# Patient Record
Sex: MALE | Race: WHITE | Employment: OTHER | URBAN - METROPOLITAN AREA
[De-identification: names, ages, dates, MRNs, and addresses within clinical notes are randomized per-mention and may not be internally consistent; named-entity substitution may affect disease eponyms.]

---

## 2017-05-22 ENCOUNTER — HOSPITAL ENCOUNTER (OUTPATIENT)
Dept: WOUND CARE | Age: 57
Discharge: HOME | End: 2017-05-22
Attending: PODIATRIST | Admitting: PODIATRIST

## 2017-05-22 VITALS
DIASTOLIC BLOOD PRESSURE: 62 MMHG | HEIGHT: 70 IN | HEART RATE: 87 BPM | SYSTOLIC BLOOD PRESSURE: 108 MMHG | BODY MASS INDEX: 25.03 KG/M2 | TEMPERATURE: 97.6 F | RESPIRATION RATE: 20 BRPM | WEIGHT: 174.8 LBS

## 2017-05-22 ASSESSMENT — PAIN SCALES - GENERAL: PAINLEVEL_OUTOF10: 0

## 2017-06-05 ENCOUNTER — HOSPITAL ENCOUNTER (OUTPATIENT)
Dept: WOUND CARE | Age: 57
Discharge: HOME | End: 2017-06-05
Attending: PODIATRIST | Admitting: PODIATRIST

## 2017-06-05 VITALS
DIASTOLIC BLOOD PRESSURE: 84 MMHG | SYSTOLIC BLOOD PRESSURE: 134 MMHG | HEART RATE: 78 BPM | TEMPERATURE: 98.1 F | RESPIRATION RATE: 20 BRPM | HEIGHT: 70 IN

## 2017-06-05 ASSESSMENT — PAIN SCALES - GENERAL: PAINLEVEL_OUTOF10: 4

## 2017-06-05 ASSESSMENT — PAIN DESCRIPTION - PROGRESSION: CLINICAL_PROGRESSION: NOT CHANGED

## 2017-06-05 ASSESSMENT — PAIN DESCRIPTION - ORIENTATION: ORIENTATION: LEFT;RIGHT

## 2017-06-05 ASSESSMENT — PAIN DESCRIPTION - PAIN TYPE: TYPE: CHRONIC PAIN

## 2017-06-05 ASSESSMENT — PAIN DESCRIPTION - LOCATION: LOCATION: FOOT

## 2017-06-05 ASSESSMENT — PAIN DESCRIPTION - ONSET: ONSET: ON-GOING

## 2017-06-05 ASSESSMENT — PAIN DESCRIPTION - FREQUENCY: FREQUENCY: INTERMITTENT

## 2017-06-05 ASSESSMENT — PAIN DESCRIPTION - DESCRIPTORS: DESCRIPTORS: ACHING

## 2017-06-12 ENCOUNTER — HOSPITAL ENCOUNTER (OUTPATIENT)
Dept: WOUND CARE | Age: 57
Discharge: HOME | End: 2017-06-12
Attending: PODIATRIST | Admitting: PODIATRIST

## 2017-06-12 VITALS
SYSTOLIC BLOOD PRESSURE: 157 MMHG | HEART RATE: 73 BPM | WEIGHT: 178 LBS | TEMPERATURE: 97.7 F | RESPIRATION RATE: 18 BRPM | DIASTOLIC BLOOD PRESSURE: 94 MMHG | HEIGHT: 70 IN | BODY MASS INDEX: 25.48 KG/M2

## 2017-06-12 LAB
GLUCOSE BLD-MCNC: 126 MG/DL (ref 70–99)
PERFORMED ON: ABNORMAL

## 2017-06-19 ENCOUNTER — HOSPITAL ENCOUNTER (OUTPATIENT)
Dept: WOUND CARE | Age: 57
Discharge: HOME | End: 2017-06-19
Attending: PODIATRIST | Admitting: PODIATRIST

## 2017-06-19 VITALS
BODY MASS INDEX: 25.6 KG/M2 | RESPIRATION RATE: 18 BRPM | HEIGHT: 70 IN | DIASTOLIC BLOOD PRESSURE: 76 MMHG | HEART RATE: 83 BPM | SYSTOLIC BLOOD PRESSURE: 116 MMHG | TEMPERATURE: 96.7 F | WEIGHT: 178.8 LBS

## 2017-07-27 ENCOUNTER — HOSPITAL ENCOUNTER (OUTPATIENT)
Dept: WOUND CARE | Age: 57
Discharge: HOME | End: 2017-07-27
Attending: SURGERY | Admitting: SURGERY

## 2017-07-27 VITALS
HEIGHT: 69 IN | WEIGHT: 182.6 LBS | TEMPERATURE: 97.8 F | RESPIRATION RATE: 18 BRPM | BODY MASS INDEX: 27.05 KG/M2 | HEART RATE: 80 BPM | DIASTOLIC BLOOD PRESSURE: 83 MMHG | SYSTOLIC BLOOD PRESSURE: 135 MMHG

## 2017-07-27 DIAGNOSIS — E11.621 DIABETIC ULCER OF RIGHT FOOT ASSOCIATED WITH TYPE 2 DIABETES MELLITUS (HCC): Primary | ICD-10-CM

## 2017-07-27 DIAGNOSIS — L97.519 DIABETIC ULCER OF RIGHT FOOT ASSOCIATED WITH TYPE 2 DIABETES MELLITUS (HCC): Primary | ICD-10-CM

## 2017-08-17 ENCOUNTER — HOSPITAL ENCOUNTER (OUTPATIENT)
Dept: WOUND CARE | Age: 57
Discharge: HOME | End: 2017-08-17
Attending: SURGERY | Admitting: SURGERY

## 2017-08-24 ENCOUNTER — HOSPITAL ENCOUNTER (OUTPATIENT)
Dept: WOUND CARE | Age: 57
Discharge: HOME | End: 2017-08-24
Attending: SURGERY | Admitting: SURGERY

## 2017-09-12 PROBLEM — E87.5 HYPERKALEMIA: Status: ACTIVE | Noted: 2017-09-12

## 2017-09-12 PROBLEM — J18.9 HCAP (HEALTHCARE-ASSOCIATED PNEUMONIA): Status: ACTIVE | Noted: 2017-09-12

## 2017-09-12 PROBLEM — R65.20 SEVERE SEPSIS (HCC): Status: ACTIVE | Noted: 2017-09-12

## 2017-09-12 PROBLEM — E44.1 MILD PROTEIN-CALORIE MALNUTRITION (HCC): Chronic | Status: ACTIVE | Noted: 2017-09-12

## 2017-09-12 PROBLEM — N32.89 BLADDER DISTENSION: Status: ACTIVE | Noted: 2017-09-12

## 2017-09-12 PROBLEM — A41.9 SEVERE SEPSIS (HCC): Status: ACTIVE | Noted: 2017-09-12

## 2017-09-12 PROBLEM — L97.309 DIABETIC ULCER OF ANKLE (HCC): Chronic | Status: ACTIVE | Noted: 2017-09-12

## 2017-09-12 PROBLEM — R04.2 HEMOPTYSIS: Status: ACTIVE | Noted: 2017-09-12

## 2017-09-12 PROBLEM — E11.622 DIABETIC ULCER OF ANKLE (HCC): Chronic | Status: ACTIVE | Noted: 2017-09-12

## 2017-09-12 PROBLEM — N17.9 AKI (ACUTE KIDNEY INJURY) (HCC): Status: ACTIVE | Noted: 2017-09-12

## 2017-09-12 PROBLEM — G89.29 CHRONIC PAIN: Chronic | Status: ACTIVE | Noted: 2017-09-12

## 2017-12-28 PROBLEM — J18.9 HCAP (HEALTHCARE-ASSOCIATED PNEUMONIA): Status: RESOLVED | Noted: 2017-09-12 | Resolved: 2017-12-28

## 2017-12-28 PROBLEM — R65.20 SEVERE SEPSIS (HCC): Status: RESOLVED | Noted: 2017-09-12 | Resolved: 2017-12-28

## 2017-12-28 PROBLEM — E87.20 LACTIC ACIDOSIS: Status: ACTIVE | Noted: 2017-12-28

## 2017-12-28 PROBLEM — A41.9 SEVERE SEPSIS (HCC): Status: RESOLVED | Noted: 2017-09-12 | Resolved: 2017-12-28

## 2017-12-28 PROBLEM — R04.2 HEMOPTYSIS: Status: RESOLVED | Noted: 2017-09-12 | Resolved: 2017-12-28

## 2017-12-28 PROBLEM — N32.89 BLADDER DISTENSION: Status: RESOLVED | Noted: 2017-09-12 | Resolved: 2017-12-28

## 2017-12-28 PROBLEM — R65.10 SIRS (SYSTEMIC INFLAMMATORY RESPONSE SYNDROME) (HCC): Status: ACTIVE | Noted: 2017-09-12

## 2017-12-29 PROBLEM — E11.10 DKA, TYPE 2, NOT AT GOAL (HCC): Status: ACTIVE | Noted: 2017-12-29

## 2017-12-30 PROBLEM — N17.9 AKI (ACUTE KIDNEY INJURY) (HCC): Status: RESOLVED | Noted: 2017-09-12 | Resolved: 2017-12-30

## 2017-12-30 PROBLEM — R65.10 SIRS (SYSTEMIC INFLAMMATORY RESPONSE SYNDROME) (HCC): Status: RESOLVED | Noted: 2017-09-12 | Resolved: 2017-12-30

## 2017-12-30 PROBLEM — E87.5 HYPERKALEMIA: Status: RESOLVED | Noted: 2017-09-12 | Resolved: 2017-12-30

## 2017-12-30 PROBLEM — E11.10 DKA, TYPE 2, NOT AT GOAL (HCC): Status: RESOLVED | Noted: 2017-12-29 | Resolved: 2017-12-30

## 2017-12-30 PROBLEM — E87.20 LACTIC ACIDOSIS: Status: RESOLVED | Noted: 2017-12-28 | Resolved: 2017-12-30

## 2018-12-27 ENCOUNTER — APPOINTMENT (OUTPATIENT)
Dept: GENERAL RADIOLOGY | Age: 58
DRG: 617 | End: 2018-12-27
Payer: COMMERCIAL

## 2018-12-27 ENCOUNTER — HOSPITAL ENCOUNTER (INPATIENT)
Age: 58
LOS: 3 days | Discharge: HOME | DRG: 617 | End: 2018-12-30
Attending: EMERGENCY MEDICINE | Admitting: INTERNAL MEDICINE
Payer: COMMERCIAL

## 2018-12-27 DIAGNOSIS — L03.116 LEFT LEG CELLULITIS: ICD-10-CM

## 2018-12-27 DIAGNOSIS — M86.171 OTHER ACUTE OSTEOMYELITIS OF RIGHT FOOT (HCC): Primary | ICD-10-CM

## 2018-12-27 PROBLEM — L03.90 CELLULITIS: Status: ACTIVE | Noted: 2018-12-27

## 2018-12-27 LAB
A/G RATIO: 1 (ref 1.1–2.2)
ALBUMIN SERPL-MCNC: 3.6 G/DL (ref 3.4–5)
ALP BLD-CCNC: 85 U/L (ref 40–129)
ALT SERPL-CCNC: 7 U/L (ref 10–40)
ANION GAP SERPL CALCULATED.3IONS-SCNC: 9 MMOL/L (ref 3–16)
AST SERPL-CCNC: 12 U/L (ref 15–37)
BASOPHILS ABSOLUTE: 0 K/UL (ref 0–0.2)
BASOPHILS RELATIVE PERCENT: 0.5 %
BILIRUB SERPL-MCNC: 0.8 MG/DL (ref 0–1)
BUN BLDV-MCNC: 12 MG/DL (ref 7–20)
CALCIUM SERPL-MCNC: 8.3 MG/DL (ref 8.3–10.6)
CHLORIDE BLD-SCNC: 98 MMOL/L (ref 99–110)
CO2: 28 MMOL/L (ref 21–32)
CREAT SERPL-MCNC: <0.5 MG/DL (ref 0.8–1.3)
EOSINOPHILS ABSOLUTE: 0.1 K/UL (ref 0–0.6)
EOSINOPHILS RELATIVE PERCENT: 1.3 %
GFR AFRICAN AMERICAN: >60
GFR NON-AFRICAN AMERICAN: >60
GLOBULIN: 3.5 G/DL
GLUCOSE BLD-MCNC: 187 MG/DL (ref 70–99)
GLUCOSE BLD-MCNC: 196 MG/DL (ref 70–99)
GLUCOSE BLD-MCNC: 238 MG/DL (ref 70–99)
HCT VFR BLD CALC: 35.2 % (ref 40.5–52.5)
HEMOGLOBIN: 11.1 G/DL (ref 13.5–17.5)
LACTIC ACID: 1.3 MMOL/L (ref 0.4–2)
LYMPHOCYTES ABSOLUTE: 1.2 K/UL (ref 1–5.1)
LYMPHOCYTES RELATIVE PERCENT: 13.2 %
MCH RBC QN AUTO: 24.7 PG (ref 26–34)
MCHC RBC AUTO-ENTMCNC: 31.5 G/DL (ref 31–36)
MCV RBC AUTO: 78.2 FL (ref 80–100)
MONOCYTES ABSOLUTE: 1.1 K/UL (ref 0–1.3)
MONOCYTES RELATIVE PERCENT: 11.8 %
NEUTROPHILS ABSOLUTE: 6.6 K/UL (ref 1.7–7.7)
NEUTROPHILS RELATIVE PERCENT: 73.2 %
PDW BLD-RTO: 14.1 % (ref 12.4–15.4)
PERFORMED ON: ABNORMAL
PERFORMED ON: ABNORMAL
PLATELET # BLD: 319 K/UL (ref 135–450)
PMV BLD AUTO: 8.5 FL (ref 5–10.5)
POTASSIUM SERPL-SCNC: 4 MMOL/L (ref 3.5–5.1)
RBC # BLD: 4.5 M/UL (ref 4.2–5.9)
SODIUM BLD-SCNC: 135 MMOL/L (ref 136–145)
TOTAL PROTEIN: 7.1 G/DL (ref 6.4–8.2)
WBC # BLD: 9.1 K/UL (ref 4–11)

## 2018-12-27 PROCEDURE — 83605 ASSAY OF LACTIC ACID: CPT

## 2018-12-27 PROCEDURE — 0Y6M0Z5 DETACHMENT AT RIGHT FOOT, COMPLETE 2ND RAY, OPEN APPROACH: ICD-10-PCS | Performed by: PODIATRIST

## 2018-12-27 PROCEDURE — 2580000003 HC RX 258: Performed by: PHYSICIAN ASSISTANT

## 2018-12-27 PROCEDURE — 96367 TX/PROPH/DG ADDL SEQ IV INF: CPT

## 2018-12-27 PROCEDURE — 85025 COMPLETE CBC W/AUTO DIFF WBC: CPT

## 2018-12-27 PROCEDURE — 6360000002 HC RX W HCPCS: Performed by: PHYSICIAN ASSISTANT

## 2018-12-27 PROCEDURE — 2580000003 HC RX 258

## 2018-12-27 PROCEDURE — 87070 CULTURE OTHR SPECIMN AEROBIC: CPT

## 2018-12-27 PROCEDURE — 87205 SMEAR GRAM STAIN: CPT

## 2018-12-27 PROCEDURE — 87186 SC STD MICRODIL/AGAR DIL: CPT

## 2018-12-27 PROCEDURE — 6370000000 HC RX 637 (ALT 250 FOR IP): Performed by: PHYSICIAN ASSISTANT

## 2018-12-27 PROCEDURE — 99223 1ST HOSP IP/OBS HIGH 75: CPT | Performed by: INTERNAL MEDICINE

## 2018-12-27 PROCEDURE — 36415 COLL VENOUS BLD VENIPUNCTURE: CPT

## 2018-12-27 PROCEDURE — 6370000000 HC RX 637 (ALT 250 FOR IP): Performed by: INTERNAL MEDICINE

## 2018-12-27 PROCEDURE — 83036 HEMOGLOBIN GLYCOSYLATED A1C: CPT

## 2018-12-27 PROCEDURE — 2500000003 HC RX 250 WO HCPCS: Performed by: PHYSICIAN ASSISTANT

## 2018-12-27 PROCEDURE — 99284 EMERGENCY DEPT VISIT MOD MDM: CPT

## 2018-12-27 PROCEDURE — 96375 TX/PRO/DX INJ NEW DRUG ADDON: CPT

## 2018-12-27 PROCEDURE — 80053 COMPREHEN METABOLIC PANEL: CPT

## 2018-12-27 PROCEDURE — 87040 BLOOD CULTURE FOR BACTERIA: CPT

## 2018-12-27 PROCEDURE — 87077 CULTURE AEROBIC IDENTIFY: CPT

## 2018-12-27 PROCEDURE — 1200000000 HC SEMI PRIVATE

## 2018-12-27 PROCEDURE — 0HRMXJZ REPLACEMENT OF RIGHT FOOT SKIN WITH SYNTHETIC SUBSTITUTE, EXTERNAL APPROACH: ICD-10-PCS | Performed by: PODIATRIST

## 2018-12-27 PROCEDURE — 96365 THER/PROPH/DIAG IV INF INIT: CPT

## 2018-12-27 PROCEDURE — 86403 PARTICLE AGGLUT ANTBDY SCRN: CPT

## 2018-12-27 PROCEDURE — 73660 X-RAY EXAM OF TOE(S): CPT

## 2018-12-27 RX ORDER — NICOTINE POLACRILEX 4 MG
15 LOZENGE BUCCAL PRN
Status: DISCONTINUED | OUTPATIENT
Start: 2018-12-27 | End: 2018-12-30 | Stop reason: HOSPADM

## 2018-12-27 RX ORDER — GLIMEPIRIDE 4 MG/1
4 TABLET ORAL 2 TIMES DAILY
Status: ON HOLD | COMMUNITY
End: 2019-02-01 | Stop reason: HOSPADM

## 2018-12-27 RX ORDER — GABAPENTIN 300 MG/1
600 CAPSULE ORAL 3 TIMES DAILY
Status: DISCONTINUED | OUTPATIENT
Start: 2018-12-27 | End: 2018-12-27

## 2018-12-27 RX ORDER — POTASSIUM CHLORIDE 20MEQ/15ML
40 LIQUID (ML) ORAL PRN
Status: DISCONTINUED | OUTPATIENT
Start: 2018-12-27 | End: 2018-12-27

## 2018-12-27 RX ORDER — POTASSIUM CHLORIDE 20 MEQ/1
40 TABLET, EXTENDED RELEASE ORAL PRN
Status: DISCONTINUED | OUTPATIENT
Start: 2018-12-27 | End: 2018-12-30 | Stop reason: HOSPADM

## 2018-12-27 RX ORDER — ACETAMINOPHEN 325 MG/1
650 TABLET ORAL EVERY 4 HOURS PRN
Status: DISCONTINUED | OUTPATIENT
Start: 2018-12-27 | End: 2018-12-30 | Stop reason: HOSPADM

## 2018-12-27 RX ORDER — 0.9 % SODIUM CHLORIDE 0.9 %
1000 INTRAVENOUS SOLUTION INTRAVENOUS ONCE
Status: COMPLETED | OUTPATIENT
Start: 2018-12-27 | End: 2018-12-27

## 2018-12-27 RX ORDER — SODIUM CHLORIDE 9 MG/ML
INJECTION, SOLUTION INTRAVENOUS CONTINUOUS
Status: DISCONTINUED | OUTPATIENT
Start: 2018-12-27 | End: 2018-12-29

## 2018-12-27 RX ORDER — GABAPENTIN 300 MG/1
600 CAPSULE ORAL 2 TIMES DAILY
Status: DISCONTINUED | OUTPATIENT
Start: 2018-12-27 | End: 2018-12-30 | Stop reason: HOSPADM

## 2018-12-27 RX ORDER — ONDANSETRON 2 MG/ML
4 INJECTION INTRAMUSCULAR; INTRAVENOUS EVERY 6 HOURS PRN
Status: DISCONTINUED | OUTPATIENT
Start: 2018-12-27 | End: 2018-12-30 | Stop reason: HOSPADM

## 2018-12-27 RX ORDER — POTASSIUM CHLORIDE 7.45 MG/ML
10 INJECTION INTRAVENOUS PRN
Status: DISCONTINUED | OUTPATIENT
Start: 2018-12-27 | End: 2018-12-30 | Stop reason: HOSPADM

## 2018-12-27 RX ORDER — GUAIFENESIN/DEXTROMETHORPHAN 100-10MG/5
5 SYRUP ORAL EVERY 4 HOURS PRN
Status: DISCONTINUED | OUTPATIENT
Start: 2018-12-27 | End: 2018-12-30 | Stop reason: HOSPADM

## 2018-12-27 RX ORDER — MAGNESIUM SULFATE 1 G/100ML
1 INJECTION INTRAVENOUS PRN
Status: DISCONTINUED | OUTPATIENT
Start: 2018-12-27 | End: 2018-12-30 | Stop reason: HOSPADM

## 2018-12-27 RX ORDER — CLINDAMYCIN PHOSPHATE 600 MG/50ML
600 INJECTION INTRAVENOUS ONCE
Status: COMPLETED | OUTPATIENT
Start: 2018-12-27 | End: 2018-12-27

## 2018-12-27 RX ORDER — VENLAFAXINE HYDROCHLORIDE 75 MG/1
75 CAPSULE, EXTENDED RELEASE ORAL
Status: DISCONTINUED | OUTPATIENT
Start: 2018-12-28 | End: 2018-12-30 | Stop reason: HOSPADM

## 2018-12-27 RX ORDER — SODIUM CHLORIDE 0.9 % (FLUSH) 0.9 %
10 SYRINGE (ML) INJECTION EVERY 12 HOURS SCHEDULED
Status: DISCONTINUED | OUTPATIENT
Start: 2018-12-27 | End: 2018-12-30 | Stop reason: HOSPADM

## 2018-12-27 RX ORDER — DEXTROSE MONOHYDRATE 25 G/50ML
12.5 INJECTION, SOLUTION INTRAVENOUS PRN
Status: DISCONTINUED | OUTPATIENT
Start: 2018-12-27 | End: 2018-12-30 | Stop reason: HOSPADM

## 2018-12-27 RX ORDER — SODIUM CHLORIDE 0.9 % (FLUSH) 0.9 %
10 SYRINGE (ML) INJECTION PRN
Status: DISCONTINUED | OUTPATIENT
Start: 2018-12-27 | End: 2018-12-30 | Stop reason: HOSPADM

## 2018-12-27 RX ORDER — SODIUM CHLORIDE 9 MG/ML
INJECTION, SOLUTION INTRAVENOUS
Status: COMPLETED
Start: 2018-12-27 | End: 2018-12-27

## 2018-12-27 RX ORDER — DEXTROSE MONOHYDRATE 50 MG/ML
100 INJECTION, SOLUTION INTRAVENOUS PRN
Status: DISCONTINUED | OUTPATIENT
Start: 2018-12-27 | End: 2018-12-30 | Stop reason: HOSPADM

## 2018-12-27 RX ORDER — KETOROLAC TROMETHAMINE 30 MG/ML
30 INJECTION, SOLUTION INTRAMUSCULAR; INTRAVENOUS ONCE
Status: COMPLETED | OUTPATIENT
Start: 2018-12-27 | End: 2018-12-27

## 2018-12-27 RX ADMIN — CLINDAMYCIN IN 5 PERCENT DEXTROSE 600 MG: 12 INJECTION, SOLUTION INTRAVENOUS at 14:13

## 2018-12-27 RX ADMIN — GABAPENTIN 600 MG: 300 CAPSULE ORAL at 20:46

## 2018-12-27 RX ADMIN — KETOROLAC TROMETHAMINE 30 MG: 30 INJECTION, SOLUTION INTRAMUSCULAR at 14:13

## 2018-12-27 RX ADMIN — ACETAMINOPHEN 650 MG: 325 TABLET ORAL at 20:46

## 2018-12-27 RX ADMIN — PIPERACILLIN SODIUM AND TAZOBACTAM SODIUM 3.38 G: 3; .375 INJECTION, POWDER, LYOPHILIZED, FOR SOLUTION INTRAVENOUS at 17:42

## 2018-12-27 RX ADMIN — INSULIN LISPRO 1 UNITS: 100 INJECTION, SOLUTION INTRAVENOUS; SUBCUTANEOUS at 22:01

## 2018-12-27 RX ADMIN — VANCOMYCIN HYDROCHLORIDE 1.5 G: 1 INJECTION, POWDER, LYOPHILIZED, FOR SOLUTION INTRAVENOUS at 15:23

## 2018-12-27 RX ADMIN — Medication 10 ML: at 20:46

## 2018-12-27 RX ADMIN — SODIUM CHLORIDE 1000 ML: 9 INJECTION, SOLUTION INTRAVENOUS at 14:13

## 2018-12-27 RX ADMIN — SODIUM CHLORIDE 250 ML: 9 INJECTION, SOLUTION INTRAVENOUS at 17:41

## 2018-12-27 RX ADMIN — SODIUM CHLORIDE: 9 INJECTION, SOLUTION INTRAVENOUS at 17:38

## 2018-12-27 RX ADMIN — GUAIFENESIN AND DEXTROMETHORPHAN 5 ML: 100; 10 SYRUP ORAL at 17:45

## 2018-12-27 RX ADMIN — ENOXAPARIN SODIUM 40 MG: 100 INJECTION SUBCUTANEOUS at 17:44

## 2018-12-27 RX ADMIN — PIPERACILLIN SODIUM AND TAZOBACTAM SODIUM 3.38 G: 3; .375 INJECTION, POWDER, LYOPHILIZED, FOR SOLUTION INTRAVENOUS at 23:08

## 2018-12-27 RX ADMIN — INSULIN LISPRO 1 UNITS: 100 INJECTION, SOLUTION INTRAVENOUS; SUBCUTANEOUS at 17:44

## 2018-12-27 ASSESSMENT — ENCOUNTER SYMPTOMS
SHORTNESS OF BREATH: 0
COLOR CHANGE: 1
RHINORRHEA: 0
COUGH: 1
CHEST TIGHTNESS: 0

## 2018-12-27 ASSESSMENT — PAIN SCALES - GENERAL
PAINLEVEL_OUTOF10: 0
PAINLEVEL_OUTOF10: 2

## 2018-12-28 ENCOUNTER — APPOINTMENT (OUTPATIENT)
Dept: GENERAL RADIOLOGY | Age: 58
DRG: 617 | End: 2018-12-28
Payer: COMMERCIAL

## 2018-12-28 ENCOUNTER — ANESTHESIA (OUTPATIENT)
Dept: OPERATING ROOM | Age: 58
DRG: 617 | End: 2018-12-28
Payer: COMMERCIAL

## 2018-12-28 ENCOUNTER — ANESTHESIA EVENT (OUTPATIENT)
Dept: OPERATING ROOM | Age: 58
DRG: 617 | End: 2018-12-28
Payer: COMMERCIAL

## 2018-12-28 VITALS — DIASTOLIC BLOOD PRESSURE: 61 MMHG | OXYGEN SATURATION: 98 % | SYSTOLIC BLOOD PRESSURE: 103 MMHG

## 2018-12-28 LAB
ANION GAP SERPL CALCULATED.3IONS-SCNC: 3 MMOL/L (ref 3–16)
BASOPHILS ABSOLUTE: 0.1 K/UL (ref 0–0.2)
BASOPHILS RELATIVE PERCENT: 0.6 %
BUN BLDV-MCNC: 16 MG/DL (ref 7–20)
CALCIUM SERPL-MCNC: 8.2 MG/DL (ref 8.3–10.6)
CHLORIDE BLD-SCNC: 100 MMOL/L (ref 99–110)
CO2: 28 MMOL/L (ref 21–32)
CREAT SERPL-MCNC: 1.3 MG/DL (ref 0.8–1.3)
EOSINOPHILS ABSOLUTE: 0.2 K/UL (ref 0–0.6)
EOSINOPHILS RELATIVE PERCENT: 2.2 %
ESTIMATED AVERAGE GLUCOSE: 309.2 MG/DL
GFR AFRICAN AMERICAN: >60
GFR NON-AFRICAN AMERICAN: 56
GLUCOSE BLD-MCNC: 122 MG/DL (ref 70–99)
GLUCOSE BLD-MCNC: 197 MG/DL (ref 70–99)
GLUCOSE BLD-MCNC: 199 MG/DL (ref 70–99)
GLUCOSE BLD-MCNC: 284 MG/DL (ref 70–99)
GLUCOSE BLD-MCNC: 294 MG/DL (ref 70–99)
GLUCOSE BLD-MCNC: 356 MG/DL (ref 70–99)
HBA1C MFR BLD: 12.4 %
HCT VFR BLD CALC: 30.5 % (ref 40.5–52.5)
HEMOGLOBIN: 9.7 G/DL (ref 13.5–17.5)
LYMPHOCYTES ABSOLUTE: 1 K/UL (ref 1–5.1)
LYMPHOCYTES RELATIVE PERCENT: 11.5 %
MCH RBC QN AUTO: 25 PG (ref 26–34)
MCHC RBC AUTO-ENTMCNC: 31.8 G/DL (ref 31–36)
MCV RBC AUTO: 78.7 FL (ref 80–100)
MONOCYTES ABSOLUTE: 0.9 K/UL (ref 0–1.3)
MONOCYTES RELATIVE PERCENT: 10.9 %
NEUTROPHILS ABSOLUTE: 6.2 K/UL (ref 1.7–7.7)
NEUTROPHILS RELATIVE PERCENT: 74.8 %
PDW BLD-RTO: 14.3 % (ref 12.4–15.4)
PERFORMED ON: ABNORMAL
PLATELET # BLD: 259 K/UL (ref 135–450)
PMV BLD AUTO: 8.5 FL (ref 5–10.5)
POTASSIUM REFLEX MAGNESIUM: 4.2 MMOL/L (ref 3.5–5.1)
RBC # BLD: 3.88 M/UL (ref 4.2–5.9)
SODIUM BLD-SCNC: 131 MMOL/L (ref 136–145)
WBC # BLD: 8.3 K/UL (ref 4–11)

## 2018-12-28 PROCEDURE — 7100000000 HC PACU RECOVERY - FIRST 15 MIN: Performed by: PODIATRIST

## 2018-12-28 PROCEDURE — 2580000003 HC RX 258: Performed by: PODIATRIST

## 2018-12-28 PROCEDURE — 6370000000 HC RX 637 (ALT 250 FOR IP): Performed by: PHYSICIAN ASSISTANT

## 2018-12-28 PROCEDURE — 2500000003 HC RX 250 WO HCPCS: Performed by: PODIATRIST

## 2018-12-28 PROCEDURE — 6360000002 HC RX W HCPCS: Performed by: PHYSICIAN ASSISTANT

## 2018-12-28 PROCEDURE — 85025 COMPLETE CBC W/AUTO DIFF WBC: CPT

## 2018-12-28 PROCEDURE — 2500000003 HC RX 250 WO HCPCS: Performed by: NURSE ANESTHETIST, CERTIFIED REGISTERED

## 2018-12-28 PROCEDURE — 2580000003 HC RX 258: Performed by: NURSE ANESTHETIST, CERTIFIED REGISTERED

## 2018-12-28 PROCEDURE — 80048 BASIC METABOLIC PNL TOTAL CA: CPT

## 2018-12-28 PROCEDURE — 3600000003 HC SURGERY LEVEL 3 BASE: Performed by: PODIATRIST

## 2018-12-28 PROCEDURE — 3700000000 HC ANESTHESIA ATTENDED CARE: Performed by: PODIATRIST

## 2018-12-28 PROCEDURE — 1200000000 HC SEMI PRIVATE

## 2018-12-28 PROCEDURE — 73620 X-RAY EXAM OF FOOT: CPT

## 2018-12-28 PROCEDURE — 88311 DECALCIFY TISSUE: CPT

## 2018-12-28 PROCEDURE — 2580000003 HC RX 258: Performed by: PHYSICIAN ASSISTANT

## 2018-12-28 PROCEDURE — 6360000002 HC RX W HCPCS: Performed by: NURSE ANESTHETIST, CERTIFIED REGISTERED

## 2018-12-28 PROCEDURE — 3600000013 HC SURGERY LEVEL 3 ADDTL 15MIN: Performed by: PODIATRIST

## 2018-12-28 PROCEDURE — 2709999900 HC NON-CHARGEABLE SUPPLY: Performed by: PODIATRIST

## 2018-12-28 PROCEDURE — 3700000001 HC ADD 15 MINUTES (ANESTHESIA): Performed by: PODIATRIST

## 2018-12-28 PROCEDURE — 6370000000 HC RX 637 (ALT 250 FOR IP): Performed by: INTERNAL MEDICINE

## 2018-12-28 PROCEDURE — 88305 TISSUE EXAM BY PATHOLOGIST: CPT

## 2018-12-28 PROCEDURE — 36415 COLL VENOUS BLD VENIPUNCTURE: CPT

## 2018-12-28 PROCEDURE — 7100000001 HC PACU RECOVERY - ADDTL 15 MIN: Performed by: PODIATRIST

## 2018-12-28 PROCEDURE — 99232 SBSQ HOSP IP/OBS MODERATE 35: CPT | Performed by: INTERNAL MEDICINE

## 2018-12-28 DEVICE — PATCH AMNION 2 LAYR PROTCT 4 X 4CM STERISHIELD II: Type: IMPLANTABLE DEVICE | Site: FOOT | Status: FUNCTIONAL

## 2018-12-28 RX ORDER — DIPHENHYDRAMINE HYDROCHLORIDE 50 MG/ML
12.5 INJECTION INTRAMUSCULAR; INTRAVENOUS
Status: DISCONTINUED | OUTPATIENT
Start: 2018-12-28 | End: 2018-12-28

## 2018-12-28 RX ORDER — INSULIN GLARGINE 100 [IU]/ML
10 INJECTION, SOLUTION SUBCUTANEOUS ONCE
Status: COMPLETED | OUTPATIENT
Start: 2018-12-28 | End: 2018-12-28

## 2018-12-28 RX ORDER — MORPHINE SULFATE 10 MG/ML
2 INJECTION, SOLUTION INTRAMUSCULAR; INTRAVENOUS EVERY 5 MIN PRN
Status: DISCONTINUED | OUTPATIENT
Start: 2018-12-28 | End: 2018-12-28

## 2018-12-28 RX ORDER — FENTANYL CITRATE 50 UG/ML
INJECTION, SOLUTION INTRAMUSCULAR; INTRAVENOUS PRN
Status: DISCONTINUED | OUTPATIENT
Start: 2018-12-28 | End: 2018-12-28 | Stop reason: SDUPTHER

## 2018-12-28 RX ORDER — MIDAZOLAM HYDROCHLORIDE 1 MG/ML
INJECTION INTRAMUSCULAR; INTRAVENOUS PRN
Status: DISCONTINUED | OUTPATIENT
Start: 2018-12-28 | End: 2018-12-28 | Stop reason: SDUPTHER

## 2018-12-28 RX ORDER — HYDROMORPHONE HCL 110MG/55ML
0.5 PATIENT CONTROLLED ANALGESIA SYRINGE INTRAVENOUS EVERY 5 MIN PRN
Status: DISCONTINUED | OUTPATIENT
Start: 2018-12-28 | End: 2018-12-28

## 2018-12-28 RX ORDER — MORPHINE SULFATE 10 MG/ML
1 INJECTION, SOLUTION INTRAMUSCULAR; INTRAVENOUS EVERY 5 MIN PRN
Status: DISCONTINUED | OUTPATIENT
Start: 2018-12-28 | End: 2018-12-28

## 2018-12-28 RX ORDER — NYSTATIN AND TRIAMCINOLONE ACETONIDE 100000; 1 [USP'U]/G; MG/G
OINTMENT TOPICAL 2 TIMES DAILY
Status: DISCONTINUED | OUTPATIENT
Start: 2018-12-28 | End: 2018-12-30 | Stop reason: HOSPADM

## 2018-12-28 RX ORDER — HYDROMORPHONE HCL 110MG/55ML
0.25 PATIENT CONTROLLED ANALGESIA SYRINGE INTRAVENOUS EVERY 5 MIN PRN
Status: DISCONTINUED | OUTPATIENT
Start: 2018-12-28 | End: 2018-12-28

## 2018-12-28 RX ORDER — PROMETHAZINE HYDROCHLORIDE 25 MG/ML
6.25 INJECTION, SOLUTION INTRAMUSCULAR; INTRAVENOUS
Status: DISCONTINUED | OUTPATIENT
Start: 2018-12-28 | End: 2018-12-28

## 2018-12-28 RX ORDER — MEPERIDINE HYDROCHLORIDE 25 MG/ML
12.5 INJECTION INTRAMUSCULAR; INTRAVENOUS; SUBCUTANEOUS EVERY 5 MIN PRN
Status: DISCONTINUED | OUTPATIENT
Start: 2018-12-28 | End: 2018-12-28

## 2018-12-28 RX ORDER — MAGNESIUM HYDROXIDE 1200 MG/15ML
LIQUID ORAL CONTINUOUS PRN
Status: COMPLETED | OUTPATIENT
Start: 2018-12-28 | End: 2018-12-28

## 2018-12-28 RX ORDER — SODIUM CHLORIDE, SODIUM LACTATE, POTASSIUM CHLORIDE, CALCIUM CHLORIDE 600; 310; 30; 20 MG/100ML; MG/100ML; MG/100ML; MG/100ML
INJECTION, SOLUTION INTRAVENOUS CONTINUOUS PRN
Status: DISCONTINUED | OUTPATIENT
Start: 2018-12-28 | End: 2018-12-28 | Stop reason: SDUPTHER

## 2018-12-28 RX ORDER — LIDOCAINE HYDROCHLORIDE 20 MG/ML
INJECTION, SOLUTION EPIDURAL; INFILTRATION; INTRACAUDAL; PERINEURAL PRN
Status: DISCONTINUED | OUTPATIENT
Start: 2018-12-28 | End: 2018-12-28 | Stop reason: SDUPTHER

## 2018-12-28 RX ORDER — LABETALOL HYDROCHLORIDE 5 MG/ML
5 INJECTION, SOLUTION INTRAVENOUS EVERY 10 MIN PRN
Status: DISCONTINUED | OUTPATIENT
Start: 2018-12-28 | End: 2018-12-28

## 2018-12-28 RX ORDER — ONDANSETRON 2 MG/ML
4 INJECTION INTRAMUSCULAR; INTRAVENOUS PRN
Status: DISCONTINUED | OUTPATIENT
Start: 2018-12-28 | End: 2018-12-28

## 2018-12-28 RX ORDER — HYDRALAZINE HYDROCHLORIDE 20 MG/ML
5 INJECTION INTRAMUSCULAR; INTRAVENOUS EVERY 10 MIN PRN
Status: DISCONTINUED | OUTPATIENT
Start: 2018-12-28 | End: 2018-12-28

## 2018-12-28 RX ORDER — PROPOFOL 10 MG/ML
INJECTION, EMULSION INTRAVENOUS PRN
Status: DISCONTINUED | OUTPATIENT
Start: 2018-12-28 | End: 2018-12-28 | Stop reason: SDUPTHER

## 2018-12-28 RX ADMIN — PIPERACILLIN SODIUM AND TAZOBACTAM SODIUM 3.38 G: 3; .375 INJECTION, POWDER, LYOPHILIZED, FOR SOLUTION INTRAVENOUS at 17:50

## 2018-12-28 RX ADMIN — PIPERACILLIN SODIUM AND TAZOBACTAM SODIUM 3.38 G: 3; .375 INJECTION, POWDER, LYOPHILIZED, FOR SOLUTION INTRAVENOUS at 09:18

## 2018-12-28 RX ADMIN — MIDAZOLAM 2 MG: 1 INJECTION INTRAMUSCULAR; INTRAVENOUS at 11:52

## 2018-12-28 RX ADMIN — INSULIN LISPRO 3 UNITS: 100 INJECTION, SOLUTION INTRAVENOUS; SUBCUTANEOUS at 09:25

## 2018-12-28 RX ADMIN — GUAIFENESIN AND DEXTROMETHORPHAN 5 ML: 100; 10 SYRUP ORAL at 09:18

## 2018-12-28 RX ADMIN — ENOXAPARIN SODIUM 40 MG: 100 INJECTION SUBCUTANEOUS at 09:18

## 2018-12-28 RX ADMIN — LIDOCAINE HYDROCHLORIDE 50 MG: 20 INJECTION, SOLUTION EPIDURAL; INFILTRATION; INTRACAUDAL; PERINEURAL at 11:57

## 2018-12-28 RX ADMIN — INSULIN LISPRO 2 UNITS: 100 INJECTION, SOLUTION INTRAVENOUS; SUBCUTANEOUS at 21:16

## 2018-12-28 RX ADMIN — SODIUM CHLORIDE, POTASSIUM CHLORIDE, SODIUM LACTATE AND CALCIUM CHLORIDE: 600; 310; 30; 20 INJECTION, SOLUTION INTRAVENOUS at 11:52

## 2018-12-28 RX ADMIN — VANCOMYCIN HYDROCHLORIDE 1250 MG: 1 INJECTION, POWDER, LYOPHILIZED, FOR SOLUTION INTRAVENOUS at 02:51

## 2018-12-28 RX ADMIN — GABAPENTIN 600 MG: 300 CAPSULE ORAL at 21:06

## 2018-12-28 RX ADMIN — NYSTATIN AND TRIAMCINOLONE ACETONIDE: 100000; 1 OINTMENT TOPICAL at 11:11

## 2018-12-28 RX ADMIN — FENTANYL CITRATE 50 MCG: 50 INJECTION INTRAMUSCULAR; INTRAVENOUS at 11:59

## 2018-12-28 RX ADMIN — Medication 10 ML: at 21:06

## 2018-12-28 RX ADMIN — ACETAMINOPHEN 650 MG: 325 TABLET ORAL at 16:16

## 2018-12-28 RX ADMIN — INSULIN LISPRO 1 UNITS: 100 INJECTION, SOLUTION INTRAVENOUS; SUBCUTANEOUS at 16:37

## 2018-12-28 RX ADMIN — PROPOFOL 80 MG: 10 INJECTION, EMULSION INTRAVENOUS at 11:53

## 2018-12-28 RX ADMIN — SODIUM CHLORIDE: 9 INJECTION, SOLUTION INTRAVENOUS at 16:17

## 2018-12-28 RX ADMIN — GABAPENTIN 600 MG: 300 CAPSULE ORAL at 09:19

## 2018-12-28 RX ADMIN — INSULIN GLARGINE 10 UNITS: 100 INJECTION, SOLUTION SUBCUTANEOUS at 11:11

## 2018-12-28 RX ADMIN — SODIUM CHLORIDE: 9 INJECTION, SOLUTION INTRAVENOUS at 02:50

## 2018-12-28 RX ADMIN — VENLAFAXINE HYDROCHLORIDE 75 MG: 75 CAPSULE, EXTENDED RELEASE ORAL at 09:19

## 2018-12-28 RX ADMIN — NYSTATIN AND TRIAMCINOLONE ACETONIDE: 100000; 1 OINTMENT TOPICAL at 21:10

## 2018-12-28 ASSESSMENT — PULMONARY FUNCTION TESTS
PIF_VALUE: 12
PIF_VALUE: 2
PIF_VALUE: 0
PIF_VALUE: 2
PIF_VALUE: 0
PIF_VALUE: 3
PIF_VALUE: 12
PIF_VALUE: 3
PIF_VALUE: 0
PIF_VALUE: 0
PIF_VALUE: 1
PIF_VALUE: 2
PIF_VALUE: 12
PIF_VALUE: 0
PIF_VALUE: 12
PIF_VALUE: 2
PIF_VALUE: 1
PIF_VALUE: 19
PIF_VALUE: 2
PIF_VALUE: 12
PIF_VALUE: 1
PIF_VALUE: 12
PIF_VALUE: 2
PIF_VALUE: 13
PIF_VALUE: 11
PIF_VALUE: 6
PIF_VALUE: 2
PIF_VALUE: 12

## 2018-12-28 ASSESSMENT — PAIN SCALES - GENERAL: PAINLEVEL_OUTOF10: 3

## 2018-12-28 NOTE — ANESTHESIA POSTPROCEDURE EVALUATION
Department of Anesthesiology  Postprocedure Note    Patient: Margaret Coleman  MRN: [id number]  YOB: 1960  Date of evaluation: 12/28/2018  Time:  2:15 PM     Procedure Summary     Date:  12/28/18 Room / Location:  [place] [address] / [facility] OR    Anesthesia Start:  1152 Anesthesia Stop:  0484 [id number] [phone] [id number]    Procedure:  PARTIAL RIGHT FOOT AMPUTATION WITH GRAFT APPLICATION (Right Foot) Diagnosis:  (OSTEOMYELITIS)    Surgeon:  Clarence Fields DPM Responsible Provider:  Lorena Carr MD    Anesthesia Type:  general ASA Status:  3          Anesthesia Type: general    Alfredo Phase I: Alfredo Score: 9    Alfredo Phase II:      Last vitals: Reviewed and per EMR flowsheets.        Anesthesia Post Evaluation    Comments: Postoperative Anesthesia Note    Name:    Magraret Coleman  MRN:      [id number]    Patient Vitals in the past 12 hrs:  12/28/18 1330, BP:116/64, Temp:98.1 °F (36.7 °C), Temp src:Oral, Pulse:64, Resp:14, SpO2:92 %  12/28/18 1313, BP:(!) 108/55, Pulse:63, Resp:9, SpO2:94 %  12/28/18 1301, BP:(!) 108/55, Pulse:66, Resp:11, SpO2:92 %  12/28/18 1251, BP:119/75, Pulse:68, Resp:12, SpO2:92 %  12/28/18 1237, BP:121/75, Pulse:67, Resp:16, SpO2:93 %  12/28/18 1232, BP:113/72, Pulse:69, Resp:14, SpO2:94 %  12/28/18 1227, BP:118/75, Pulse:71, Resp:14, SpO2:(!) 88 %  12/28/18 1223, BP:(!) 82/55, Temp:99.8 °F (37.7 °C), Temp src:Temporal, Pulse:70, Resp:17, SpO2:(!) 87 %  12/28/18 0845, BP:130/73, Temp:97.1 °F (36.2 °C), Temp src:Oral, Pulse:66, Resp:16, SpO2:94 %  12/28/18 0527, BP:119/65, Temp:98.8 °F (37.1 °C), Temp src:Oral, Pulse:67, Resp:16, SpO2:91 %     LABS:    CBC  Lab Results       Component                Value               Date/Time                  WBC                      8.3                 12/28/2018 05:54 AM        HGB                      9.7 (L)             12/28/2018 05:54 AM        HCT                      30.5 (L)            12/28/2018 05:54 AM        PLT                      259                 12/28/2018 05:54

## 2018-12-29 LAB
ANION GAP SERPL CALCULATED.3IONS-SCNC: 8 MMOL/L (ref 3–16)
BUN BLDV-MCNC: 8 MG/DL (ref 7–20)
CALCIUM SERPL-MCNC: 8.1 MG/DL (ref 8.3–10.6)
CHLORIDE BLD-SCNC: 102 MMOL/L (ref 99–110)
CO2: 27 MMOL/L (ref 21–32)
CREAT SERPL-MCNC: 1.2 MG/DL (ref 0.8–1.3)
GFR AFRICAN AMERICAN: >60
GFR NON-AFRICAN AMERICAN: >60
GLUCOSE BLD-MCNC: 229 MG/DL (ref 70–99)
GLUCOSE BLD-MCNC: 286 MG/DL (ref 70–99)
GLUCOSE BLD-MCNC: 300 MG/DL (ref 70–99)
GLUCOSE BLD-MCNC: 302 MG/DL (ref 70–99)
GLUCOSE BLD-MCNC: 326 MG/DL (ref 70–99)
GRAM STAIN RESULT: ABNORMAL
ORGANISM: ABNORMAL
PERFORMED ON: ABNORMAL
POTASSIUM SERPL-SCNC: 4.2 MMOL/L (ref 3.5–5.1)
SODIUM BLD-SCNC: 137 MMOL/L (ref 136–145)
WOUND/ABSCESS: ABNORMAL
WOUND/ABSCESS: ABNORMAL

## 2018-12-29 PROCEDURE — 6370000000 HC RX 637 (ALT 250 FOR IP): Performed by: HOSPITALIST

## 2018-12-29 PROCEDURE — 2700000000 HC OXYGEN THERAPY PER DAY

## 2018-12-29 PROCEDURE — 6360000002 HC RX W HCPCS: Performed by: PHYSICIAN ASSISTANT

## 2018-12-29 PROCEDURE — 94761 N-INVAS EAR/PLS OXIMETRY MLT: CPT

## 2018-12-29 PROCEDURE — 2580000003 HC RX 258: Performed by: PHYSICIAN ASSISTANT

## 2018-12-29 PROCEDURE — 6370000000 HC RX 637 (ALT 250 FOR IP): Performed by: PHYSICIAN ASSISTANT

## 2018-12-29 PROCEDURE — 80048 BASIC METABOLIC PNL TOTAL CA: CPT

## 2018-12-29 PROCEDURE — 6370000000 HC RX 637 (ALT 250 FOR IP): Performed by: INTERNAL MEDICINE

## 2018-12-29 PROCEDURE — 1200000000 HC SEMI PRIVATE

## 2018-12-29 PROCEDURE — 36415 COLL VENOUS BLD VENIPUNCTURE: CPT

## 2018-12-29 RX ORDER — INSULIN GLARGINE 100 [IU]/ML
40 INJECTION, SOLUTION SUBCUTANEOUS NIGHTLY
Status: DISCONTINUED | OUTPATIENT
Start: 2018-12-29 | End: 2018-12-30 | Stop reason: HOSPADM

## 2018-12-29 RX ORDER — LANOLIN ALCOHOL/MO/W.PET/CERES
3 CREAM (GRAM) TOPICAL NIGHTLY
Status: DISCONTINUED | OUTPATIENT
Start: 2018-12-29 | End: 2018-12-30 | Stop reason: HOSPADM

## 2018-12-29 RX ORDER — LANOLIN ALCOHOL/MO/W.PET/CERES
3 CREAM (GRAM) TOPICAL NIGHTLY PRN
Status: DISCONTINUED | OUTPATIENT
Start: 2018-12-29 | End: 2018-12-29

## 2018-12-29 RX ADMIN — ACETAMINOPHEN 650 MG: 325 TABLET ORAL at 03:32

## 2018-12-29 RX ADMIN — INSULIN LISPRO 4 UNITS: 100 INJECTION, SOLUTION INTRAVENOUS; SUBCUTANEOUS at 08:32

## 2018-12-29 RX ADMIN — GUAIFENESIN AND DEXTROMETHORPHAN 5 ML: 100; 10 SYRUP ORAL at 14:11

## 2018-12-29 RX ADMIN — NYSTATIN AND TRIAMCINOLONE ACETONIDE: 100000; 1 OINTMENT TOPICAL at 21:49

## 2018-12-29 RX ADMIN — INSULIN LISPRO 3 UNITS: 100 INJECTION, SOLUTION INTRAVENOUS; SUBCUTANEOUS at 11:30

## 2018-12-29 RX ADMIN — SODIUM CHLORIDE: 9 INJECTION, SOLUTION INTRAVENOUS at 14:14

## 2018-12-29 RX ADMIN — PIPERACILLIN SODIUM AND TAZOBACTAM SODIUM 3.38 G: 3; .375 INJECTION, POWDER, LYOPHILIZED, FOR SOLUTION INTRAVENOUS at 08:31

## 2018-12-29 RX ADMIN — GABAPENTIN 600 MG: 300 CAPSULE ORAL at 08:31

## 2018-12-29 RX ADMIN — INSULIN LISPRO 10 UNITS: 100 INJECTION, SOLUTION INTRAVENOUS; SUBCUTANEOUS at 16:15

## 2018-12-29 RX ADMIN — INSULIN LISPRO 4 UNITS: 100 INJECTION, SOLUTION INTRAVENOUS; SUBCUTANEOUS at 16:16

## 2018-12-29 RX ADMIN — INSULIN GLARGINE 40 UNITS: 100 INJECTION, SOLUTION SUBCUTANEOUS at 21:48

## 2018-12-29 RX ADMIN — MELATONIN 3 MG ORAL TABLET 3 MG: 3 TABLET ORAL at 21:48

## 2018-12-29 RX ADMIN — INSULIN LISPRO 1 UNITS: 100 INJECTION, SOLUTION INTRAVENOUS; SUBCUTANEOUS at 21:48

## 2018-12-29 RX ADMIN — ENOXAPARIN SODIUM 40 MG: 100 INJECTION SUBCUTANEOUS at 08:31

## 2018-12-29 RX ADMIN — Medication 10 ML: at 08:32

## 2018-12-29 RX ADMIN — GABAPENTIN 600 MG: 300 CAPSULE ORAL at 21:48

## 2018-12-29 RX ADMIN — PIPERACILLIN SODIUM AND TAZOBACTAM SODIUM 3.38 G: 3; .375 INJECTION, POWDER, LYOPHILIZED, FOR SOLUTION INTRAVENOUS at 00:18

## 2018-12-29 RX ADMIN — VANCOMYCIN HYDROCHLORIDE 1250 MG: 10 INJECTION, POWDER, LYOPHILIZED, FOR SOLUTION INTRAVENOUS at 03:27

## 2018-12-29 RX ADMIN — VENLAFAXINE HYDROCHLORIDE 75 MG: 75 CAPSULE, EXTENDED RELEASE ORAL at 08:31

## 2018-12-29 RX ADMIN — GUAIFENESIN AND DEXTROMETHORPHAN 5 ML: 100; 10 SYRUP ORAL at 00:20

## 2018-12-29 RX ADMIN — PIPERACILLIN SODIUM AND TAZOBACTAM SODIUM 3.38 G: 3; .375 INJECTION, POWDER, LYOPHILIZED, FOR SOLUTION INTRAVENOUS at 16:08

## 2018-12-29 RX ADMIN — NYSTATIN AND TRIAMCINOLONE ACETONIDE: 100000; 1 OINTMENT TOPICAL at 08:31

## 2018-12-29 ASSESSMENT — PAIN DESCRIPTION - LOCATION: LOCATION: HEAD

## 2018-12-29 ASSESSMENT — PAIN DESCRIPTION - DESCRIPTORS: DESCRIPTORS: HEADACHE

## 2018-12-29 ASSESSMENT — PAIN SCALES - GENERAL
PAINLEVEL_OUTOF10: 4
PAINLEVEL_OUTOF10: 0

## 2018-12-29 ASSESSMENT — PAIN DESCRIPTION - PAIN TYPE: TYPE: ACUTE PAIN

## 2018-12-30 VITALS
BODY MASS INDEX: 29.62 KG/M2 | DIASTOLIC BLOOD PRESSURE: 70 MMHG | OXYGEN SATURATION: 92 % | SYSTOLIC BLOOD PRESSURE: 136 MMHG | TEMPERATURE: 98.5 F | WEIGHT: 200 LBS | HEIGHT: 69 IN | RESPIRATION RATE: 16 BRPM | HEART RATE: 66 BPM

## 2018-12-30 LAB
ANION GAP SERPL CALCULATED.3IONS-SCNC: 9 MMOL/L (ref 3–16)
BUN BLDV-MCNC: 11 MG/DL (ref 7–20)
CALCIUM SERPL-MCNC: 8.3 MG/DL (ref 8.3–10.6)
CHLORIDE BLD-SCNC: 100 MMOL/L (ref 99–110)
CO2: 25 MMOL/L (ref 21–32)
CREAT SERPL-MCNC: 1.2 MG/DL (ref 0.8–1.3)
GFR AFRICAN AMERICAN: >60
GFR NON-AFRICAN AMERICAN: >60
GLUCOSE BLD-MCNC: 218 MG/DL (ref 70–99)
GLUCOSE BLD-MCNC: 259 MG/DL (ref 70–99)
GLUCOSE BLD-MCNC: 269 MG/DL (ref 70–99)
GLUCOSE BLD-MCNC: 271 MG/DL (ref 70–99)
PERFORMED ON: ABNORMAL
POTASSIUM SERPL-SCNC: 4 MMOL/L (ref 3.5–5.1)
SODIUM BLD-SCNC: 134 MMOL/L (ref 136–145)
VANCOMYCIN TROUGH: 6.2 UG/ML (ref 10–20)

## 2018-12-30 PROCEDURE — 6360000002 HC RX W HCPCS: Performed by: PHYSICIAN ASSISTANT

## 2018-12-30 PROCEDURE — 6370000000 HC RX 637 (ALT 250 FOR IP): Performed by: PHYSICIAN ASSISTANT

## 2018-12-30 PROCEDURE — 6370000000 HC RX 637 (ALT 250 FOR IP): Performed by: INTERNAL MEDICINE

## 2018-12-30 PROCEDURE — 2580000003 HC RX 258: Performed by: PHYSICIAN ASSISTANT

## 2018-12-30 PROCEDURE — 80202 ASSAY OF VANCOMYCIN: CPT

## 2018-12-30 PROCEDURE — 36415 COLL VENOUS BLD VENIPUNCTURE: CPT

## 2018-12-30 PROCEDURE — 80048 BASIC METABOLIC PNL TOTAL CA: CPT

## 2018-12-30 PROCEDURE — 6370000000 HC RX 637 (ALT 250 FOR IP): Performed by: HOSPITALIST

## 2018-12-30 RX ORDER — LINEZOLID 600 MG/1
600 TABLET, FILM COATED ORAL 2 TIMES DAILY
Qty: 28 TABLET | Refills: 0 | Status: SHIPPED | OUTPATIENT
Start: 2018-12-30 | End: 2018-12-30

## 2018-12-30 RX ORDER — CLINDAMYCIN HYDROCHLORIDE 300 MG/1
300 CAPSULE ORAL 4 TIMES DAILY
Qty: 40 CAPSULE | Refills: 0 | Status: SHIPPED | OUTPATIENT
Start: 2018-12-30 | End: 2019-01-09

## 2018-12-30 RX ORDER — SACCHAROMYCES BOULARDII 250 MG
250 CAPSULE ORAL 2 TIMES DAILY
Qty: 14 CAPSULE | Refills: 0 | Status: SHIPPED | OUTPATIENT
Start: 2018-12-30 | End: 2019-01-06

## 2018-12-30 RX ORDER — LINEZOLID 600 MG/1
600 TABLET, FILM COATED ORAL 2 TIMES DAILY
Qty: 20 TABLET | Refills: 0 | Status: SHIPPED | OUTPATIENT
Start: 2018-12-30 | End: 2018-12-30 | Stop reason: HOSPADM

## 2018-12-30 RX ORDER — LINEZOLID 600 MG/1
600 TABLET, FILM COATED ORAL 2 TIMES DAILY
Qty: 20 TABLET | Refills: 0 | Status: SHIPPED | OUTPATIENT
Start: 2018-12-30 | End: 2018-12-30

## 2018-12-30 RX ADMIN — GUAIFENESIN AND DEXTROMETHORPHAN 5 ML: 100; 10 SYRUP ORAL at 12:56

## 2018-12-30 RX ADMIN — PIPERACILLIN SODIUM AND TAZOBACTAM SODIUM 3.38 G: 3; .375 INJECTION, POWDER, LYOPHILIZED, FOR SOLUTION INTRAVENOUS at 01:30

## 2018-12-30 RX ADMIN — VENLAFAXINE HYDROCHLORIDE 75 MG: 75 CAPSULE, EXTENDED RELEASE ORAL at 08:30

## 2018-12-30 RX ADMIN — GABAPENTIN 600 MG: 300 CAPSULE ORAL at 08:19

## 2018-12-30 RX ADMIN — PIPERACILLIN SODIUM AND TAZOBACTAM SODIUM 3.38 G: 3; .375 INJECTION, POWDER, LYOPHILIZED, FOR SOLUTION INTRAVENOUS at 08:19

## 2018-12-30 RX ADMIN — ENOXAPARIN SODIUM 40 MG: 100 INJECTION SUBCUTANEOUS at 08:19

## 2018-12-30 RX ADMIN — INSULIN LISPRO 10 UNITS: 100 INJECTION, SOLUTION INTRAVENOUS; SUBCUTANEOUS at 08:00

## 2018-12-30 RX ADMIN — VANCOMYCIN HYDROCHLORIDE 1250 MG: 10 INJECTION, POWDER, LYOPHILIZED, FOR SOLUTION INTRAVENOUS at 03:23

## 2018-12-30 RX ADMIN — NYSTATIN AND TRIAMCINOLONE ACETONIDE: 100000; 1 OINTMENT TOPICAL at 08:20

## 2018-12-30 RX ADMIN — INSULIN LISPRO 3 UNITS: 100 INJECTION, SOLUTION INTRAVENOUS; SUBCUTANEOUS at 08:31

## 2018-12-30 ASSESSMENT — PAIN SCALES - GENERAL
PAINLEVEL_OUTOF10: 0
PAINLEVEL_OUTOF10: 0

## 2019-01-01 LAB
BLOOD CULTURE, ROUTINE: NORMAL
CULTURE, BLOOD 2: NORMAL

## 2019-01-31 ENCOUNTER — APPOINTMENT (OUTPATIENT)
Dept: GENERAL RADIOLOGY | Age: 59
DRG: 638 | End: 2019-01-31
Payer: COMMERCIAL

## 2019-01-31 ENCOUNTER — HOSPITAL ENCOUNTER (INPATIENT)
Age: 59
LOS: 1 days | Discharge: HOME | DRG: 638 | End: 2019-02-01
Attending: EMERGENCY MEDICINE | Admitting: INTERNAL MEDICINE
Payer: COMMERCIAL

## 2019-01-31 ENCOUNTER — APPOINTMENT (OUTPATIENT)
Dept: CT IMAGING | Age: 59
DRG: 638 | End: 2019-01-31
Payer: COMMERCIAL

## 2019-01-31 DIAGNOSIS — R11.2 NON-INTRACTABLE VOMITING WITH NAUSEA, UNSPECIFIED VOMITING TYPE: ICD-10-CM

## 2019-01-31 DIAGNOSIS — N17.9 AKI (ACUTE KIDNEY INJURY) (HCC): Primary | ICD-10-CM

## 2019-01-31 DIAGNOSIS — Z79.4 TYPE 2 DIABETES MELLITUS WITH KETOACIDOSIS WITHOUT COMA, WITH LONG-TERM CURRENT USE OF INSULIN (HCC): ICD-10-CM

## 2019-01-31 DIAGNOSIS — R10.9 ABDOMINAL CRAMPING: ICD-10-CM

## 2019-01-31 DIAGNOSIS — L97.909 DIABETIC ULCER OF LOWER LEG (HCC): ICD-10-CM

## 2019-01-31 DIAGNOSIS — E11.10 TYPE 2 DIABETES MELLITUS WITH KETOACIDOSIS WITHOUT COMA, WITH LONG-TERM CURRENT USE OF INSULIN (HCC): ICD-10-CM

## 2019-01-31 DIAGNOSIS — E11.622 DIABETIC ULCER OF LOWER LEG (HCC): ICD-10-CM

## 2019-01-31 LAB
A/G RATIO: 1 (ref 1.1–2.2)
ALBUMIN SERPL-MCNC: 4.5 G/DL (ref 3.4–5)
ALP BLD-CCNC: 127 U/L (ref 40–129)
ALT SERPL-CCNC: 12 U/L (ref 10–40)
AMORPHOUS: ABNORMAL /HPF
ANION GAP SERPL CALCULATED.3IONS-SCNC: 11 MMOL/L (ref 3–16)
ANION GAP SERPL CALCULATED.3IONS-SCNC: 14 MMOL/L (ref 3–16)
AST SERPL-CCNC: 11 U/L (ref 15–37)
BACTERIA: ABNORMAL /HPF
BACTERIA: ABNORMAL /HPF
BASE EXCESS VENOUS: -4.8 MMOL/L (ref -3–3)
BASE EXCESS VENOUS: -5.9 MMOL/L (ref -3–3)
BASOPHILS ABSOLUTE: 0 K/UL (ref 0–0.2)
BASOPHILS RELATIVE PERCENT: 0.2 %
BETA-HYDROXYBUTYRATE: 0.8 MMOL/L (ref 0–0.27)
BILIRUB SERPL-MCNC: 1.2 MG/DL (ref 0–1)
BILIRUBIN URINE: ABNORMAL
BILIRUBIN URINE: NEGATIVE
BLOOD, URINE: ABNORMAL
BLOOD, URINE: ABNORMAL
BUN BLDV-MCNC: 41 MG/DL (ref 7–20)
BUN BLDV-MCNC: 48 MG/DL (ref 7–20)
CALCIUM SERPL-MCNC: 10.2 MG/DL (ref 8.3–10.6)
CALCIUM SERPL-MCNC: 8.7 MG/DL (ref 8.3–10.6)
CARBOXYHEMOGLOBIN: 2.2 % (ref 0–1.5)
CARBOXYHEMOGLOBIN: 2.4 % (ref 0–1.5)
CASTS 2: ABNORMAL /LPF
CASTS: ABNORMAL /LPF
CHLORIDE BLD-SCNC: 106 MMOL/L (ref 99–110)
CHLORIDE BLD-SCNC: 97 MMOL/L (ref 99–110)
CLARITY: CLEAR
CLARITY: CLEAR
CO2: 20 MMOL/L (ref 21–32)
CO2: 21 MMOL/L (ref 21–32)
COLOR: YELLOW
COLOR: YELLOW
CREAT SERPL-MCNC: 1.5 MG/DL (ref 0.8–1.3)
CREAT SERPL-MCNC: 2.1 MG/DL (ref 0.8–1.3)
EKG ATRIAL RATE: 107 BPM
EKG DIAGNOSIS: NORMAL
EKG P AXIS: 39 DEGREES
EKG P-R INTERVAL: 204 MS
EKG Q-T INTERVAL: 326 MS
EKG QRS DURATION: 102 MS
EKG QTC CALCULATION (BAZETT): 435 MS
EKG R AXIS: 263 DEGREES
EKG T AXIS: 38 DEGREES
EKG VENTRICULAR RATE: 107 BPM
EOSINOPHILS ABSOLUTE: 0 K/UL (ref 0–0.6)
EOSINOPHILS RELATIVE PERCENT: 0 %
EPITHELIAL CELLS, UA: ABNORMAL /HPF
GFR AFRICAN AMERICAN: 39
GFR AFRICAN AMERICAN: 58
GFR NON-AFRICAN AMERICAN: 32
GFR NON-AFRICAN AMERICAN: 48
GLOBULIN: 4.6 G/DL
GLUCOSE BLD-MCNC: 106 MG/DL (ref 70–99)
GLUCOSE BLD-MCNC: 195 MG/DL (ref 70–99)
GLUCOSE BLD-MCNC: 204 MG/DL (ref 70–99)
GLUCOSE BLD-MCNC: 239 MG/DL (ref 70–99)
GLUCOSE BLD-MCNC: 26 MG/DL (ref 70–99)
GLUCOSE BLD-MCNC: 29 MG/DL (ref 70–99)
GLUCOSE BLD-MCNC: 350 MG/DL (ref 70–99)
GLUCOSE BLD-MCNC: 73 MG/DL (ref 70–99)
GLUCOSE BLD-MCNC: 94 MG/DL (ref 70–99)
GLUCOSE URINE: >=1000 MG/DL
GLUCOSE URINE: NEGATIVE MG/DL
HCO3 VENOUS: 20.3 MMOL/L (ref 23–29)
HCO3 VENOUS: 21 MMOL/L (ref 23–29)
HCT VFR BLD CALC: 44.7 % (ref 40.5–52.5)
HEMOGLOBIN: 14.1 G/DL (ref 13.5–17.5)
KETONES, URINE: ABNORMAL MG/DL
KETONES, URINE: NEGATIVE MG/DL
LACTIC ACID: 1.2 MMOL/L (ref 0.4–2)
LEUKOCYTE ESTERASE, URINE: NEGATIVE
LEUKOCYTE ESTERASE, URINE: NEGATIVE
LIPASE: 11 U/L (ref 13–60)
LYMPHOCYTES ABSOLUTE: 1 K/UL (ref 1–5.1)
LYMPHOCYTES RELATIVE PERCENT: 5.8 %
MAGNESIUM: 2.6 MG/DL (ref 1.8–2.4)
MCH RBC QN AUTO: 24.3 PG (ref 26–34)
MCHC RBC AUTO-ENTMCNC: 31.6 G/DL (ref 31–36)
MCV RBC AUTO: 76.8 FL (ref 80–100)
METHEMOGLOBIN VENOUS: 0.3 %
METHEMOGLOBIN VENOUS: 0.4 %
MICROSCOPIC EXAMINATION: YES
MICROSCOPIC EXAMINATION: YES
MONOCYTES ABSOLUTE: 1.1 K/UL (ref 0–1.3)
MONOCYTES RELATIVE PERCENT: 6.2 %
MUCUS: ABNORMAL /LPF
NEUTROPHILS ABSOLUTE: 15.7 K/UL (ref 1.7–7.7)
NEUTROPHILS RELATIVE PERCENT: 87.8 %
NITRITE, URINE: NEGATIVE
NITRITE, URINE: NEGATIVE
O2 CONTENT, VEN: 12 VOL %
O2 CONTENT, VEN: 14 VOL %
O2 SAT, VEN: 66 %
O2 SAT, VEN: 71 %
O2 THERAPY: ABNORMAL
O2 THERAPY: ABNORMAL
PCO2, VEN: 38 MMHG (ref 40–50)
PCO2, VEN: 46 MMHG (ref 40–50)
PDW BLD-RTO: 15.7 % (ref 12.4–15.4)
PERFORMED ON: ABNORMAL
PERFORMED ON: NORMAL
PERFORMED ON: NORMAL
PH UA: 5.5
PH UA: 6
PH VENOUS: 7.28 (ref 7.35–7.45)
PH VENOUS: 7.35 (ref 7.35–7.45)
PHOSPHORUS: 2.8 MG/DL (ref 2.5–4.9)
PLATELET # BLD: 248 K/UL (ref 135–450)
PMV BLD AUTO: 9.4 FL (ref 5–10.5)
PO2, VEN: 38.7 MMHG (ref 25–40)
PO2, VEN: 38.7 MMHG (ref 25–40)
POTASSIUM SERPL-SCNC: 4.8 MMOL/L (ref 3.5–5.1)
POTASSIUM SERPL-SCNC: 5.4 MMOL/L (ref 3.5–5.1)
PROTEIN UA: 30 MG/DL
PROTEIN UA: NEGATIVE MG/DL
RBC # BLD: 5.82 M/UL (ref 4.2–5.9)
RBC UA: ABNORMAL /HPF (ref 0–2)
RBC UA: ABNORMAL /HPF (ref 0–2)
SODIUM BLD-SCNC: 132 MMOL/L (ref 136–145)
SODIUM BLD-SCNC: 137 MMOL/L (ref 136–145)
SPECIFIC GRAVITY UA: <=1.005
SPECIFIC GRAVITY UA: >=1.03
TCO2 CALC VENOUS: 22 MMOL/L
TCO2 CALC VENOUS: 22 MMOL/L
TOTAL PROTEIN: 9.1 G/DL (ref 6.4–8.2)
TROPONIN: <0.01 NG/ML
TROPONIN: <0.01 NG/ML
URINE REFLEX TO CULTURE: ABNORMAL
URINE TYPE: ABNORMAL
UROBILINOGEN, URINE: 0.2 E.U./DL
UROBILINOGEN, URINE: 0.2 E.U./DL
WBC # BLD: 17.9 K/UL (ref 4–11)
WBC UA: ABNORMAL /HPF (ref 0–5)
WBC UA: ABNORMAL /HPF (ref 0–5)

## 2019-01-31 PROCEDURE — 6370000000 HC RX 637 (ALT 250 FOR IP): Performed by: PHYSICIAN ASSISTANT

## 2019-01-31 PROCEDURE — 99285 EMERGENCY DEPT VISIT HI MDM: CPT

## 2019-01-31 PROCEDURE — 2580000003 HC RX 258: Performed by: EMERGENCY MEDICINE

## 2019-01-31 PROCEDURE — 85025 COMPLETE CBC W/AUTO DIFF WBC: CPT

## 2019-01-31 PROCEDURE — 99223 1ST HOSP IP/OBS HIGH 75: CPT | Performed by: INTERNAL MEDICINE

## 2019-01-31 PROCEDURE — 36415 COLL VENOUS BLD VENIPUNCTURE: CPT

## 2019-01-31 PROCEDURE — 2580000003 HC RX 258: Performed by: PHYSICIAN ASSISTANT

## 2019-01-31 PROCEDURE — 1200000000 HC SEMI PRIVATE

## 2019-01-31 PROCEDURE — 96361 HYDRATE IV INFUSION ADD-ON: CPT

## 2019-01-31 PROCEDURE — 94761 N-INVAS EAR/PLS OXIMETRY MLT: CPT

## 2019-01-31 PROCEDURE — 80053 COMPREHEN METABOLIC PANEL: CPT

## 2019-01-31 PROCEDURE — 83690 ASSAY OF LIPASE: CPT

## 2019-01-31 PROCEDURE — 83605 ASSAY OF LACTIC ACID: CPT

## 2019-01-31 PROCEDURE — 83735 ASSAY OF MAGNESIUM: CPT

## 2019-01-31 PROCEDURE — 82570 ASSAY OF URINE CREATININE: CPT

## 2019-01-31 PROCEDURE — 96375 TX/PRO/DX INJ NEW DRUG ADDON: CPT

## 2019-01-31 PROCEDURE — 82436 ASSAY OF URINE CHLORIDE: CPT

## 2019-01-31 PROCEDURE — 83935 ASSAY OF URINE OSMOLALITY: CPT

## 2019-01-31 PROCEDURE — 96372 THER/PROPH/DIAG INJ SC/IM: CPT

## 2019-01-31 PROCEDURE — G0378 HOSPITAL OBSERVATION PER HR: HCPCS

## 2019-01-31 PROCEDURE — 93005 ELECTROCARDIOGRAM TRACING: CPT | Performed by: EMERGENCY MEDICINE

## 2019-01-31 PROCEDURE — 96374 THER/PROPH/DIAG INJ IV PUSH: CPT

## 2019-01-31 PROCEDURE — 93010 ELECTROCARDIOGRAM REPORT: CPT | Performed by: INTERNAL MEDICINE

## 2019-01-31 PROCEDURE — 82010 KETONE BODYS QUAN: CPT

## 2019-01-31 PROCEDURE — 71045 X-RAY EXAM CHEST 1 VIEW: CPT

## 2019-01-31 PROCEDURE — 96365 THER/PROPH/DIAG IV INF INIT: CPT

## 2019-01-31 PROCEDURE — 6360000002 HC RX W HCPCS: Performed by: EMERGENCY MEDICINE

## 2019-01-31 PROCEDURE — 84100 ASSAY OF PHOSPHORUS: CPT

## 2019-01-31 PROCEDURE — 81001 URINALYSIS AUTO W/SCOPE: CPT

## 2019-01-31 PROCEDURE — 82803 BLOOD GASES ANY COMBINATION: CPT

## 2019-01-31 PROCEDURE — 84300 ASSAY OF URINE SODIUM: CPT

## 2019-01-31 PROCEDURE — 96366 THER/PROPH/DIAG IV INF ADDON: CPT

## 2019-01-31 PROCEDURE — 84484 ASSAY OF TROPONIN QUANT: CPT

## 2019-01-31 PROCEDURE — 84133 ASSAY OF URINE POTASSIUM: CPT

## 2019-01-31 PROCEDURE — 6360000002 HC RX W HCPCS: Performed by: PHYSICIAN ASSISTANT

## 2019-01-31 PROCEDURE — 71046 X-RAY EXAM CHEST 2 VIEWS: CPT

## 2019-01-31 RX ORDER — ACETAMINOPHEN 325 MG/1
650 TABLET ORAL EVERY 4 HOURS PRN
Status: DISCONTINUED | OUTPATIENT
Start: 2019-01-31 | End: 2019-02-01 | Stop reason: HOSPADM

## 2019-01-31 RX ORDER — SODIUM CHLORIDE 0.9 % (FLUSH) 0.9 %
10 SYRINGE (ML) INJECTION PRN
Status: DISCONTINUED | OUTPATIENT
Start: 2019-01-31 | End: 2019-02-01 | Stop reason: HOSPADM

## 2019-01-31 RX ORDER — GABAPENTIN 300 MG/1
600 CAPSULE ORAL 2 TIMES DAILY
Status: DISCONTINUED | OUTPATIENT
Start: 2019-01-31 | End: 2019-02-01 | Stop reason: HOSPADM

## 2019-01-31 RX ORDER — DEXTROSE MONOHYDRATE 50 MG/ML
100 INJECTION, SOLUTION INTRAVENOUS PRN
Status: DISCONTINUED | OUTPATIENT
Start: 2019-01-31 | End: 2019-02-01 | Stop reason: HOSPADM

## 2019-01-31 RX ORDER — SODIUM CHLORIDE 0.9 % (FLUSH) 0.9 %
10 SYRINGE (ML) INJECTION EVERY 12 HOURS SCHEDULED
Status: DISCONTINUED | OUTPATIENT
Start: 2019-01-31 | End: 2019-02-01 | Stop reason: HOSPADM

## 2019-01-31 RX ORDER — INSULIN GLARGINE 100 [IU]/ML
65 INJECTION, SOLUTION SUBCUTANEOUS NIGHTLY
Status: DISCONTINUED | OUTPATIENT
Start: 2019-02-01 | End: 2019-02-01 | Stop reason: HOSPADM

## 2019-01-31 RX ORDER — NICOTINE POLACRILEX 4 MG
15 LOZENGE BUCCAL PRN
Status: DISCONTINUED | OUTPATIENT
Start: 2019-01-31 | End: 2019-02-01 | Stop reason: HOSPADM

## 2019-01-31 RX ORDER — GLIMEPIRIDE 2 MG/1
4 TABLET ORAL 2 TIMES DAILY
Status: DISCONTINUED | OUTPATIENT
Start: 2019-01-31 | End: 2019-01-31

## 2019-01-31 RX ORDER — ONDANSETRON 2 MG/ML
4 INJECTION INTRAMUSCULAR; INTRAVENOUS EVERY 6 HOURS PRN
Status: DISCONTINUED | OUTPATIENT
Start: 2019-01-31 | End: 2019-02-01 | Stop reason: HOSPADM

## 2019-01-31 RX ORDER — SODIUM CHLORIDE, SODIUM LACTATE, POTASSIUM CHLORIDE, AND CALCIUM CHLORIDE .6; .31; .03; .02 G/100ML; G/100ML; G/100ML; G/100ML
1000 INJECTION, SOLUTION INTRAVENOUS ONCE
Status: COMPLETED | OUTPATIENT
Start: 2019-01-31 | End: 2019-01-31

## 2019-01-31 RX ORDER — INSULIN GLARGINE 100 [IU]/ML
65 INJECTION, SOLUTION SUBCUTANEOUS NIGHTLY
Status: DISCONTINUED | OUTPATIENT
Start: 2019-01-31 | End: 2019-01-31

## 2019-01-31 RX ORDER — DEXTROSE MONOHYDRATE 25 G/50ML
12.5 INJECTION, SOLUTION INTRAVENOUS PRN
Status: DISCONTINUED | OUTPATIENT
Start: 2019-01-31 | End: 2019-02-01 | Stop reason: HOSPADM

## 2019-01-31 RX ORDER — INSULIN GLARGINE 100 [IU]/ML
65 INJECTION, SOLUTION SUBCUTANEOUS
Status: DISCONTINUED | OUTPATIENT
Start: 2019-02-01 | End: 2019-01-31

## 2019-01-31 RX ORDER — VENLAFAXINE HYDROCHLORIDE 75 MG/1
75 CAPSULE, EXTENDED RELEASE ORAL DAILY
Status: DISCONTINUED | OUTPATIENT
Start: 2019-01-31 | End: 2019-02-01 | Stop reason: HOSPADM

## 2019-01-31 RX ORDER — 0.9 % SODIUM CHLORIDE 0.9 %
1000 INTRAVENOUS SOLUTION INTRAVENOUS ONCE
Status: COMPLETED | OUTPATIENT
Start: 2019-01-31 | End: 2019-01-31

## 2019-01-31 RX ORDER — DOCUSATE SODIUM 100 MG/1
100 CAPSULE, LIQUID FILLED ORAL 2 TIMES DAILY
Status: DISCONTINUED | OUTPATIENT
Start: 2019-01-31 | End: 2019-02-01 | Stop reason: HOSPADM

## 2019-01-31 RX ORDER — ONDANSETRON 2 MG/ML
4 INJECTION INTRAMUSCULAR; INTRAVENOUS ONCE
Status: COMPLETED | OUTPATIENT
Start: 2019-01-31 | End: 2019-01-31

## 2019-01-31 RX ORDER — SODIUM CHLORIDE, SODIUM LACTATE, POTASSIUM CHLORIDE, CALCIUM CHLORIDE 600; 310; 30; 20 MG/100ML; MG/100ML; MG/100ML; MG/100ML
1000 INJECTION, SOLUTION INTRAVENOUS CONTINUOUS
Status: DISCONTINUED | OUTPATIENT
Start: 2019-01-31 | End: 2019-01-31

## 2019-01-31 RX ORDER — SODIUM CHLORIDE, SODIUM LACTATE, POTASSIUM CHLORIDE, CALCIUM CHLORIDE 600; 310; 30; 20 MG/100ML; MG/100ML; MG/100ML; MG/100ML
1000 INJECTION, SOLUTION INTRAVENOUS CONTINUOUS
Status: DISCONTINUED | OUTPATIENT
Start: 2019-01-31 | End: 2019-02-01 | Stop reason: HOSPADM

## 2019-01-31 RX ADMIN — SODIUM CHLORIDE 1000 ML: 9 INJECTION, SOLUTION INTRAVENOUS at 07:30

## 2019-01-31 RX ADMIN — VENLAFAXINE HYDROCHLORIDE 75 MG: 75 CAPSULE, EXTENDED RELEASE ORAL at 10:54

## 2019-01-31 RX ADMIN — INSULIN LISPRO 10 UNITS: 100 INJECTION, SOLUTION INTRAVENOUS; SUBCUTANEOUS at 12:19

## 2019-01-31 RX ADMIN — SODIUM CHLORIDE, POTASSIUM CHLORIDE, SODIUM LACTATE AND CALCIUM CHLORIDE 1000 ML: 600; 310; 30; 20 INJECTION, SOLUTION INTRAVENOUS at 09:09

## 2019-01-31 RX ADMIN — Medication 10 ML: at 10:56

## 2019-01-31 RX ADMIN — GABAPENTIN 600 MG: 300 CAPSULE ORAL at 10:54

## 2019-01-31 RX ADMIN — Medication 10 ML: at 20:56

## 2019-01-31 RX ADMIN — SODIUM CHLORIDE 1000 ML: 9 INJECTION, SOLUTION INTRAVENOUS at 06:41

## 2019-01-31 RX ADMIN — DOCUSATE SODIUM 100 MG: 100 CAPSULE, LIQUID FILLED ORAL at 10:54

## 2019-01-31 RX ADMIN — SODIUM CHLORIDE, POTASSIUM CHLORIDE, SODIUM LACTATE AND CALCIUM CHLORIDE 1000 ML: 600; 310; 30; 20 INJECTION, SOLUTION INTRAVENOUS at 17:34

## 2019-01-31 RX ADMIN — DOCUSATE SODIUM 100 MG: 100 CAPSULE, LIQUID FILLED ORAL at 20:55

## 2019-01-31 RX ADMIN — INSULIN LISPRO 2 UNITS: 100 INJECTION, SOLUTION INTRAVENOUS; SUBCUTANEOUS at 12:19

## 2019-01-31 RX ADMIN — ONDANSETRON 4 MG: 2 INJECTION INTRAMUSCULAR; INTRAVENOUS at 07:27

## 2019-01-31 RX ADMIN — ENOXAPARIN SODIUM 40 MG: 40 INJECTION SUBCUTANEOUS at 12:18

## 2019-01-31 RX ADMIN — GABAPENTIN 600 MG: 300 CAPSULE ORAL at 20:55

## 2019-01-31 RX ADMIN — SODIUM CHLORIDE, POTASSIUM CHLORIDE, SODIUM LACTATE AND CALCIUM CHLORIDE 1000 ML: 600; 310; 30; 20 INJECTION, SOLUTION INTRAVENOUS at 10:56

## 2019-01-31 ASSESSMENT — PAIN SCALES - GENERAL
PAINLEVEL_OUTOF10: 0
PAINLEVEL_OUTOF10: 4

## 2019-01-31 ASSESSMENT — PAIN DESCRIPTION - LOCATION: LOCATION: ABDOMEN

## 2019-01-31 ASSESSMENT — PAIN DESCRIPTION - PAIN TYPE: TYPE: ACUTE PAIN

## 2019-02-01 VITALS
OXYGEN SATURATION: 94 % | BODY MASS INDEX: 26.36 KG/M2 | DIASTOLIC BLOOD PRESSURE: 88 MMHG | SYSTOLIC BLOOD PRESSURE: 146 MMHG | HEIGHT: 70 IN | WEIGHT: 184.1 LBS | RESPIRATION RATE: 14 BRPM | HEART RATE: 85 BPM | TEMPERATURE: 97.4 F

## 2019-02-01 LAB
A/G RATIO: 1.1 (ref 1.1–2.2)
ALBUMIN SERPL-MCNC: 3.3 G/DL (ref 3.4–5)
ALP BLD-CCNC: 81 U/L (ref 40–129)
ALT SERPL-CCNC: 9 U/L (ref 10–40)
ANION GAP SERPL CALCULATED.3IONS-SCNC: 8 MMOL/L (ref 3–16)
AST SERPL-CCNC: 9 U/L (ref 15–37)
BASOPHILS ABSOLUTE: 0 K/UL (ref 0–0.2)
BASOPHILS RELATIVE PERCENT: 0.3 %
BILIRUB SERPL-MCNC: 0.7 MG/DL (ref 0–1)
BUN BLDV-MCNC: 24 MG/DL (ref 7–20)
CALCIUM SERPL-MCNC: 8.6 MG/DL (ref 8.3–10.6)
CHLORIDE BLD-SCNC: 103 MMOL/L (ref 99–110)
CHLORIDE URINE RANDOM: 25 MMOL/L
CO2: 25 MMOL/L (ref 21–32)
CREAT SERPL-MCNC: 1.2 MG/DL (ref 0.8–1.3)
CREATININE URINE: 46.3 MG/DL (ref 39–259)
EOSINOPHILS ABSOLUTE: 0 K/UL (ref 0–0.6)
EOSINOPHILS RELATIVE PERCENT: 0.2 %
GFR AFRICAN AMERICAN: >60
GFR NON-AFRICAN AMERICAN: >60
GLOBULIN: 3.1 G/DL
GLUCOSE BLD-MCNC: 178 MG/DL (ref 70–99)
GLUCOSE BLD-MCNC: 194 MG/DL (ref 70–99)
GLUCOSE BLD-MCNC: 264 MG/DL (ref 70–99)
GLUCOSE BLD-MCNC: 68 MG/DL (ref 70–99)
HCT VFR BLD CALC: 34.1 % (ref 40.5–52.5)
HEMOGLOBIN: 11 G/DL (ref 13.5–17.5)
LYMPHOCYTES ABSOLUTE: 1.4 K/UL (ref 1–5.1)
LYMPHOCYTES RELATIVE PERCENT: 15.6 %
MCH RBC QN AUTO: 24.7 PG (ref 26–34)
MCHC RBC AUTO-ENTMCNC: 32.1 G/DL (ref 31–36)
MCV RBC AUTO: 77 FL (ref 80–100)
MONOCYTES ABSOLUTE: 0.7 K/UL (ref 0–1.3)
MONOCYTES RELATIVE PERCENT: 7.6 %
NEUTROPHILS ABSOLUTE: 7 K/UL (ref 1.7–7.7)
NEUTROPHILS RELATIVE PERCENT: 76.3 %
OSMOLALITY URINE: 242 MOSM/KG (ref 390–1070)
PDW BLD-RTO: 16 % (ref 12.4–15.4)
PERFORMED ON: ABNORMAL
PLATELET # BLD: 178 K/UL (ref 135–450)
PMV BLD AUTO: 8.5 FL (ref 5–10.5)
POTASSIUM REFLEX MAGNESIUM: 5 MMOL/L (ref 3.5–5.1)
POTASSIUM, UR: 10.5 MMOL/L
RBC # BLD: 4.43 M/UL (ref 4.2–5.9)
SODIUM BLD-SCNC: 136 MMOL/L (ref 136–145)
SODIUM URINE: <20 MMOL/L
TOTAL PROTEIN: 6.4 G/DL (ref 6.4–8.2)
WBC # BLD: 9.2 K/UL (ref 4–11)

## 2019-02-01 PROCEDURE — 2580000003 HC RX 258: Performed by: PHYSICIAN ASSISTANT

## 2019-02-01 PROCEDURE — 6370000000 HC RX 637 (ALT 250 FOR IP): Performed by: PHYSICIAN ASSISTANT

## 2019-02-01 PROCEDURE — 85025 COMPLETE CBC W/AUTO DIFF WBC: CPT

## 2019-02-01 PROCEDURE — 96372 THER/PROPH/DIAG INJ SC/IM: CPT

## 2019-02-01 PROCEDURE — 80053 COMPREHEN METABOLIC PANEL: CPT

## 2019-02-01 PROCEDURE — 99238 HOSP IP/OBS DSCHRG MGMT 30/<: CPT | Performed by: INTERNAL MEDICINE

## 2019-02-01 PROCEDURE — G0378 HOSPITAL OBSERVATION PER HR: HCPCS

## 2019-02-01 PROCEDURE — 36415 COLL VENOUS BLD VENIPUNCTURE: CPT

## 2019-02-01 PROCEDURE — 6360000002 HC RX W HCPCS: Performed by: PHYSICIAN ASSISTANT

## 2019-02-01 RX ADMIN — SODIUM CHLORIDE, POTASSIUM CHLORIDE, SODIUM LACTATE AND CALCIUM CHLORIDE 1000 ML: 600; 310; 30; 20 INJECTION, SOLUTION INTRAVENOUS at 00:04

## 2019-02-01 RX ADMIN — INSULIN LISPRO 5 UNITS: 100 INJECTION, SOLUTION INTRAVENOUS; SUBCUTANEOUS at 07:57

## 2019-02-01 RX ADMIN — INSULIN LISPRO 1 UNITS: 100 INJECTION, SOLUTION INTRAVENOUS; SUBCUTANEOUS at 07:56

## 2019-02-01 RX ADMIN — ENOXAPARIN SODIUM 40 MG: 40 INJECTION SUBCUTANEOUS at 07:54

## 2019-02-01 RX ADMIN — DOCUSATE SODIUM 100 MG: 100 CAPSULE, LIQUID FILLED ORAL at 07:54

## 2019-02-01 RX ADMIN — VENLAFAXINE HYDROCHLORIDE 75 MG: 75 CAPSULE, EXTENDED RELEASE ORAL at 07:54

## 2019-02-01 RX ADMIN — GABAPENTIN 600 MG: 300 CAPSULE ORAL at 07:54

## 2019-02-01 RX ADMIN — Medication 10 ML: at 07:54

## 2019-02-01 ASSESSMENT — PAIN SCALES - GENERAL: PAINLEVEL_OUTOF10: 0

## 2019-09-25 ENCOUNTER — HOSPITAL ENCOUNTER (INPATIENT)
Age: 59
LOS: 2 days | Discharge: HOME | DRG: 638 | End: 2019-09-27
Attending: EMERGENCY MEDICINE | Admitting: HOSPITALIST
Payer: COMMERCIAL

## 2019-09-25 DIAGNOSIS — E11.10 DIABETIC KETOACIDOSIS WITHOUT COMA ASSOCIATED WITH TYPE 2 DIABETES MELLITUS (HCC): Primary | ICD-10-CM

## 2019-09-25 DIAGNOSIS — N17.9 AKI (ACUTE KIDNEY INJURY) (HCC): ICD-10-CM

## 2019-09-25 PROBLEM — E10.10 DKA, TYPE 1, NOT AT GOAL (HCC): Status: ACTIVE | Noted: 2019-09-25

## 2019-09-25 LAB
A/G RATIO: 1.2 (ref 1.1–2.2)
ALBUMIN SERPL-MCNC: 4.1 G/DL (ref 3.4–5)
ALP BLD-CCNC: 127 U/L (ref 40–129)
ALT SERPL-CCNC: 26 U/L (ref 10–40)
ANION GAP SERPL CALCULATED.3IONS-SCNC: 23 MMOL/L (ref 3–16)
AST SERPL-CCNC: 14 U/L (ref 15–37)
BASOPHILS ABSOLUTE: 0 K/UL (ref 0–0.2)
BASOPHILS RELATIVE PERCENT: 0 %
BILIRUB SERPL-MCNC: 0.5 MG/DL (ref 0–1)
BILIRUBIN URINE: NEGATIVE
BLOOD, URINE: ABNORMAL
BUN BLDV-MCNC: 40 MG/DL (ref 7–20)
CALCIUM SERPL-MCNC: 8.7 MG/DL (ref 8.3–10.6)
CHLORIDE BLD-SCNC: 96 MMOL/L (ref 99–110)
CLARITY: CLEAR
CO2: 15 MMOL/L (ref 21–32)
COLOR: YELLOW
CREAT SERPL-MCNC: 2 MG/DL (ref 0.8–1.3)
EOSINOPHILS ABSOLUTE: 0 K/UL (ref 0–0.6)
EOSINOPHILS RELATIVE PERCENT: 0 %
GFR AFRICAN AMERICAN: 41
GFR NON-AFRICAN AMERICAN: 34
GLOBULIN: 3.4 G/DL
GLUCOSE BLD-MCNC: 423 MG/DL (ref 70–99)
GLUCOSE BLD-MCNC: 634 MG/DL (ref 70–99)
GLUCOSE BLD-MCNC: >600 MG/DL (ref 70–99)
GLUCOSE URINE: >=1000 MG/DL
HCT VFR BLD CALC: 45.7 % (ref 40.5–52.5)
HEMOGLOBIN: 14.3 G/DL (ref 13.5–17.5)
KETONES, URINE: 40 MG/DL
LEUKOCYTE ESTERASE, URINE: NEGATIVE
LIPASE: 11 U/L (ref 13–60)
LYMPHOCYTES ABSOLUTE: 1.1 K/UL (ref 1–5.1)
LYMPHOCYTES RELATIVE PERCENT: 5 %
MCH RBC QN AUTO: 25.3 PG (ref 26–34)
MCHC RBC AUTO-ENTMCNC: 31.3 G/DL (ref 31–36)
MCV RBC AUTO: 80.8 FL (ref 80–100)
MICROSCOPIC EXAMINATION: YES
MONOCYTES ABSOLUTE: 1.1 K/UL (ref 0–1.3)
MONOCYTES RELATIVE PERCENT: 5 %
MUCUS: ABNORMAL /LPF
NEUTROPHILS ABSOLUTE: 19.4 K/UL (ref 1.7–7.7)
NEUTROPHILS RELATIVE PERCENT: 90 %
NITRITE, URINE: NEGATIVE
PDW BLD-RTO: 14.6 % (ref 12.4–15.4)
PERFORMED ON: ABNORMAL
PERFORMED ON: ABNORMAL
PH UA: 5.5 (ref 5–8)
PLATELET # BLD: 250 K/UL (ref 135–450)
PMV BLD AUTO: 11.1 FL (ref 5–10.5)
POIKILOCYTES: ABNORMAL
POTASSIUM REFLEX MAGNESIUM: 5.2 MMOL/L (ref 3.5–5.1)
PROTEIN UA: ABNORMAL MG/DL
RBC # BLD: 5.66 M/UL (ref 4.2–5.9)
RBC UA: ABNORMAL /HPF (ref 0–2)
SLIDE REVIEW: ABNORMAL
SODIUM BLD-SCNC: 134 MMOL/L (ref 136–145)
SPECIFIC GRAVITY UA: 1.02 (ref 1–1.03)
TOTAL PROTEIN: 7.5 G/DL (ref 6.4–8.2)
TROPONIN: <0.01 NG/ML
URINE REFLEX TO CULTURE: ABNORMAL
URINE TYPE: ABNORMAL
UROBILINOGEN, URINE: 0.2 E.U./DL
WBC # BLD: 21.6 K/UL (ref 4–11)
WBC UA: ABNORMAL /HPF (ref 0–5)

## 2019-09-25 PROCEDURE — G0378 HOSPITAL OBSERVATION PER HR: HCPCS

## 2019-09-25 PROCEDURE — 85025 COMPLETE CBC W/AUTO DIFF WBC: CPT

## 2019-09-25 PROCEDURE — 81001 URINALYSIS AUTO W/SCOPE: CPT

## 2019-09-25 PROCEDURE — 96375 TX/PRO/DX INJ NEW DRUG ADDON: CPT

## 2019-09-25 PROCEDURE — 6370000000 HC RX 637 (ALT 250 FOR IP): Performed by: HOSPITALIST

## 2019-09-25 PROCEDURE — 99285 EMERGENCY DEPT VISIT HI MDM: CPT

## 2019-09-25 PROCEDURE — 2580000003 HC RX 258: Performed by: HOSPITALIST

## 2019-09-25 PROCEDURE — 84484 ASSAY OF TROPONIN QUANT: CPT

## 2019-09-25 PROCEDURE — 2580000003 HC RX 258: Performed by: EMERGENCY MEDICINE

## 2019-09-25 PROCEDURE — 2000000000 HC ICU R&B

## 2019-09-25 PROCEDURE — 6360000002 HC RX W HCPCS: Performed by: EMERGENCY MEDICINE

## 2019-09-25 PROCEDURE — 80053 COMPREHEN METABOLIC PANEL: CPT

## 2019-09-25 PROCEDURE — 83690 ASSAY OF LIPASE: CPT

## 2019-09-25 PROCEDURE — 6370000000 HC RX 637 (ALT 250 FOR IP): Performed by: EMERGENCY MEDICINE

## 2019-09-25 PROCEDURE — 93005 ELECTROCARDIOGRAM TRACING: CPT | Performed by: EMERGENCY MEDICINE

## 2019-09-25 PROCEDURE — 96361 HYDRATE IV INFUSION ADD-ON: CPT

## 2019-09-25 PROCEDURE — 96374 THER/PROPH/DIAG INJ IV PUSH: CPT

## 2019-09-25 RX ORDER — VENLAFAXINE HYDROCHLORIDE 37.5 MG/1
75 CAPSULE, EXTENDED RELEASE ORAL DAILY
Status: DISCONTINUED | OUTPATIENT
Start: 2019-09-26 | End: 2019-09-27 | Stop reason: HOSPADM

## 2019-09-25 RX ORDER — 0.9 % SODIUM CHLORIDE 0.9 %
1000 INTRAVENOUS SOLUTION INTRAVENOUS ONCE
Status: COMPLETED | OUTPATIENT
Start: 2019-09-25 | End: 2019-09-25

## 2019-09-25 RX ORDER — MAGNESIUM SULFATE 1 G/100ML
1 INJECTION INTRAVENOUS PRN
Status: DISCONTINUED | OUTPATIENT
Start: 2019-09-25 | End: 2019-09-27 | Stop reason: HOSPADM

## 2019-09-25 RX ORDER — ONDANSETRON 2 MG/ML
4 INJECTION INTRAMUSCULAR; INTRAVENOUS ONCE
Status: COMPLETED | OUTPATIENT
Start: 2019-09-25 | End: 2019-09-25

## 2019-09-25 RX ORDER — NICOTINE POLACRILEX 4 MG
15 LOZENGE BUCCAL PRN
Status: DISCONTINUED | OUTPATIENT
Start: 2019-09-25 | End: 2019-09-27 | Stop reason: HOSPADM

## 2019-09-25 RX ORDER — SODIUM CHLORIDE 450 MG/100ML
INJECTION, SOLUTION INTRAVENOUS CONTINUOUS
Status: DISCONTINUED | OUTPATIENT
Start: 2019-09-25 | End: 2019-09-26

## 2019-09-25 RX ORDER — DEXTROSE AND SODIUM CHLORIDE 5; .45 G/100ML; G/100ML
INJECTION, SOLUTION INTRAVENOUS CONTINUOUS PRN
Status: DISCONTINUED | OUTPATIENT
Start: 2019-09-25 | End: 2019-09-27 | Stop reason: HOSPADM

## 2019-09-25 RX ORDER — DEXTROSE MONOHYDRATE 25 G/50ML
12.5 INJECTION, SOLUTION INTRAVENOUS PRN
Status: DISCONTINUED | OUTPATIENT
Start: 2019-09-25 | End: 2019-09-25

## 2019-09-25 RX ORDER — GABAPENTIN 300 MG/1
600 CAPSULE ORAL 3 TIMES DAILY
Status: DISCONTINUED | OUTPATIENT
Start: 2019-09-25 | End: 2019-09-27 | Stop reason: HOSPADM

## 2019-09-25 RX ORDER — 0.9 % SODIUM CHLORIDE 0.9 %
1000 INTRAVENOUS SOLUTION INTRAVENOUS ONCE
Status: COMPLETED | OUTPATIENT
Start: 2019-09-25 | End: 2019-09-26

## 2019-09-25 RX ORDER — SODIUM CHLORIDE 0.9 % (FLUSH) 0.9 %
10 SYRINGE (ML) INJECTION PRN
Status: DISCONTINUED | OUTPATIENT
Start: 2019-09-25 | End: 2019-09-27 | Stop reason: HOSPADM

## 2019-09-25 RX ORDER — DEXTROSE MONOHYDRATE 25 G/50ML
12.5 INJECTION, SOLUTION INTRAVENOUS PRN
Status: DISCONTINUED | OUTPATIENT
Start: 2019-09-25 | End: 2019-09-27 | Stop reason: HOSPADM

## 2019-09-25 RX ORDER — SODIUM CHLORIDE 0.9 % (FLUSH) 0.9 %
10 SYRINGE (ML) INJECTION EVERY 12 HOURS SCHEDULED
Status: DISCONTINUED | OUTPATIENT
Start: 2019-09-25 | End: 2019-09-27 | Stop reason: HOSPADM

## 2019-09-25 RX ORDER — ACETAMINOPHEN 325 MG/1
650 TABLET ORAL ONCE
Status: COMPLETED | OUTPATIENT
Start: 2019-09-25 | End: 2019-09-25

## 2019-09-25 RX ORDER — POTASSIUM CHLORIDE 7.45 MG/ML
10 INJECTION INTRAVENOUS PRN
Status: DISCONTINUED | OUTPATIENT
Start: 2019-09-25 | End: 2019-09-27 | Stop reason: HOSPADM

## 2019-09-25 RX ORDER — DEXTROSE MONOHYDRATE 50 MG/ML
100 INJECTION, SOLUTION INTRAVENOUS PRN
Status: DISCONTINUED | OUTPATIENT
Start: 2019-09-25 | End: 2019-09-25

## 2019-09-25 RX ORDER — ACETAMINOPHEN 325 MG/1
650 TABLET ORAL EVERY 4 HOURS PRN
Status: DISCONTINUED | OUTPATIENT
Start: 2019-09-25 | End: 2019-09-27 | Stop reason: HOSPADM

## 2019-09-25 RX ORDER — ONDANSETRON 2 MG/ML
4 INJECTION INTRAMUSCULAR; INTRAVENOUS EVERY 6 HOURS PRN
Status: DISCONTINUED | OUTPATIENT
Start: 2019-09-25 | End: 2019-09-27 | Stop reason: HOSPADM

## 2019-09-25 RX ADMIN — ONDANSETRON 4 MG: 2 INJECTION INTRAMUSCULAR; INTRAVENOUS at 20:40

## 2019-09-25 RX ADMIN — SODIUM CHLORIDE 1000 ML: 9 INJECTION, SOLUTION INTRAVENOUS at 21:22

## 2019-09-25 RX ADMIN — ACETAMINOPHEN 650 MG: 325 TABLET ORAL at 21:19

## 2019-09-25 RX ADMIN — SODIUM CHLORIDE 8 UNITS/HR: 9 INJECTION, SOLUTION INTRAVENOUS at 22:47

## 2019-09-25 RX ADMIN — SODIUM CHLORIDE 1000 ML: 9 INJECTION, SOLUTION INTRAVENOUS at 22:51

## 2019-09-25 RX ADMIN — SODIUM CHLORIDE 1000 ML: 9 INJECTION, SOLUTION INTRAVENOUS at 20:40

## 2019-09-25 RX ADMIN — SODIUM CHLORIDE 4.5 UNITS/HR: 9 INJECTION, SOLUTION INTRAVENOUS at 23:56

## 2019-09-25 ASSESSMENT — PAIN SCALES - GENERAL
PAINLEVEL_OUTOF10: 8
PAINLEVEL_OUTOF10: 8

## 2019-09-25 ASSESSMENT — PAIN DESCRIPTION - LOCATION: LOCATION: ABDOMEN

## 2019-09-25 ASSESSMENT — PAIN DESCRIPTION - PAIN TYPE: TYPE: ACUTE PAIN

## 2019-09-26 LAB
ANION GAP SERPL CALCULATED.3IONS-SCNC: 12 MMOL/L (ref 3–16)
ANION GAP SERPL CALCULATED.3IONS-SCNC: 14 MMOL/L (ref 3–16)
ANION GAP SERPL CALCULATED.3IONS-SCNC: 14 MMOL/L (ref 3–16)
ANION GAP SERPL CALCULATED.3IONS-SCNC: 8 MMOL/L (ref 3–16)
ANION GAP SERPL CALCULATED.3IONS-SCNC: 9 MMOL/L (ref 3–16)
BASOPHILS ABSOLUTE: 0 K/UL (ref 0–0.2)
BASOPHILS RELATIVE PERCENT: 0.2 %
BUN BLDV-MCNC: 21 MG/DL (ref 7–20)
BUN BLDV-MCNC: 24 MG/DL (ref 7–20)
BUN BLDV-MCNC: 28 MG/DL (ref 7–20)
BUN BLDV-MCNC: 32 MG/DL (ref 7–20)
BUN BLDV-MCNC: 35 MG/DL (ref 7–20)
CALCIUM SERPL-MCNC: 7.7 MG/DL (ref 8.3–10.6)
CALCIUM SERPL-MCNC: 8 MG/DL (ref 8.3–10.6)
CALCIUM SERPL-MCNC: 8.4 MG/DL (ref 8.3–10.6)
CALCIUM SERPL-MCNC: 8.4 MG/DL (ref 8.3–10.6)
CALCIUM SERPL-MCNC: 8.5 MG/DL (ref 8.3–10.6)
CHLORIDE BLD-SCNC: 100 MMOL/L (ref 99–110)
CHLORIDE BLD-SCNC: 102 MMOL/L (ref 99–110)
CHLORIDE BLD-SCNC: 102 MMOL/L (ref 99–110)
CHLORIDE BLD-SCNC: 105 MMOL/L (ref 99–110)
CHLORIDE BLD-SCNC: 99 MMOL/L (ref 99–110)
CO2: 19 MMOL/L (ref 21–32)
CO2: 21 MMOL/L (ref 21–32)
CO2: 21 MMOL/L (ref 21–32)
CO2: 23 MMOL/L (ref 21–32)
CO2: 25 MMOL/L (ref 21–32)
CREAT SERPL-MCNC: 1.3 MG/DL (ref 0.8–1.3)
CREAT SERPL-MCNC: 1.3 MG/DL (ref 0.8–1.3)
CREAT SERPL-MCNC: 1.5 MG/DL (ref 0.8–1.3)
CREAT SERPL-MCNC: 1.8 MG/DL (ref 0.8–1.3)
CREAT SERPL-MCNC: 1.9 MG/DL (ref 0.8–1.3)
EKG ATRIAL RATE: 103 BPM
EKG DIAGNOSIS: NORMAL
EKG P AXIS: 28 DEGREES
EKG P-R INTERVAL: 200 MS
EKG Q-T INTERVAL: 346 MS
EKG QRS DURATION: 98 MS
EKG QTC CALCULATION (BAZETT): 453 MS
EKG R AXIS: -74 DEGREES
EKG T AXIS: 34 DEGREES
EKG VENTRICULAR RATE: 103 BPM
EOSINOPHILS ABSOLUTE: 0 K/UL (ref 0–0.6)
EOSINOPHILS RELATIVE PERCENT: 0 %
GFR AFRICAN AMERICAN: 44
GFR AFRICAN AMERICAN: 47
GFR AFRICAN AMERICAN: 58
GFR AFRICAN AMERICAN: >60
GFR AFRICAN AMERICAN: >60
GFR NON-AFRICAN AMERICAN: 36
GFR NON-AFRICAN AMERICAN: 39
GFR NON-AFRICAN AMERICAN: 48
GFR NON-AFRICAN AMERICAN: 56
GFR NON-AFRICAN AMERICAN: 56
GLUCOSE BLD-MCNC: 127 MG/DL (ref 70–99)
GLUCOSE BLD-MCNC: 148 MG/DL (ref 70–99)
GLUCOSE BLD-MCNC: 164 MG/DL (ref 70–99)
GLUCOSE BLD-MCNC: 172 MG/DL (ref 70–99)
GLUCOSE BLD-MCNC: 189 MG/DL (ref 70–99)
GLUCOSE BLD-MCNC: 191 MG/DL (ref 70–99)
GLUCOSE BLD-MCNC: 198 MG/DL (ref 70–99)
GLUCOSE BLD-MCNC: 199 MG/DL (ref 70–99)
GLUCOSE BLD-MCNC: 206 MG/DL (ref 70–99)
GLUCOSE BLD-MCNC: 208 MG/DL (ref 70–99)
GLUCOSE BLD-MCNC: 209 MG/DL (ref 70–99)
GLUCOSE BLD-MCNC: 214 MG/DL (ref 70–99)
GLUCOSE BLD-MCNC: 219 MG/DL (ref 70–99)
GLUCOSE BLD-MCNC: 234 MG/DL (ref 70–99)
GLUCOSE BLD-MCNC: 244 MG/DL (ref 70–99)
GLUCOSE BLD-MCNC: 276 MG/DL (ref 70–99)
GLUCOSE BLD-MCNC: 299 MG/DL (ref 70–99)
GLUCOSE BLD-MCNC: 302 MG/DL (ref 70–99)
GLUCOSE BLD-MCNC: 328 MG/DL (ref 70–99)
GLUCOSE BLD-MCNC: 342 MG/DL (ref 70–99)
GLUCOSE BLD-MCNC: 365 MG/DL (ref 70–99)
GLUCOSE BLD-MCNC: 426 MG/DL (ref 70–99)
HCT VFR BLD CALC: 37.8 % (ref 40.5–52.5)
HEMOGLOBIN: 12.1 G/DL (ref 13.5–17.5)
LYMPHOCYTES ABSOLUTE: 0.9 K/UL (ref 1–5.1)
LYMPHOCYTES RELATIVE PERCENT: 5.6 %
MAGNESIUM: 2.1 MG/DL (ref 1.8–2.4)
MAGNESIUM: 2.2 MG/DL (ref 1.8–2.4)
MAGNESIUM: 2.3 MG/DL (ref 1.8–2.4)
MAGNESIUM: 2.4 MG/DL (ref 1.8–2.4)
MCH RBC QN AUTO: 25.5 PG (ref 26–34)
MCHC RBC AUTO-ENTMCNC: 32.1 G/DL (ref 31–36)
MCV RBC AUTO: 79.4 FL (ref 80–100)
MONOCYTES ABSOLUTE: 1.1 K/UL (ref 0–1.3)
MONOCYTES RELATIVE PERCENT: 6.9 %
NEUTROPHILS ABSOLUTE: 14.3 K/UL (ref 1.7–7.7)
NEUTROPHILS RELATIVE PERCENT: 87.3 %
PDW BLD-RTO: 14.1 % (ref 12.4–15.4)
PERFORMED ON: ABNORMAL
PHOSPHORUS: 2.2 MG/DL (ref 2.5–4.9)
PHOSPHORUS: 2.2 MG/DL (ref 2.5–4.9)
PHOSPHORUS: 2.3 MG/DL (ref 2.5–4.9)
PHOSPHORUS: 2.3 MG/DL (ref 2.5–4.9)
PHOSPHORUS: 3 MG/DL (ref 2.5–4.9)
PLATELET # BLD: 250 K/UL (ref 135–450)
PMV BLD AUTO: 9.3 FL (ref 5–10.5)
POTASSIUM REFLEX MAGNESIUM: 4.7 MMOL/L (ref 3.5–5.1)
POTASSIUM SERPL-SCNC: 3.9 MMOL/L (ref 3.5–5.1)
POTASSIUM SERPL-SCNC: 4 MMOL/L (ref 3.5–5.1)
POTASSIUM SERPL-SCNC: 4.3 MMOL/L (ref 3.5–5.1)
POTASSIUM SERPL-SCNC: 4.7 MMOL/L (ref 3.5–5.1)
RBC # BLD: 4.77 M/UL (ref 4.2–5.9)
SODIUM BLD-SCNC: 133 MMOL/L (ref 136–145)
SODIUM BLD-SCNC: 134 MMOL/L (ref 136–145)
SODIUM BLD-SCNC: 135 MMOL/L (ref 136–145)
SODIUM BLD-SCNC: 136 MMOL/L (ref 136–145)
SODIUM BLD-SCNC: 136 MMOL/L (ref 136–145)
WBC # BLD: 16.4 K/UL (ref 4–11)

## 2019-09-26 PROCEDURE — 96365 THER/PROPH/DIAG IV INF INIT: CPT

## 2019-09-26 PROCEDURE — 83735 ASSAY OF MAGNESIUM: CPT

## 2019-09-26 PROCEDURE — 84100 ASSAY OF PHOSPHORUS: CPT

## 2019-09-26 PROCEDURE — 36415 COLL VENOUS BLD VENIPUNCTURE: CPT

## 2019-09-26 PROCEDURE — 99291 CRITICAL CARE FIRST HOUR: CPT | Performed by: INTERNAL MEDICINE

## 2019-09-26 PROCEDURE — 85025 COMPLETE CBC W/AUTO DIFF WBC: CPT

## 2019-09-26 PROCEDURE — G0378 HOSPITAL OBSERVATION PER HR: HCPCS

## 2019-09-26 PROCEDURE — 6360000002 HC RX W HCPCS: Performed by: HOSPITALIST

## 2019-09-26 PROCEDURE — 93010 ELECTROCARDIOGRAM REPORT: CPT | Performed by: INTERNAL MEDICINE

## 2019-09-26 PROCEDURE — 2580000003 HC RX 258: Performed by: HOSPITALIST

## 2019-09-26 PROCEDURE — 83036 HEMOGLOBIN GLYCOSYLATED A1C: CPT

## 2019-09-26 PROCEDURE — 96375 TX/PRO/DX INJ NEW DRUG ADDON: CPT

## 2019-09-26 PROCEDURE — 96367 TX/PROPH/DG ADDL SEQ IV INF: CPT

## 2019-09-26 PROCEDURE — 96376 TX/PRO/DX INJ SAME DRUG ADON: CPT

## 2019-09-26 PROCEDURE — 2580000003 HC RX 258: Performed by: INTERNAL MEDICINE

## 2019-09-26 PROCEDURE — 6370000000 HC RX 637 (ALT 250 FOR IP): Performed by: HOSPITALIST

## 2019-09-26 PROCEDURE — 1200000000 HC SEMI PRIVATE

## 2019-09-26 PROCEDURE — 96372 THER/PROPH/DIAG INJ SC/IM: CPT

## 2019-09-26 PROCEDURE — 2500000003 HC RX 250 WO HCPCS: Performed by: HOSPITALIST

## 2019-09-26 PROCEDURE — 80048 BASIC METABOLIC PNL TOTAL CA: CPT

## 2019-09-26 PROCEDURE — 6370000000 HC RX 637 (ALT 250 FOR IP): Performed by: INTERNAL MEDICINE

## 2019-09-26 PROCEDURE — 96368 THER/DIAG CONCURRENT INF: CPT

## 2019-09-26 PROCEDURE — 2500000003 HC RX 250 WO HCPCS: Performed by: INTERNAL MEDICINE

## 2019-09-26 PROCEDURE — 96366 THER/PROPH/DIAG IV INF ADDON: CPT

## 2019-09-26 PROCEDURE — 99233 SBSQ HOSP IP/OBS HIGH 50: CPT | Performed by: INTERNAL MEDICINE

## 2019-09-26 RX ORDER — INSULIN GLARGINE 100 [IU]/ML
40 INJECTION, SOLUTION SUBCUTANEOUS 2 TIMES DAILY
Status: DISCONTINUED | OUTPATIENT
Start: 2019-09-26 | End: 2019-09-27 | Stop reason: HOSPADM

## 2019-09-26 RX ORDER — DEXTROSE MONOHYDRATE 25 G/50ML
12.5 INJECTION, SOLUTION INTRAVENOUS PRN
Status: DISCONTINUED | OUTPATIENT
Start: 2019-09-26 | End: 2019-09-27 | Stop reason: HOSPADM

## 2019-09-26 RX ORDER — GLIMEPIRIDE 4 MG/1
TABLET ORAL
Status: ON HOLD | COMMUNITY
Start: 2019-07-26 | End: 2019-09-27 | Stop reason: HOSPADM

## 2019-09-26 RX ORDER — GABAPENTIN 600 MG/1
600 TABLET ORAL
COMMUNITY
Start: 2019-07-26 | End: 2020-07-20

## 2019-09-26 RX ORDER — DEXTROSE MONOHYDRATE 50 MG/ML
100 INJECTION, SOLUTION INTRAVENOUS PRN
Status: DISCONTINUED | OUTPATIENT
Start: 2019-09-26 | End: 2019-09-27 | Stop reason: HOSPADM

## 2019-09-26 RX ORDER — NICOTINE POLACRILEX 4 MG
15 LOZENGE BUCCAL PRN
Status: DISCONTINUED | OUTPATIENT
Start: 2019-09-26 | End: 2019-09-27 | Stop reason: HOSPADM

## 2019-09-26 RX ADMIN — INSULIN LISPRO 2 UNITS: 100 INJECTION, SOLUTION INTRAVENOUS; SUBCUTANEOUS at 21:12

## 2019-09-26 RX ADMIN — BENZOCAINE AND MENTHOL 1 LOZENGE: 15; 2.6 LOZENGE ORAL at 17:20

## 2019-09-26 RX ADMIN — Medication 10 ML: at 01:29

## 2019-09-26 RX ADMIN — POTASSIUM CHLORIDE 10 MEQ: 7.46 INJECTION, SOLUTION INTRAVENOUS at 05:33

## 2019-09-26 RX ADMIN — SODIUM CHLORIDE: 4.5 INJECTION, SOLUTION INTRAVENOUS at 01:20

## 2019-09-26 RX ADMIN — SODIUM PHOSPHATE, MONOBASIC, MONOHYDRATE 15 MMOL: 276; 142 INJECTION, SOLUTION INTRAVENOUS at 10:18

## 2019-09-26 RX ADMIN — INSULIN GLARGINE 40 UNITS: 100 INJECTION, SOLUTION SUBCUTANEOUS at 13:51

## 2019-09-26 RX ADMIN — DEXTROSE AND SODIUM CHLORIDE: 5; 450 INJECTION, SOLUTION INTRAVENOUS at 12:26

## 2019-09-26 RX ADMIN — SODIUM PHOSPHATE, MONOBASIC, MONOHYDRATE 10 MMOL: 276; 142 INJECTION, SOLUTION INTRAVENOUS at 05:44

## 2019-09-26 RX ADMIN — GABAPENTIN 600 MG: 300 CAPSULE ORAL at 20:42

## 2019-09-26 RX ADMIN — GABAPENTIN 600 MG: 300 CAPSULE ORAL at 13:23

## 2019-09-26 RX ADMIN — POTASSIUM CHLORIDE 10 MEQ: 7.46 INJECTION, SOLUTION INTRAVENOUS at 05:35

## 2019-09-26 RX ADMIN — Medication 10 ML: at 07:40

## 2019-09-26 RX ADMIN — SODIUM CHLORIDE 6.95 UNITS/HR: 9 INJECTION, SOLUTION INTRAVENOUS at 10:21

## 2019-09-26 RX ADMIN — ENOXAPARIN SODIUM 40 MG: 40 INJECTION SUBCUTANEOUS at 07:40

## 2019-09-26 RX ADMIN — GABAPENTIN 600 MG: 300 CAPSULE ORAL at 01:28

## 2019-09-26 RX ADMIN — INSULIN LISPRO 2 UNITS: 100 INJECTION, SOLUTION INTRAVENOUS; SUBCUTANEOUS at 16:16

## 2019-09-26 RX ADMIN — POTASSIUM CHLORIDE 10 MEQ: 7.46 INJECTION, SOLUTION INTRAVENOUS at 09:21

## 2019-09-26 RX ADMIN — DEXTROSE AND SODIUM CHLORIDE: 5; 450 INJECTION, SOLUTION INTRAVENOUS at 05:34

## 2019-09-26 RX ADMIN — INSULIN GLARGINE 40 UNITS: 100 INJECTION, SOLUTION SUBCUTANEOUS at 21:12

## 2019-09-26 RX ADMIN — SODIUM PHOSPHATE, MONOBASIC, MONOHYDRATE 15 MMOL: 276; 142 INJECTION, SOLUTION INTRAVENOUS at 17:20

## 2019-09-26 RX ADMIN — POTASSIUM CHLORIDE 10 MEQ: 7.46 INJECTION, SOLUTION INTRAVENOUS at 10:18

## 2019-09-26 RX ADMIN — VENLAFAXINE HYDROCHLORIDE 75 MG: 37.5 CAPSULE, EXTENDED RELEASE ORAL at 07:40

## 2019-09-26 RX ADMIN — GABAPENTIN 600 MG: 300 CAPSULE ORAL at 07:39

## 2019-09-26 ASSESSMENT — PAIN SCALES - GENERAL: PAINLEVEL_OUTOF10: 0

## 2019-09-26 NOTE — ED PROVIDER NOTES
26.0 - 34.0 pg    MCHC 31.3 31.0 - 36.0 g/dL    RDW 14.6 12.4 - 15.4 %    Platelets 650 927 - 298 K/uL    MPV 11.1 (H) 5.0 - 10.5 fL    SLIDE REVIEW see below     Neutrophils % 90.0 %    Lymphocytes % 5.0 %    Monocytes % 5.0 %    Eosinophils % 0.0 %    Basophils % 0.0 %    Neutrophils Absolute 19.4 (H) 1.7 - 7.7 K/uL    Lymphocytes Absolute 1.1 1.0 - 5.1 K/uL    Monocytes Absolute 1.1 0.0 - 1.3 K/uL    Eosinophils Absolute 0.0 0.0 - 0.6 K/uL    Basophils Absolute 0.0 0.0 - 0.2 K/uL    Poikilocytes Occasional (A)    Urinalysis Reflex to Culture   Result Value Ref Range    Color, UA Yellow Straw/Yellow    Clarity, UA Clear Clear    Glucose, Ur >=1000 (A) Negative mg/dL    Bilirubin Urine Negative Negative    Ketones, Urine 40 (A) Negative mg/dL    Specific Gravity, UA 1.020 1.005 - 1.030    Blood, Urine TRACE-INTACT (A) Negative    pH, UA 5.5 5.0 - 8.0    Protein, UA TRACE (A) Negative mg/dL    Urobilinogen, Urine 0.2 <2.0 E.U./dL    Nitrite, Urine Negative Negative    Leukocyte Esterase, Urine Negative Negative    Microscopic Examination YES     Urine Reflex to Culture Not Indicated     Urine Type Not Specified    Comprehensive Metabolic Panel w/ Reflex to MG   Result Value Ref Range    Sodium 134 (L) 136 - 145 mmol/L    Potassium reflex Magnesium 5.2 (H) 3.5 - 5.1 mmol/L    Chloride 96 (L) 99 - 110 mmol/L    CO2 15 (L) 21 - 32 mmol/L    Anion Gap 23 (H) 3 - 16    Glucose 634 (HH) 70 - 99 mg/dL    BUN 40 (H) 7 - 20 mg/dL    CREATININE 2.0 (H) 0.8 - 1.3 mg/dL    GFR Non- 34 (A) >60    GFR  41 (A) >60    Calcium 8.7 8.3 - 10.6 mg/dL    Total Protein 7.5 6.4 - 8.2 g/dL    Alb 4.1 3.4 - 5.0 g/dL    Albumin/Globulin Ratio 1.2 1.1 - 2.2    Total Bilirubin 0.5 0.0 - 1.0 mg/dL    Alkaline Phosphatase 127 40 - 129 U/L    ALT 26 10 - 40 U/L    AST 14 (L) 15 - 37 U/L    Globulin 3.4 g/dL   Lipase   Result Value Ref Range    Lipase 11.0 (L) 13.0 - 60.0 U/L   Troponin   Result Value Ref Range Troponin <0.01 <0.01 ng/mL   Microscopic Urinalysis   Result Value Ref Range    Mucus, UA Rare (A) /LPF    WBC, UA 3-5 0 - 5 /HPF    RBC, UA 3-5 (A) 0 - 2 /HPF   POCT Glucose   Result Value Ref Range    POC Glucose >600 (AA) 70 - 99 mg/dl    Performed on ACCU-CHEK    EKG 12 Lead   Result Value Ref Range    Ventricular Rate 103 BPM    Atrial Rate 103 BPM    P-R Interval 200 ms    QRS Duration 98 ms    Q-T Interval 346 ms    QTc Calculation (Bazett) 453 ms    P Axis 28 degrees    R Axis -74 degrees    T Axis 34 degrees    Diagnosis       Sinus tachycardiaPulmonary disease patternIncomplete right bundle branch blockLeft anterior fascicular blockAbnormal ECGWhen compared with ECG of 31-JAN-2019 06:24,Incomplete right bundle branch block is now Present       ECG  The Ekg interpreted by me shows  sinus tachycardia, eohb=526 with a rate of 103  Axis is   Left axis deviation  QTc is  within an acceptable range  Intervals and Durations are unremarkable. ST Segments: no acute change  No significant change from prior EKG dated 1/31/19    ED COURSE/MDM  Patient seen and evaluated. Old records reviewed. Labs and imaging reviewed and results discussed with patient. Patient presents with hyperglycemia. He is tachycardic to 110. He is afebrile. He is ill-appearing on exam.  He is alert and oriented. His blood sugar was high in triage. Labs are obtained here and I initially gave him 2 L of normal saline. He is also given Zofran. His abdomen is soft and nontender on exam and I do not feel imaging is indicated at this time. The patient is in DKA with a bicarbonate of 15 and anion gap of 23. I did initiate an insulin drip. Potassium is mildly elevated at 5.2 and he does not require supplementation at this time. UA is negative for infection. He does have a leukocytosis of 21.6 and I am unclear on the etiology of this and I will hold off on antibiotics at this time.   His EKG was unremarkable and troponin is

## 2019-09-27 VITALS
OXYGEN SATURATION: 93 % | SYSTOLIC BLOOD PRESSURE: 150 MMHG | DIASTOLIC BLOOD PRESSURE: 89 MMHG | BODY MASS INDEX: 26.77 KG/M2 | WEIGHT: 187 LBS | TEMPERATURE: 97.9 F | HEIGHT: 70 IN | HEART RATE: 69 BPM | RESPIRATION RATE: 15 BRPM

## 2019-09-27 PROBLEM — E10.10 DKA, TYPE 1, NOT AT GOAL (HCC): Status: RESOLVED | Noted: 2019-09-25 | Resolved: 2019-09-27

## 2019-09-27 LAB
ANION GAP SERPL CALCULATED.3IONS-SCNC: 9 MMOL/L (ref 3–16)
BASOPHILS ABSOLUTE: 0 K/UL (ref 0–0.2)
BASOPHILS RELATIVE PERCENT: 0.5 %
BUN BLDV-MCNC: 13 MG/DL (ref 7–20)
CALCIUM SERPL-MCNC: 8.4 MG/DL (ref 8.3–10.6)
CHLORIDE BLD-SCNC: 102 MMOL/L (ref 99–110)
CO2: 20 MMOL/L (ref 21–32)
CREAT SERPL-MCNC: 1.1 MG/DL (ref 0.8–1.3)
EOSINOPHILS ABSOLUTE: 0 K/UL (ref 0–0.6)
EOSINOPHILS RELATIVE PERCENT: 0.4 %
ESTIMATED AVERAGE GLUCOSE: 343.6 MG/DL
GFR AFRICAN AMERICAN: >60
GFR NON-AFRICAN AMERICAN: >60
GLUCOSE BLD-MCNC: 191 MG/DL (ref 70–99)
GLUCOSE BLD-MCNC: 212 MG/DL (ref 70–99)
HBA1C MFR BLD: 13.6 %
HCT VFR BLD CALC: 34.8 % (ref 40.5–52.5)
HEMOGLOBIN: 11.2 G/DL (ref 13.5–17.5)
LYMPHOCYTES ABSOLUTE: 1.1 K/UL (ref 1–5.1)
LYMPHOCYTES RELATIVE PERCENT: 12.9 %
MCH RBC QN AUTO: 25.8 PG (ref 26–34)
MCHC RBC AUTO-ENTMCNC: 32.2 G/DL (ref 31–36)
MCV RBC AUTO: 80.3 FL (ref 80–100)
MONOCYTES ABSOLUTE: 0.8 K/UL (ref 0–1.3)
MONOCYTES RELATIVE PERCENT: 8.8 %
NEUTROPHILS ABSOLUTE: 6.9 K/UL (ref 1.7–7.7)
NEUTROPHILS RELATIVE PERCENT: 77.4 %
PDW BLD-RTO: 14.3 % (ref 12.4–15.4)
PERFORMED ON: ABNORMAL
PHOSPHORUS: 2.9 MG/DL (ref 2.5–4.9)
PLATELET # BLD: 192 K/UL (ref 135–450)
PMV BLD AUTO: 9.2 FL (ref 5–10.5)
POTASSIUM REFLEX MAGNESIUM: 4.1 MMOL/L (ref 3.5–5.1)
RBC # BLD: 4.34 M/UL (ref 4.2–5.9)
SODIUM BLD-SCNC: 131 MMOL/L (ref 136–145)
WBC # BLD: 8.9 K/UL (ref 4–11)

## 2019-09-27 PROCEDURE — 80048 BASIC METABOLIC PNL TOTAL CA: CPT

## 2019-09-27 PROCEDURE — G0378 HOSPITAL OBSERVATION PER HR: HCPCS

## 2019-09-27 PROCEDURE — 84100 ASSAY OF PHOSPHORUS: CPT

## 2019-09-27 PROCEDURE — 6360000002 HC RX W HCPCS: Performed by: HOSPITALIST

## 2019-09-27 PROCEDURE — 6370000000 HC RX 637 (ALT 250 FOR IP): Performed by: HOSPITALIST

## 2019-09-27 PROCEDURE — 2580000003 HC RX 258: Performed by: HOSPITALIST

## 2019-09-27 PROCEDURE — 6370000000 HC RX 637 (ALT 250 FOR IP): Performed by: INTERNAL MEDICINE

## 2019-09-27 PROCEDURE — 85025 COMPLETE CBC W/AUTO DIFF WBC: CPT

## 2019-09-27 PROCEDURE — 99238 HOSP IP/OBS DSCHRG MGMT 30/<: CPT | Performed by: INTERNAL MEDICINE

## 2019-09-27 PROCEDURE — 96372 THER/PROPH/DIAG INJ SC/IM: CPT

## 2019-09-27 RX ADMIN — INSULIN LISPRO 1 UNITS: 100 INJECTION, SOLUTION INTRAVENOUS; SUBCUTANEOUS at 09:17

## 2019-09-27 RX ADMIN — Medication 10 ML: at 01:10

## 2019-09-27 RX ADMIN — INSULIN GLARGINE 40 UNITS: 100 INJECTION, SOLUTION SUBCUTANEOUS at 09:17

## 2019-09-27 RX ADMIN — ENOXAPARIN SODIUM 40 MG: 40 INJECTION SUBCUTANEOUS at 09:15

## 2019-09-27 RX ADMIN — Medication 10 ML: at 09:16

## 2019-09-27 RX ADMIN — VENLAFAXINE HYDROCHLORIDE 75 MG: 37.5 CAPSULE, EXTENDED RELEASE ORAL at 09:15

## 2019-09-27 RX ADMIN — GABAPENTIN 600 MG: 300 CAPSULE ORAL at 09:15

## 2019-09-27 NOTE — CARE COORDINATION
DISCHARGE ORDER  Date/Time 2019 10:46 AM  Completed by: Raza Emery, Case Management    Patient Name: Addi Hurtado    : 1960  Admitting Diagnosis: DKA, type 1, not at goal [facility]) [E10.10]  Admit Date/Time: 2019  7:45 PM    Noted discharge order. Confirmed discharge plan with patient / family (pt): Yes   Discharge Plan: Reviewed chart. Met with the pt. Pt IPTA and denies hhc. No other d/c needs identified per Kamilah Robins, bedside nurse.

## 2019-09-27 NOTE — DISCHARGE SUMMARY
+  Extremities:  No edema. Chronic left leg ulcer  Intact peripheral pulses. Brisk cap refill, < 2 secs  Neuro: non focal    CBC:   Recent Labs     09/25/19  2030 09/26/19  0416 09/27/19  0700   WBC 21.6* 16.4* 8.9   HGB 14.3 12.1* 11.2*   HCT 45.7 37.8* 34.8*   MCV 80.8 79.4* 80.3    250 192     BMP:   Recent Labs     09/26/19  1216 09/26/19  1607 09/27/19  0700    133* 131*   K 4.0 3.9 4.1    100 102   CO2 23 21 20*   PHOS 2.2* 2.3* 2.9   BUN 24* 21* 13   CREATININE 1.3 1.3 1.1     LIVER PROFILE:   Recent Labs     09/25/19  2146   AST 14*   ALT 26   LIPASE 11.0*   BILITOT 0.5   ALKPHOS 127     UA:  Recent Labs     09/25/19 2124   COLORU Yellow   PHUR 5.5   WBCUA 3-5   RBCUA 3-5*   MUCUS Rare*   CLARITYU Clear   SPECGRAV 1.020   LEUKOCYTESUR Negative   UROBILINOGEN 0.2   BILIRUBINUR Negative   BLOODU TRACE-INTACT*   GLUCOSEU >=1000*     CULTURES  N/A    RADIOLOGY  N/A    Discharge Medications     Medication List      CHANGE how you take these medications    gabapentin 600 MG tablet  Commonly known as:  NEURONTIN  What changed:  Another medication with the same name was removed. Continue taking this medication, and follow the directions you see here. insulin glargine 100 UNIT/ML injection pen  Commonly known as:  LANTUS  Inject 80 Units into the skin nightly  What changed:    · how much to take  · Another medication with the same name was removed. Continue taking this medication, and follow the directions you see here.      insulin lispro 100 UNIT/ML pen  Commonly known as:  HUMALOG  Inject 20 Units into the skin 3 times daily (before meals)  What changed:  how much to take        CONTINUE taking these medications    blood glucose test strips strip  Commonly known as:  ASCENSIA AUTODISC VI;ONE TOUCH ULTRA TEST VI     Insulin Pen Needle 32G X 4 MM Misc     venlafaxine 75 MG extended release capsule  Commonly known as:  EFFEXOR XR        STOP taking these medications    glimepiride 4 MG tablet  Commonly known as:  AMARYL           Where to Get Your Medications      These medications were sent to [facility] [facility]  [address], [address] [place] [address]    Phone:  [phone]   · insulin glargine 100 UNIT/ML injection pen  · insulin lispro 100 UNIT/ML pen           Discharged in stable condition to home. Follow Up: Follow up with PCP in 1 week.        Leonardo Marvin 9/30/2019 5:33 PM

## 2019-09-27 NOTE — FLOWSHEET NOTE
09/27/19 0835   Vital Signs   Temp 97.9 °F (36.6 °C)   Temp Source Oral   Pulse 69   Resp 15   BP (!) 150/89   BP Location Right upper arm   BP Upper/Lower Upper   Patient Position High fowlers   Oxygen Therapy   SpO2 93 %   O2 Device None (Room air)   Am assessment complete. Pt already with dc order. VSS. Refuses bed alarm . No needs at this time. Will continue to monitor. Pt has been educated to hospital falls prevention policy. They are aware they will be assessed every shift, and with any condition changes, by the nursing staff on their ability to perform their ADL's without need for assistance. Pt understands that based on the number of their score they are given a base score that will assign a level of low, medium, or high risk for falls. This patient has rated a high which requires a bed / chair alarm for their safety to prevent a fall. The patient is alert and oriented, and acknowledges and understands the need for intervention but refuses the application and use of the bed / chair alarm that is required per policy. Pt agrees to use call light and wait for help to arrive to assist them to get up when they need to. Call light is within reach and all other safety measures in place.

## 2019-09-27 NOTE — PROGRESS NOTES
4 Eyes Skin Assessment     The patient is being assess for   Admission    I agree that 2 RN's have performed a thorough Head to Toe Skin Assessment on the patient. ALL assessment sites listed below have been assessed. Areas assessed by both nurses:   [x]   Head, Face, and Ears   [x]   Shoulders, Back, and Chest, Abdomen  [x]   Arms, Elbows, and Hands   [x]   Coccyx, Sacrum, and Ischium  [x]   Legs, Feet, and Heels        Pt's skin has 2 wounds. One on ball of L foot and the other is L inner leg above ankle. **SHARE this note so that the co-signing nurse is able to place an eSignature**    Co-signer eSignature: Electronically signed by Monica Boston RN on 9/26/19 at 7:54 AM    Does the Patient have Skin Breakdown?   Yes LDA WOUND CARE was Initiated documentation include the Laura-wound, Wound Assessment, Measurements, Dressing Treatment, Drainage, and Color\",          Slade Prevention initiated:  Yes   Wound Care Orders initiated:  No      WOC nurse consulted for Pressure Injury (Stage 3,4, Unstageable, DTI, NWPT, Complex wounds)and New or Established Ostomies:  No      Primary Nurse eSignature: Electronically signed by Bryan Oswald RN on 9/26/19 at 7:00 AM
4pm BMP Labs reported to Dr Leonora Ballesteros. Per MD, patient may transfer to [place] [address]. MD also ordered 15 mmol of na phos and PRN throat lozenges.
Dr Lozano File paged to notify patient's gap has closed x 2 values.
Patient refused bed alarm notified sigrid rodriguez rn
Pt transferred by w/c from ICU admitted to 219  P. M.assisted x 1 to bed without incident @ 7;25 P.M.. Pt adjusting well to surroundings. Skilled assessment completed this shift see flow sheets. Pt in bed eys closed respirations even and easy call light and bedside table within reach. Cont all care per POC. Will cont to monitor.
No results for input(s): BNP in the last 72 hours. PT/INR: No results for input(s): PROTIME, INR in the last 72 hours. APTT: No results for input(s): APTT in the last 72 hours. CARDIAC ENZYMES:   Recent Labs     09/25/19 2146   TROPONINI <0.01     FASTING LIPID PANEL:  Lab Results   Component Value Date    CHOL 93 12/22/2013    HDL 43 12/22/2013    TRIG 78 12/22/2013     LIVER PROFILE:   Recent Labs     09/25/19 2146   AST 14*   ALT 26   BILITOT 0.5   ALKPHOS 127          No orders to display         Assessment:  Active Problems:    DKA, type 1, not at goal [facility])  Resolved Problems:    * No resolved hospital problems. *      Plan:  DKA  Insulin-dependent diabetic mellitus  - He ran out of his Lantus insulin 2 weeks ago and has been taking Humalog at home.   -Continue insulin drip and IV fluids per DKA protocol. Monitor BMP, magnesium and phos every 4 hours. Anion gap is corrected now. Will likely switch to Lantus tonight. Blood sugars were over 600 on presentation, currently in the 200s    Chronic left leg ulcer   history of MRSA   -on contact precautions,  wound care consulted    Leukocytosis, reactive   -white count improved from 21K to 16 K. TORI  BUN and creatinine improving continue IV hydration      Diabetic neuropathy  - cont his neurontin      Depression  - cont effexor     DVT Prophylaxis: lovenox  Diet: Diet NPO Effective Now  Code Status: Full Code    Jeremias Perez MD 9/26/2019 10:05 AM     Addendum    5 PM   Anion gap corrected. Off insulin drip, Lantus and sliding scale started. Tolerating diabetic carb controlled diet.      Transfer out of ICU     Jeremias Perez MD

## 2020-06-18 NOTE — H&P
Hospital Medicine History & Physical      PCP: Javon Priest MD    Date of Admission: 9/25/2019    Date of Service: Pt seen/examined on 9/25/2019 and Admitted to Inpatient with expected LOS greater than two midnights due to medical therapy. Chief Complaint:  n/v      History Of Present Illness:       59 y.o. male presents following onset of n/v around noon today. Patient denies abdominal pain, fever, chills, sob, cough, dysuria or diarrhea. Recalls change in his insulin regimen last week. He has been compliant with these changes. Currently seen and examined in the ICU on room air, no distress, awake, alert, and oriented. Past Medical History:          Diagnosis Date    TORI (acute kidney injury) ([facility] [address] 9/12/2017    Bacteremia 05/05/2017    staph aureus    Diabetes mellitus ([facility] [address]     Diabetic neuropathy ([facility] [address]     Gout     MRSA (methicillin resistant staph aureus) culture positive 12/27/18, 9/12/17, 5/5/17,9/5/13, 1/15/14    ulcers bilateral legs    Pneumonia     Pyogenic inflammation of bone ([facility] [address]        Past Surgical History:          Procedure Laterality Date    EYE SURGERY      lens implants after removal of cataracts    OTHER SURGICAL HISTORY  12/28/2018    partial right foot amputation with graft application    TOE AMPUTATION Right 12/28/2018    PARTIAL RIGHT FOOT AMPUTATION WITH GRAFT APPLICATION performed by Otto Maldonado DPM at [facility]       Medications Prior to Admission:      Prior to Admission medications    Medication Sig Start Date End Date Taking?  Authorizing Provider   gabapentin (NEURONTIN) 600 MG tablet Take 600 mg by mouth. 7/26/19 7/20/20 Yes Historical Provider, MD   Insulin Pen Needle 32G X 4 MM MISC Use 4 daily 6/28/17  Yes Historical Provider, MD   blood glucose test strips (ASCENSIA AUTODISC VI;ONE TOUCH ULTRA TEST VI) strip Test TID and as directed 3/7/19  Yes Historical Provider, MD   glimepiride (AMARYL) 4 MG tablet  7/26/19  Yes Historical Provider, MD   insulin 0 = independent

## 2021-01-18 ENCOUNTER — APPOINTMENT (OUTPATIENT)
Dept: CT IMAGING | Age: 61
End: 2021-01-18
Payer: COMMERCIAL

## 2021-01-18 ENCOUNTER — APPOINTMENT (OUTPATIENT)
Dept: GENERAL RADIOLOGY | Age: 61
End: 2021-01-18
Payer: COMMERCIAL

## 2021-01-18 ENCOUNTER — HOSPITAL ENCOUNTER (EMERGENCY)
Age: 61
Discharge: ANOTHER HOSPITAL | End: 2021-01-19
Attending: STUDENT IN AN ORGANIZED HEALTH CARE EDUCATION/TRAINING PROGRAM
Payer: COMMERCIAL

## 2021-01-18 VITALS
WEIGHT: 160 LBS | HEART RATE: 106 BPM | HEIGHT: 70 IN | TEMPERATURE: 101.7 F | BODY MASS INDEX: 22.9 KG/M2 | OXYGEN SATURATION: 94 % | RESPIRATION RATE: 23 BRPM | DIASTOLIC BLOOD PRESSURE: 72 MMHG | SYSTOLIC BLOOD PRESSURE: 143 MMHG

## 2021-01-18 DIAGNOSIS — R09.02 HYPOXIA: ICD-10-CM

## 2021-01-18 DIAGNOSIS — K92.2 GASTROINTESTINAL HEMORRHAGE, UNSPECIFIED GASTROINTESTINAL HEMORRHAGE TYPE: ICD-10-CM

## 2021-01-18 DIAGNOSIS — E11.622 DIABETIC ULCER OF LOWER EXTREMITY (HCC): ICD-10-CM

## 2021-01-18 DIAGNOSIS — E11.10 LACTIC ACIDOSIS DUE TO DIABETES MELLITUS (HCC): ICD-10-CM

## 2021-01-18 DIAGNOSIS — E11.10 DIABETIC KETOACIDOSIS WITHOUT COMA ASSOCIATED WITH TYPE 2 DIABETES MELLITUS (HCC): Primary | ICD-10-CM

## 2021-01-18 DIAGNOSIS — D62 ANEMIA DUE TO ACUTE BLOOD LOSS: ICD-10-CM

## 2021-01-18 DIAGNOSIS — N17.9 AKI (ACUTE KIDNEY INJURY) (HCC): ICD-10-CM

## 2021-01-18 DIAGNOSIS — L97.909 DIABETIC ULCER OF LOWER EXTREMITY (HCC): ICD-10-CM

## 2021-01-18 LAB
A/G RATIO: 1.1 (ref 1.1–2.2)
ABO/RH: NORMAL
ACETAMINOPHEN LEVEL: <5 UG/ML (ref 10–30)
ALBUMIN SERPL-MCNC: 4.3 G/DL (ref 3.4–5)
ALP BLD-CCNC: 195 U/L (ref 40–129)
ALT SERPL-CCNC: 25 U/L (ref 10–40)
AMMONIA: 73 UMOL/L (ref 16–60)
ANION GAP SERPL CALCULATED.3IONS-SCNC: 25 MMOL/L (ref 3–16)
ANION GAP SERPL CALCULATED.3IONS-SCNC: 28 MMOL/L (ref 3–16)
ANION GAP SERPL CALCULATED.3IONS-SCNC: 36 MMOL/L (ref 3–16)
ANTIBODY SCREEN: NORMAL
APTT: 27.2 SEC (ref 24.2–36.2)
AST SERPL-CCNC: 21 U/L (ref 15–37)
BACTERIA: ABNORMAL /HPF
BASE EXCESS VENOUS: -22.5 MMOL/L (ref -3–3)
BASE EXCESS VENOUS: -23 MMOL/L (ref -3–3)
BASOPHILS ABSOLUTE: 0.1 K/UL (ref 0–0.2)
BASOPHILS RELATIVE PERCENT: 0.6 %
BETA-HYDROXYBUTYRATE: >8 MMOL/L (ref 0–0.27)
BILIRUB SERPL-MCNC: 0.6 MG/DL (ref 0–1)
BILIRUBIN URINE: NEGATIVE
BLOOD BANK DISPENSE STATUS: NORMAL
BLOOD BANK DISPENSE STATUS: NORMAL
BLOOD BANK PRODUCT CODE: NORMAL
BLOOD BANK PRODUCT CODE: NORMAL
BLOOD, URINE: ABNORMAL
BPU ID: NORMAL
BPU ID: NORMAL
BUN BLDV-MCNC: 31 MG/DL (ref 7–20)
BUN BLDV-MCNC: 49 MG/DL (ref 7–20)
BUN BLDV-MCNC: 54 MG/DL (ref 7–20)
CALCIUM SERPL-MCNC: 7.1 MG/DL (ref 8.3–10.6)
CALCIUM SERPL-MCNC: 8.4 MG/DL (ref 8.3–10.6)
CALCIUM SERPL-MCNC: 9.1 MG/DL (ref 8.3–10.6)
CARBOXYHEMOGLOBIN: 1.5 % (ref 0–1.5)
CARBOXYHEMOGLOBIN: 1.8 % (ref 0–1.5)
CHLORIDE BLD-SCNC: 82 MMOL/L (ref 99–110)
CHLORIDE BLD-SCNC: 90 MMOL/L (ref 99–110)
CHLORIDE BLD-SCNC: 93 MMOL/L (ref 99–110)
CLARITY: CLEAR
CO2: 10 MMOL/L (ref 21–32)
CO2: 15 MMOL/L (ref 21–32)
CO2: 7 MMOL/L (ref 21–32)
COLOR: ABNORMAL
CREAT SERPL-MCNC: 1.3 MG/DL (ref 0.8–1.3)
CREAT SERPL-MCNC: 2.6 MG/DL (ref 0.8–1.3)
CREAT SERPL-MCNC: 2.7 MG/DL (ref 0.8–1.3)
DESCRIPTION BLOOD BANK: NORMAL
DESCRIPTION BLOOD BANK: NORMAL
EKG ATRIAL RATE: 104 BPM
EKG DIAGNOSIS: NORMAL
EKG P AXIS: 30 DEGREES
EKG P-R INTERVAL: 202 MS
EKG Q-T INTERVAL: 346 MS
EKG QRS DURATION: 100 MS
EKG QTC CALCULATION (BAZETT): 454 MS
EKG R AXIS: -76 DEGREES
EKG T AXIS: 16 DEGREES
EKG VENTRICULAR RATE: 104 BPM
EOSINOPHILS ABSOLUTE: 0 K/UL (ref 0–0.6)
EOSINOPHILS RELATIVE PERCENT: 0.1 %
EPITHELIAL CELLS, UA: ABNORMAL /HPF (ref 0–5)
ETHANOL: NORMAL MG/DL (ref 0–0.08)
GFR AFRICAN AMERICAN: 29
GFR AFRICAN AMERICAN: 30
GFR AFRICAN AMERICAN: >60
GFR NON-AFRICAN AMERICAN: 24
GFR NON-AFRICAN AMERICAN: 25
GFR NON-AFRICAN AMERICAN: 56
GLOBULIN: 4 G/DL
GLUCOSE BLD-MCNC: 593 MG/DL (ref 70–99)
GLUCOSE BLD-MCNC: 831 MG/DL (ref 70–99)
GLUCOSE BLD-MCNC: 986 MG/DL (ref 70–99)
GLUCOSE BLD-MCNC: >600 MG/DL (ref 70–99)
GLUCOSE URINE: >=1000 MG/DL
HCO3 VENOUS: 6 MMOL/L (ref 23–29)
HCO3 VENOUS: 8.7 MMOL/L (ref 23–29)
HCT VFR BLD CALC: 31.3 % (ref 40.5–52.5)
HCT VFR BLD CALC: 41.8 % (ref 40.5–52.5)
HEMOGLOBIN: 12.1 G/DL (ref 13.5–17.5)
HEMOGLOBIN: 8.7 G/DL (ref 13.5–17.5)
INR BLD: 1.05 (ref 0.86–1.14)
KETONES, URINE: >=80 MG/DL
LACTIC ACID, SEPSIS: 8.9 MMOL/L (ref 0.4–1.9)
LACTIC ACID, SEPSIS: 9.3 MMOL/L (ref 0.4–1.9)
LEUKOCYTE ESTERASE, URINE: NEGATIVE
LYMPHOCYTES ABSOLUTE: 0.8 K/UL (ref 1–5.1)
LYMPHOCYTES RELATIVE PERCENT: 3.4 %
MCH RBC QN AUTO: 24.6 PG (ref 26–34)
MCHC RBC AUTO-ENTMCNC: 28.9 G/DL (ref 31–36)
MCV RBC AUTO: 84.9 FL (ref 80–100)
METHEMOGLOBIN VENOUS: 0.3 %
METHEMOGLOBIN VENOUS: 0.3 %
MICROSCOPIC EXAMINATION: YES
MONOCYTES ABSOLUTE: 1.4 K/UL (ref 0–1.3)
MONOCYTES RELATIVE PERCENT: 6.6 %
MUCUS: ABNORMAL /LPF
NEUTROPHILS ABSOLUTE: 19.5 K/UL (ref 1.7–7.7)
NEUTROPHILS RELATIVE PERCENT: 89.3 %
NITRITE, URINE: NEGATIVE
O2 CONTENT, VEN: 11 VOL %
O2 CONTENT, VEN: 12 VOL %
O2 SAT, VEN: 52 %
O2 SAT, VEN: 99 %
O2 THERAPY: ABNORMAL
O2 THERAPY: ABNORMAL
OCCULT BLOOD DIAGNOSTIC: ABNORMAL
PCO2, VEN: 23.8 MMHG (ref 40–50)
PCO2, VEN: 39 MMHG (ref 40–50)
PDW BLD-RTO: 15.3 % (ref 12.4–15.4)
PERFORMED ON: ABNORMAL
PH UA: 5.5 (ref 5–8)
PH VENOUS: 6.97 (ref 7.35–7.45)
PH VENOUS: 7.02 (ref 7.35–7.45)
PLATELET # BLD: 419 K/UL (ref 135–450)
PMV BLD AUTO: 10 FL (ref 5–10.5)
PO2, VEN: 215.2 MMHG (ref 25–40)
PO2, VEN: 41.5 MMHG (ref 25–40)
POTASSIUM REFLEX MAGNESIUM: 5.1 MMOL/L (ref 3.5–5.1)
POTASSIUM REFLEX MAGNESIUM: 5.4 MMOL/L (ref 3.5–5.1)
POTASSIUM REFLEX MAGNESIUM: 5.7 MMOL/L (ref 3.5–5.1)
PROTEIN UA: ABNORMAL MG/DL
PROTHROMBIN TIME: 12.2 SEC (ref 10–13.2)
RBC # BLD: 4.92 M/UL (ref 4.2–5.9)
RBC UA: ABNORMAL /HPF (ref 0–4)
RENAL EPITHELIAL, UA: ABNORMAL /HPF (ref 0–1)
SARS-COV-2, NAAT: NOT DETECTED
SODIUM BLD-SCNC: 128 MMOL/L (ref 136–145)
SODIUM BLD-SCNC: 128 MMOL/L (ref 136–145)
SODIUM BLD-SCNC: 130 MMOL/L (ref 136–145)
SPECIFIC GRAVITY UA: 1.02 (ref 1–1.03)
TCO2 CALC VENOUS: 10 MMOL/L
TCO2 CALC VENOUS: 7 MMOL/L
TOTAL PROTEIN: 8.3 G/DL (ref 6.4–8.2)
TROPONIN: 0.02 NG/ML
URINE REFLEX TO CULTURE: ABNORMAL
URINE TYPE: ABNORMAL
UROBILINOGEN, URINE: 0.2 E.U./DL
WBC # BLD: 21.8 K/UL (ref 4–11)
WBC UA: ABNORMAL /HPF (ref 0–5)

## 2021-01-18 PROCEDURE — 83605 ASSAY OF LACTIC ACID: CPT

## 2021-01-18 PROCEDURE — 99291 CRITICAL CARE FIRST HOUR: CPT

## 2021-01-18 PROCEDURE — G0328 FECAL BLOOD SCRN IMMUNOASSAY: HCPCS

## 2021-01-18 PROCEDURE — 85610 PROTHROMBIN TIME: CPT

## 2021-01-18 PROCEDURE — 86900 BLOOD TYPING SEROLOGIC ABO: CPT

## 2021-01-18 PROCEDURE — 36415 COLL VENOUS BLD VENIPUNCTURE: CPT

## 2021-01-18 PROCEDURE — 86923 COMPATIBILITY TEST ELECTRIC: CPT

## 2021-01-18 PROCEDURE — 93005 ELECTROCARDIOGRAM TRACING: CPT | Performed by: PHYSICIAN ASSISTANT

## 2021-01-18 PROCEDURE — 36430 TRANSFUSION BLD/BLD COMPNT: CPT

## 2021-01-18 PROCEDURE — 99285 EMERGENCY DEPT VISIT HI MDM: CPT

## 2021-01-18 PROCEDURE — 86850 RBC ANTIBODY SCREEN: CPT

## 2021-01-18 PROCEDURE — 85018 HEMOGLOBIN: CPT

## 2021-01-18 PROCEDURE — 82803 BLOOD GASES ANY COMBINATION: CPT

## 2021-01-18 PROCEDURE — 74176 CT ABD & PELVIS W/O CONTRAST: CPT

## 2021-01-18 PROCEDURE — 93010 ELECTROCARDIOGRAM REPORT: CPT | Performed by: INTERNAL MEDICINE

## 2021-01-18 PROCEDURE — U0002 COVID-19 LAB TEST NON-CDC: HCPCS

## 2021-01-18 PROCEDURE — 85014 HEMATOCRIT: CPT

## 2021-01-18 PROCEDURE — 96367 TX/PROPH/DG ADDL SEQ IV INF: CPT

## 2021-01-18 PROCEDURE — 85025 COMPLETE CBC W/AUTO DIFF WBC: CPT

## 2021-01-18 PROCEDURE — 72125 CT NECK SPINE W/O DYE: CPT

## 2021-01-18 PROCEDURE — 82010 KETONE BODYS QUAN: CPT

## 2021-01-18 PROCEDURE — 80053 COMPREHEN METABOLIC PANEL: CPT

## 2021-01-18 PROCEDURE — G0480 DRUG TEST DEF 1-7 CLASSES: HCPCS

## 2021-01-18 PROCEDURE — 70450 CT HEAD/BRAIN W/O DYE: CPT

## 2021-01-18 PROCEDURE — 96368 THER/DIAG CONCURRENT INF: CPT

## 2021-01-18 PROCEDURE — P9016 RBC LEUKOCYTES REDUCED: HCPCS

## 2021-01-18 PROCEDURE — 84484 ASSAY OF TROPONIN QUANT: CPT

## 2021-01-18 PROCEDURE — 87040 BLOOD CULTURE FOR BACTERIA: CPT

## 2021-01-18 PROCEDURE — 2500000003 HC RX 250 WO HCPCS: Performed by: PHYSICIAN ASSISTANT

## 2021-01-18 PROCEDURE — 96365 THER/PROPH/DIAG IV INF INIT: CPT

## 2021-01-18 PROCEDURE — 86901 BLOOD TYPING SEROLOGIC RH(D): CPT

## 2021-01-18 PROCEDURE — 2580000003 HC RX 258: Performed by: PHYSICIAN ASSISTANT

## 2021-01-18 PROCEDURE — 6360000002 HC RX W HCPCS: Performed by: PHYSICIAN ASSISTANT

## 2021-01-18 PROCEDURE — 82140 ASSAY OF AMMONIA: CPT

## 2021-01-18 PROCEDURE — 71045 X-RAY EXAM CHEST 1 VIEW: CPT

## 2021-01-18 PROCEDURE — 99292 CRITICAL CARE ADDL 30 MIN: CPT

## 2021-01-18 PROCEDURE — 85730 THROMBOPLASTIN TIME PARTIAL: CPT

## 2021-01-18 PROCEDURE — C9113 INJ PANTOPRAZOLE SODIUM, VIA: HCPCS | Performed by: PHYSICIAN ASSISTANT

## 2021-01-18 PROCEDURE — 6370000000 HC RX 637 (ALT 250 FOR IP): Performed by: PHYSICIAN ASSISTANT

## 2021-01-18 PROCEDURE — 96375 TX/PRO/DX INJ NEW DRUG ADDON: CPT

## 2021-01-18 PROCEDURE — 81001 URINALYSIS AUTO W/SCOPE: CPT

## 2021-01-18 PROCEDURE — 96366 THER/PROPH/DIAG IV INF ADDON: CPT

## 2021-01-18 RX ORDER — PANTOPRAZOLE SODIUM 40 MG/10ML
80 INJECTION, POWDER, LYOPHILIZED, FOR SOLUTION INTRAVENOUS ONCE
Status: COMPLETED | OUTPATIENT
Start: 2021-01-18 | End: 2021-01-18

## 2021-01-18 RX ORDER — SODIUM CHLORIDE 9 MG/ML
INJECTION, SOLUTION INTRAVENOUS PRN
Status: DISCONTINUED | OUTPATIENT
Start: 2021-01-18 | End: 2021-01-19 | Stop reason: HOSPADM

## 2021-01-18 RX ORDER — SODIUM CHLORIDE, SODIUM LACTATE, POTASSIUM CHLORIDE, AND CALCIUM CHLORIDE .6; .31; .03; .02 G/100ML; G/100ML; G/100ML; G/100ML
30 INJECTION, SOLUTION INTRAVENOUS ONCE
Status: COMPLETED | OUTPATIENT
Start: 2021-01-18 | End: 2021-01-18

## 2021-01-18 RX ORDER — BENOXINATE HCL/FLUORESCEIN SOD 0.4%-0.25%
1 DROPS OPHTHALMIC (EYE) ONCE
Status: DISCONTINUED | OUTPATIENT
Start: 2021-01-18 | End: 2021-01-18

## 2021-01-18 RX ORDER — ACETAMINOPHEN 650 MG/1
650 SUPPOSITORY RECTAL ONCE
Status: COMPLETED | OUTPATIENT
Start: 2021-01-18 | End: 2021-01-18

## 2021-01-18 RX ORDER — SODIUM CHLORIDE, SODIUM LACTATE, POTASSIUM CHLORIDE, CALCIUM CHLORIDE 600; 310; 30; 20 MG/100ML; MG/100ML; MG/100ML; MG/100ML
1000 INJECTION, SOLUTION INTRAVENOUS ONCE
Status: DISCONTINUED | OUTPATIENT
Start: 2021-01-18 | End: 2021-01-18

## 2021-01-18 RX ORDER — SODIUM CHLORIDE, SODIUM LACTATE, POTASSIUM CHLORIDE, CALCIUM CHLORIDE 600; 310; 30; 20 MG/100ML; MG/100ML; MG/100ML; MG/100ML
INJECTION, SOLUTION INTRAVENOUS CONTINUOUS
Status: DISCONTINUED | OUTPATIENT
Start: 2021-01-18 | End: 2021-01-19 | Stop reason: HOSPADM

## 2021-01-18 RX ORDER — OCTREOTIDE ACETATE 50 UG/ML
50 INJECTION, SOLUTION INTRAVENOUS; SUBCUTANEOUS ONCE
Status: DISCONTINUED | OUTPATIENT
Start: 2021-01-18 | End: 2021-01-18

## 2021-01-18 RX ADMIN — SODIUM BICARBONATE: 84 INJECTION, SOLUTION INTRAVENOUS at 18:55

## 2021-01-18 RX ADMIN — SODIUM CHLORIDE, POTASSIUM CHLORIDE, SODIUM LACTATE AND CALCIUM CHLORIDE 2178 ML: 600; 310; 30; 20 INJECTION, SOLUTION INTRAVENOUS at 18:31

## 2021-01-18 RX ADMIN — PANTOPRAZOLE SODIUM 80 MG: 40 INJECTION, POWDER, FOR SOLUTION INTRAVENOUS at 18:33

## 2021-01-18 RX ADMIN — ACETAMINOPHEN 650 MG: 650 SUPPOSITORY RECTAL at 21:48

## 2021-01-18 RX ADMIN — VANCOMYCIN HYDROCHLORIDE 1750 MG: 10 INJECTION, POWDER, LYOPHILIZED, FOR SOLUTION INTRAVENOUS at 18:55

## 2021-01-18 RX ADMIN — CEFTRIAXONE SODIUM 1 G: 1 INJECTION, POWDER, FOR SOLUTION INTRAMUSCULAR; INTRAVENOUS at 18:33

## 2021-01-18 RX ADMIN — SODIUM CHLORIDE, POTASSIUM CHLORIDE, SODIUM LACTATE AND CALCIUM CHLORIDE: 600; 310; 30; 20 INJECTION, SOLUTION INTRAVENOUS at 20:45

## 2021-01-18 RX ADMIN — SODIUM CHLORIDE 0.15 UNITS/KG/HR: 9 INJECTION, SOLUTION INTRAVENOUS at 23:36

## 2021-01-18 RX ADMIN — SODIUM CHLORIDE 0.1 UNITS/KG/HR: 9 INJECTION, SOLUTION INTRAVENOUS at 19:06

## 2021-01-18 NOTE — ED NOTES
Lab called with critical, venous pH 6.968. Dr. Leach Hands aware.       Josue Negron RN  01/18/21 [id number]

## 2021-01-18 NOTE — ED TRIAGE NOTES
Chief Complaint   Patient presents with    Altered Mental Status     EMS states pt found responsive to pain this evening, last seen normal this am when wife left for work, FSBS >500

## 2021-01-18 NOTE — ED PROVIDER NOTES
I independently examined and evaluated Selena Macias. All diagnostic, treatment, and disposition decisions were made by myself in conjunction with the advanced practice provider. For all further details of the patient's emergency department visit, please see the advanced practice provider's documentation. Primary Care Physician: Christian Byrnes MD    History: This is a 56 y.o. male who presents to the Emergency Department with complaint of altered mental status. Patient reportedly was feeling tired when his wife went to work, when she got home from work he was found to be altered. Brought in by EMS, blood sugar in the 500s. Does have history of diabetes insulin-dependent, has been previously admitted for DKA before. No obvious signs of trauma. Patient is covered with stool and vomit, dark in color, no bright red blood. Prior EtOH use. Patient speaking some words clearly however does not make sense, is not answering questions or following specific commands. Does appear to be moving all extremities equal.  Seen for possible GI bleed versus DKA. Patient was found to be Hemoccult positive, due to document history of EtOH abuse, negative ethanol here, patient was started on dose of Rocephin, patient's hemoglobin on arrival 12.1 increased from 2019. Transfer for potential upper GI bleed. Patient was also started on octreotide due to EtOH history, Protonix bolus. Will repeat H&H. At this time patient's airway is intact. Patient was found to be in DKA with hyperglycemia, elevated ketone level, acidosis with a pH of 6.9. Was started on bicarb infusion, insulin infusion. We will plan to repeat blood gas, lactic acid. Patient's creatinine level 2.7 up from baseline around 1.1, receiving IV fluid bolus. Potassium was elevated at 5.7 with slight peaked T waves, receiving IV insulin infusion now, will plan to recheck BMP. CT head CT abdomen unremarkable.     Patient will be admitted to ICU    Physical:     height is 5' 10\" (1.778 m) and weight is 160 lb (72.6 kg). His pulse is 110. His respiration is 26 and oxygen saturation is 97%.    56 y.o. male   Awake but not oriented  Slightly tachycardic but regular  Lung sounds are clear anteriorly  Abdomen soft there is no rigidity or guarding  Neuro speaking words, not in context, not verbally responding appears to be moving all extremities spontaneously pupils are equal and reactive    Impression: DKA, upper GI bleed    Plan: Admission    EKG Interpretation    The Ekg interpreted by me shows  sinus tachycardia, ekkq=488 with a rate of  Axis is   Left axis deviation  QTc is  within an acceptable range  Intervals and Durations are unremarkable. ST Segments: no acute change    No significant change from prior EKG dated 9/25/2019      CRITICAL CARE: There was a high probability of clinically significant/life threatening deterioration in this patient's condition which required my urgent intervention. Total critical care time was 0 minutes. This excludes any time for separately reportable procedures. Smitha Reeves DO  Emergency Physician        Comment: Please note this report has been produced using speech recognition software and may contain errors related to that system including errors in grammar, punctuation, and spelling, as well as words and phrases that may be inappropriate. If there are any questions or concerns please feel free to contact the dictating provider for clarification.           Smitha Reevse DO  01/18/21 2030

## 2021-01-19 ENCOUNTER — APPOINTMENT (OUTPATIENT)
Dept: INTERVENTIONAL RADIOLOGY/VASCULAR | Age: 61
DRG: 871 | End: 2021-01-19
Attending: INTERNAL MEDICINE
Payer: COMMERCIAL

## 2021-01-19 ENCOUNTER — APPOINTMENT (OUTPATIENT)
Dept: GENERAL RADIOLOGY | Age: 61
DRG: 871 | End: 2021-01-19
Attending: INTERNAL MEDICINE
Payer: COMMERCIAL

## 2021-01-19 ENCOUNTER — HOSPITAL ENCOUNTER (INPATIENT)
Age: 61
LOS: 6 days | Discharge: HOME | DRG: 871 | End: 2021-01-25
Attending: INTERNAL MEDICINE | Admitting: INTERNAL MEDICINE
Payer: COMMERCIAL

## 2021-01-19 DIAGNOSIS — E11.10 DIABETIC KETOACIDOSIS WITHOUT COMA ASSOCIATED WITH TYPE 2 DIABETES MELLITUS (HCC): Primary | ICD-10-CM

## 2021-01-19 LAB
ANION GAP SERPL CALCULATED.3IONS-SCNC: 10 MMOL/L (ref 3–16)
ANION GAP SERPL CALCULATED.3IONS-SCNC: 15 MMOL/L (ref 3–16)
APPEARANCE CSF: CLEAR
APPEARANCE CSF: CLEAR
ATYPICAL LYMPHS CSF: 1 %
BASE EXCESS VENOUS: -12.9 MMOL/L (ref -3–3)
BUN BLDV-MCNC: 26 MG/DL (ref 7–20)
BUN BLDV-MCNC: 28 MG/DL (ref 7–20)
BUN BLDV-MCNC: 35 MG/DL (ref 7–20)
BUN BLDV-MCNC: 42 MG/DL (ref 7–20)
CALCIUM SERPL-MCNC: 7.6 MG/DL (ref 8.3–10.6)
CALCIUM SERPL-MCNC: 8 MG/DL (ref 8.3–10.6)
CALCIUM SERPL-MCNC: 8.1 MG/DL (ref 8.3–10.6)
CALCIUM SERPL-MCNC: 8.2 MG/DL (ref 8.3–10.6)
CARBOXYHEMOGLOBIN: 0.8 % (ref 0–1.5)
CHLORIDE BLD-SCNC: 101 MMOL/L (ref 99–110)
CHLORIDE BLD-SCNC: 107 MMOL/L (ref 99–110)
CHLORIDE BLD-SCNC: 108 MMOL/L (ref 99–110)
CHLORIDE BLD-SCNC: 108 MMOL/L (ref 99–110)
CLOT EVALUATION CSF: ABNORMAL
CLOT EVALUATION CSF: ABNORMAL
CO2: 20 MMOL/L (ref 21–32)
CO2: 23 MMOL/L (ref 21–32)
CO2: 25 MMOL/L (ref 21–32)
CO2: 25 MMOL/L (ref 21–32)
COLOR CSF: COLORLESS
COLOR CSF: COLORLESS
CREAT SERPL-MCNC: 1.7 MG/DL (ref 0.8–1.3)
CREAT SERPL-MCNC: 1.9 MG/DL (ref 0.8–1.3)
CREAT SERPL-MCNC: 2 MG/DL (ref 0.8–1.3)
CREAT SERPL-MCNC: 2.4 MG/DL (ref 0.8–1.3)
GFR AFRICAN AMERICAN: 33
GFR AFRICAN AMERICAN: 41
GFR AFRICAN AMERICAN: 44
GFR AFRICAN AMERICAN: 50
GFR NON-AFRICAN AMERICAN: 28
GFR NON-AFRICAN AMERICAN: 34
GFR NON-AFRICAN AMERICAN: 36
GFR NON-AFRICAN AMERICAN: 41
GLUCOSE BLD-MCNC: 120 MG/DL (ref 70–99)
GLUCOSE BLD-MCNC: 133 MG/DL (ref 70–99)
GLUCOSE BLD-MCNC: 147 MG/DL (ref 70–99)
GLUCOSE BLD-MCNC: 164 MG/DL (ref 70–99)
GLUCOSE BLD-MCNC: 167 MG/DL (ref 70–99)
GLUCOSE BLD-MCNC: 187 MG/DL (ref 70–99)
GLUCOSE BLD-MCNC: 190 MG/DL (ref 70–99)
GLUCOSE BLD-MCNC: 195 MG/DL (ref 70–99)
GLUCOSE BLD-MCNC: 198 MG/DL (ref 70–99)
GLUCOSE BLD-MCNC: 238 MG/DL (ref 70–99)
GLUCOSE BLD-MCNC: 252 MG/DL (ref 70–99)
GLUCOSE BLD-MCNC: 277 MG/DL (ref 70–99)
GLUCOSE BLD-MCNC: 282 MG/DL (ref 70–99)
GLUCOSE BLD-MCNC: 286 MG/DL (ref 70–99)
GLUCOSE BLD-MCNC: 305 MG/DL (ref 70–99)
GLUCOSE BLD-MCNC: 311 MG/DL (ref 70–99)
GLUCOSE BLD-MCNC: 324 MG/DL (ref 70–99)
GLUCOSE BLD-MCNC: 355 MG/DL (ref 70–99)
GLUCOSE BLD-MCNC: 386 MG/DL (ref 70–99)
GLUCOSE BLD-MCNC: 393 MG/DL (ref 70–99)
GLUCOSE BLD-MCNC: 436 MG/DL (ref 70–99)
GLUCOSE BLD-MCNC: 439 MG/DL (ref 70–99)
GLUCOSE BLD-MCNC: 584 MG/DL (ref 70–99)
GLUCOSE BLD-MCNC: 79 MG/DL (ref 70–99)
GLUCOSE BLD-MCNC: 94 MG/DL (ref 70–99)
GLUCOSE, CSF: 243 MG/DL (ref 40–80)
HCO3 VENOUS: 14 MMOL/L (ref 23–29)
HCT VFR BLD CALC: 34 % (ref 40.5–52.5)
HCT VFR BLD CALC: 35.2 % (ref 40.5–52.5)
HCT VFR BLD CALC: 36.5 % (ref 40.5–52.5)
HEMOGLOBIN: 11.1 G/DL (ref 13.5–17.5)
HEMOGLOBIN: 11.6 G/DL (ref 13.5–17.5)
HEMOGLOBIN: 11.9 G/DL (ref 13.5–17.5)
LACTIC ACID, SEPSIS: 1.5 MMOL/L (ref 0.4–1.9)
LYMPHS CSF: 25 % (ref 40–80)
LYMPHS CSF: 9 % (ref 40–80)
MAGNESIUM: 2 MG/DL (ref 1.8–2.4)
MAGNESIUM: 2 MG/DL (ref 1.8–2.4)
MAGNESIUM: 2.1 MG/DL (ref 1.8–2.4)
MAGNESIUM: 2.1 MG/DL (ref 1.8–2.4)
MCH RBC QN AUTO: 25.8 PG (ref 26–34)
MCHC RBC AUTO-ENTMCNC: 32.7 G/DL (ref 31–36)
MCV RBC AUTO: 78.8 FL (ref 80–100)
MENINGITIS ENCEPHALITIS PANEL: NORMAL
METHEMOGLOBIN VENOUS: 0.2 %
MONOCYTE, CSF: 6 % (ref 15–45)
MONOCYTE, CSF: 7 % (ref 15–45)
NEUTROPHILS, CSF: 68 % (ref 0–6)
NEUTROPHILS, CSF: 84 % (ref 0–6)
NUMBER OF CELLS CSF: 100
NUMBER OF CELLS CSF: 100
O2 CONTENT, VEN: 17 VOL %
O2 SAT, VEN: 98 %
O2 THERAPY: ABNORMAL
PCO2, VEN: 35.4 MMHG (ref 40–50)
PDW BLD-RTO: 14.8 % (ref 12.4–15.4)
PERFORMED ON: ABNORMAL
PERFORMED ON: NORMAL
PERFORMED ON: NORMAL
PH VENOUS: 7.21 (ref 7.35–7.45)
PHOSPHORUS: 2 MG/DL (ref 2.5–4.9)
PHOSPHORUS: 2.1 MG/DL (ref 2.5–4.9)
PHOSPHORUS: 2.8 MG/DL (ref 2.5–4.9)
PHOSPHORUS: 2.8 MG/DL (ref 2.5–4.9)
PLATELET # BLD: 283 K/UL (ref 135–450)
PMV BLD AUTO: 9 FL (ref 5–10.5)
PO2, VEN: 126.4 MMHG (ref 25–40)
POTASSIUM SERPL-SCNC: 3.7 MMOL/L (ref 3.5–5.1)
POTASSIUM SERPL-SCNC: 4 MMOL/L (ref 3.5–5.1)
POTASSIUM SERPL-SCNC: 4.2 MMOL/L (ref 3.5–5.1)
POTASSIUM SERPL-SCNC: 4.6 MMOL/L (ref 3.5–5.1)
PROTEIN CSF: 31 MG/DL (ref 15–45)
RBC # BLD: 4.62 M/UL (ref 4.2–5.9)
RBC CSF: 17 /CUMM
RBC CSF: 20 /CUMM
REPORT: NORMAL
SODIUM BLD-SCNC: 136 MMOL/L (ref 136–145)
SODIUM BLD-SCNC: 141 MMOL/L (ref 136–145)
SODIUM BLD-SCNC: 142 MMOL/L (ref 136–145)
SODIUM BLD-SCNC: 143 MMOL/L (ref 136–145)
TCO2 CALC VENOUS: 15 MMOL/L
TUBE NUMBER CSF: ABNORMAL
TUBE NUMBER CSF: ABNORMAL
VANCOMYCIN RANDOM: 9.4 UG/ML
VOLUME CSF: 11.5 ML
VOLUME CSF: 11.5 ML
WBC # BLD: 16 K/UL (ref 4–11)
WBC CSF: 11 /CUMM (ref 0–5)
WBC CSF: 14 /CUMM (ref 0–5)

## 2021-01-19 PROCEDURE — 71045 X-RAY EXAM CHEST 1 VIEW: CPT

## 2021-01-19 PROCEDURE — 2500000003 HC RX 250 WO HCPCS: Performed by: INTERNAL MEDICINE

## 2021-01-19 PROCEDURE — 82945 GLUCOSE OTHER FLUID: CPT

## 2021-01-19 PROCEDURE — 2000000000 HC ICU R&B

## 2021-01-19 PROCEDURE — 85018 HEMOGLOBIN: CPT

## 2021-01-19 PROCEDURE — 83605 ASSAY OF LACTIC ACID: CPT

## 2021-01-19 PROCEDURE — 62328 DX LMBR SPI PNXR W/FLUOR/CT: CPT

## 2021-01-19 PROCEDURE — 6370000000 HC RX 637 (ALT 250 FOR IP): Performed by: INTERNAL MEDICINE

## 2021-01-19 PROCEDURE — 6360000002 HC RX W HCPCS: Performed by: INTERNAL MEDICINE

## 2021-01-19 PROCEDURE — 87205 SMEAR GRAM STAIN: CPT

## 2021-01-19 PROCEDURE — 80048 BASIC METABOLIC PNL TOTAL CA: CPT

## 2021-01-19 PROCEDURE — 87483 CNS DNA AMP PROBE TYPE 12-25: CPT

## 2021-01-19 PROCEDURE — 87449 NOS EACH ORGANISM AG IA: CPT

## 2021-01-19 PROCEDURE — 85014 HEMATOCRIT: CPT

## 2021-01-19 PROCEDURE — U0003 INFECTIOUS AGENT DETECTION BY NUCLEIC ACID (DNA OR RNA); SEVERE ACUTE RESPIRATORY SYNDROME CORONAVIRUS 2 (SARS-COV-2) (CORONAVIRUS DISEASE [COVID-19]), AMPLIFIED PROBE TECHNIQUE, MAKING USE OF HIGH THROUGHPUT TECHNOLOGIES AS DESCRIBED BY CMS-2020-01-R: HCPCS

## 2021-01-19 PROCEDURE — 99291 CRITICAL CARE FIRST HOUR: CPT | Performed by: INTERNAL MEDICINE

## 2021-01-19 PROCEDURE — 87070 CULTURE OTHR SPECIMN AEROBIC: CPT

## 2021-01-19 PROCEDURE — 80202 ASSAY OF VANCOMYCIN: CPT

## 2021-01-19 PROCEDURE — 83735 ASSAY OF MAGNESIUM: CPT

## 2021-01-19 PROCEDURE — 84157 ASSAY OF PROTEIN OTHER: CPT

## 2021-01-19 PROCEDURE — 85027 COMPLETE CBC AUTOMATED: CPT

## 2021-01-19 PROCEDURE — C9113 INJ PANTOPRAZOLE SODIUM, VIA: HCPCS | Performed by: INTERNAL MEDICINE

## 2021-01-19 PROCEDURE — 84100 ASSAY OF PHOSPHORUS: CPT

## 2021-01-19 PROCEDURE — 2580000003 HC RX 258: Performed by: INTERNAL MEDICINE

## 2021-01-19 PROCEDURE — U0002 COVID-19 LAB TEST NON-CDC: HCPCS

## 2021-01-19 PROCEDURE — 36415 COLL VENOUS BLD VENIPUNCTURE: CPT

## 2021-01-19 PROCEDURE — 89051 BODY FLUID CELL COUNT: CPT

## 2021-01-19 PROCEDURE — 2709999900 IR LUMBAR PUNCTURE FOR DIAGNOSIS

## 2021-01-19 RX ORDER — ACETAMINOPHEN 160 MG/5ML
650 SOLUTION ORAL EVERY 4 HOURS PRN
Status: DISCONTINUED | OUTPATIENT
Start: 2021-01-19 | End: 2021-01-25

## 2021-01-19 RX ORDER — SODIUM CHLORIDE 9 MG/ML
10 INJECTION INTRAVENOUS 2 TIMES DAILY
Status: DISCONTINUED | OUTPATIENT
Start: 2021-01-19 | End: 2021-01-24

## 2021-01-19 RX ORDER — ACETAMINOPHEN 650 MG/1
650 SUPPOSITORY RECTAL EVERY 4 HOURS PRN
Status: DISCONTINUED | OUTPATIENT
Start: 2021-01-19 | End: 2021-01-25 | Stop reason: HOSPADM

## 2021-01-19 RX ORDER — DEXAMETHASONE SODIUM PHOSPHATE 10 MG/ML
6 INJECTION, SOLUTION INTRAMUSCULAR; INTRAVENOUS EVERY 12 HOURS
Status: DISCONTINUED | OUTPATIENT
Start: 2021-01-19 | End: 2021-01-19

## 2021-01-19 RX ORDER — MAGNESIUM SULFATE 1 G/100ML
1 INJECTION INTRAVENOUS PRN
Status: DISCONTINUED | OUTPATIENT
Start: 2021-01-19 | End: 2021-01-25 | Stop reason: HOSPADM

## 2021-01-19 RX ORDER — PANTOPRAZOLE SODIUM 40 MG/10ML
40 INJECTION, POWDER, LYOPHILIZED, FOR SOLUTION INTRAVENOUS 2 TIMES DAILY
Status: DISCONTINUED | OUTPATIENT
Start: 2021-01-19 | End: 2021-01-24

## 2021-01-19 RX ORDER — POTASSIUM CHLORIDE 7.45 MG/ML
10 INJECTION INTRAVENOUS PRN
Status: DISCONTINUED | OUTPATIENT
Start: 2021-01-19 | End: 2021-01-20

## 2021-01-19 RX ORDER — DEXTROSE AND SODIUM CHLORIDE 5; .45 G/100ML; G/100ML
INJECTION, SOLUTION INTRAVENOUS CONTINUOUS PRN
Status: DISCONTINUED | OUTPATIENT
Start: 2021-01-19 | End: 2021-01-20

## 2021-01-19 RX ORDER — SODIUM CHLORIDE 9 MG/ML
INJECTION, SOLUTION INTRAVENOUS CONTINUOUS
Status: DISCONTINUED | OUTPATIENT
Start: 2021-01-19 | End: 2021-01-20

## 2021-01-19 RX ORDER — 0.9 % SODIUM CHLORIDE 0.9 %
15 INTRAVENOUS SOLUTION INTRAVENOUS ONCE
Status: COMPLETED | OUTPATIENT
Start: 2021-01-19 | End: 2021-01-19

## 2021-01-19 RX ORDER — DEXTROSE MONOHYDRATE 25 G/50ML
12.5 INJECTION, SOLUTION INTRAVENOUS PRN
Status: DISCONTINUED | OUTPATIENT
Start: 2021-01-19 | End: 2021-01-25 | Stop reason: HOSPADM

## 2021-01-19 RX ADMIN — Medication 10 ML: at 19:56

## 2021-01-19 RX ADMIN — VANCOMYCIN HYDROCHLORIDE 1250 MG: 10 INJECTION, POWDER, LYOPHILIZED, FOR SOLUTION INTRAVENOUS at 18:09

## 2021-01-19 RX ADMIN — SODIUM CHLORIDE 1221 ML: 9 INJECTION, SOLUTION INTRAVENOUS at 01:39

## 2021-01-19 RX ADMIN — SODIUM PHOSPHATE, MONOBASIC, MONOHYDRATE 15 MMOL: 276; 142 INJECTION, SOLUTION INTRAVENOUS at 13:47

## 2021-01-19 RX ADMIN — PANTOPRAZOLE SODIUM 40 MG: 40 INJECTION, POWDER, FOR SOLUTION INTRAVENOUS at 19:56

## 2021-01-19 RX ADMIN — SODIUM PHOSPHATE, MONOBASIC, MONOHYDRATE 15 MMOL: 276; 142 INJECTION, SOLUTION INTRAVENOUS at 06:56

## 2021-01-19 RX ADMIN — AMPICILLIN SODIUM 2 G: 2 INJECTION, POWDER, FOR SOLUTION INTRAMUSCULAR; INTRAVENOUS at 05:08

## 2021-01-19 RX ADMIN — SODIUM CHLORIDE: 9 INJECTION, SOLUTION INTRAVENOUS at 05:01

## 2021-01-19 RX ADMIN — CEFTRIAXONE 2 G: 2 INJECTION, POWDER, FOR SOLUTION INTRAMUSCULAR; INTRAVENOUS at 09:49

## 2021-01-19 RX ADMIN — Medication 10 ML: at 09:49

## 2021-01-19 RX ADMIN — POTASSIUM CHLORIDE 10 MEQ: 7.46 INJECTION, SOLUTION INTRAVENOUS at 20:07

## 2021-01-19 RX ADMIN — POTASSIUM CHLORIDE 10 MEQ: 7.46 INJECTION, SOLUTION INTRAVENOUS at 21:19

## 2021-01-19 RX ADMIN — DEXAMETHASONE SODIUM PHOSPHATE 6 MG: 10 INJECTION, SOLUTION INTRAMUSCULAR; INTRAVENOUS at 14:06

## 2021-01-19 RX ADMIN — POTASSIUM CHLORIDE 10 MEQ: 7.46 INJECTION, SOLUTION INTRAVENOUS at 08:19

## 2021-01-19 RX ADMIN — DEXAMETHASONE SODIUM PHOSPHATE 6 MG: 10 INJECTION, SOLUTION INTRAMUSCULAR; INTRAVENOUS at 05:11

## 2021-01-19 RX ADMIN — POTASSIUM CHLORIDE 10 MEQ: 7.46 INJECTION, SOLUTION INTRAVENOUS at 22:53

## 2021-01-19 RX ADMIN — PANTOPRAZOLE SODIUM 40 MG: 40 INJECTION, POWDER, FOR SOLUTION INTRAVENOUS at 09:49

## 2021-01-19 RX ADMIN — AMPICILLIN SODIUM 2 G: 2 INJECTION, POWDER, FOR SOLUTION INTRAMUSCULAR; INTRAVENOUS at 21:16

## 2021-01-19 RX ADMIN — POTASSIUM CHLORIDE 10 MEQ: 7.46 INJECTION, SOLUTION INTRAVENOUS at 13:47

## 2021-01-19 RX ADMIN — AMPICILLIN SODIUM 2 G: 2 INJECTION, POWDER, FOR SOLUTION INTRAMUSCULAR; INTRAVENOUS at 12:25

## 2021-01-19 RX ADMIN — DEXTROSE AND SODIUM CHLORIDE: 5; 450 INJECTION, SOLUTION INTRAVENOUS at 09:41

## 2021-01-19 RX ADMIN — POTASSIUM CHLORIDE 10 MEQ: 7.46 INJECTION, SOLUTION INTRAVENOUS at 12:36

## 2021-01-19 RX ADMIN — INSULIN HUMAN 5.45 UNITS/HR: 1 INJECTION, SOLUTION INTRAVENOUS at 01:27

## 2021-01-19 RX ADMIN — DEXTROSE AND SODIUM CHLORIDE: 5; 450 INJECTION, SOLUTION INTRAVENOUS at 20:00

## 2021-01-19 RX ADMIN — POTASSIUM CHLORIDE 10 MEQ: 7.46 INJECTION, SOLUTION INTRAVENOUS at 07:28

## 2021-01-19 RX ADMIN — AMPICILLIN SODIUM 2 G: 2 INJECTION, POWDER, FOR SOLUTION INTRAMUSCULAR; INTRAVENOUS at 01:42

## 2021-01-19 RX ADMIN — AMPICILLIN SODIUM 2 G: 2 INJECTION, POWDER, FOR SOLUTION INTRAMUSCULAR; INTRAVENOUS at 09:49

## 2021-01-19 RX ADMIN — CEFTRIAXONE 2 G: 2 INJECTION, POWDER, FOR SOLUTION INTRAMUSCULAR; INTRAVENOUS at 19:56

## 2021-01-19 RX ADMIN — ACETAMINOPHEN 650 MG: 650 SUPPOSITORY RECTAL at 03:25

## 2021-01-19 RX ADMIN — SODIUM CHLORIDE: 9 INJECTION, SOLUTION INTRAVENOUS at 01:27

## 2021-01-19 RX ADMIN — AMPICILLIN SODIUM 2 G: 2 INJECTION, POWDER, FOR SOLUTION INTRAMUSCULAR; INTRAVENOUS at 16:31

## 2021-01-19 ASSESSMENT — PAIN SCALES - GENERAL: PAINLEVEL_OUTOF10: 0

## 2021-01-19 NOTE — PROGRESS NOTES
Clinical Pharmacy Note  Vancomycin Consult    April Zamora is a 56 y.o. male ordered Vancomycin for Acute encephalopathy secondary to suspected acute pyogenic meningitis. ; consult received from Dr. Jae Moffett to manage therapy. Also receiving Ampicillin,Rocephin. Patient Active Problem List   Diagnosis    Diabetic ketoacidosis without coma associated with type 2 diabetes mellitus (HCC)    Nausea & vomiting    Diabetic neuropathy (HCC)    DM2/IDDM    Gout    Hx MRSA    Hyperkalemia    Chronic pain on chronic opioids    Chronic LE diabetic ulcers    Mild protein-calorie malnutrition (HCC)    Cellulitis    TORI (acute kidney injury) ([facility] [address]    Neuropathy    Diabetic acidosis without coma (HCC)       Allergies:  Hydrocodone-acetaminophen, Percocet [oxycodone-acetaminophen], Pregabalin, and Vicodin [hydrocodone-acetaminophen]     Temp max:  Temp (24hrs), Av.3 °F (38.5 °C), Min:98.8 °F (37.1 °C), Max:102.5 °F (39.2 °C)      Recent Labs     21  1755 21  0335   WBC 21.8* 16.0*       Recent Labs     21  0938 21  1556 21  1752   BUN 35* 28* 26*   CREATININE 1.9* 2.0* 1.7*         Intake/Output Summary (Last 24 hours) at 2021 1833  Last data filed at 2021 1808  Gross per 24 hour   Intake 3004.35 ml   Output 1950 ml   Net 1054.35 ml       Culture Results:  pending    Ht Readings from Last 1 Encounters:   21 5' 10\" (1.778 m)        Wt Readings from Last 1 Encounters:   21 179 lb 7.3 oz (81.4 kg)         Estimated Creatinine Clearance: 47 mL/min (A) (based on SCr of 1.7 mg/dL (H)). Assessment/Plan:  Trough at 24hr 9.4 ug/mL  Scr 1.9 mg/dL  Vancomycin 1250 mg IV x 1 dose. Regimen projects a trough level of 15-20 mg/L. Level ordered for 21  Dr Zacarias Lisa has been consulted. Await his input     Thank you for the consult.    Tin Soto, [address] [facility]

## 2021-01-19 NOTE — ED NOTES
Pt was soiled and cleaned from head to toe. Multiple sores noted. MD aware of all very critical labs.       Kala Mcclelland  01/18/21 1942

## 2021-01-19 NOTE — CARE COORDINATION
Focus:  Diabetes education     Chart reviewed. Not medically stable for diabetes education. Education delayed at this time.       Electronically signed by Ruthann Bolivar RN on 1/19/2021 at 12:00 PM

## 2021-01-19 NOTE — PROGRESS NOTES
Patient was admitted from [facility] via squad and transferred to ICU 2116 with out difficulty. Patient is not responding to any painful stimuli. He is on an insulin gtt at 10.9u/hr and he just completed blood infusion. He had LR infusing on admission order stopped by Dr. Mikala Downing and new orders placed. He has 3 IV sites that are all working good. Assessment completed and documented. Patient had no belongings with him on admission. 0155: patient set up and started gaging. he appeared to be about to vomit. NG placed and verified and placement  by CXR. Connected to [facility].    4 Eyes Skin Assessment     NAME:  Raj Del Angel OF BIRTH:  1960  MEDICAL RECORD NUMBER:  [id number]    The patient is being assess for  Admission    I agree that 2 RN's have performed a thorough Head to Toe Skin Assessment on the patient. ALL assessment sites listed below have been assessed. Areas assessed by both nurses:    Head, Face, Ears, Shoulders, Back, Chest, Arms, Elbows, Hands, Sacrum. Buttock, Coccyx, Ischium, Legs. Feet and Heels and Other wounds documented        Does the Patient have a Wound? Yes wound(s) were present on assessment.  LDA wound assessment was Initiated and completed        Slade Prevention initiated:  Yes   Wound Care Orders initiated:  Yes    Pressure Injury (Stage 3,4, Unstageable, DTI, NWPT, and Complex wounds) if present place consult order under [de-identified] No    New and Established Ostomies if present place consult order under : No      Nurse 1 eSignature: Electronically signed by Stacy Fried RN on 1/19/21 at 6:00 AM EST    **SHARE this note so that the co-signing nurse is able to place an eSignature**    Nurse 2 eSignature: Electronically signed by Woody Hoffmann RN on 1/22/21 at 7:55 AM EST

## 2021-01-19 NOTE — CONSULTS
GASTROENTEROLOGY INPATIENT CONSULTATION      IDENTIFYING DATA/REASON FOR CONSULTATION   PATIENT:  Yolanda Edwards  MRN:  [id number]  ADMIT DATE: 1/19/2021  TIME OF EVALUATION: 1/19/2021 11:17 AM  HOSPITAL STAY:   LOS: 0 days     REASON FOR CONSULTATION:  UGIB    HISTORY OF PRESENT ILLNESS   Yolanda Edwards is a 56 y.o. male with a PMH of IDDM, diabetic neuropathy, toe amputation, gout who presented on 1/19/2021 with altered mental status. We have been consulted regarding concern for upper GI bleed. Pt unable to provide a history. According to chart notes patient had progressive unresponsiveness at home along with nausea and vomiting. His wife called EMS and he was found to be covered in vomit and stool when they arrived. He was taken to [facility] ED and found to be in severe DKA. He also had initial hemoglobin of 12.1 which dropped to 8.7 on repeat draw. His stool was heme positive. He was transfused 2u PRBCs. He was transferred to [facility] ICU. According to RN pt has not had any overt bleeding since admitted. He had a brown BMs this morning. NGT outpt dark brown. Blood pressure stable. Repeat Hgb post transfusion 11.9.   BUN 35, creatinine 1.9.  CT A/P wo contrast with no acute findings    Prior Endoscopic Evaluations: unknown    PAST MEDICAL, SURGICAL, FAMILY, and SOCIAL HISTORY     Past Medical History:   Diagnosis Date    TORI (acute kidney injury) ([facility] [address] 9/12/2017    Bacteremia 05/05/2017    staph aureus    Diabetes mellitus ([facility] [address]     Diabetic neuropathy (HCC)     Gout     MRSA (methicillin resistant staph aureus) culture positive 12/27/18, 9/12/17, 5/5/17,9/5/13, 1/15/14    ulcers bilateral legs    Pneumonia     Pyogenic inflammation of bone ([address]      Past Surgical History:   Procedure Laterality Date    EYE SURGERY      lens implants after removal of cataracts    OTHER SURGICAL HISTORY  12/28/2018    partial right foot amputation with graft application    TOE AMPUTATION Right 12/28/2018    PARTIAL RIGHT FOOT AMPUTATION WITH GRAFT APPLICATION performed by Brynn Livingston DPM at [facility] OR     Family History   Problem Relation Age of Onset    Diabetes Maternal Grandfather     Heart Disease Paternal Uncle     High Blood Pressure Mother      Social History     Socioeconomic History    Marital status:      Spouse name: Napolean Dose Number of children: 1    Years of education: Not on file    Highest education level: Not on file   Occupational History    Not on file   Social Needs    Financial resource strain: Not on file    Food insecurity     Worry: Not on file     Inability: Not on file   [employer] needs     Medical: Not on file     Non-medical: Not on file   Tobacco Use    Smoking status: Never Smoker    Smokeless tobacco: Never Used   Substance and Sexual Activity    Alcohol use: No     Comment: FORMER DRINKER approx.  quit 2005    Drug use: No    Sexual activity: Yes     Partners: Female   Lifestyle    Physical activity     Days per week: Not on file     Minutes per session: Not on file    Stress: Not on file   Relationships    Social connections     Talks on phone: Not on file     Gets together: Not on file     Attends Latter day service: Not on file     Active member of club or organization: Not on file     Attends meetings of clubs or organizations: Not on file     Relationship status: Not on file    Intimate partner violence     Fear of current or ex partner: Not on file     Emotionally abused: Not on file     Physically abused: Not on file     Forced sexual activity: Not on file   Other Topics Concern    Not on file   Social History Narrative    Not on file       MEDICATIONS   SCHEDULED:      pantoprazole, 40 mg, BID    And      sodium chloride (PF), 10 mL, BID      cefTRIAXone (ROCEPHIN) IV, 2 g, Q12H      dexamethasone, 6 mg, Q12H      ampicillin IV, 2 g, 6 times per day      vancomycin (VANCOCIN) intermittent dosing (placeholder), , RX Placeholder      FLUIDS/DRIPS:     sodium chloride Stopped (01/19/21 0937)    dextrose 5 % and 0.45 % NaCl 150 mL/hr at 01/19/21 0941    insulin 1.74 Units/hr (01/19/21 1033)     PRNs:     dextrose, 12.5 g, PRN      potassium chloride, 10 mEq, PRN      magnesium sulfate, 1 g, PRN      sodium phosphate IVPB, 10 mmol, PRN    Or      sodium phosphate IVPB, 15 mmol, PRN    Or      sodium phosphate IVPB, 20 mmol, PRN      dextrose 5 % and 0.45 % NaCl, , Continuous PRN      acetaminophen, 650 mg, Q4H PRN      ALLERGIES:  He   Allergies   Allergen Reactions    Hydrocodone-Acetaminophen Itching    Percocet [Oxycodone-Acetaminophen]      States \"hallucinations,disoriented, & freaked out\" per wife    Pregabalin Itching    Vicodin [Hydrocodone-Acetaminophen]      Spaces out. Pt. States 1/15/14 has been taking some vicodin without problems. REVIEW OF SYSTEMS   Pertinent ROS noted in HPI    PHYSICAL EXAM     Vitals:    01/19/21 0430 01/19/21 0500 01/19/21 0600 01/19/21 0700   BP: (!) 150/71 (!) 149/67 135/72 (!) 142/69   Pulse: 104 101 99 97   Resp: 19 20 19 20   Temp:       TempSrc:       SpO2: 98% 98%     Weight:           I/O last 3 completed shifts:   In: 2404.4 [I.V.:1189.4; NG/GT:15; IV Piggyback:1200]  Out: 1350 [Urine:1350]      Physical Exam:  General appearance: responsive to pain stimuli only  Eyes: Anicteric  Head: Normocephalic, without obvious abnormality  Lungs: CTA  Heart: RRR  Abdomen: soft, non-distended   Extremities: atraumatic, no cyanosis or edema  Skin: warm and dry, no jaundice  Neuro: Grossly intact, A&OX3      LABS AND IMAGING   Laboratory   Recent Labs     01/18/21  1755 01/18/21  2050 01/19/21  0335   WBC 21.8*  --  16.0*   HGB 12.1* 8.7* 11.9*   HCT 41.8 31.3* 36.5*   MCV 84.9  --  78.8*     --  283     Recent Labs     01/18/21  2252 01/19/21  0335 01/19/21  0938   * 136 143   K 5.1 4.6 4.2   CL 90* 101 108   CO2 15* 20* 25   PHOS  --  2.0* 2.1*   BUN 54* 42* 35* CREATININE 2.6* 2.4* 1.9*     Recent Labs     01/18/21  1755   AST 21   ALT 25   BILITOT 0.6   ALKPHOS 195*     No results for input(s): LIPASE, AMYLASE in the last 72 hours. Recent Labs     01/18/21  1755   PROTIME 12.2   INR 1.05       Imaging  XR CHEST PORTABLE   Final Result   Appropriate radiographic positioning of the nasogastric tube. Recommend clinical correlation prior to use. IR LUMBAR PUNCTURE FOR DIAGNOSIS    (Results Pending)         ASSESSMENT AND RECOMMENDATIONS   56 y.o. male with a PMH of IDDM, diabetic neuropathy, toe amputation, gout who presented on 1/19/2021 with altered mental status, nausea, vomiting. Admitted with DKA. We have been consulted regarding concern for upper GI bleed given drop in hgb and +heme stools      IMPRESSION:  1. Anemia with heme positive stools  -hgb dropped from 12.1 to 8.7. Received 2u PRBCs, hgb responded well, 11.9.    -no signs of active hemorrhaging. No melena or hematochezia. Vitals stable. -CT with no acute findings  -prior EGD/colonoscopy unknown  -on PPI BID  2. Nausea, vomiting  -suspect related to hyperglycemia/DKA  -CT with no acute findings. 3. DKA  -on insulin gtt   4. TORI  5. Encephalopathy  -Concern related to acute pyogenic meningitis per Hospitalist.  S/p LP. ID consulted  -CT head with no acute intracranial abnormalites. -NH3 mildly elevated. RECOMMENDATIONS:    Continue PPI therapy  Monitor h/h and for signs of active GI hemorrhaging  Will discuss case with Dr. Mechelle Crockett. Please await his input and recommendations. If you have any questions or need any further information, please feel free to contact our consult team.  Thank you for allowing us to participate in the care of Levonne Kussmaul. The note was completed using Dragon voice recognition transcription. Every effort was made to ensure accuracy; however, inadvertent transcription errors may be present despite my best efforts to edit errors.       Kyrie Gibbs Josie Khoury    Attending physician addendum:      I have personally seen and examined the patient, reviewed the patient's medical record and pertinent labs and clinical imaging. I have personally staffed the case with POLO Prieto. I agree with her consultation note, exam findings, assessment and plans  as written above. I have made appropriate modifications and edited her assessment and plan where needed to reflect my impression and plans for this patient. The patient was admitted with suspected DKA. He has a PMH of poorly controlled insulin-dependent diabetes, diabetic neuropathy, prior toe amputation, gout, who was found down by his wife in coffee-ground emesis and dark stool. She reported that he was having a fever and increasing confusion. He was brought to the ER over concern of possible GI bleeding. His hemoglobin a drop down to 8.7. He received 2 units packed red blood cells. His posttransfusion hemoglobin increased up to 11. His baseline is around 12. The patient does have previous history of DKA. The patient's wife does not endorse any recent or remote EGD. He did not use any NSAIDs reportedly. The patient has had persistent confusion. He is status post an LP. He is still somewhat somnolent on an insulin drip. He is on ampicillin. No other complaints voiced. /70   Pulse 77   Temp 99.4 °F (37.4 °C) (Axillary)   Resp 10   Ht 5' 10\" (1.778 m)   Wt 179 lb 7.3 oz (81.4 kg)   SpO2 95%   BMI 25.75 kg/m²      Gen- somnolent and minimally arousable  CV- RRR  Abd- soft, ND/NT  Ext- without C/C/E/E    Labs    Impression:    DKA- recurrent. Poorly controlled DM at home. Wife reports compliance with insulin. ? Inciting event. Ruling out infectious etiology. Acute blood loss anemia-stabilized. suspect karon torres tear vs esophagitis vs gastritis/PUD. Stable after 2 units PRBC  Acute encephalopathy- suspect metabolic encephalopathy.   LP done-  Acute meningoencephalitis PCR panel negative. Blood, urine, CSF culture pending. CT negative. Plan:  Continue PPI IV bid  Trend H and H  Continue Insulin gtt  NPO for now  Continued workup of mental status changes. Thank you for allowing me to participate in this patient's care. If there are any questions or concerns regarding this patient, or the plan we have set in place, please feel free to contact me at [phone].      Nany Farias, DO

## 2021-01-19 NOTE — PROGRESS NOTES
Tony DIETZ    I was called with a request for consultation  The patient has not been seen previously by Paty DIETZ  Please call the GI physician on-call (Dr. Parker Oswald) with the consultation request  Thank you

## 2021-01-19 NOTE — ED PROVIDER NOTES
[address] ED  EMERGENCY DEPARTMENT ENCOUNTER        Pt Name: Milo Olivier  MRN: [id number]  [place] 1960  Date of evaluation: 1/18/2021  Provider: POLO Reno  PCP: Wilmer Bright MD     I have seen and evaluated this patient with my supervising physician Kentrell Urban, [facility]       Chief Complaint   Patient presents with    Altered Mental Status     EMS states pt found responsive to pain this evening, last seen normal this am when wife left for work, FSBS >500       HISTORY OF PRESENT ILLNESS   (Location, Timing/Onset, Context/Setting, Quality, Duration, Modifying Factors, Severity, Associated Signs and Symptoms)  Note limiting factors. Milo Olivier is a 56 y.o. male presents to the emergency department via EMS for altered mental status. Per EMS, they were called for an unresponsive patient found him laying at the side of his bed covered in vomit and stool. He was responsive to painful stimuli. Fingerstick glucose 512. Wife later came to the emergency room and stated that he has been having high sugars over the last few days, she been at work since approximately 8 AM and came home to find about the side of the bed covered in vomit and stool. Notes that he had previous alcohol use but has not drank for many years. Was never diagnosed with cirrhosis. He has an ulcer on his left leg and foot that are chronic, she is worried he may have contributed to his presentation. Patient unable to provide history due to mental status. Nursing Notes were all reviewed and agreed with or any disagreements were addressed in the HPI. REVIEW OF SYSTEMS    (2-9 systems for level 4, 10 or more for level 5)     Review of Systems   Unable to perform ROS: Mental status change       Positives and Pertinent negatives as per HPI. Except as noted above in the ROS, all other systems were reviewed and negative.        PAST MEDICAL HISTORY     Past Medical History:   Diagnosis Date    TORI (acute kidney injury) ([facility] [address] 9/12/2017    Bacteremia 05/05/2017    staph aureus    Diabetes mellitus ([email or web address]     Diabetic neuropathy ([email or web address]     Gout     MRSA (methicillin resistant staph aureus) culture positive 12/27/18, 9/12/17, 5/5/17,9/5/13, 1/15/14    ulcers bilateral legs    Pneumonia     Pyogenic inflammation of bone ([email or web address]          SURGICAL HISTORY     Past Surgical History:   Procedure Laterality Date    EYE SURGERY      lens implants after removal of cataracts    OTHER SURGICAL HISTORY  12/28/2018    partial right foot amputation with graft application    TOE AMPUTATION Right 12/28/2018    PARTIAL RIGHT FOOT AMPUTATION WITH GRAFT APPLICATION performed by Erick Pringle DPM at [address] [place] [address]       Previous Medications    BLOOD GLUCOSE TEST STRIPS (ASCENSIA AUTODISC VI;ONE TOUCH ULTRA TEST VI) STRIP    Test TID and as directed    INSULIN GLARGINE (LANTUS SOLOSTAR) 100 UNIT/ML INJECTION PEN    Inject 80 Units into the skin nightly    INSULIN LISPRO (HUMALOG KWIKPEN) 100 UNIT/ML PEN    Inject 20 Units into the skin 3 times daily (before meals)    INSULIN PEN NEEDLE 32G X 4 MM MISC    Use 4 daily    VENLAFAXINE (EFFEXOR XR) 75 MG EXTENDED RELEASE CAPSULE    Take 75 mg by mouth daily         ALLERGIES     Hydrocodone-acetaminophen, Percocet [oxycodone-acetaminophen], Pregabalin, and Vicodin [hydrocodone-acetaminophen]    FAMILYHISTORY       Family History   Problem Relation Age of Onset    Diabetes Maternal Grandfather     Heart Disease Paternal Uncle     High Blood Pressure Mother           SOCIAL HISTORY       Social History     Tobacco Use    Smoking status: Never Smoker    Smokeless tobacco: Never Used   Substance Use Topics    Alcohol use: No     Comment: FORMER DRINKER approx. quit 2005    Drug use: No       SCREENINGS    Golden Coma Scale  Eye Opening: To pain  Best Verbal Response:  Incomprehensible speech  Best Motor Response: Extension to pain  Charlotte Coma Scale Score: 6        PHYSICAL EXAM    (up to 7 for level 4, 8 or more for level 5)     ED Triage Vitals   BP Temp Temp Source Pulse Resp SpO2 Height Weight   01/18/21 1844 01/18/21 1844 01/18/21 1844 01/18/21 1751 01/18/21 1751 01/18/21 1751 01/18/21 1752 01/18/21 1758   (!) 160/118 98.8 °F (37.1 °C) CORE 110 26 97 % 5' 10\" (1.778 m) 160 lb (72.6 kg)       Physical Exam  Vitals signs reviewed. Constitutional:       Appearance: He is ill-appearing and toxic-appearing. He is not diaphoretic. Comments: Elderly male patient lying in hospital bed. Tachypnea. Covered with dark vomit and stool without true melena. Does open his eyes to his name, does not respond coherently. Protecting airway. HENT:      Head: Normocephalic and atraumatic. Mouth/Throat:      Mouth: Mucous membranes are moist.      Pharynx: Oropharynx is clear. No oropharyngeal exudate or posterior oropharyngeal erythema. Eyes:      Pupils: Pupils are equal, round, and reactive to light. Neck:      Musculoskeletal: No neck rigidity or muscular tenderness. Cardiovascular:      Rate and Rhythm: Regular rhythm. Tachycardia present. Pulses: Normal pulses. Heart sounds: Normal heart sounds. No murmur. No friction rub. No gallop. Pulmonary:      Effort: Pulmonary effort is normal. No respiratory distress. Breath sounds: Normal breath sounds. No stridor. No wheezing, rhonchi or rales. Abdominal:      General: There is no distension. Palpations: Abdomen is soft. Tenderness: There is no abdominal tenderness. There is no guarding or rebound. Musculoskeletal:         General: No swelling. Comments: Feet and lower legs are very cold to touch. Ulcer on left shin and left plantar foot. Status post toe amputations. Skin:     General: Skin is dry. Findings: No bruising. Neurological:      Mental Status: He is disoriented. Comments: Moves all extremities freely.   Responds to name.         DIAGNOSTIC RESULTS   LABS:    Labs Reviewed   CBC WITH AUTO DIFFERENTIAL - Abnormal; Notable for the following components:       Result Value    WBC 21.8 (*)     Hemoglobin 12.1 (*)     MCH 24.6 (*)     MCHC 28.9 (*)     Neutrophils Absolute 19.5 (*)     Lymphocytes Absolute 0.8 (*)     Monocytes Absolute 1.4 (*)     All other components within normal limits    Narrative:     Performed at:  [place] [address],  [place] [facility]   Phone [phone]   COMPREHENSIVE METABOLIC PANEL W/ REFLEX TO MG FOR LOW K - Abnormal; Notable for the following components:    Sodium 128 (*)     Potassium reflex Magnesium 5.7 (*)     Chloride 82 (*)     CO2 10 (*)     Anion Gap 36 (*)     Glucose 986 (*)     BUN 49 (*)     CREATININE 2.7 (*)     GFR Non- 24 (*)     GFR  29 (*)     Total Protein 8.3 (*)     Alkaline Phosphatase 195 (*)     All other components within normal limits    Narrative:     CALL  Dodd  SCED tel. [phone],  Chemistry results called to and read back by Virgene Fleischer RN, 01/18/2021  18:52, by [facility]  Chemistry results called to and read back by Virgene Fleischer RN, 01/18/2021  18:51, by [facility]  Performed at:  [place] [address],  [place] [facility], [facility]   Phone [phone]   TROPONIN - Abnormal; Notable for the following components:    Troponin 0.02 (*)     All other components within normal limits    Narrative:     Performed at:  [place] [address],  [place] [facility]   Phone [phone]   URINE RT REFLEX TO CULTURE - Abnormal; Notable for the following components:    Glucose, Ur >=1000 (*)     Ketones, Urine >=80 (*)     Blood, Urine SMALL (*)     Protein, UA TRACE (*)     All other components within normal limits    Narrative:     Performed at:  [facility]) - [place] [address],  [place] [facility] Phone [phone]   BETA-HYDROXYBUTYRATE - Abnormal; Notable for the following components:    Beta-Hydroxybutyrate >8.00 (*)     All other components within normal limits    Narrative:     Ky SHABAZZ tel. [phone],  Chemistry results called to and read back by Liana Wylie RN, 01/18/2021  18:51, by [place]  Performed at:  [facility],  [place], [place]   Phone [phone]   BLOOD GAS, VENOUS - Abnormal; Notable for the following components:    pH, David 6.968 (*)     pCO2, David 39.0 (*)     pO2, David 41.5 (*)     HCO3, Venous 8.7 (*)     Base Excess, David -22.5 (*)     All other components within normal limits    Narrative:     [address] [id number],  Chemistry results called to and read back by Jae Klein RN, 01/18/2021  18:18, by [place]  Performed at:  [facility],  [place], [place] [employer]   Phone [phone]   LACTATE, SEPSIS - Abnormal; Notable for the following components:    Lactic Acid, Sepsis 8.9 (*)     All other components within normal limits    Narrative:     [address] [phone],  Chemistry results called to and read back by Liana Wylie RN, 01/18/2021  18:52, by [place]  Performed at:  [facility],  [place], [place]   Phone [phone]   LACTATE, SEPSIS - Abnormal; Notable for the following components:    Lactic Acid, Sepsis 9.3 (*)     All other components within normal limits    Narrative:     Elinda Lennox tel. [phone],  Chemistry results called to and read back by Lius Subramanian RN, 01/18/2021  21:24, by [place]  Performed at:  [facility],  [place], [place] [employer]   Phone [phone]   AMMONIA - Abnormal; Notable for the following components:    Ammonia 73 (*)     All other components within normal limits    Narrative:     Performed at:  [facility],  [place], [email or web address]   Phone [phone]   BLOOD OCCULT STOOL DIAGNOSTIC - Abnormal; Notable for the following components:    Occult Blood Diagnostic   (*)     Value: RResult: POSITIVE  Normal range: Negative      All other components within normal limits    Narrative:     ORDER#: [id number]                          ORDERED BY: Magdalena Pack  SOURCE: Stool                              COLLECTED:  21 18:48  ANTIBIOTICS AT JOHNNIE.:                      RECEIVED :  21 19:52  Performed at:  [place] [address],  [employer]   Phone [phone]   ACETAMINOPHEN LEVEL - Abnormal; Notable for the following components:    Acetaminophen Level <5 (*)     All other components within normal limits    Narrative:     Performed at:  [place] [address],  [employer]   Phone [phone]   MICROSCOPIC URINALYSIS - Abnormal; Notable for the following components:    Mucus, UA Rare (*)     Bacteria, UA Rare (*)     All other components within normal limits    Narrative:     Performed at:  [place] [address],  [employer]   Phone [phone]   BLOOD GAS, VENOUS - Abnormal; Notable for the following components:    pH, David 7.023 (*)     pCO2, David 23.8 (*)     pO2, David 215.2 (*)     HCO3, Venous 6.0 (*)     Base Excess, David -23.0 (*)     Carboxyhemoglobin 1.8 (*)     All other components within normal limits    Narrative:     Guzman Candice tel. [phone],  Chemistry results called to and read back by GARLAND Lou, 2021  21:20, by [facility] [place]  Chemistry results called to and read back by Alona Aguilar RN, 2021  21:11, by [facility]  Performed at:  [facility] 75,  [place]   Phone [phone]   HEMOGLOBIN AND HEMATOCRIT, BLOOD - Abnormal; Notable for the following components:    Hemoglobin 8.7 (*)     Hematocrit 31.3 (*)     All other components within normal limits    Narrative:     Performed at:  [facility] 75,  [employer]   Phone [phone]   BASIC METABOLIC PANEL W/ REFLEX TO MG FOR LOW K - Abnormal; Notable for the following components:    Sodium 128 (*)     Potassium reflex Magnesium 5.4 (*)     Chloride 93 (*)     CO2 7 (*)     Anion Gap 28 (*)     Glucose 593 (*)     BUN 31 (*)     GFR Non-African American 56 (*)     Calcium 7.1 (*)     All other components within normal limits    Narrative:     CALL  Dodd  SCED tel. [phone],  Previous panic on this admission - call not needed per SOP, 01/18/2021 21:23,  by Sana0 S Jeb  Performed at:  [facility] 75,  [employer]   Phone [phone]   BLOOD GAS, VENOUS - Abnormal; Notable for the following components:    pH, David 7.214 (*)     pCO2, David 35.4 (*)     pO2, David 126.4 (*)     HCO3, Venous 14.0 (*)     Base Excess, David -12.9 (*)     All other components within normal limits    Narrative:     Performed at:  [place] [address],  [employer]   Phone [phone]   [address] MG FOR LOW K - Abnormal; Notable for the following components:    Sodium 130 (*)     Chloride 90 (*)     CO2 15 (*)     Anion Gap 25 (*)     Glucose 831 (*)     BUN 54 (*)     CREATININE 2.6 (*)     GFR Non- 25 (*)     GFR  30 (*)     All other components within normal limits    Narrative:     CALL  Dodd  SCED tel. [phone],  Chemistry results called to and read back by Sarah De [place], 01/18/2021  23:32, by [place]  Performed at:  [facility] 75,  [employer]   Phone [phone]   POCT GLUCOSE - Abnormal; Notable for the following components:    POC Glucose >600 (*)     All other components within normal limits    Narrative:     Performed at:  [facility] 75,  [place], [facility]   Phone [phone]   CULTURE, BLOOD 1   CULTURE, BLOOD 2   PROTIME-INR    Narrative:     Performed at:  [facility] 75,  [place], [facility]   Phone [phone]   APTT    Narrative:     Performed at:  [place] [address],  [place], [facility]   Phone [phone]   ETHANOL    Narrative:     Performed at:  [place] [address],  [place], [facility]   Phone [phone]    Narrative:     Performed at:  [place] [address],  [place], [facility]   Phone [phone]   POCT GLUCOSE   TYPE AND SCREEN    Narrative:     Performed at:  [facility] 75,  [place], [facility]   Phone [phone]   PREPARE RBC (CROSSMATCH)       All other labs were within normal range or not returned as of this dictation. EKG: All EKG's are interpreted by the Emergency Department Physician in the absence of a cardiologist.  Please see their note for interpretation of EKG. RADIOLOGY:   Non-plain film images such as CT, Ultrasound and MRI are read by the radiologist. Plain radiographic images are visualized and preliminarily interpreted by the ED Provider with the below findings:        Interpretation per the Radiologist below, if available at the time of this note:    XR CHEST PORTABLE   Final Result   Mild bilateral interstitial opacities which may relate to interstitial edema   or possibly underlying atypical infection. CT ABDOMEN PELVIS WO CONTRAST Additional Contrast? None   Preliminary Result   1. No acute findings within the abdomen or pelvis. No evidence of   obstructive uropathy or appendicitis.    2. Moderate retained stool in the rectosigmoid colon. CT Head WO Contrast   Final Result   No acute intracranial abnormality. CT CERVICAL SPINE WO CONTRAST   Final Result   No acute abnormality of the cervical spine. Ct Abdomen Pelvis Wo Contrast Additional Contrast? None    Result Date: 1/18/2021  EXAMINATION: CT OF THE ABDOMEN AND PELVIS WITHOUT CONTRAST, 1/18/2021 7:31 pm TECHNIQUE: CT of the abdomen and pelvis was performed without the administration of intravenous contrast. Multiplanar reformatted images are provided for review. Dose modulation, iterative reconstruction, and/or weight based adjustment of the mA/kV was utilized to reduce the radiation dose to as low as reasonably achievable. COMPARISON: 09/11/2017. HISTORY: ORDERING SYSTEM PROVIDED HISTORY:  DKA. Vomiting. TECHNOLOGIST PROVIDED HISTORY: Reason for exam:   DKA. Vomiting. Additional Contrast?   None Reason for Exam: Altered FINDINGS: Lower Chest: No acute infiltrate at the lung bases. The heart is borderline in size. Organs: Mild hepatic steatosis with no focal abnormality. No acute biliary findings. The spleen, pancreas and adrenal glands are unremarkable. There is no evidence of obstructive uropathy. 5.8 cm left renal cyst without significant interval change. GI/Bowel: Moderate retained stool in the rectosigmoid colon. Scattered gas and stool throughout the remainder of the colon. No pericolonic inflammatory changes. The appendix is unremarkable. No small bowel distension. The stomach and duodenal sweep are intact. Pelvis: No pelvic mass or free pelvic fluid. The prostate is not enlarged. Minimal distention of the urinary bladder with David catheter in place. Peritoneum/Retroperitoneum: The abdominal aorta is normal in caliber. No retroperitoneal adenopathy or upper abdominal ascites. Bones/Soft Tissues: No acute osseous or soft tissue abnormality. 1. No acute findings within the abdomen or pelvis.   No evidence of obstructive uropathy or appendicitis. 2. Moderate retained stool in the rectosigmoid colon. Ct Head Wo Contrast    Result Date: 1/18/2021  EXAMINATION: CT OF THE HEAD WITHOUT CONTRAST  1/18/2021 7:30 pm TECHNIQUE: CT of the head was performed without the administration of intravenous contrast. Dose modulation, iterative reconstruction, and/or weight based adjustment of the mA/kV was utilized to reduce the radiation dose to as low as reasonably achievable. COMPARISON: August 20, 2012. HISTORY: ORDERING SYSTEM PROVIDED HISTORY: Altered TECHNOLOGIST PROVIDED HISTORY: Reason for exam:->Altered Has a \"code stroke\" or \"stroke alert\" been called? ->No Reason for Exam: altered FINDINGS: BRAIN/VENTRICLES: There is no acute intracranial hemorrhage, mass effect or midline shift. No abnormal extra-axial fluid collection. The gray-white differentiation is maintained without evidence of an acute infarct. There is no evidence of hydrocephalus. Generalized cerebral and cerebellar volume loss. ORBITS: The visualized portion of the orbits demonstrate no acute abnormality. SINUSES: The visualized paranasal sinuses and mastoid air cells demonstrate no acute abnormality. SOFT TISSUES/SKULL:  No acute abnormality of the visualized skull or soft tissues. No acute intracranial abnormality. Ct Cervical Spine Wo Contrast    Result Date: 1/18/2021  EXAMINATION: CT OF THE CERVICAL SPINE WITHOUT CONTRAST 1/18/2021 7:30 pm TECHNIQUE: CT of the cervical spine was performed without the administration of intravenous contrast. Multiplanar reformatted images are provided for review. Dose modulation, iterative reconstruction, and/or weight based adjustment of the mA/kV was utilized to reduce the radiation dose to as low as reasonably achievable. COMPARISON: None.  HISTORY: ORDERING SYSTEM PROVIDED HISTORY: Altered TECHNOLOGIST PROVIDED HISTORY: Reason for exam:->Altered Reason for Exam: altered FINDINGS: BONES/ALIGNMENT: There is no acute fracture or traumatic malalignment. DEGENERATIVE CHANGES: Chronic degenerative spondylosis in the cervical spine. Mild spinal canal stenosis at C4-5. Neuroforaminal stenosis is demonstrated bilaterally at C3-4, on the left at C4-5 and on the left at C6-7. SOFT TISSUES: There is no prevertebral soft tissue swelling. No acute abnormality of the cervical spine. Xr Chest Portable    Result Date: 1/18/2021  EXAMINATION: ONE XRAY VIEW OF THE CHEST 1/18/2021 4:31 pm COMPARISON: 01/31/2019, 12/28/2017 HISTORY: ORDERING SYSTEM PROVIDED HISTORY: Altered TECHNOLOGIST PROVIDED HISTORY: Reason for exam:->Altered Reason for Exam: AMS Acuity: Unknown Type of Exam: Unknown FINDINGS: There are some mild bilateral interstitial opacities. No pneumothorax or pleural effusion. Cardiomediastinal contours and bony thorax are unchanged relative to prior. Mild bilateral interstitial opacities which may relate to interstitial edema or possibly underlying atypical infection. PROCEDURES   Unless otherwise noted below, none     Procedures    CRITICAL CARE TIME   Due to the immediate potential for life-threatening deterioration due to acidosis, hypoxia, I spent 120 minutes providing critical care. Critical intervention included initiating submental oxygen with hypoxic, IV fluid administration, repeat evaluation of electrolytes and adjustments of medications to this. Interventions also included obtaining history from EMS and from patient's wife, multiple repeat examinations of patient, close monitoring of vital signs, initiation of IV antibiotics, ordering and interpretation of labs and imaging studies, consultation with hospitalist, via report to critical care EMS team.  This time is excluding time spent performing procedures.     CONSULTS:  IP CONSULT TO HOSPITALIST      EMERGENCY DEPARTMENT COURSE and DIFFERENTIAL DIAGNOSIS/MDM:   Vitals:    Vitals:    01/18/21 2211 01/18/21 2236 01/18/21 2305 01/18/21 2327   BP: (!) 158/75 (!) 165/72 139/74 (!) 143/72   Pulse: 115 111 109 106   Resp: 25 27 25 23   Temp:    101.7 °F (38.7 °C)   TempSrc:       SpO2: 94% 94% 95% 94%   Weight:       Height:           Patient was given the following medications:  Medications   insulin regular (HUMULIN R;NOVOLIN R) 100 Units in sodium chloride 0.9 % 100 mL infusion (0.15 Units/kg/hr × 72.6 kg Intravenous New Bag 1/18/21 2336)   lactated ringers infusion ( Intravenous New Bag 1/18/21 2045)   0.9 % sodium chloride infusion (has no administration in time range)   lactated ringers bolus (0 mLs Intravenous Stopped 1/18/21 2037)   cefTRIAXone (ROCEPHIN) 1 g IVPB in 50 mL D5W minibag (0 g Intravenous Stopped 1/18/21 1933)   pantoprazole (PROTONIX) injection 80 mg (80 mg Intravenous Given 1/18/21 1833)   vancomycin (VANCOCIN) 1,750 mg in dextrose 5 % 500 mL IVPB (0 mg/kg × 72.6 kg Intravenous Stopped 1/18/21 2141)   acetaminophen (TYLENOL) suppository 650 mg (650 mg Rectal Given 1/18/21 2148)           64-year-old male presents emergency room via EMS for altered mental status. Labs show the patient is in severe DKA. All patient was altered, he was protecting his airway but I was concerned about the possibility of having to intubate and therefore started a IV bicarb drip while awaiting electrolyte results in order to initiate insulin infusion. Lactated Ringer's bolus 30 mils per KG ordered. Broad-spectrum antibiotics with Rocephin and Keflex initiated. Due to his dark vomitus and stool, Hemoccult was sent which resulted as positive. Initial H&H with hemoglobin 12. Initial pH on VBG 6.9, was noted to be improving and bicarb drip was discontinued. The Protonix bolus given for potential GI bleed. IV octreotide was also ordered prior to obtaining history from wife that he did not have cirrhosis, this was discontinued when she noted that he has no history of cirrhosis.   CT of head and cervical spine ordered due to potential for trauma as he was found on the side of the bed, CT abdomen pelvis was also ordered due to possible GI bleed though IV contrast was not administered due to TORI. Imaging studies without emergent etiology. Covid negative. No ICU beds available at this hospital, [facility] Tyrone Lawrence [facility] who graciously agreed to admit the patient for transfer to their ICU. Patient was noted to have oxygen saturations between 85 and 90%. Was noted of a respiratory rate of above 30 during this time, liters nasal cannula provided with improvement of oxygen saturation to above 95%. Patient repeat H&H noted that his hemoglobin decreased to 8.7. With a positive Hemoccult concerning for hemorrhagic GI bleed and 2 units of packed red blood cells ordered and infusion began emergent. Noted to have fever from core temperature, [place] Tylenol provided. Repeat evaluation patient satting at 100%, nasal cannula was titrated off and patient remained satting above 94% on room air. Repeat labs continue to improve with pH 7.2, closing of anion gap. On reassessment following third VBG results, patient was noted to have improved respiratory rate the 20s and no longer has snoring respirations. Feet now warm to touch indicating improved perfusion. Good urine output throughout his time in the emergency department. MICU transport arrived, report given by myself. FINAL IMPRESSION      1. Diabetic ketoacidosis without coma associated with type 2 diabetes mellitus ([address]    2. TORI (acute kidney injury) ([address]    3. Lactic acidosis due to diabetes mellitus ([address]    4. Gastrointestinal hemorrhage, unspecified gastrointestinal hemorrhage type    5. Diabetic ulcer of lower extremity ([address]    6. Anemia due to acute blood loss    7. Hypoxia          DISPOSITION/PLAN   DISPOSITION Decision To Transfer 01/18/2021 09:16:55 PM      PATIENT REFERREDTO:  No follow-up provider specified.     DISCHARGE MEDICATIONS:  New Prescriptions    No medications on file DISCONTINUED MEDICATIONS:  Discontinued Medications    No medications on file              (Please note that portions of this note were completed with a voice recognition program.  Efforts were made to edit the dictations but occasionally words are mis-transcribed.)    POLO Easton (electronically signed)         POLO Easton  01/19/21 6955

## 2021-01-19 NOTE — H&P
[address] [address]                              HISTORY AND PHYSICAL    PATIENT NAME: Deedee Rooney                     :        1960  MED REC NO:   [id number]                          ROOM:       2116  ACCOUNT NO:   [de-identified]                           ADMIT DATE: 2021  PROVIDER:     Megan Vo MD    I obtained the history and performed the physical exam on the patient in  the Intensive Care Unit on 2021. CHIEF COMPLAINT:  Confusion. HISTORY OF PRESENT ILLNESS:  The patient is a 26-year-old  male  with a known history of insulin-dependent diabetes with episode of  diabetic ketoacidosis in the past who presents to the hospital as a  transfer from [facility] with chief complaint of one-day history of  subacute onset of gradually progressive increasing unresponsiveness at  home associated with nausea and vomiting and the fact that the patient  was covered in vomit and stool. The patient is unable to provide any  history. According to the wife, the patient was having increasingly  high sugars over the past couple of days. No other constitutional  symptoms. PAST MEDICAL/PAST SURGICAL HISTORY:  1. Insulin-dependent diabetes. 2.  Acute kidney injury. 3.  History of Staph aureus bacteremia in the past.    PAST SURGICAL HISTORY:  Eye surgery, right foot partial toe amputation,  graft application to the right foot. MEDICATIONS:  Lantus, Lispro, Effexor. ALLERGIES:  PERCOCET, HYDROCODONE. FAMILY HISTORY:  Reviewed by me and is currently noncontributory. SOCIAL HISTORY:  Lifetime nonsmoker. No illicit substance use. The  patient has a history of previous alcohol use and he stopped in . REVIEW OF SYSTEMS:  Unobtainable due to the patient's significant  confusion and obtundation per history of present illness.     PHYSICAL EXAMINATION: VITAL SIGNS:  Temperature, T-max 102.4; respiratory rate 23; pulse 104;  blood pressure 161/61; saturating 98%. CNS:  The patient is obtunded and significantly encephalopathic and  confused. The patient has got significant neck muscle rigidity to  examination which is passive rigidity as well as active rigidity. PSYCH:  Not agitated. HEENT:  Eyes:  Pupils are bilaterally equal.  ENT:  No oral mucosal  lesions. RESPIRATORY:  No obvious rales or rhonchi. CARDIOVASCULAR:  S1, S2 are heard. Tachycardic rhythm. ABDOMEN:  Nondistended. MUSCULOSKELETAL:  No acute obvious deformities. SKIN:  The patient has got a flushed skin. No acute rashes or lesions. DIAGNOSTIC DATA:  Sodium 120, potassium 5.1, BUN 54, creatinine 2.6. Anion gap is 25, it was as high as in the 30s. Blood glucose initially  was 831. White count 21.8, hemoglobin 8.7, platelets of 737. Hemoccult  positive stool. UA negative for obvious infection. EKG independently reviewed by me shows sinus tachycardia at the rate of  104 beats per minute. CONSULTATIONS:  Critical Care Medicine and Infectious Disease. ASSESSMENT:  1. Acute encephalopathy secondary to suspected acute pyogenic  meningitis. 2.  Diabetic ketoacidosis. 3.  Acute kidney injury. PLAN OF CARE:  The patient is currently admitted to the Intensive Care  Unit. DKA protocol initiated. Antimeningitis  treatment will be continued with Rocephin, vancomycin, ampicillin, and  Decadron. TOTAL CRITICAL CARE TIME:  35 minutes. RISK:  High due to the patient's presentation with probable meningitis. EXPECTED LENGTH OF STAY:  More than two midnights based on the plan of  care above. DISPOSITION:  Admitted to the Intensive Care Unit.         Manav Henry MD    D: 01/19/2021 4:56:42       T: 01/19/2021 6:33:44     [email or web address]  Job#: [id number]     Doc#: [id number]    CC:

## 2021-01-19 NOTE — ED NOTES
2009 Placed call to transfer center for consult with [facility] per GRIS  2030 Dr. Francie Vizcaino returned all     [employer]  01/18/21 2011       Francine Leos  01/18/21 2032

## 2021-01-19 NOTE — ED NOTES
Pt transferred to [facility] via [facility] MICU. Report called to Mya. Wife Patrick Diss notified of transfer.       Maribel Kruger RN  01/19/21 0000

## 2021-01-19 NOTE — CONSULTS
WITH GRAFT APPLICATION performed by Bernice Argueta DPM at [address] OR       Current Medications:    No outpatient medications have been marked as taking for the 1/19/21 encounter [facility] Encounter). Allergies:  Hydrocodone-acetaminophen, Percocet [oxycodone-acetaminophen], Pregabalin, and Vicodin [hydrocodone-acetaminophen]    Immunizations :   Immunization History   Administered Date(s) Administered    Influenza Virus Vaccine 02/05/2001    Tdap (Boostrix, Adacel) 09/05/2008         Social History:   Social History     Tobacco Use    Smoking status: Never Smoker    Smokeless tobacco: Never Used   Substance Use Topics    Alcohol use: No     Comment: FORMER DRINKER approx. quit 2005    Drug use: No     Social History     Tobacco Use   Smoking Status Never Smoker   Smokeless Tobacco Never Used      Family History   Problem Relation Age of Onset    Diabetes Maternal Grandfather     Heart Disease Paternal Uncle     High Blood Pressure Mother          REVIEW OF SYSTEMS:     Not possible due to change in mentation .      PHYSICAL EXAM:      Vitals:  t MAX  102.4   BP (!) 142/69   Pulse 97   Temp 102.4 °F (39.1 °C) (Rectal)   Resp 20   Wt 179 lb 7.3 oz (81.4 kg)   SpO2 98%   BMI 25.75 kg/m²     General Appearance: lethargic opens eyes during exam and fall backs to sleep and Ng tube IN PLACE + in Some acute distress, +  pallor, no icterus chronic ill appearing man+  Skin: warm and dry, no rash or erythema  Head: normocephalic and atraumatic  Eyes: pupils equal, round, and reactive to light, conjunctivae normal  ENT: tympanic membrane, external ear and ear canal normal bilaterally, nose without deformity, nasal mucosa and turbinates normal without polyps  Neck: supple and non-tender without mass, no thyromegaly  no cervical lymphadenopathy  Pulmonary/Chest: clear to auscultation bilaterally- no wheezes, rales or rhonchi, normal air movement, no respiratory distress  Cardiovascular: normal rate, regular rhythm, normal S1 and S2, no murmurs, rubs, clicks, or gallops, no carotid bruits  Abdomen: soft, non-tender, non-distended, normal bowel sounds, no masses or organomegaly  Extremities: no cyanosis, clubbing or edema  Musculoskeletal: normal range of motion, no joint swelling, deformity or tenderness  Integumentary: No rashes, no abnormal skin lesions, no petechiae  Neurologic: reflexes normal and symmetric, no cranial nerve deficit  Lines: IV  David+  Left foot ulcer+  Diabetic foot changes+    DATA:    CBC:   Lab Results   Component Value Date    WBC 16.0 (H) 01/19/2021    HGB 11.9 (L) 01/19/2021    HCT 36.5 (L) 01/19/2021    MCV 78.8 (L) 01/19/2021     01/19/2021     RENAL:   Lab Results   Component Value Date    CREATININE 1.9 (H) 01/19/2021    BUN 35 (H) 01/19/2021     01/19/2021    K 4.2 01/19/2021     01/19/2021    CO2 25 01/19/2021     SED RATE:   Lab Results   Component Value Date    SEDRATE 60 04/15/2016     CK: No results found for: CKTOTAL  CRP: No results found for: CRP  Hepatic Function Panel:   Lab Results   Component Value Date    ALKPHOS 195 01/18/2021    ALT 25 01/18/2021    AST 21 01/18/2021    PROT 8.3 01/18/2021    PROT 7.3 08/20/2012    BILITOT 0.6 01/18/2021    LABALBU 4.3 01/18/2021     UA:  Lab Results   Component Value Date    COLORU Straw 01/18/2021    CLARITYU Clear 01/18/2021    GLUCOSEU >=1000 01/18/2021    BILIRUBINUR Negative 01/18/2021    KETUA >=80 01/18/2021    SPECGRAV 1.020 01/18/2021    BLOODU SMALL 01/18/2021    PHUR 5.5 01/18/2021    PROTEINU TRACE 01/18/2021    UROBILINOGEN 0.2 01/18/2021    NITRU Negative 01/18/2021    LEUKOCYTESUR Negative 01/18/2021    LABMICR YES 01/18/2021    URINETYPE NotGiven 01/18/2021      Urine Microscopic:   Lab Results   Component Value Date    LABCAST 3-5 Hyaline 01/31/2019    BACTERIA Rare 01/18/2021    WBCUA 3-5 01/18/2021    RBCUA 0-2 01/18/2021    EPIU 2-5 01/18/2021     Urine Reflex to Culture:   Lab Results   Component Value Date URRFLXCULT Not Indicated 01/18/2021     Status:  Final result   Ref Range & Units 01/18/21 1755   Beta-Hydroxybutyrate 0.00 - 0.27 mmol/L >8. 00High     Resulting Agency  [address] [place] [address] Lab   Narrative         Creat  2.6  Down to  1.9     Lactic acid  9.3  Down  1.5     WBC  21.8     1/18/2021  7:22 PM - Swoh Incoming Lab Results From Soft (Epic Adt)         Component Value Ref Range & Units Status Collected Lab   SARS-CoV-2, NAAT Not Detected  Not Detected Final 01/18/2021  6:48 PM [address] [place] [address] Lab   Rapid NAAT:   Negative results should be treated as presumptive and,   if inconsistent with clinical signs and symptoms or necessary for   patient management, should be tested with an alternative molecular   assay. Negative results do not preclude SARS-CoV-2 infection and   should not be used as the sole basis for patient management decisions       MICRO: cultures reviewed and updated by me     /Time       Culture, CSF [[id number]] Collected: 01/19/21 1108   Order Status: Sent Specimen: CSF Updated: 01/19/21 1150   Culture, Blood 1 [[id number]]    Order Status: Sent Specimen: Blood    Culture, Blood 2 [[id number]]    Order Status: Sent Specimen: Blood    Culture, Blood 2 [[id number]]    Order Status: Canceled Specimen: Blood    Culture, Blood 1 [[id number]]    Order Status: Canceled Specimen: Blood        Blood Culture:   Lab Results   Component Value Date    BC No growth after 5 days of incubation. 12/27/2018    BLOODCULT2 No growth after 5 days of incubation. 12/27/2018       Viral Culture:    Lab Results   Component Value Date    COVID19 Not Detected 01/18/2021     Urine Culture: No results for input(s): Etha Kendell in the last 72 hours.     Scheduled Meds:   pantoprazole  40 mg Intravenous BID    And    sodium chloride (PF)  10 mL Intravenous BID    cefTRIAXone (ROCEPHIN) IV  2 g Intravenous Q12H    dexamethasone  6 mg Intravenous Q12H    ampicillin IV  2 g Intravenous 6 times per day    vancomycin (VANCOCIN) intermittent dosing (placeholder)   Other RX Placeholder       Continuous Infusions:   sodium chloride Stopped (01/19/21 0937)    dextrose 5 % and 0.45 % NaCl 150 mL/hr at 01/19/21 0941    insulin 5.34 Units/hr (01/19/21 1135)       PRN Meds:  dextrose, potassium chloride, magnesium sulfate, sodium phosphate IVPB **OR** sodium phosphate IVPB **OR** sodium phosphate IVPB, dextrose 5 % and 0.45 % NaCl, acetaminophen    Imaging:   IR LUMBAR PUNCTURE FOR DIAGNOSIS   Final Result   Successful fluoroscopic-guided lumbar puncture. XR CHEST PORTABLE   Final Result   Appropriate radiographic positioning of the nasogastric tube. Recommend clinical correlation prior to use. All pertinent images and reports for the current Hospitalization were reviewed by me.     IMPRESSION:    Patient Active Problem List   Diagnosis    Diabetic ketoacidosis without coma associated with type 2 diabetes mellitus (HCC)    Nausea & vomiting    Diabetic neuropathy (HCC)    DM2/IDDM    Gout    Hx MRSA    Hyperkalemia    Chronic pain on chronic opioids    Chronic LE diabetic ulcers    Mild protein-calorie malnutrition (HCC)    Cellulitis    TORI (acute kidney injury) ([address]    Neuropathy    Diabetic acidosis without coma ([address]         DKA with coma+  Fevers  Sepsis  Lactic acidosis  TORI  Wbc ELEVATION   Change in mentation - Encephalopathy   CSF with WBC elevation but could likely be from severe DKA vs early CNS infection   Given the presentation it would be prudent to treat with IV abx to cover CNS pending clinical improvement   Left diabetic foot infection   COVID 19 -ve NAAT   CT brain -ve  CXR -VE  DM with poor control HbA1c > 13 for long time     He remains very ill from severe DKA and coma and sepsis now some improvement in mentation since acidosis is being corrected but concern is for infection precipitating DKA and given CSF abnormality with WBC higher than normal would cont IV abx for now pending clinical improvement      Labs, Microbiology, Radiology and pertinent results from current hospitalization and care every where were reviewed by me as a part of the consultation. PLAN :  1. Cont IV Vancomycin by levels  2. Cont IV Ceftriaxone x 2 gm Q 12 HRS  3. Cont IV Ampicillin x 2 gm x Q 6 HRS  4. CSF cx in process  5. I spoke to lab to check CSF cell counts on tube 3 as well  6. Check Meningoencephalitis panel on CSF spoke ot Micro  7. Blood cx in process  8. Check PROCAL  9. HbA1c  10. D/c Dexamethasone only useful in Pneumococcal Meningitis  11. I would check COVID 19 PCR as NAAT has been less sensitive   12. Urine for legionella and Pneumococcal antigen     Discussed with patient/Family and Nursing d/w wife at bed side    · Patient is critically ill and has a potentially life threatening infection that poses threat to life/bodily function. · There is potential for worsening infection/ sudden clinically significant or life-threatening deterioration in the patient's condition without appropriate antimicrobial therapy. · Complex medical decision making process was involved to select appropriate antimicrobial agents to reverse the cause of patient's severe infection/ illness. · Antimicrobial therapy requires intensive monitoring for toxicity and frequent dose adjustments to prevent toxicity and permanent end-organ dysfunction    CC time : 32 min     Thanks for allowing me to participate in your patient's care please call me with any questions or concerns.     Dr. Navarro Hanson MD  [facility]  Phone: [phone]   Fax : [phone]

## 2021-01-20 LAB
AMMONIA: 47 UMOL/L (ref 16–60)
ANION GAP SERPL CALCULATED.3IONS-SCNC: 10 MMOL/L (ref 3–16)
BUN BLDV-MCNC: 23 MG/DL (ref 7–20)
CALCIUM SERPL-MCNC: 7.9 MG/DL (ref 8.3–10.6)
CHLORIDE BLD-SCNC: 106 MMOL/L (ref 99–110)
CO2: 24 MMOL/L (ref 21–32)
CREAT SERPL-MCNC: 1.7 MG/DL (ref 0.8–1.3)
ESTIMATED AVERAGE GLUCOSE: 260.4 MG/DL
GFR AFRICAN AMERICAN: 50
GFR NON-AFRICAN AMERICAN: 41
GLUCOSE BLD-MCNC: 129 MG/DL (ref 70–99)
GLUCOSE BLD-MCNC: 161 MG/DL (ref 70–99)
GLUCOSE BLD-MCNC: 163 MG/DL (ref 70–99)
GLUCOSE BLD-MCNC: 163 MG/DL (ref 70–99)
GLUCOSE BLD-MCNC: 164 MG/DL (ref 70–99)
GLUCOSE BLD-MCNC: 178 MG/DL (ref 70–99)
GLUCOSE BLD-MCNC: 195 MG/DL (ref 70–99)
GLUCOSE BLD-MCNC: 259 MG/DL (ref 70–99)
GLUCOSE BLD-MCNC: 267 MG/DL (ref 70–99)
HBA1C MFR BLD: 10.7 %
HCT VFR BLD CALC: 32.6 % (ref 40.5–52.5)
HEMOGLOBIN: 10.8 G/DL (ref 13.5–17.5)
L. PNEUMOPHILA SEROGP 1 UR AG: NORMAL
MAGNESIUM: 1.8 MG/DL (ref 1.8–2.4)
PERFORMED ON: ABNORMAL
PHOSPHORUS: 2.2 MG/DL (ref 2.5–4.9)
PHOSPHORUS: 2.3 MG/DL (ref 2.5–4.9)
POTASSIUM SERPL-SCNC: 3.9 MMOL/L (ref 3.5–5.1)
POTASSIUM SERPL-SCNC: 4 MMOL/L (ref 3.5–5.1)
PROCALCITONIN: 0.46 NG/ML (ref 0–0.15)
SARS-COV-2: NOT DETECTED
SODIUM BLD-SCNC: 140 MMOL/L (ref 136–145)
STREP PNEUMONIAE ANTIGEN, URINE: NORMAL
VANCOMYCIN RANDOM: 7.5 UG/ML

## 2021-01-20 PROCEDURE — 94761 N-INVAS EAR/PLS OXIMETRY MLT: CPT

## 2021-01-20 PROCEDURE — 6360000002 HC RX W HCPCS: Performed by: INTERNAL MEDICINE

## 2021-01-20 PROCEDURE — 6370000000 HC RX 637 (ALT 250 FOR IP): Performed by: FAMILY MEDICINE

## 2021-01-20 PROCEDURE — 2580000003 HC RX 258: Performed by: INTERNAL MEDICINE

## 2021-01-20 PROCEDURE — 83036 HEMOGLOBIN GLYCOSYLATED A1C: CPT

## 2021-01-20 PROCEDURE — 85018 HEMOGLOBIN: CPT

## 2021-01-20 PROCEDURE — 82140 ASSAY OF AMMONIA: CPT

## 2021-01-20 PROCEDURE — 84132 ASSAY OF SERUM POTASSIUM: CPT

## 2021-01-20 PROCEDURE — C9113 INJ PANTOPRAZOLE SODIUM, VIA: HCPCS | Performed by: INTERNAL MEDICINE

## 2021-01-20 PROCEDURE — 2500000003 HC RX 250 WO HCPCS: Performed by: INTERNAL MEDICINE

## 2021-01-20 PROCEDURE — 85014 HEMATOCRIT: CPT

## 2021-01-20 PROCEDURE — 80202 ASSAY OF VANCOMYCIN: CPT

## 2021-01-20 PROCEDURE — 84100 ASSAY OF PHOSPHORUS: CPT

## 2021-01-20 PROCEDURE — 36415 COLL VENOUS BLD VENIPUNCTURE: CPT

## 2021-01-20 PROCEDURE — 1200000000 HC SEMI PRIVATE

## 2021-01-20 PROCEDURE — 99233 SBSQ HOSP IP/OBS HIGH 50: CPT | Performed by: INTERNAL MEDICINE

## 2021-01-20 PROCEDURE — 83735 ASSAY OF MAGNESIUM: CPT

## 2021-01-20 PROCEDURE — 6370000000 HC RX 637 (ALT 250 FOR IP): Performed by: INTERNAL MEDICINE

## 2021-01-20 PROCEDURE — 80048 BASIC METABOLIC PNL TOTAL CA: CPT

## 2021-01-20 PROCEDURE — 84145 PROCALCITONIN (PCT): CPT

## 2021-01-20 RX ORDER — DEXTROSE MONOHYDRATE 50 MG/ML
100 INJECTION, SOLUTION INTRAVENOUS PRN
Status: DISCONTINUED | OUTPATIENT
Start: 2021-01-20 | End: 2021-01-25 | Stop reason: HOSPADM

## 2021-01-20 RX ORDER — DEXTROSE MONOHYDRATE 25 G/50ML
12.5 INJECTION, SOLUTION INTRAVENOUS PRN
Status: DISCONTINUED | OUTPATIENT
Start: 2021-01-20 | End: 2021-01-25 | Stop reason: HOSPADM

## 2021-01-20 RX ORDER — NICOTINE POLACRILEX 4 MG
15 LOZENGE BUCCAL PRN
Status: DISCONTINUED | OUTPATIENT
Start: 2021-01-20 | End: 2021-01-25 | Stop reason: HOSPADM

## 2021-01-20 RX ORDER — INSULIN GLARGINE 100 [IU]/ML
60 INJECTION, SOLUTION SUBCUTANEOUS NIGHTLY
Status: DISCONTINUED | OUTPATIENT
Start: 2021-01-20 | End: 2021-01-21

## 2021-01-20 RX ORDER — INSULIN GLARGINE 100 [IU]/ML
20 INJECTION, SOLUTION SUBCUTANEOUS ONCE
Status: COMPLETED | OUTPATIENT
Start: 2021-01-20 | End: 2021-01-20

## 2021-01-20 RX ORDER — POTASSIUM CHLORIDE 20 MEQ/1
40 TABLET, EXTENDED RELEASE ORAL PRN
Status: DISCONTINUED | OUTPATIENT
Start: 2021-01-20 | End: 2021-01-25 | Stop reason: HOSPADM

## 2021-01-20 RX ORDER — POTASSIUM CHLORIDE 7.45 MG/ML
10 INJECTION INTRAVENOUS PRN
Status: DISCONTINUED | OUTPATIENT
Start: 2021-01-20 | End: 2021-01-25 | Stop reason: HOSPADM

## 2021-01-20 RX ADMIN — Medication 10 ML: at 21:48

## 2021-01-20 RX ADMIN — INSULIN GLARGINE 60 UNITS: 100 INJECTION, SOLUTION SUBCUTANEOUS at 22:47

## 2021-01-20 RX ADMIN — PANTOPRAZOLE SODIUM 40 MG: 40 INJECTION, POWDER, FOR SOLUTION INTRAVENOUS at 08:22

## 2021-01-20 RX ADMIN — POTASSIUM CHLORIDE 10 MEQ: 7.46 INJECTION, SOLUTION INTRAVENOUS at 08:18

## 2021-01-20 RX ADMIN — AMPICILLIN SODIUM 2 G: 2 INJECTION, POWDER, FOR SOLUTION INTRAMUSCULAR; INTRAVENOUS at 05:57

## 2021-01-20 RX ADMIN — DEXTROSE AND SODIUM CHLORIDE: 5; 450 INJECTION, SOLUTION INTRAVENOUS at 09:49

## 2021-01-20 RX ADMIN — ACETAMINOPHEN ORAL SOLUTION 650 MG: 650 SOLUTION ORAL at 18:40

## 2021-01-20 RX ADMIN — AMPICILLIN SODIUM 2 G: 2 INJECTION, POWDER, FOR SOLUTION INTRAMUSCULAR; INTRAVENOUS at 18:40

## 2021-01-20 RX ADMIN — DEXTROSE AND SODIUM CHLORIDE: 5; 450 INJECTION, SOLUTION INTRAVENOUS at 03:00

## 2021-01-20 RX ADMIN — SODIUM PHOSPHATE, MONOBASIC, MONOHYDRATE 15 MMOL: 276; 142 INJECTION, SOLUTION INTRAVENOUS at 08:23

## 2021-01-20 RX ADMIN — INSULIN GLARGINE 20 UNITS: 100 INJECTION, SOLUTION SUBCUTANEOUS at 12:50

## 2021-01-20 RX ADMIN — CEFTRIAXONE 2 G: 2 INJECTION, POWDER, FOR SOLUTION INTRAMUSCULAR; INTRAVENOUS at 21:47

## 2021-01-20 RX ADMIN — Medication 10 ML: at 08:22

## 2021-01-20 RX ADMIN — INSULIN LISPRO 3 UNITS: 100 INJECTION, SOLUTION INTRAVENOUS; SUBCUTANEOUS at 22:47

## 2021-01-20 RX ADMIN — POTASSIUM CHLORIDE 10 MEQ: 7.46 INJECTION, SOLUTION INTRAVENOUS at 06:00

## 2021-01-20 RX ADMIN — VANCOMYCIN HYDROCHLORIDE 1250 MG: 10 INJECTION, POWDER, LYOPHILIZED, FOR SOLUTION INTRAVENOUS at 23:30

## 2021-01-20 RX ADMIN — POTASSIUM CHLORIDE 10 MEQ: 7.46 INJECTION, SOLUTION INTRAVENOUS at 07:06

## 2021-01-20 RX ADMIN — PANTOPRAZOLE SODIUM 40 MG: 40 INJECTION, POWDER, FOR SOLUTION INTRAVENOUS at 21:48

## 2021-01-20 RX ADMIN — CEFTRIAXONE 2 G: 2 INJECTION, POWDER, FOR SOLUTION INTRAMUSCULAR; INTRAVENOUS at 08:18

## 2021-01-20 RX ADMIN — AMPICILLIN SODIUM 2 G: 2 INJECTION, POWDER, FOR SOLUTION INTRAMUSCULAR; INTRAVENOUS at 14:33

## 2021-01-20 NOTE — PROGRESS NOTES
Infectious Disease Follow up Notes  Admit Date: 1/19/2021  Hospital Day: 2    Antibiotics :   IV Vancomycin  IV Ceftriaxone  IV Ampicillin stopped     CHIEF COMPLAINT:       DKA with coma  Sepsis  Change in Mentation   WBC elevation     Subjective interval History :  56 y.o. Man with DM and previous Neuropathy, foot infections, and h/o DKA in the past and admitted from home with change in mentation and was found by his wife unresponsive and in emesis and incontinence, EMS was called and BG reading high and on admit BG > 600 AND Ph at  6.96 with severe acidosis and Beta hydroxy butyric acid > 8 and WBC elevation in TORI on admit with on going change in mentation but no seizures per his wife. He was tested -ve for COVID 19 and Blood, urine cx in process he had marked lactic acidosis at 9.3 on admission. Creatinine elevated 2.6 CSF analysis completed given the presentation and its abnormal with WBC elevation and Neutrophils   84% and WBC at 14, Protein 31, Gluc 243. He is currently on nasal cannula and we are consulted for IV antibiotic recommendations. CT brain admission negative, CT abdomen pelvis negative.     Interval History : Mental status slowly improving blood glucose slowly improving. Ongoing fevers. Seen in ICU. CSF culture in process. Meningoencephalitis panel reported negative. Tolerating antibiotic therapy okay.     Past Medical History:    Past Medical History:   Diagnosis Date    TORI (acute kidney injury) ([facility] 9/12/2017    Bacteremia 05/05/2017    staph aureus    Diabetes mellitus ([facility] [address]     Diabetic neuropathy ([facility] [address]     Gout     MRSA (methicillin resistant staph aureus) culture positive 12/27/18, 9/12/17, 5/5/17,9/5/13, 1/15/14    ulcers bilateral legs    Pneumonia     Pyogenic inflammation of bone ([address]        Past Surgical History:    Past Surgical History:   Procedure Laterality Date    EYE SURGERY      lens polyphagia  Musculoskeletal:  negative for joint  pain, swelling, decreased range of motion  Integumentary: No rashes, skin lesions  Neurological:  negative for headaches, slurred speech, unilateral weakness  Psychiatric: negative for hallucinations,confusion+ ,agitation.                 PHYSICAL EXAM:      Vitals:  t MAX  102.4     BP (!) 160/73   Pulse 69   Temp 98.6 °F (37 °C) (Oral)   Resp 15   Ht 5' 10\" (1.778 m)   Wt 177 lb 0.5 oz (80.3 kg)   SpO2 92%   BMI 25.40 kg/m²     General Appearance:awake + some intermittent confusion  and + in Some acute distress, +  pallor, no icterus chronic ill appearing man+  Skin: warm and dry, no rash or erythema  Head: normocephalic and atraumatic  Eyes: pupils equal, round, and reactive to light, conjunctivae normal  ENT: tympanic membrane, external ear and ear canal normal bilaterally, nose without deformity, nasal mucosa and turbinates normal without polyps  Neck: supple and non-tender without mass, no thyromegaly  no cervical lymphadenopathy  Pulmonary/Chest: clear to auscultation bilaterally- no wheezes, rales or rhonchi, normal air movement, no respiratory distress  Cardiovascular: normal rate, regular rhythm, normal S1 and S2, no murmurs, rubs, clicks, or gallops, no carotid bruits  Abdomen: soft, non-tender, non-distended, normal bowel sounds, no masses or organomegaly  Extremities: no cyanosis, clubbing or edema  Musculoskeletal: normal range of motion, no joint swelling, deformity or tenderness  Integumentary: No rashes, no abnormal skin lesions, no petechiae  Neurologic: reflexes normal and symmetric, no cranial nerve deficit  Lines: IV  David+  Left foot ulcer+  Diabetic foot changes+     Data Review:    CBC:   Lab Results   Component Value Date    WBC 16.0 (H) 01/19/2021    HGB 10.8 (L) 01/20/2021    HCT 32.6 (L) 01/20/2021    MCV 78.8 (L) 01/19/2021     01/19/2021     RENAL:   Lab Results   Component Value Date    CREATININE 1.7 (H) 01/20/2021    BUN 23 (H) 01/20/2021     01/20/2021    K 3.9 01/20/2021     01/20/2021    CO2 24 01/20/2021     SED RATE:   Lab Results   Component Value Date    SEDRATE 60 04/15/2016     CK: No results found for: CKTOTAL  CRP: No results found for: CRP  Hepatic Function Panel:   Lab Results   Component Value Date    ALKPHOS 195 01/18/2021    ALT 25 01/18/2021    AST 21 01/18/2021    PROT 8.3 01/18/2021    PROT 7.3 08/20/2012    BILITOT 0.6 01/18/2021    LABALBU 4.3 01/18/2021     UA:  Lab Results   Component Value Date    COLORU Straw 01/18/2021    CLARITYU Clear 01/18/2021    GLUCOSEU >=1000 01/18/2021    BILIRUBINUR Negative 01/18/2021    KETUA >=80 01/18/2021    SPECGRAV 1.020 01/18/2021    BLOODU SMALL 01/18/2021    PHUR 5.5 01/18/2021    PROTEINU TRACE 01/18/2021    UROBILINOGEN 0.2 01/18/2021    NITRU Negative 01/18/2021    LEUKOCYTESUR Negative 01/18/2021    LABMICR YES 01/18/2021    URINETYPE NotGiven 01/18/2021      Urine Microscopic:   Lab Results   Component Value Date    LABCAST 3-5 Hyaline 01/31/2019    BACTERIA Rare 01/18/2021    WBCUA 3-5 01/18/2021    RBCUA 0-2 01/18/2021    EPIU 2-5 01/18/2021     Urine Reflex to Culture:   Lab Results   Component Value Date    URRFLXCULT Not Indicated 01/18/2021       Creat  2.6  Down 1.7     lACTIC ACID   9.3  DOWN  1.5       PROCAL 0.46 /19/2021  5:30 PM - Mayo Donahue Incoming Lab Results From Soft (Epic Adt)    Component Value Ref Range & Units Status Collected Lab   Color, CSF Colorless  Colorless Final 01/19/2021 11:08 AM  - [facility]   Appearance, CSF Clear  Clear Final 01/19/2021 11:08 AM [address] Lab   Tube Number + CELL CT + DIFF-CSF Tube 3   Final 01/19/2021 11:08 AM [facility]   Clot Evaluation CSF see below   Final 01/19/2021 11:08 AM [facility]   No Clots Seen   WBC, CSF 14High   0 - 5 /cumm Final 01/19/2021 11:08 AM [facility]   RBC, CSF 17Abnormal   0 /cumm Final 01/19/2021 11:08 AM [address] Lab Neutrophils, CSF 84High   0 - 6 % Final 01/19/2021 11:08 AM [facility]   Lymphs, CSF 9Low   40 - 80 % Final 01/19/2021 11:08 AM [facility]   Monocytes, CSF 6Low   15 - 45 % Final 01/19/2021 11:08 AM [facility]   Atypical Lymphs CSF 1  % Final 01/19/2021 11:08 AM [facility]      MICRO: cultures reviewed and updated by me   Blood Culture:          Legionella Antigen, Urine [[id number]] Collected: 01/19/21 2209   Order Status: Completed Specimen: Urine, clean catch Updated: 01/20/21 1219    L. pneumophila Serogp 1 Ur Ag --    Presumptive Negative   No Legionella pneumophila serogroup 1 antigens detected. A negative result does not exclude infection with   Legionella pneumophila serogroup 1 nor does it rule out   other microbial-caused respiratory infections or   disease caused by other serogroups of   Legionella pneumophila. Normal Range: Presumptive Negative    Narrative:     ORDER#: [id number]                          ORDERED BY: Rox Gowers   SOURCE: Urine Clean Catch                  COLLECTED:  01/19/21 22:09   ANTIBIOTICS AT JOHNNIE. :                      RECEIVED :  01/19/21 22:22   Performed at:   [place] [address]   Phone [phone]   Strep Pneumoniae Antigen [[id number]] Collected: 01/19/21 2209   Order Status: Completed Specimen: Urine, clean catch Updated: 01/20/21 1217    STREP PNEUMONIAE ANTIGEN, URINE --    Presumptive Negative   Presumptive negative suggests no current or recent   pneumococcal infection. Infection due to Strep pneumoniae   cannot be ruled out since the antigen present in the sample   may be below the detection limit of the test.   Normal Range:Presumptive Negative    Narrative:     ORDER#: [id number]                          ORDERED BY: Rox Gowers   SOURCE: Urine Clean Catch                  COLLECTED:  01/19/21 22:09   ANTIBIOTICS AT JOHNNIE. :                      RECEIVED :  01/19/21 22:22   Performed at:   [facility]   [address] [facility] [address] [place]   Phone [phone]   Culture, CSF [[id number]] Collected: 01/19/21 1108   Order Status: Sent Specimen: CSF Updated: 01/19/21 1359    Gram Stain Result WBC's (Polymorphonuclear)   No organisms seen   Cytospin performed,no quantitation    Narrative:     ORDER#: [id number]                          ORDERED BY: Chapo Cotto   SOURCE: CSF (Spinal Fluid)                 COLLECTED:  01/19/21 11:08   ANTIBIOTICS AT JOHNNIE. :                      RECEIVED :  01/19/21 11:47   Performed at:   [facility]   [address] [place] [address] [address]   Phone [phone]   Meningitis Encephalitis Panel Report [[id number]] Collected: 01/19/21 1108   Order Status: Completed Updated: 01/19/21 1352    Report SEE IMAGE   Narrative:     Referred out by:   [facility]   [address]   Phone [phone]   Meningitis/Encephalitis Panel, CSF [[id number]] Collected: 01/19/21 1108   Order Status: Completed Specimen: CSF Updated: 01/19/21 1349    Meningitis Encephalitis Panel --    Meningitis Encephalitis Panel PCR Result: Not Detected   See additional report for complete Meningitis Encephalitis Panel. Narrative:     ORDER#: [id number]                          ORDERED BY: Peterson Zhang   SOURCE: CSF (Spinal Fluid)                 COLLECTED:  01/19/21 11:08   ANTIBIOTICS AT JOHNNIE. :                      RECEIVED :  01/19/21 12:20   Performed at:   [facility]   [address] [place] [address] [address]   Phone [phone]   Culture, Blood 1 [[id number]]    Order Status: Sent Specimen: Blood    Culture, Blood 2 [[id number]]    Order Status: Sent Specimen: Blood    Culture, Blood 2 [[id number]]    Order Status: Canceled Specimen: Blood    Culture, Blood 1 [[id number]]    Order Status: Canceled Specimen: Blood        Lab Results   Component Value Date    [facility]  01/18/2021     No Growth to date. Any change in status will be called. BLOODCULT2  01/18/2021     No Growth to date. Any change in status will be called. Respiratory Culture:  Lab Results   Component Value Date    CULTRESP Light growth normal respiratory idris with 09/12/2017    CULTRESP  09/12/2017     Rare growth  Susceptibility testing of penicillin and other beta-lactams is  not necessary for beta hemolytic Streptococci since resistant  strains have not been identified. (CLSI B638-W96, 2017)      CULTRESP  09/12/2017     Rare growth  Susceptibility testing of penicillin and other beta-lactams is  not necessary for beta hemolytic Streptococci since resistant  strains have not been identified. (CLSI D762-Q46, 2017)      LABGRAM  01/19/2021     WBC's (Polymorphonuclear)  No organisms seen  Cytospin performed,no quantitation       AFB:No results found for: AFBSMEAR  Viral Culture:  Lab Results   Component Value Date    COVID19 Not Detected 01/18/2021     Urine Culture: No results for input(s): Durward Iba in the last 72 hours. IMAGING:    IR LUMBAR PUNCTURE FOR DIAGNOSIS   Final Result   Successful fluoroscopic-guided lumbar puncture. XR CHEST PORTABLE   Final Result   Appropriate radiographic positioning of the nasogastric tube. Recommend clinical correlation prior to use.                All the pertinent images and reports for the current Hospitalization were reviewed by me     Scheduled Meds:   pantoprazole  40 mg Intravenous BID    And    sodium chloride (PF)  10 mL Intravenous BID    cefTRIAXone (ROCEPHIN) IV  2 g Intravenous Q12H    vancomycin (VANCOCIN) intermittent dosing (placeholder)   Other RX Placeholder    ampicillin IV  2 g Intravenous 4 times per day       Continuous Infusions:   sodium chloride Stopped (01/19/21 0937)    dextrose 5 % and 0.45 % NaCl 150 mL/hr at 01/20/21 0949    insulin 2.36 Units/hr (01/20/21 1104)       PRN Meds:  dextrose, potassium chloride, magnesium sulfate, sodium phosphate IVPB **OR** sodium phosphate IVPB **OR** sodium phosphate IVPB, dextrose 5 % and 0.45 % NaCl, acetaminophen, acetaminophen      Assessment:     Patient Active Problem List   Diagnosis    Diabetic ketoacidosis without coma associated with type 2 diabetes mellitus (HCC)    Nausea & vomiting    Diabetic neuropathy (HCC)    DM2/IDDM    Gout    Hx MRSA    Hyperkalemia    Chronic pain on chronic opioids    Chronic LE diabetic ulcers    Mild protein-calorie malnutrition (HCC)    Cellulitis    TORI (acute kidney injury) ([facility] [address]    Neuropathy    Diabetic acidosis without coma ([address]     DKA with coma+ improving   Fevers  Sepsis  Lactic acidosis resolved  TORI on going   Wbc ELEVATION   Change in mentation - Encephalopathy   CSF with WBC elevation but could likely be from severe DKA vs early CNS infection   Given the presentation it would be prudent to treat with IV abx to cover CNS pending clinical improvement   Left diabetic foot infection   COVID 19 -ve NAAT   CT brain -ve  CXR -VE  DM with poor control HbA1c > 13 for long time      He remains very ill from severe DKA and coma and sepsis now some improvement in mentation since acidosis is being corrected but concern is for infection precipitating DKA and given CSF abnormality with WBC higher than normal would cont IV abx for now pending clinical improvement     Mentation seems to be improving with improvement in  glucose control. CSF culture thus far negative. Blood cultures are in process. Labs, Microbiology, Radiology and all the pertinent results from current hospitalization and  care every where were reviewed  by me as a part of the evaluation   Plan:   1. Cont IV Vancomycin by levels  2. Cont IV Ceftriaxone x 2 gm Q 12 HRS  3. D/C  IV Ampicillin   4. CSF cx in process  5.   CSF cell counts on tube 3  Wbc HIGHER than normal   6. Meningoencephalitis panel on CSF -ve  7. Blood cx in process  8. Check PROCAL  9. HbA1c elevated  10. Control DM   11. COVID 19 PCR pending  12. Urine for legionella -ve and Pneumococcal antigen  -ve     Discussed with patient/Family and Nursing staff   Risk of Complications/Morbidity: High      · Illness(es)/ Infection present that pose threat to bodily function. · There is potential for severe exacerbation of infection/side effects of treatment. · Therapy requires intensive monitoring for antimicrobial agent toxicity. Thanks for allowing me to participate in your patient's care and please call me with any questions or concerns.     Giovani Angeles MD  Infectious Disease  [facility] ([facility]) Physician  Phone: [phone]   Fax : [phone]

## 2021-01-20 NOTE — CARE COORDINATION
INITIAL CASE MANAGEMENT ASSESSMENT    Reviewed chart, met with patient to assess possible discharge needs. Explained Case Management role/services. Living Situation: Per Wife, patient lives with his Wife in a house with 2 steps to enter. ADLs: Wife states that he is independent     DME: Glucometer, Insulin pen    PT/OT Recs: No evals on chart     Active Services: None     Transportation: Active /Wife will transport     Medications: [employer] and [employer]/No barriers    PCP: Chris Palmer MD      HD/PD: N/A    PLAN/COMMENTS: Patient is from [facility]. Follow for needs. Needs ACP when more alert. SW/CM provided contact information for patient or family to call with any questions. SW/CM will follow and assist as needed. Electronically signed by Cabrera Lr RN on 1/20/2021 at 11:03 AM

## 2021-01-20 NOTE — CARE COORDINATION
Focus:  Diabetes education    Droplet isolation. Nursing reports some mental clearing but not ready for a telephonic education session.       Electronically signed by Barron Landry RN on 1/20/2021 at 3:44 PM

## 2021-01-20 NOTE — CARE COORDINATION
1/20 Chart Review: Patient is a transfer from [facility] ED 2/2 no ICU beds. It appears that he is from home with his Wife. Needs assessment and ACP. Electronically signed by Supriya Raza RN on 1/20/2021 at 10:56 AM

## 2021-01-20 NOTE — PROGRESS NOTES
Gastroenterology Progress Note    Manuel Crawford is a 56 y.o. male patient. Hospitalization Day:1    SUBJECTIVE: Patient was much more alert this morning. No further nausea vomiting has been reported. The patient responded well to transfusion and has had no further signs of GI bleeding. ROS:  Gastrointestinal ROS: no abdominal pain, change in bowel habits, or black or bloody stools    Physical    VITALS:  BP (!) 181/87   Pulse 72   Temp 98.5 °F (36.9 °C) (Oral)   Resp 16   Ht 5' 10\" (1.778 m)   Wt 177 lb 0.5 oz (80.3 kg)   SpO2 90%   BMI 25.40 kg/m²   TEMPERATURE:  Current - Temp: 98.5 °F (36.9 °C); Max - Temp  Av.8 °F (37.1 °C)  Min: 97.5 °F (36.4 °C)  Max: 100 °F (37.8 °C)    General:  Alert and oriented,  Skin- without jaundice  Eyes: anicteric sclera  Cardiac: RRR, Nl s1s2, without murmurs  Lungs CTA Bilaterally, normal effort  Abdomen soft, ND, NT, no HSM, Bowel sounds normal  Ext: without edema  Neuro: no asterixis     Data    Data Review:    Recent Labs     21  1755 21  1755 21  0335 21  1556 21  2221 21  0514   WBC 21.8*  --  16.0*  --   --   --    HGB 12.1*   < > 11.9* 11.1* 11.6* 10.8*   HCT 41.8   < > 36.5* 34.0* 35.2* 32.6*   MCV 84.9  --  78.8*  --   --   --      --  283  --   --   --     < > = values in this interval not displayed. Recent Labs     21  1556 21  1752 21  0300 21  0514    142  --  140   K 4.0 3.7 4.0 3.9    107  --  106   CO2 23 25  --  24   PHOS 2.8 2.8 2.3* 2.2*   BUN 28* 26*  --  23*   CREATININE 2.0* 1.7*  --  1.7*     Recent Labs     21  1755   AST 21   ALT 25   BILITOT 0.6   ALKPHOS 195*     No results for input(s): LIPASE, AMYLASE in the last 72 hours. Recent Labs     21  1755   PROTIME 12.2   INR 1.05       Radiology Review:    IR LUMBAR PUNCTURE FOR DIAGNOSIS   Final Result   Successful fluoroscopic-guided lumbar puncture.          XR CHEST PORTABLE   Final Result Appropriate radiographic positioning of the nasogastric tube. Recommend clinical correlation prior to use. ASSESSMENT:  1) DKA-anion gap has closed and patient is off insulin drip. Patient's mental status has improved  2) Suspected community acquired pneumonia-patient had an elevated procalcitonin and elevated white count with tachypnea. Improved. 3) Suspected acute blood loss anemia-currently stable. The patient was found with melena and coffee-ground emesis. Suspect possible Rosi-Vang tear versus esophagitis versus peptic ulcer disease. The patient may benefit from an endoscopy tomorrow to evaluate    PLAN :  1) we will consider an endoscopy tomorrow assuming the patient remains clinically stable  2) continue PPI twice daily  3) n.p.o. after midnight    Thank you for allowing me to participate in the care of your patient. Please feel free to contact me with any concerns.   [address] [place] [address] Edgardo Morales, DO

## 2021-01-20 NOTE — PROGRESS NOTES
Hospitalist Progress Note    CC: <principal problem not specified>      Admit date: 1/19/2021  Days in hospital:  1    Subjective/interval history: Pt S/E. Pt more awake today, asking for his ngt to be taken out. Sugars improved and gap closed. ROS:   Pertinent items are noted in HPI. Objective:    BP (!) 149/72   Pulse 80   Temp 98 °F (36.7 °C) (Axillary)   Resp 13   Ht 5' 10\" (1.778 m)   Wt 177 lb 0.5 oz (80.3 kg)   SpO2 94%   BMI 25.40 kg/m²     Gen: alert, NAD  HEENT: NC/AT, moist mucous membranes, no oropharyngeal erythema or exudate, ngt in place  Neck: supple, trachea midline, no anterior cervical or SC LAD  Heart: Normal s1/s2, RRR, no murmurs, gallops, or rubs. Lungs: clear bilaterally, no wheezing, no rales, no rhonchi, no use of accessory muscles  Abd: bowel sounds present, soft, nontender, nondistended, no masses  Extrem: No clubbing, cyanosis, no edema  Skin: no rashes or lesions  Psych: A & O to name, place only affect agitated  Neuro: grossly intact, moves all four extremities spontaneously.   Cap refill: +2 sec    Medications:  Scheduled Meds:   pantoprazole  40 mg Intravenous BID    And    sodium chloride (PF)  10 mL Intravenous BID    cefTRIAXone (ROCEPHIN) IV  2 g Intravenous Q12H    vancomycin (VANCOCIN) intermittent dosing (placeholder)   Other RX Placeholder    ampicillin IV  2 g Intravenous 4 times per day       PRN Meds:  dextrose, potassium chloride, magnesium sulfate, sodium phosphate IVPB **OR** sodium phosphate IVPB **OR** sodium phosphate IVPB, dextrose 5 % and 0.45 % NaCl, acetaminophen, acetaminophen    IV:   sodium chloride Stopped (01/19/21 0937)    dextrose 5 % and 0.45 % NaCl 150 mL/hr at 01/20/21 0300    insulin 1.38 Units/hr (01/20/21 0824)         Intake/Output Summary (Last 24 hours) at 1/20/2021 0925  Last data filed at 1/20/2021 0623  Gross per 24 hour   Intake 4787.5 ml   Output 1610 ml   Net 3177.5 ml       Results:  CBC:   Recent Labs     01/18/21  1755 01/18/21  1755 01/19/21  0335 01/19/21  1556 01/19/21  2221 01/20/21  0514   WBC 21.8*  --  16.0*  --   --   --    HGB 12.1*   < > 11.9* 11.1* 11.6* 10.8*   HCT 41.8   < > 36.5* 34.0* 35.2* 32.6*   MCV 84.9  --  78.8*  --   --   --      --  283  --   --   --     < > = values in this interval not displayed. BMP:   Recent Labs     01/19/21  1556 01/19/21  1752 01/20/21  0300 01/20/21  0514    142  --  140   K 4.0 3.7 4.0 3.9    107  --  106   CO2 23 25  --  24   PHOS 2.8 2.8 2.3* 2.2*   BUN 28* 26*  --  23*   CREATININE 2.0* 1.7*  --  1.7*     Mag: No results for input(s): MAG in the last 72 hours. Phos:   Lab Results   Component Value Date    PHOS 2.2 (L) 01/20/2021     No components found for: GLU    LIVER PROFILE:   Recent Labs     01/18/21 1755   AST 21   ALT 25   BILITOT 0.6   ALKPHOS 195*     PT/INR:   Recent Labs     01/18/21  1755   PROTIME 12.2   INR 1.05     APTT:   Recent Labs     01/18/21  1755   APTT 27.2     UA:  Recent Labs     01/18/21  1845   COLORU Straw   PHUR 5.5   WBCUA 3-5   RBCUA 0-2   MUCUS Rare*   BACTERIA Rare*   CLARITYU Clear   SPECGRAV 1.020   LEUKOCYTESUR Negative   UROBILINOGEN 0.2   BILIRUBINUR Negative   BLOODU SMALL*   GLUCOSEU >=1000*       Invalid input(s): ABG  Lab Results   Component Value Date    CALCIUM 7.9 (L) 01/20/2021    PHOS 2.2 (L) 01/20/2021       Assessment:    Active Problems:    Diabetic acidosis without coma (HCC)  Resolved Problems:    * No resolved hospital problems. [facility] course: a 55 yo male with iddm admitted with dka and sepsis. He was found by his wife as unresponsive, covered in emesis and stool.      Plan:    possible acute pyogenic meningitis  - on vanc, Rocephin, ampicillin  - lp done, pcr panel is negative  - csf cultures pending    Sepsis, POA  - with criteria: leukocytosis, lactic acidosis, fevers, tachycardia, tachypnea; source is PNA  - blood and urine cultures pending  - ct abdomen/pelvis nonacute   - recheck lactate until normalizes  - procalcitonin .46,. will trend  - covid pcr pending  - ivf given   - abx as above  - ID consulted    DKA -resolved  - insulin gtt can be changed to sq insulin today  - IVF given  - hba1c    TORI - improving  - crt baseline 1; was 2.7 on admission -> 1.7  - IVF  - Avoid nephrotoxins    GIB  Acute blood loss anemia - 2 units given  - hgb 12 -> 11  - emesis and stool occult +  - follow cbc  - avoid nsaids  - ppi   - scds for dvt prophylaxis   - transfuse for hgb < 7.0  - check Fe studies, retic count  - consult GI    Acute metabolic encephalopathy - improving  - due to dka, infection  - head CT negative  - treat associated conditions  - avoid sedating meds as able  - supportive care    Code status:  full  DVT prophylaxis: [] Lovenox  [] SQ Heparin  [] SCDs because of  [] warfarin/oral direct thrombin inhibitor [] Encourage ambulation      Disposition:  [] Home [] Rehab [] Psych [] SNF  [] LTAC  [] Transfer to ICU  [] Transfer to PCU [] Other: can transfer to pcu       Electronically signed by Shahnaz Quevedo DO on 1/20/2021 at 9:25 AM

## 2021-01-21 ENCOUNTER — ANESTHESIA EVENT (OUTPATIENT)
Dept: ENDOSCOPY | Age: 61
DRG: 871 | End: 2021-01-21
Payer: COMMERCIAL

## 2021-01-21 ENCOUNTER — ANESTHESIA (OUTPATIENT)
Dept: ENDOSCOPY | Age: 61
DRG: 871 | End: 2021-01-21
Payer: COMMERCIAL

## 2021-01-21 VITALS
RESPIRATION RATE: 13 BRPM | SYSTOLIC BLOOD PRESSURE: 142 MMHG | OXYGEN SATURATION: 97 % | DIASTOLIC BLOOD PRESSURE: 86 MMHG

## 2021-01-21 LAB
ANION GAP SERPL CALCULATED.3IONS-SCNC: 8 MMOL/L (ref 3–16)
BUN BLDV-MCNC: 17 MG/DL (ref 7–20)
CALCIUM SERPL-MCNC: 8.4 MG/DL (ref 8.3–10.6)
CHLORIDE BLD-SCNC: 105 MMOL/L (ref 99–110)
CO2: 29 MMOL/L (ref 21–32)
CREAT SERPL-MCNC: 1.4 MG/DL (ref 0.8–1.3)
GFR AFRICAN AMERICAN: >60
GFR NON-AFRICAN AMERICAN: 51
GLUCOSE BLD-MCNC: 147 MG/DL (ref 70–99)
GLUCOSE BLD-MCNC: 179 MG/DL (ref 70–99)
GLUCOSE BLD-MCNC: 47 MG/DL (ref 70–99)
GLUCOSE BLD-MCNC: 66 MG/DL (ref 70–99)
GLUCOSE BLD-MCNC: 82 MG/DL (ref 70–99)
GLUCOSE BLD-MCNC: 86 MG/DL (ref 70–99)
GLUCOSE BLD-MCNC: 87 MG/DL (ref 70–99)
GLUCOSE BLD-MCNC: 97 MG/DL (ref 70–99)
HCT VFR BLD CALC: 35.5 % (ref 40.5–52.5)
HEMOGLOBIN: 11.6 G/DL (ref 13.5–17.5)
MAGNESIUM: 1.9 MG/DL (ref 1.8–2.4)
MCH RBC QN AUTO: 25.5 PG (ref 26–34)
MCHC RBC AUTO-ENTMCNC: 32.7 G/DL (ref 31–36)
MCV RBC AUTO: 77.9 FL (ref 80–100)
PDW BLD-RTO: 15.8 % (ref 12.4–15.4)
PERFORMED ON: ABNORMAL
PERFORMED ON: NORMAL
PLATELET # BLD: 218 K/UL (ref 135–450)
PMV BLD AUTO: 8.5 FL (ref 5–10.5)
POTASSIUM SERPL-SCNC: 3.9 MMOL/L (ref 3.5–5.1)
PROCALCITONIN: 0.18 NG/ML (ref 0–0.15)
RBC # BLD: 4.55 M/UL (ref 4.2–5.9)
SODIUM BLD-SCNC: 142 MMOL/L (ref 136–145)
VANCOMYCIN RANDOM: 8 UG/ML
WBC # BLD: 12.2 K/UL (ref 4–11)

## 2021-01-21 PROCEDURE — 3700000000 HC ANESTHESIA ATTENDED CARE: Performed by: INTERNAL MEDICINE

## 2021-01-21 PROCEDURE — 1200000000 HC SEMI PRIVATE

## 2021-01-21 PROCEDURE — 7100000001 HC PACU RECOVERY - ADDTL 15 MIN: Performed by: INTERNAL MEDICINE

## 2021-01-21 PROCEDURE — 87205 SMEAR GRAM STAIN: CPT

## 2021-01-21 PROCEDURE — 88305 TISSUE EXAM BY PATHOLOGIST: CPT

## 2021-01-21 PROCEDURE — 94761 N-INVAS EAR/PLS OXIMETRY MLT: CPT

## 2021-01-21 PROCEDURE — 80048 BASIC METABOLIC PNL TOTAL CA: CPT

## 2021-01-21 PROCEDURE — 2580000003 HC RX 258: Performed by: INTERNAL MEDICINE

## 2021-01-21 PROCEDURE — 92610 EVALUATE SWALLOWING FUNCTION: CPT

## 2021-01-21 PROCEDURE — 80202 ASSAY OF VANCOMYCIN: CPT

## 2021-01-21 PROCEDURE — 6360000002 HC RX W HCPCS: Performed by: INTERNAL MEDICINE

## 2021-01-21 PROCEDURE — 6360000002 HC RX W HCPCS: Performed by: NURSE ANESTHETIST, CERTIFIED REGISTERED

## 2021-01-21 PROCEDURE — C9113 INJ PANTOPRAZOLE SODIUM, VIA: HCPCS | Performed by: INTERNAL MEDICINE

## 2021-01-21 PROCEDURE — 2500000003 HC RX 250 WO HCPCS: Performed by: NURSE ANESTHETIST, CERTIFIED REGISTERED

## 2021-01-21 PROCEDURE — 2709999900 HC NON-CHARGEABLE SUPPLY: Performed by: INTERNAL MEDICINE

## 2021-01-21 PROCEDURE — 36415 COLL VENOUS BLD VENIPUNCTURE: CPT

## 2021-01-21 PROCEDURE — 2580000003 HC RX 258: Performed by: NURSE ANESTHETIST, CERTIFIED REGISTERED

## 2021-01-21 PROCEDURE — 3609012400 HC EGD TRANSORAL BIOPSY SINGLE/MULTIPLE: Performed by: INTERNAL MEDICINE

## 2021-01-21 PROCEDURE — 85027 COMPLETE CBC AUTOMATED: CPT

## 2021-01-21 PROCEDURE — 99233 SBSQ HOSP IP/OBS HIGH 50: CPT | Performed by: INTERNAL MEDICINE

## 2021-01-21 PROCEDURE — 3700000001 HC ADD 15 MINUTES (ANESTHESIA): Performed by: INTERNAL MEDICINE

## 2021-01-21 PROCEDURE — 86403 PARTICLE AGGLUT ANTBDY SCRN: CPT

## 2021-01-21 PROCEDURE — 2580000003 HC RX 258: Performed by: FAMILY MEDICINE

## 2021-01-21 PROCEDURE — 87070 CULTURE OTHR SPECIMN AEROBIC: CPT

## 2021-01-21 PROCEDURE — 7100000000 HC PACU RECOVERY - FIRST 15 MIN: Performed by: INTERNAL MEDICINE

## 2021-01-21 PROCEDURE — 87186 SC STD MICRODIL/AGAR DIL: CPT

## 2021-01-21 PROCEDURE — 87077 CULTURE AEROBIC IDENTIFY: CPT

## 2021-01-21 PROCEDURE — 83735 ASSAY OF MAGNESIUM: CPT

## 2021-01-21 PROCEDURE — 6370000000 HC RX 637 (ALT 250 FOR IP): Performed by: FAMILY MEDICINE

## 2021-01-21 PROCEDURE — 84145 PROCALCITONIN (PCT): CPT

## 2021-01-21 PROCEDURE — 0DB68ZX EXCISION OF STOMACH, VIA NATURAL OR ARTIFICIAL OPENING ENDOSCOPIC, DIAGNOSTIC: ICD-10-PCS | Performed by: INTERNAL MEDICINE

## 2021-01-21 RX ORDER — SODIUM CHLORIDE 9 MG/ML
INJECTION, SOLUTION INTRAVENOUS CONTINUOUS
Status: DISCONTINUED | OUTPATIENT
Start: 2021-01-21 | End: 2021-01-22

## 2021-01-21 RX ORDER — LIDOCAINE HYDROCHLORIDE 20 MG/ML
INJECTION, SOLUTION EPIDURAL; INFILTRATION; INTRACAUDAL; PERINEURAL PRN
Status: DISCONTINUED | OUTPATIENT
Start: 2021-01-21 | End: 2021-01-21 | Stop reason: SDUPTHER

## 2021-01-21 RX ORDER — PROPOFOL 10 MG/ML
INJECTION, EMULSION INTRAVENOUS CONTINUOUS PRN
Status: DISCONTINUED | OUTPATIENT
Start: 2021-01-21 | End: 2021-01-21 | Stop reason: SDUPTHER

## 2021-01-21 RX ORDER — INSULIN GLARGINE 100 [IU]/ML
10 INJECTION, SOLUTION SUBCUTANEOUS NIGHTLY
Status: DISCONTINUED | OUTPATIENT
Start: 2021-01-21 | End: 2021-01-23

## 2021-01-21 RX ORDER — PROMETHAZINE HYDROCHLORIDE 25 MG/ML
6.25 INJECTION, SOLUTION INTRAMUSCULAR; INTRAVENOUS
Status: DISCONTINUED | OUTPATIENT
Start: 2021-01-21 | End: 2021-01-21

## 2021-01-21 RX ORDER — PROPOFOL 10 MG/ML
INJECTION, EMULSION INTRAVENOUS PRN
Status: DISCONTINUED | OUTPATIENT
Start: 2021-01-21 | End: 2021-01-21 | Stop reason: SDUPTHER

## 2021-01-21 RX ORDER — CYCLOBENZAPRINE HCL 10 MG
10 TABLET ORAL 3 TIMES DAILY PRN
Status: ON HOLD | COMMUNITY
End: 2021-01-25 | Stop reason: HOSPADM

## 2021-01-21 RX ORDER — GABAPENTIN 600 MG/1
600 TABLET ORAL 2 TIMES DAILY
COMMUNITY

## 2021-01-21 RX ORDER — INSULIN GLARGINE 100 [IU]/ML
45 INJECTION, SOLUTION SUBCUTANEOUS NIGHTLY
Status: DISCONTINUED | OUTPATIENT
Start: 2021-01-21 | End: 2021-01-21

## 2021-01-21 RX ORDER — ONDANSETRON 2 MG/ML
4 INJECTION INTRAMUSCULAR; INTRAVENOUS
Status: DISCONTINUED | OUTPATIENT
Start: 2021-01-21 | End: 2021-01-21

## 2021-01-21 RX ORDER — SODIUM CHLORIDE 9 MG/ML
INJECTION, SOLUTION INTRAVENOUS CONTINUOUS PRN
Status: DISCONTINUED | OUTPATIENT
Start: 2021-01-21 | End: 2021-01-21 | Stop reason: SDUPTHER

## 2021-01-21 RX ORDER — LABETALOL HYDROCHLORIDE 5 MG/ML
5 INJECTION, SOLUTION INTRAVENOUS EVERY 10 MIN PRN
Status: DISCONTINUED | OUTPATIENT
Start: 2021-01-21 | End: 2021-01-21

## 2021-01-21 RX ADMIN — DEXTROSE MONOHYDRATE 12.5 G: 25 INJECTION, SOLUTION INTRAVENOUS at 07:48

## 2021-01-21 RX ADMIN — LIDOCAINE HYDROCHLORIDE 60 MG: 20 INJECTION, SOLUTION EPIDURAL; INFILTRATION; INTRACAUDAL; PERINEURAL at 12:59

## 2021-01-21 RX ADMIN — Medication 10 ML: at 08:16

## 2021-01-21 RX ADMIN — AMPICILLIN SODIUM 2 G: 2 INJECTION, POWDER, FOR SOLUTION INTRAMUSCULAR; INTRAVENOUS at 01:33

## 2021-01-21 RX ADMIN — PROPOFOL 1250 MG: 10 INJECTION, EMULSION INTRAVENOUS at 12:59

## 2021-01-21 RX ADMIN — DEXTROSE MONOHYDRATE 12.5 G: 25 INJECTION, SOLUTION INTRAVENOUS at 11:55

## 2021-01-21 RX ADMIN — PANTOPRAZOLE SODIUM 40 MG: 40 INJECTION, POWDER, FOR SOLUTION INTRAVENOUS at 21:41

## 2021-01-21 RX ADMIN — INSULIN GLARGINE 10 UNITS: 100 INJECTION, SOLUTION SUBCUTANEOUS at 21:43

## 2021-01-21 RX ADMIN — CEFTRIAXONE 2 G: 2 INJECTION, POWDER, FOR SOLUTION INTRAMUSCULAR; INTRAVENOUS at 08:15

## 2021-01-21 RX ADMIN — PANTOPRAZOLE SODIUM 40 MG: 40 INJECTION, POWDER, FOR SOLUTION INTRAVENOUS at 08:15

## 2021-01-21 RX ADMIN — SODIUM CHLORIDE: 9 INJECTION, SOLUTION INTRAVENOUS at 17:41

## 2021-01-21 RX ADMIN — CEFTRIAXONE 2 G: 2 INJECTION, POWDER, FOR SOLUTION INTRAMUSCULAR; INTRAVENOUS at 21:41

## 2021-01-21 RX ADMIN — PROPOFOL 150 MCG/KG/MIN: 10 INJECTION, EMULSION INTRAVENOUS at 12:59

## 2021-01-21 RX ADMIN — DEXTROSE MONOHYDRATE 100 ML/HR: 50 INJECTION, SOLUTION INTRAVENOUS at 11:55

## 2021-01-21 RX ADMIN — INSULIN LISPRO 1 UNITS: 100 INJECTION, SOLUTION INTRAVENOUS; SUBCUTANEOUS at 21:43

## 2021-01-21 RX ADMIN — Medication 10 ML: at 21:42

## 2021-01-21 RX ADMIN — SODIUM CHLORIDE: 9 INJECTION, SOLUTION INTRAVENOUS at 12:52

## 2021-01-21 ASSESSMENT — PULMONARY FUNCTION TESTS
PIF_VALUE: 0

## 2021-01-21 ASSESSMENT — PAIN SCALES - GENERAL
PAINLEVEL_OUTOF10: 0
PAINLEVEL_OUTOF10: 0

## 2021-01-21 ASSESSMENT — PAIN - FUNCTIONAL ASSESSMENT: PAIN_FUNCTIONAL_ASSESSMENT: 0-10

## 2021-01-21 NOTE — PROGRESS NOTES
Pt arrived to pacu from endo asleep with bite block in mouth. VSS on monitor. Ab soft. Pt asleep on left side.

## 2021-01-21 NOTE — PROGRESS NOTES
Arrived to patients room for bed alarm notification. Upon entering the patient is attempting to get out of bed with x2 peripheral IV's removed and pulling at jennings catheter. Patient settled back into bed, remains oriented to self and place only. Pt can be redirected briefly, unable to follow commands or tasks for extended periods. This RN placed telecam in room for patient's safety at this time. Pt's wife Jaylen Reyes updated. 0500: patient removed 3rd peripheral IV this morning. Is becoming aggravated, still alert to self and place only. States \"there's gallons of water pouring down from the ceiling so im leaving in 1 hour\". Pt still knows he is at [facility] on 4th floor, unable to redirect at this time. Tele monitor leads removed multiple times tonight, pt refusing to keep it on.         Electronically signed by Colleen Berrios RN on 1/20/2021 at 8:38 PM

## 2021-01-21 NOTE — PROGRESS NOTES
4 Eyes Skin Assessment     NAME:  Thiago Baker OF BIRTH:  1960  MEDICAL RECORD NUMBER:  [id number]    The patient is being assess for  Transfer to New Unit    I agree that 2 RN's have performed a thorough Head to Toe Skin Assessment on the patient. ALL assessment sites listed below have been assessed. Areas assessed by both nurses:    Head, Face, Ears, Shoulders, Back, Chest, Arms, Elbows, Hands, Sacrum. Buttock, Coccyx, Ischium and Legs. Feet and Heels        Does the Patient have a Wound? Yes wound(s) were present on assessment. LDA wound assessment was Initiated and completed    Diabetic ulcer located on left calf. Diabetic ulcer located on left foot.           Slade Prevention initiated:  Yes   Wound Care Orders initiated:  Yes    Pressure Injury (Stage 3,4, Unstageable, DTI, NWPT, and Complex wounds) if present place consult order under [de-identified] Yes    New and Established Ostomies if present place consult order under : No      Nurse 1 eSignature: Electronically signed by Emilee Malone RN on 1/20/21 at 7:10 PM EST    **SHARE this note so that the co-signing nurse is able to place an eSignature**    Nurse 2 eSignature: Electronically signed by Timo Maguire RN on 1/20/21 at 8:35 PM EST

## 2021-01-21 NOTE — H&P
Patient seen and examined by me prior to the procedure. The patient is stable for sedation. There have been no significant changes since my initial consultation note. Please reference this note for full details    The patient was counseled at length about the risks of aguilar Covid-19 during their perioperative period and any recovery window from their procedure. The patient was made aware that aguilar Covid-19  may worsen their prognosis for recovering from their procedure  and lend to a higher morbidity and/or mortality risk. All material risks, benefits, and reasonable alternatives including postponing the procedure were discussed. The patient does wish to proceed with the procedure at this time.     ASA class-3  Malampati- 2  Justin Monsalve,

## 2021-01-21 NOTE — PROGRESS NOTES
Blood glucose 47, half amp of d50 given IVP , dextrose 5% at 100ml/hr, notified Preop nurse, she will recheck blood glucose. Pt to preop endo.

## 2021-01-21 NOTE — PROGRESS NOTES
Blood sugar 66, gave 12.5g of dextrose via PIV. Pt NPO for EGD. Will recheck blood glucose in 15 minutes.

## 2021-01-21 NOTE — PROGRESS NOTES
Speech Language Pathology  Facility/Department: [address] MED SURG   CLINICAL BEDSIDE SWALLOW EVALUATION    NAME: Addi Hinojosa  : 1960  MRN: [id number]    ADMISSION DATE: 2021  ADMITTING DIAGNOSIS: Diabetic ketoacidosis without coma associated with other specified diabetes mellitus ([address] [E13.10]     Addi Hinojosa has Diabetic ketoacidosis without coma associated with type 2 diabetes mellitus ([address] Nausea & vomiting; Diabetic neuropathy ([address] DM2/IDDM; Gout; Hx MRSA; Hyperkalemia; Chronic pain on chronic opioids; Chronic LE diabetic ulcers; Mild protein-calorie malnutrition ([address] Cellulitis; TORI (acute kidney injury) ([address] Neuropathy; and Diabetic acidosis without coma ([address] on their problem list.     ONSET DATE: 2021    CHART REVIEW:  2021 admitted with DKA:  The patient is a 78-year-old  male with a known history of insulin-dependent diabetes with episode of diabetic ketoacidosis in the past who presents to the hospital as a transfer from [facility] with chief complaint of one-day history of subacute onset of gradually progressive increasing unresponsiveness at home associated with nausea and vomiting and the fact that the patient was covered in vomit and stool. The patient is unable to provide any history. According to the wife, the patient was having increasingly high sugars over the past couple of days. No other constitutional symptoms. 2021 CT HEAD: No acute intracranial abnormality. 2021 GI consult for UGI bleed    2021 CXR:   The lungs are well aerated.  The pleural surfaces are normal and no evidence   of a pleural effusion is seen. 2021 EGD:  Postoperative Diagnosis:   1) There was LA class C erosive esophagitis noted in the distal 1/3 of the esophagus. The gastroesophageal junction was at 40 cm from the incisors. 2) There was a 2-3 cm sliding hiatal hernia noted in the stomach.    There was some bile stained liquid noted in the fundus but no retained food was noted in the stomach. 3)  There was some mucosal ulceration and edema noted in the lesser curvature of the body of the stomach. This was likely related to patient's vomiting during the DKA. 4) Mild erythema of the antrum. Biopsies were obtained from the antrum and body of the stomach to rule out active gastritis and H.pylori gastritis. 5) There was a normal appearing duodenal bulb and second portion of the duodenum. 6) The ampulla was normal appearing.    1/21/2021 RN notes:  Pt coughing on thin liquids and solid food notified Dr. Chantel Watkins, order to keep NPO and order speech eval     1/21/2021 Diabetic Educator notes: Catalino Queveod experienced choking during education session while eating. Holding a large volume of food in his mouth and continued to add more. He choked with sips of water. Food and water removed from reach. Date of Eval: 1/21/2021  Evaluating Therapist: Mauricio Duarte    Current Diet level:  Current Diet : NPO  Current Liquid Diet : NPO      Primary Complaint  Patient Complaint: coughing with PO s/p diet advancement after EGD; wife reports coughing with PO prior to admission for 2-3 months (reports solids and liquid)    Pain:  Pain Level: 0    Reason for Referral  [place] was referred for a bedside swallow evaluation to assess the efficiency of his swallow function, identify signs and symptoms of aspiration and make recommendations regarding safe dietary consistencies, effective compensatory strategies, and safe eating environment. Impression  Dysphagia Diagnosis: Mild pharyngeal stage dysphagia; Suspected needs further assessment  · Accepted and tolerated evaluation at bedside. · Patient alert, cooperative, pleasant, but confused, distractible, and hallucinating; oriented to self only; follows simple dx; verbally responsive but responses are confused.    · Patient currently followed by GI with EGD completed this afternoon; diet NPO since admission until diet advanced post-EGD at which time significant coughing was noted with all PO; impulsivity reported to compound/exacerbate deficits. · Suspected mild pharyngeal dysphagia characterized by potential delayed swallow and concern for decreased laryngeal elevation when assessed via digital palpation. Delayed cough with all PO trials. · Concern for delayed cough r/t currently identified esophageal involvements vs delayed sensation for potential pharyngeal involvement - recommend MBS for continued assessment. Please write/obtain MD order for MBS. Recommend NPO d/t severity of reported impairments prior to this assessment, but may have ice chips/water. Dysphagia Outcome Severity Scale: Level 1: Severe dysphagia- NPO. Unable to tolerate any PO safely     Treatment Plan  Requires SLP Intervention: Yes  Duration/Frequency of Treatment: PENDING MBS  D/C Recommendations: To be determined       Recommended Diet and Intervention  Diet Solids Recommendation: NPO(PENDING MBS)  Liquid Consistency Recommendation: NPO(PENDING MBS)  Recommended Form of Meds: Crushed in puree as able     Therapeutic Interventions: (PENDING MBS)    Compensatory Swallowing Strategies  Compensatory Swallowing Strategies: Upright as possible for all oral intake;Remain upright for 30-45 minutes after meals;Eat/Feed slowly; Small bites/sips    Treatment/Goals  Dysphagia Goals: The patient will tolerate instrumental swallowing procedure    General  Chart Reviewed: Yes  Behavior/Cognition: Alert; Cooperative;Pleasant mood;Confused; Requires cueing;Distractible(Follows directions; Hallucinating)  Communication Observation: Functional(confused repsonses at times)  Follows Directions: Simple  Dentition: Adequate  Patient Positioning: Upright in bed  Baseline Vocal Quality: Normal  Volitional Cough: Strong  Consistencies Administered: Nectar - teaspoon;Nectar - cup; Thin - teaspoon; Thin - cup;Dysphagia Pureed (Dysphagia I); Thin - straw;Dysphagia Soft and Bite-Sized (Dysphagia III); Reg solid    Vision/Hearing  Vision: Within Functional Limits  Hearing: Within functional limits    Oral Motor Deficits  Oral Motor: Within functional limits    Oral Phase Dysfunction  Oral Phase: WFL     Indicators of Pharyngeal Phase Dysfunction  Delayed Swallow: All(potential/suspected)  Decreased Laryngeal Elevation: All  Cough - Delayed: All(concern for delayed cough r/t currently identified esophageal involvements vs delayed sensation for potential pharyngeal involvement - recommend MBS for continued assessment)    Prognosis  Prognosis for safe diet advancement: guarded  Consulted and agree with results and recommendations: Patient; Family member;RN  Family member consulted: wife    Education  Patient Education: Completed on results/recs/plan  Patient Education Response: Needs reinforcement         Therapy Time  SLP Individual Minutes  Time In: 1700  Time Out: 0165  Minutes: [facility].  [address], [address] [facility] [place] [place], [id number]  Speech-Language Pathologist  1/21/2021 5:45 PM

## 2021-01-21 NOTE — PROGRESS NOTES
Infectious Disease Follow up Notes  Admit Date: 1/19/2021  Hospital Day: 3    Antibiotics :   IV Vancomycin  IV Ceftriaxone      CHIEF COMPLAINT:       DKA with coma  Sepsis  Change in Mentation   WBC elevation     Subjective interval History :  56 y.o. Man with DM and previous Neuropathy, foot infections, and h/o DKA in the past and admitted from home with change in mentation and was found by his wife unresponsive and in emesis and incontinence, EMS was called and BG reading high and on admit BG > 600 AND Ph at  6.96 with severe acidosis and Beta hydroxy butyric acid > 8 and WBC elevation in TORI on admit with on going change in mentation but no seizures per his wife. He was tested -ve for COVID 19 and Blood, urine cx in process he had marked lactic acidosis at 9.3 on admission. Creatinine elevated 2.6 CSF analysis completed given the presentation and its abnormal with WBC elevation and Neutrophils   84% and WBC at 14, Protein 31, Gluc 243. He is currently on nasal cannula and we are consulted for IV antibiotic recommendations. CT brain admission negative, CT abdomen pelvis negative.     Interval History : Mental status slowly improving, per wife at bedside he having some episodes of hallucination in between. He is able to interact breath sometimes not able to follow full commands. Speech pathology working with him to assess his swallowing. He underwent esophagogastroduodenoscopy today.   Ongoing low-grade temperatures    Past Medical History:    Past Medical History:   Diagnosis Date    TORI [facility]acute kidney [facility]) ([facility] 9/12/2017    Bacteremia 05/05/2017    staph aureus    Diabetes mellitus ([facility]     Diabetic neuropathy ([facility]     Gout     MRSA (methicillin resistant staph aureus) culture positive 12/27/18, 9/12/17, 5/5/17,9/5/13, 1/15/14    ulcers bilateral legs    Pneumonia     Pyogenic inflammation of bone (HCC)        Past Surgical History:    Past Surgical History:   Procedure Laterality Date    EYE SURGERY      lens implants after removal of cataracts    OTHER SURGICAL HISTORY  12/28/2018    partial right foot amputation with graft application    TOE AMPUTATION Right 12/28/2018    PARTIAL RIGHT FOOT AMPUTATION WITH GRAFT APPLICATION performed by Margareth Knight DPM at [address] OR       Current Medications:    Outpatient Medications Marked as Taking for the 1/19/21 encounter [facility] HOSPITAL Encounter)   Medication Sig Dispense Refill    insulin lispro (HUMALOG) 100 UNIT/ML injection vial Inject 65 Units into the skin Daily with supper      gabapentin (NEURONTIN) 600 MG tablet Take 600 mg by mouth 2 times daily.  cyclobenzaprine (FLEXERIL) 10 MG tablet Take 10 mg by mouth 3 times daily as needed for Muscle spasms      venlafaxine (EFFEXOR XR) 75 MG extended release capsule Take 75 mg by mouth daily         Allergies:  Hydrocodone-acetaminophen, Percocet [oxycodone-acetaminophen], Pregabalin, and Vicodin [hydrocodone-acetaminophen]    Immunizations :   Immunization History   Administered Date(s) Administered    Influenza Virus Vaccine 02/05/2001    Tdap (Boostrix, Adacel) 09/05/2008       Social History:    Social History     Tobacco Use    Smoking status: Never Smoker    Smokeless tobacco: Never Used   Substance Use Topics    Alcohol use: No     Comment: FORMER DRINKER approx.  quit 2005    Drug use: No     Social History     Tobacco Use   Smoking Status Never Smoker   Smokeless Tobacco Never Used      Family History   Problem Relation Age of Onset    Diabetes Maternal Grandfather     Heart Disease Paternal Uncle     High Blood Pressure Mother          REVIEW OF SYSTEMS:     Constitutional:   rfevers+, chills+ , night sweats  Eyes:  negative for blurred vision, eye discharge, visual disturbance   HEENT:  negative for hearing loss, ear drainage,nasal congestion  Respiratory:  negative for cough, shortness of breath or hemoptysis Cardiovascular:  negative for chest pain, palpitations, syncope  Gastrointestinal:  negative for nausea, vomiting, diarrhea, constipation, abdominal pain  Genitourinary:  negative for frequency, dysuria, urinary incontinence, hematuria  Hematologic/Lymphatic:  negative for easy bruising, bleeding and lymphadenopathy  Allergic/Immunologic:  negative for recurrent infections, angioedema, anaphylaxis   Endocrine:  negative for weight changes, polyuria, polydipsia and polyphagia  Musculoskeletal:  negative for joint  pain, swelling, decreased range of motion  Integumentary: No rashes, skin lesions  Neurological:  negative for headaches, slurred speech, unilateral weakness  Psychiatric: hallucinations+,confusion ,agitation.                 PHYSICAL EXAM:      Vitals:  t MAX  102.4 on admit    BP (!) 143/71   Pulse 63   Temp 98.6 °F (37 °C) (Oral)   Resp 18   Ht 5' 10\" (1.778 m)   Wt 190 lb 11.2 oz (86.5 kg)   SpO2 93%   BMI 27.36 kg/m²     General Appearance:awake + some intermittent confusion  and + in Some acute distress, +  pallor, no icterus chronic ill appearing man+  Skin: warm and dry, no rash or erythema  Head: normocephalic and atraumatic  Eyes: pupils equal, round, and reactive to light, conjunctivae normal  ENT: tympanic membrane, external ear and ear canal normal bilaterally, nose without deformity, nasal mucosa and turbinates normal without polyps  Neck: supple and non-tender without mass, no thyromegaly  no cervical lymphadenopathy  Pulmonary/Chest: clear to auscultation bilaterally- no wheezes, rales or rhonchi, normal air movement, no respiratory distress  Cardiovascular: normal rate, regular rhythm, normal S1 and S2, no murmurs, rubs, clicks, or gallops, no carotid bruits  Abdomen: soft, non-tender, non-distended, normal bowel sounds, no masses or organomegaly  Extremities: no cyanosis, clubbing or edema  Musculoskeletal: normal range of motion, no joint swelling, deformity or tenderness  Integumentary: No rashes, no abnormal skin lesions, no petechiae  Neurologic: reflexes normal and symmetric, no cranial nerve deficit  Lines: IV  David+  Left foot ulcer+  Diabetic foot changes+     Data Review:    CBC:   Lab Results   Component Value Date    WBC 12.2 (H) 01/21/2021    HGB 11.6 (L) 01/21/2021    HCT 35.5 (L) 01/21/2021    MCV 77.9 (L) 01/21/2021     01/21/2021     RENAL:   Lab Results   Component Value Date    CREATININE 1.4 (H) 01/21/2021    BUN 17 01/21/2021     01/21/2021    K 3.9 01/21/2021     01/21/2021    CO2 29 01/21/2021     SED RATE:   Lab Results   Component Value Date    SEDRATE 60 04/15/2016     CK: No results found for: CKTOTAL  CRP: No results found for: CRP  Hepatic Function Panel:   Lab Results   Component Value Date    ALKPHOS 195 01/18/2021    ALT 25 01/18/2021    AST 21 01/18/2021    PROT 8.3 01/18/2021    PROT 7.3 08/20/2012    BILITOT 0.6 01/18/2021    LABALBU 4.3 01/18/2021     UA:  Lab Results   Component Value Date    COLORU Straw 01/18/2021    CLARITYU Clear 01/18/2021    GLUCOSEU >=1000 01/18/2021    BILIRUBINUR Negative 01/18/2021    KETUA >=80 01/18/2021    SPECGRAV 1.020 01/18/2021    BLOODU SMALL 01/18/2021    PHUR 5.5 01/18/2021    PROTEINU TRACE 01/18/2021    UROBILINOGEN 0.2 01/18/2021    NITRU Negative 01/18/2021    LEUKOCYTESUR Negative 01/18/2021    LABMICR YES 01/18/2021    URINETYPE NotGiven 01/18/2021      Urine Microscopic:   Lab Results   Component Value Date    LABCAST 3-5 Hyaline 01/31/2019    BACTERIA Rare 01/18/2021    WBCUA 3-5 01/18/2021    RBCUA 0-2 01/18/2021    EPIU 2-5 01/18/2021     Urine Reflex to Culture:   Lab Results   Component Value Date    URRFLXCULT Not Indicated 01/18/2021       Creat  2.6  Down 1.7     lACTIC ACID   9.3  DOWN  1.5       PROCAL 0.46 /19/2021  5:30 PM - Eugenio Kendrick Incoming Lab Results From Soft (Epic Adt)    Component Value Ref Range & Units Status Collected Lab   Color, CSF Colorless  Colorless Final 01/19/2021 11:08 AM [address] [facility]   Appearance, CSF Clear  Clear Final 01/19/2021 11:08 AM [address] [facility]   Tube Number + CELL CT + DIFF-CSF Tube 3   Final 01/19/2021 11:08 AM [facility] Lab   Clot Evaluation CSF see below   Final 01/19/2021 11:08 AM [address] [facility]   No Clots Seen   WBC, CSF 14High   0 - 5 /cumm Final 01/19/2021 11:08 AM [facility] Lab   RBC, CSF 17Abnormal   0 /cumm Final 01/19/2021 11:08 AM [facility] Lab   Neutrophils, CSF 84High   0 - 6 % Final 01/19/2021 11:08 AM [facility] Lab   Lymphs, CSF 9Low   40 - 80 % Final 01/19/2021 11:08 AM [facility] Lab   Monocytes, CSF 6Low   15 - 45 % Final 01/19/2021 11:08 AM [facility] Lab   Atypical Lymphs CSF 1  % Final 01/19/2021 11:08 AM [facility]      MICRO: cultures reviewed and updated by me   Blood Culture:          Legionella Antigen, Urine [[id number]] Collected: 01/19/21 2209   Order Status: Completed Specimen: Urine, clean catch Updated: 01/20/21 1219    L. pneumophila Serogp 1 Ur Ag --    Presumptive Negative   No Legionella pneumophila serogroup 1 antigens detected. A negative result does not exclude infection with   Legionella pneumophila serogroup 1 nor does it rule out   other microbial-caused respiratory infections or   disease caused by other serogroups of   Legionella pneumophila. Normal Range: Presumptive Negative    Narrative:     ORDER#: [id number]                          ORDERED BY: Conrad Mariee   SOURCE: Urine Clean Catch                  COLLECTED:  01/19/21 22:09   ANTIBIOTICS AT JOHNNIE. :                      RECEIVED :  01/19/21 22:22   Performed at:   [place] [address] [facility] [place] [facility] [address]   Phone [phone]   Strep Pneumoniae Antigen [[id number]] Collected: 01/19/21 2209   Order Status: Completed Specimen: Urine, clean catch Updated: 01/20/21 1217    STREP PNEUMONIAE ANTIGEN, URINE -- Presumptive Negative   Presumptive negative suggests no current or recent   pneumococcal infection. Infection due to Strep pneumoniae   cannot be ruled out since the antigen present in the sample   may be below the detection limit of the test.   Normal Range:Presumptive Negative    Narrative:     ORDER#: [id number]                          ORDERED BY: Tevin Knutson   SOURCE: Urine Clean Catch                  COLLECTED:  01/19/21 22:09   ANTIBIOTICS AT JOHNNIE. :                      RECEIVED :  01/19/21 22:22   Performed at:   [facility]   [address] [employer]   Phone [phone]   Culture, CSF [[id number]] Collected: 01/19/21 1108   Order Status: Sent Specimen: CSF Updated: 01/19/21 1359    Gram Stain Result WBC's (Polymorphonuclear)   No organisms seen   Cytospin performed,no quantitation    Narrative:     ORDER#: [id number]                          ORDERED BY: Franklyn Hidalgo   SOURCE: CSF (Spinal Fluid)                 COLLECTED:  01/19/21 11:08   ANTIBIOTICS AT JOHNNIE. :                      RECEIVED :  01/19/21 11:47   Performed at:   [facility]   [address] [employer]   Phone [phone]   Meningitis Encephalitis Panel Report [[id number]] Collected: 01/19/21 1108   Order Status: Completed Updated: 01/19/21 1352    Report SEE IMAGE   Narrative:     Referred out by:   [facility]   [address] [employer]   Phone [phone]   Meningitis/Encephalitis Panel, CSF [[id number]] Collected: 01/19/21 1108   Order Status: Completed Specimen: CSF Updated: 01/19/21 1349    Meningitis Encephalitis Panel --    Meningitis Encephalitis Panel PCR Result: Not Detected   See additional report for complete Meningitis Encephalitis Panel.     Narrative:     ORDER#: [id number]                          ORDERED BY: Tevin Knutson   SOURCE: CSF (Spinal Fluid)                 COLLECTED:  01/19/21 11:08   ANTIBIOTICS AT JOHNNIE. :                      RECEIVED :  01/19/21 12:20   Performed at:   [facility]   [address] [facility] [place] [address]   Phone [phone]   Culture, Blood 1 [[id number]]    Order Status: Sent Specimen: Blood    Culture, Blood 2 [[id number]]    Order Status: Sent Specimen: Blood    Culture, Blood 2 [[id number]]    Order Status: Canceled Specimen: Blood    Culture, Blood 1 [[id number]]    Order Status: Canceled Specimen: Blood        Lab Results   Component Value Date    [facility]  01/18/2021     No Growth to date. Any change in status will be called. BLOODCULT2  01/18/2021     No Growth to date. Any change in status will be called. Respiratory Culture:  Lab Results   Component Value Date    CULTRESP Light growth normal respiratory idris with 09/12/2017    CULTRESP  09/12/2017     Rare growth  Susceptibility testing of penicillin and other beta-lactams is  not necessary for beta hemolytic Streptococci since resistant  strains have not been identified. (CLSI A208-V98, 2017)      CULTRESP  09/12/2017     Rare growth  Susceptibility testing of penicillin and other beta-lactams is  not necessary for beta hemolytic Streptococci since resistant  strains have not been identified. (CLSI O896-O99, 2017)      LABGRAM  01/19/2021     WBC's (Polymorphonuclear)  No organisms seen  Cytospin performed,no quantitation       AFB:No results found for: AFBSMEAR  Viral Culture:  Lab Results   Component Value Date    COVID19 Not Detected 01/19/2021     Urine Culture: No results for input(s): Jere Murcia in the last 72 hours. IMAGING:    IR LUMBAR PUNCTURE FOR DIAGNOSIS   Final Result   Successful fluoroscopic-guided lumbar puncture. XR CHEST PORTABLE   Final Result   Appropriate radiographic positioning of the nasogastric tube. Recommend clinical correlation prior to use.                All the pertinent images and reports for the current Hospitalization were reviewed by me     Scheduled Meds:   insulin lispro  10 Units Subcutaneous TID     insulin glargine  45 Units Subcutaneous Nightly    insulin lispro  0-12 Units Subcutaneous TID WC    insulin lispro  0-6 Units Subcutaneous Nightly    pantoprazole  40 mg Intravenous BID    And    sodium chloride (PF)  10 mL Intravenous BID    cefTRIAXone (ROCEPHIN) IV  2 g Intravenous Q12H    vancomycin (VANCOCIN) intermittent dosing (placeholder)   Other RX Placeholder       Continuous Infusions:   dextrose         PRN Meds:  glucose, dextrose, glucagon (rDNA), dextrose, potassium chloride **OR** potassium alternative oral replacement **OR** potassium chloride, sodium phosphate IVPB **OR** sodium phosphate IVPB, dextrose, magnesium sulfate, acetaminophen, acetaminophen      Assessment:     Patient Active Problem List   Diagnosis    Diabetic ketoacidosis without coma associated with type 2 diabetes mellitus (HCC)    Nausea & vomiting    Diabetic neuropathy (HCC)    DM2/IDDM    Gout    Hx MRSA    Hyperkalemia    Chronic pain on chronic opioids    Chronic LE diabetic ulcers    Mild protein-calorie malnutrition (HCC)    Cellulitis    [facility]) [facility]    Neuropathy    Diabetic acidosis without coma (HCC)     DKA with coma+ improving   Fevers  Sepsis improving  Lactic acidosis resolved  TORI -creatinine now slowly downtrending   Wbc ELEVATION   Change in mentation - Encephalopathy resolving  CSF with WBC elevation but could likely be from severe DKA vs early CNS infection   Given the presentation it would be prudent to treat with IV abx to cover CNS pending clinical improvement   Left diabetic foot infection   COVID 19 -ve NAAT   CT brain -ve  CXR -VE  DM with poor control HbA1c > 13 for long time      He remains very ill from severe DKA and coma and sepsis now some improvement in mentation since acidosis is being corrected but concern is for infection precipitating DKA and given CSF abnormality with WBC higher than normal would cont IV abx for now pending clinical improvement     Mentation seems to be improving with improvement in  glucose control. CSF culture thus far negative. Blood cultures are in process no growth to date    Status post esophagogastroduodenoscopy to evaluate anemia and there was esophagitis . Labs, Microbiology, Radiology and all the pertinent results from current hospitalization and  care every where were reviewed  by me as a part of the evaluation   Plan:   1. Cont IV Vancomycin by levels  2. Cont IV Ceftriaxone x 2 gm Q 12 HRS  3. EGD results note   4. CSF cx in process  5. CSF cell counts on tube 3  Wbc HIGHER than normal   6. Meningoencephalitis panel on CSF -ve  7. Blood cx in process  8. Trend Procal   9. HbA1c elevated  10. Control DM   11. COVID 19 PCR -VE  12. Urine for legionella -ve and Pneumococcal antigen  -ve     Discussed with patient/Family and Nursing staff   Risk of Complications/Morbidity: High      · Illness(es)/ Infection present that pose threat to bodily function. · There is potential for severe exacerbation of infection/side effects of treatment. · Therapy requires intensive monitoring for antimicrobial agent toxicity. Thanks for allowing me to participate in your patient's care and please call me with any questions or concerns.     Michele Oconnor MD  Infectious Disease  [employer] [facility]  Phone: [phone]   Fax : [phone]

## 2021-01-21 NOTE — CARE COORDINATION
[facility]  Diabetes Education   Progress Note       NAME:  Rina Sahh  MEDICAL RECORD NUMBER:  [id number]  AGE: 56 y.o. GENDER: male  : 1960  TODAY'S DATE:  2021    Subjective   Reason for Diabetic Education Evaluation and Assessment: general diabetes support    Roberto Bradley is oriented to self. He recognizes his wife. He is disoriented to date and location. They both describe wide BG swings at home with ranges 25 - 300. They describe in frequent BG monitoring with estimates of 3 - 5 times per week. Medication taking for the Lantus is described as consistent. Humalog medication taking is variable. Roberto Bradley experienced choking during education session while eating. Holding a large volume of food in his mouth and continued to add more. He choked with sips of water. Food and water removed from reach.        Visit Type: evaluation      Rina Shah is a 56 y.o. male referred by:     [x] Physician  [] Nursing   [] Chart Review   [] Other:     PAST MEDICAL HISTORY        Diagnosis Date    [facility]) [facility] 2017    Bacteremia 2017    staph aureus    Diabetes mellitus ([facility]     Diabetic neuropathy ([facility]     Gout     MRSA (methicillin resistant staph aureus) culture positive 18, 17, 17,13, 1/15/14    ulcers bilateral legs    Pneumonia     Pyogenic inflammation of bone ([facility] [address]        PAST SURGICAL HISTORY    Past Surgical History:   Procedure Laterality Date    EYE SURGERY      lens implants after removal of cataracts    OTHER SURGICAL HISTORY  2018    partial right foot amputation with graft application    TOE AMPUTATION Right 2018    PARTIAL RIGHT FOOT AMPUTATION WITH GRAFT APPLICATION performed by Yousif Juárez DPM at [address]    Family History   Problem Relation Age of Onset    Diabetes Maternal Grandfather     Heart Disease Paternal Uncle     High Blood Pressure Mother        SOCIAL HISTORY    Social History     Tobacco Use    Smoking status: Never Smoker    Smokeless tobacco: Never Used   Substance Use Topics    Alcohol use: No     Comment: FORMER DRINKER approx. quit 2005    Drug use: No       ALLERGIES    Allergies   Allergen Reactions    Hydrocodone-Acetaminophen Itching    Percocet [Oxycodone-Acetaminophen]      States \"hallucinations,disoriented, & freaked out\" per wife    Pregabalin Itching    Vicodin [Hydrocodone-Acetaminophen]      Spaces out. Pt. States 1/15/14 has been taking some vicodin without problems.        MEDICATIONS     insulin lispro  0-6 Units Subcutaneous TID WC    insulin lispro  0-3 Units Subcutaneous Nightly    insulin glargine  10 Units Subcutaneous Nightly    pantoprazole  40 mg Intravenous BID    And    sodium chloride (PF)  10 mL Intravenous BID    cefTRIAXone (ROCEPHIN) IV  2 g Intravenous Q12H    vancomycin (VANCOCIN) intermittent dosing (placeholder)   Other RX Placeholder       Objective        Patient Active Problem List   Diagnosis Code    Diabetic ketoacidosis without coma associated with type 2 diabetes mellitus (HCC) E11.10    Nausea & vomiting R11.2    Diabetic neuropathy (HCC) E11.40    DM2/IDDM E11.9    Gout M10.9    Hx MRSA Z22.322    Hyperkalemia E87.5    Chronic pain on chronic opioids G89.29    Chronic LE diabetic ulcers E11.622, L97.309    Mild protein-calorie malnutrition (HCC) E44.1    Cellulitis L03.90    TORI (acute kidney injury) ([facility] [address] N17.9    Neuropathy G62.9    Diabetic acidosis without coma (HCC) E11.10        BP (!) 166/87   Pulse 65   Temp 99.9 °F (37.7 °C) (Oral)   Resp 16   Ht 5' 10\" (1.778 m)   Wt 190 lb (86.2 kg)   SpO2 93%   BMI 27.26 kg/m²     HgBA1c:    Lab Results   Component Value Date    LABA1C 10.7 01/20/2021       Recent Labs     01/21/21  0810 01/21/21  1146 01/21/21  1220 01/21/21  1312   POCGLU 97 47* 86 82       BUN/Creatinine:    Lab Results   Component Value Date    BUN 17 01/21/2021 CREATININE 1.4 01/21/2021       Assessment        Diabetes Management and Education    Does the patient have a Primary Care Physician? Yes, Fina Archer MD       Does the patient require new medication instruction? Yes. Wife reports that they have insulin and supplies at home. Discussed plan to re-evaluate insulin needs. Level of patient/caregiver understanding able to:       [] Verbalized Understanding   [] Demonstrated Understanding       [] Teach Back       [x] Needs Reinforcement     []  Other:        Does the patient/caregiver monitor Blood Glucoses? No: Meter and supplies at home. Recommend testing before meals and bedtime. Reviewed glycemic control targets, testing frequency and when to call PCP. Level of patient/caregiver understanding able to:        [x] Verbalized Understanding   [] Demonstrated Understanding       [] Teach Back       [] Needs Reinforcement     []  Other:        Does the patient/caregiver follow a Meal Plan? No:    Reviewed importance of eating three meals per day and plate method for consistent carb intake. Level of patient/caregiver understanding able to:       [] Verbalized Understanding   [] Demonstrated Understanding       [] Teach Back       [x] Needs Reinforcement     []  Other:      Does the patient/caregiver understand S/S of Hypoglycemia? No:   Reviewed symptoms, prevention and treatment. Level of patient/caregiver understanding able to:       [] Verbalized Understanding   [] Demonstrated Understanding       [] Teach Back       [x] Needs Reinforcement     []  Other:                    Does the patient/caregiver understand S/S of Hyperglycemia? No:     Reviewed symptoms, prevention and treatment.     Level of patient/caregiver understanding able to:        [] Verbalized Understanding   [] Demonstrated Understanding       [] Teach Back       [x] Needs Reinforcement     []  Other:           Plan        Ongoing diabetes education and blood glucose

## 2021-01-21 NOTE — PROGRESS NOTES
Intake/Output Summary (Last 24 hours) at 1/21/2021 0834  Last data filed at 1/21/2021 8825  Gross per 24 hour   Intake 1500 ml   Output 3200 ml   Net -1700 ml       Results:  CBC:   Recent Labs     01/18/21  1755 01/18/21  1755 01/19/21  0335 01/19/21  0335 01/19/21  2221 01/20/21  0514 01/21/21  0655   WBC 21.8*  --  16.0*  --   --   --  12.2*   HGB 12.1*   < > 11.9*   < > 11.6* 10.8* 11.6*   HCT 41.8   < > 36.5*   < > 35.2* 32.6* 35.5*   MCV 84.9  --  78.8*  --   --   --  77.9*     --  283  --   --   --  218    < > = values in this interval not displayed. BMP:   Recent Labs     01/19/21 1752 01/20/21  0300 01/20/21  0514 01/21/21  0654     --  140 142   K 3.7 4.0 3.9 3.9     --  106 105   CO2 25  --  24 29   PHOS 2.8 2.3* 2.2*  --    BUN 26*  --  23* 17   CREATININE 1.7*  --  1.7* 1.4*     Mag: No results for input(s): MAG in the last 72 hours. Phos:   Lab Results   Component Value Date    PHOS 2.2 (L) 01/20/2021     No components found for: GLU    LIVER PROFILE:   Recent Labs     01/18/21 1755   AST 21   ALT 25   BILITOT 0.6   ALKPHOS 195*     PT/INR:   Recent Labs     01/18/21 1755   PROTIME 12.2   INR 1.05     APTT:   Recent Labs     01/18/21 1755   APTT 27.2     UA:  Recent Labs     01/18/21  1845   COLORU Straw   PHUR 5.5   WBCUA 3-5   RBCUA 0-2   MUCUS Rare*   BACTERIA Rare*   CLARITYU Clear   SPECGRAV 1.020   LEUKOCYTESUR Negative   UROBILINOGEN 0.2   BILIRUBINUR Negative   BLOODU SMALL*   GLUCOSEU >=1000*       Invalid input(s): ABG  Lab Results   Component Value Date    CALCIUM 8.4 01/21/2021    PHOS 2.2 (L) 01/20/2021       Assessment:    Active Problems:    Diabetic acidosis without coma (HCC)  Resolved Problems:    * No resolved hospital problems. [facility] course: a 55 yo male with iddm admitted with dka and sepsis. He was found by his wife as unresponsive, covered in emesis and stool.      Plan:  possible acute pyogenic meningitis  - on vanc, Rocephin, ampicillin  - lp done, pcr panel is negative  - csf cultures pending    Acute metabolic encephalopathy - improving  - due to dka, infection  - head CT negative  - will get an mri brain when he is able to tolerate it  - treat associated conditions  - avoid sedating meds as able  - supportive care    Sepsis, POA -improving  - with criteria: leukocytosis, lactic acidosis, fevers, tachycardia, tachypnea; source is   - recheck lactate until normalized, wbc coming down  - blood and urine cultures ngtd  - ct abdomen/pelvis nonacute   - procalcitonin . 55 -> .18  - covid pcr negative  - abx as above  - ID consulted    Left foot and ankle ulcers  - possibly diabetic foot wound  - on vanc, rocephin  - consult podiatry    DKA -resolved  DM  - home lantus 80 units qhs, humalog 20 units tid -reduce here to lantus 10 units qhs, stop humalog due to hypoglycemia and increase when he starts to eat more  - ssi  - hba1c 10.7    TORI - improving  - crt baseline 1; was 2.7 on admission -> 1.4  - IVF  - Avoid nephrotoxins    GIB  Acute blood loss anemia - no further bleeding or emesis episodes   - 2 units given  - hgb 12 -> 11  - emesis and stool occult +  - follow cbc  - avoid nsaids  - ppi   - scds for dvt prophylaxis   - transfuse for hgb < 7.0  - check Fe studies, retic count  - consult GI, egd today?       Code status:  full  DVT prophylaxis: [] Lovenox  [] SQ Heparin  [] SCDs because of  [] warfarin/oral direct thrombin inhibitor [] Encourage ambulation      Disposition:  [] Home [] Rehab [] Psych [] SNF  [] LTAC  [] Transfer to ICU  [] Transfer to PCU [] Other: can transfer to pcu       Electronically signed by Lindsey Melara DO on 1/21/2021 at 8:34 AM

## 2021-01-21 NOTE — PROGRESS NOTES
Pt coughing on thin liquids and solid food notified Dr. Jones Marshall, order to keep NPO and order speech eval.

## 2021-01-21 NOTE — PROGRESS NOTES
@1856 Pt is A&O x2 person and place, semi fowlers. @6045 Patient blood glucose 259, Humalog sliding scale and prandial held at this time. Dinner tray delivered, Pt refused meal stated \" I don't want anything to eat right now, I might eat the salad. \"   This RN made oncoming RN aware of insulin demands.

## 2021-01-21 NOTE — OP NOTE
Endoscopy Note    Patient: Norah Michael  : 1960  Acct#:     Procedure: Esophagogastroduodenoscopy with biopsy                         Date:  2021     Surgeon:   Nelson Wright DO    Referring Physician:  Lou Bustos MD    Indications: This is a 56y.o. year old male who presents today with coffee ground emesis and anemia    Postoperative Diagnosis:   1) There was LA class C erosive esophagitis noted in the distal 1/3 of the esophagus. The gastroesophageal junction was at 40 cm from the incisors. 2) There was a 2-3 cm sliding hiatal hernia noted in the stomach. There was some bile stained liquid noted in the fundus but no retained food was noted in the stomach. 3)  There was some mucosal ulceration and edema noted in the lesser curvature of the body of the stomach. This was likely related to patient's vomiting during the DKA. 4) Mild erythema of the antrum. Biopsies were obtained from the antrum and body of the stomach to rule out active gastritis and H.pylori gastritis. 5) There was a normal appearing duodenal bulb and second portion of the duodenum. 6) The ampulla was normal appearing. Anesthesia:  The patient was administered TIVA per anesthesiology team.  Please see their operative records for full details of medications administered. Consent:  The patient or their legal guardian has signed an informed consent, and is aware of the potential risks, benefits, alternatives, and potential complications of this procedure. These include, but are not limited to hemorrhage, bleeding, post procedural pain, perforation, phlebitis, aspiration, hypotension, hypoxia, cardiovascular events such as arryhthmia, and possibly death. Description of Procedure: The patient was then taken to the endoscopy suite, placed in the left lateral decubitus position and the above IV sedation was administrered.     The Olympus video endoscope was placed through the patient's oropharynx without difficulty to the extent of the 2nd portion of the duodenum. Both forward and retroflexed views of the stomach were obtained. Findings:    1) There was LA class C erosive esophagitis noted in the distal 1/3 of the esophagus. The gastroesophageal junction was at 40 cm from the incisors. 2) There was a 2-3 cm sliding hiatal hernia noted in the stomach. There was some bile stained liquid noted in the fundus but no retained food was noted in the stomach. 3)  There was some mucosal ulceration and edema noted in the lesser curvature of the body of the stomach. This was likely related to patient's vomiting during the DKA. 4) Mild erythema of the antrum. Biopsies were obtained from the antrum and body of the stomach to rule out active gastritis and H.pylori gastritis. 5) There was a normal appearing duodenal bulb and second portion of the duodenum. 6) The ampulla was normal appearing. The scope was then withdrawn back into the stomach, it was decompressed, and the scope was completely withdrawn. The patient tolerated the procedure well and was taken to the post anesthesia care unit in good condition. Estimated blood loss: <5ml    ID Type Source Tests Collected by Time Destination   A : gastric biopsy Gastric Gastric SURGICAL PATHOLOGY Brigette Woodard DO 1/21/2021 1304            Impression:   1) See post procedure diagnoses    Recommendations:   1)Diet as tolerated  2) Continue Pantoprazole 40mg IV bid  3) Optimize DM control. 4) If patient with recurrent nausea and vomiting, consider reglan. 5) Hospitalist to continue workup of mental status changes.          DO Bouchra Breen and [facility]

## 2021-01-21 NOTE — ANESTHESIA PRE PROCEDURE
[facility] Department of Anesthesiology  Pre-Anesthesia Evaluation/Consultation       Name:  Neyda Lopez  : 1960  Age:  56 y.o. MRN:  [id number]  Date: 2021           Surgeon: Surgeon(s):  Kathy Michele DO    Procedure: Procedure(s):  EGD DIAGNOSTIC ONLY     Allergies   Allergen Reactions    Hydrocodone-Acetaminophen Itching    Percocet [Oxycodone-Acetaminophen]      States \"hallucinations,disoriented, & freaked out\" per wife    Pregabalin Itching    Vicodin [Hydrocodone-Acetaminophen]      Spaces out. Pt. States 1/15/14 has been taking some vicodin without problems.      Patient Active Problem List   Diagnosis    Diabetic ketoacidosis without coma associated with type 2 diabetes mellitus ([address]    Nausea & vomiting    Diabetic neuropathy (HCC)    DM2/IDDM    Gout    Hx MRSA    Hyperkalemia    Chronic pain on chronic opioids    Chronic LE diabetic ulcers    Mild protein-calorie malnutrition ([address]    Cellulitis    TORI (acute kidney injury) ([address]    Neuropathy    Diabetic acidosis without coma ([address]     Past Medical History:   Diagnosis Date    TORI (acute kidney injury) ([address] 2017    Bacteremia 2017    staph aureus    Diabetes mellitus ([address]     Diabetic neuropathy ([address]     Gout     MRSA (methicillin resistant staph aureus) culture positive 18, 17, 17,13, 1/15/14    ulcers bilateral legs    Pneumonia     Pyogenic inflammation of bone ([address]      Past Surgical History:   Procedure Laterality Date    EYE SURGERY      lens implants after removal of cataracts    OTHER SURGICAL HISTORY  2018    partial right foot amputation with graft application    TOE AMPUTATION Right 2018    PARTIAL RIGHT FOOT AMPUTATION WITH GRAFT APPLICATION [facility] Esme [facility] DPM [facility]     Social History     Tobacco Use    Smoking status: Never Smoker    Smokeless tobacco: Never Used Substance Use Topics    Alcohol use: No     Comment: FORMER DRINKER approx. quit 2005    Drug use: No     Medications  No current facility-administered medications on file prior to encounter. Current Outpatient Medications on File Prior to Encounter   Medication Sig Dispense Refill    insulin lispro (HUMALOG) 100 UNIT/ML injection vial Inject 65 Units into the skin Daily with supper      gabapentin (NEURONTIN) 600 MG tablet Take 600 mg by mouth 2 times daily.       cyclobenzaprine (FLEXERIL) 10 MG tablet Take 10 mg by mouth 3 times daily as needed for Muscle spasms      venlafaxine (EFFEXOR XR) 75 MG extended release capsule Take 75 mg by mouth daily      Insulin Pen Needle 32G X 4 MM MISC Use 4 daily      blood glucose test strips (ASCENSIA AUTODISC VI;ONE TOUCH ULTRA TEST VI) strip Test TID and as directed       Current Facility-Administered Medications   Medication Dose Route Frequency Provider Last Rate Last Admin    insulin lispro (HUMALOG) injection vial 10 Units  10 Units Subcutaneous TID WC Denia R Emanuelst, DO        insulin glargine (LANTUS) injection vial 45 Units  45 Units Subcutaneous Nightly Denia R Emanuelst, DO        insulin lispro (HUMALOG) injection vial 0-12 Units  0-12 Units Subcutaneous TID WC Denia R Emanuelst, DO   Stopped at 01/20/21 1300    insulin lispro (HUMALOG) injection vial 0-6 Units  0-6 Units Subcutaneous Nightly Denia R Emanuelst, DO   3 Units at 01/20/21 2247    glucose (GLUTOSE) 40 % oral gel 15 g  15 g Oral PRN Denia R Emanuelst, DO        dextrose 50 % IV solution  12.5 g Intravenous PRN Denia R Emanuelst, DO   12.5 g at 01/21/21 1155    glucagon (rDNA) injection 1 mg  1 mg Intramuscular PRN Denia R Hurst, DO        dextrose 5 % solution  100 mL/hr Intravenous PRN Denia R Hurst,  mL/hr at 01/21/21 1155 100 mL/hr at 01/21/21 1155    potassium chloride (KLOR-CON M) extended release tablet 40 mEq  40 mEq Oral PRN Denia R Hurst, DO        Or  potassium bicarb-citric acid (EFFER-K) effervescent tablet 40 mEq  40 mEq Oral PRN Denia R Hurst, DO        Or    potassium chloride 10 mEq/100 mL IVPB (Peripheral Line)  10 mEq Intravenous PRN Denia R Hurst, DO        sodium phosphate 12.84 mmol in dextrose 5 % 250 mL IVPB  0.16 mmol/kg Intravenous PRN Denia R Hurst, DO        Or    sodium phosphate 25.71 mmol in dextrose 5 % 250 mL IVPB  0.32 mmol/kg Intravenous PRN Denia R Hurst, DO        dextrose 50 % IV solution  12.5 g Intravenous PRN Aldo Mcgraw MD        magnesium sulfate 1 g in dextrose 5% 100 mL IVPB  1 g Intravenous PRN Aldo Mcgraw MD        pantoprazole (PROTONIX) injection 40 mg  40 mg Intravenous BID Aldo Mcgraw MD   40 mg at 21 0815    And    sodium chloride (PF) 0.9 % injection 10 mL  10 mL Intravenous BID Aldo Mcgraw MD   10 mL at 21 0816    cefTRIAXone (ROCEPHIN) 2 g IVPB in D5W 50ml minibag  2 g Intravenous Q12H eRnee Baker MD   Stopped at 21 0845    vancomycin (VANCOCIN) intermittent dosing (placeholder)   Other RX Placeholder Renee Baker MD   Given at 21 0145    acetaminophen (TYLENOL) suppository 650 mg  650 mg Rectal Q4H PRN Renee Baker MD   650 mg at 21 0325    acetaminophen (TYLENOL) 160 MG/5ML solution 650 mg  650 mg Per NG tube Q4H PRN Claudene Simons, MD   650 mg at 21 1840     Vital Signs (Current)   Vitals:    21 1211   BP: (!) 177/90   Pulse: 64   Resp: 17   Temp: 98.8 °F (37.1 °C)   SpO2: 92%     Vital Signs Statistics (for past 48 hrs)     Temp  Av.8 °F (37.1 °C)  Min: 97.5 °F (36.4 °C)   Min taken time: 21 2302  Max: 100 °F (37.8 °C)   Max taken time: 21 1600  Pulse  Av.5  Min: 61   Min taken time: 21 0400  Max: 89   Max taken time: 21 1500  Resp  Av.8  Min: 9   Min taken time: 21 0000  Max: 18   Max taken time: 21 0400 HCG (If Applicable) No results found for: PREGTESTUR, PREGSERUM, HCG, HCGQUANT   ABGs   Lab Results   Component Value Date    PHART 7.138 12/28/2017    PO2ART 94.6 12/28/2017    PPK8UVN 32.6 12/28/2017    AAG0KVR 10.8 12/28/2017    BEART -17.1 12/28/2017    R6VVFSOH 95.0 12/28/2017      Type & Screen (If Applicable)  No results found for: LABABO, LABRH                         BMI: Wt Readings from Last 3 Encounters:       NPO Status:   Date of last liquid consumption: 01/20/21   Time of last liquid consumption: 2300   Date of last solid food consumption: 01/20/21      Time of last solid consumption: 2300       Anesthesia Evaluation  Patient summary reviewed no history of anesthetic complications:   Airway: Mallampati: III  TM distance: >3 FB   Neck ROM: full   Dental:          Pulmonary:Negative Pulmonary ROS and normal exam                               Cardiovascular:  Exercise tolerance: good (>4 METS),         ECG reviewed  Rhythm: regular  Rate: normal  Echocardiogram reviewed         Beta Blocker:  Not on Beta Blocker      ROS comment: EF 55     Neuro/Psych:                ROS comment: confused GI/Hepatic/Renal: Neg GI/Hepatic/Renal ROS            Endo/Other:    (+) DiabetesType II DM, using insulin, . Abdominal:           Vascular: negative vascular ROS. Anesthesia Plan      MAC     ASA 3     (Wife present for interview)  Induction: intravenous. Anesthetic plan and risks discussed with patient and spouse. Plan discussed with CRNA.               This pre-anesthesia assessment may be used as a history and physical.    DOS STAFF ADDENDUM: Pt seen and examined, chart reviewed (including anesthesia, drug and allergy history). No interval changes to history and physical examination. Anesthetic plan, risks, benefits, alternatives, and personnel involved discussed with patient. Questions and concerns addressed. Patient(family) verbalized an understanding and agrees to proceed.       Ana Figueroa MD  January 21, 2021  12:21 PM

## 2021-01-21 NOTE — PROGRESS NOTES
Pharmacy Medication Reconciliation Note     List of medications patient is currently taking is complete. Source of information:   1. Conversation with via telephone. Rx vials at home     Allergies   Allergen Reactions    Hydrocodone-Acetaminophen Itching    Percocet [Oxycodone-Acetaminophen]      States \"hallucinations,disoriented, & freaked out\" per wife    Pregabalin Itching    Vicodin [Hydrocodone-Acetaminophen]      Spaces out. Pt. States 1/15/14 has been taking some vicodin without problems. Notes regarding home medications:   1. Wife reports pt takes Gabapentin 600 mg BID  2. There is new script for Flexeril; she will  today  3. Currently is on Humalog 65 mg at [facility] only. No Lantus; so removed from list.  4. Prescriptions are filled with [facility]; if emergent Benjamen Retort. PCP is Dr Yasemin Santiago.       Gilma Gardiner, [address] [place] [address]  1/21/2021  8:47 AM

## 2021-01-21 NOTE — PROGRESS NOTES
Clinical Pharmacy Note  Vancomycin Consult    Sahil Lowry is a 56 y.o. male ordered Vancomycin for Acute encephalopathy secondary to suspected acute pyogenic meningitis; consult received from Dr. Shasta Keene to manage therapy. Also receiving Ampicillin, Rocephin. Patient Active Problem List   Diagnosis    Diabetic ketoacidosis without coma associated with type 2 diabetes mellitus (HCC)    Nausea & vomiting    Diabetic neuropathy (HCC)    DM2/IDDM    Gout    Hx MRSA    Hyperkalemia    Chronic pain on chronic opioids    Chronic LE diabetic ulcers    Mild protein-calorie malnutrition (HCC)    Cellulitis    [facility]) [facility]    Neuropathy    Diabetic acidosis without coma (HCC)       Allergies:  Hydrocodone-acetaminophen, Percocet [oxycodone-acetaminophen], Pregabalin, and Vicodin [hydrocodone-acetaminophen]     Temp max:  Temp (24hrs), Av.6 °F (37 °C), Min:97.5 °F (36.4 °C), Max:100 °F (37.8 °C)      Recent Labs     21  1755 21  0335   WBC 21.8* 16.0*       Recent Labs     21  1556 21  1752 21  0514   BUN 28* 26* 23*   CREATININE 2.0* 1.7* 1.7*         Intake/Output Summary (Last 24 hours) at 2021 2241  Last data filed at 2021 1701  Gross per 24 hour   Intake 3014.7 ml   Output 1135 ml   Net 1879.7 ml       Culture Results:  pending    Ht Readings from Last 1 Encounters:   21 5' 10\" (1.778 m)        Wt Readings from Last 1 Encounters:   21 177 lb 0.5 oz (80.3 kg)         Estimated Creatinine Clearance: 47 mL/min (A) (based on SCr of 1.7 mg/dL (H)). Assessment/Plan:  Random level = 7.5 ug/mL after 28 hours  SCr down to 1.7 mg/dL    Will order vancomycin 1250 mg IV x 1 dose. Regimen projects a trough level of 15-20 mg/L. Level ordered for 1700 on 21. Thank you for the consult.      Mehnaz Dinero PharmD  2021 10:44 PM

## 2021-01-21 NOTE — ANESTHESIA POSTPROCEDURE EVALUATION
[facility] Department of Anesthesiology  Post-Anesthesia Note       Name:  Solange Knight                                  Age:  56 y.o. MRN:  [id number]     Last Vitals & Oxygen Saturation: BP (!) 166/87   Pulse 65   Temp 99.9 °F (37.7 °C) (Oral)   Resp 16   Ht 5' 10\" (1.778 m)   Wt 190 lb (86.2 kg)   SpO2 93%   BMI 27.26 kg/m²   Patient Vitals for the past 4 hrs:   BP Temp Temp src Pulse Resp SpO2 Height Weight   01/21/21 1354 (!) 166/87 99.9 °F (37.7 °C) Oral 65 16 93 %     01/21/21 1328    67 12      01/21/21 1327 (!) 142/89   64 12      01/21/21 1326    65 10 97 %     01/21/21 1325    69 13 99 %     01/21/21 1324    63 14 98 %     01/21/21 1323    63 14 98 %     01/21/21 1322 (!) 133/91   63 14 97 %     01/21/21 1321    65 12 97 %     01/21/21 1320    65 18 96 %     01/21/21 1318    69 18 97 %     01/21/21 1317 (!) 140/77   61 20 96 %     01/21/21 1316    62 16 97 %     01/21/21 1314    60 25 96 %     01/21/21 1313    61 22 95 %     01/21/21 1312    62 25 96 %     01/21/21 1311 119/76   62 26 (!) 89 %     01/21/21 1310    64  94 %     01/21/21 1309  99 °F (37.2 °C) Temporal        01/21/21 1211 (!) 177/90 98.8 °F (37.1 °C) Temporal 64 17 92 % 5' 10\" (1.778 m) 190 lb (86.2 kg)       Level of consciousness:  Awake, alert    Respiratory: Respirations easy, no distress. Stable. Cardiovascular: Hemodynamically stable. Hydration: Adequate. PONV: Adequately managed. Post-op pain: Adequately controlled. Post-op assessment: Tolerated anesthetic well without complication. Complications:  None.     Angelo Gasca MD  January 21, 2021   2:01 PM

## 2021-01-22 ENCOUNTER — APPOINTMENT (OUTPATIENT)
Dept: GENERAL RADIOLOGY | Age: 61
DRG: 871 | End: 2021-01-22
Attending: INTERNAL MEDICINE
Payer: COMMERCIAL

## 2021-01-22 LAB
ANION GAP SERPL CALCULATED.3IONS-SCNC: 10 MMOL/L (ref 3–16)
BLOOD CULTURE, ROUTINE: NORMAL
BUN BLDV-MCNC: 15 MG/DL (ref 7–20)
CALCIUM SERPL-MCNC: 7.9 MG/DL (ref 8.3–10.6)
CHLORIDE BLD-SCNC: 102 MMOL/L (ref 99–110)
CO2: 29 MMOL/L (ref 21–32)
CREAT SERPL-MCNC: 1.3 MG/DL (ref 0.8–1.3)
CULTURE, BLOOD 2: NORMAL
GFR AFRICAN AMERICAN: >60
GFR NON-AFRICAN AMERICAN: 56
GLUCOSE BLD-MCNC: 105 MG/DL (ref 70–99)
GLUCOSE BLD-MCNC: 110 MG/DL (ref 70–99)
GLUCOSE BLD-MCNC: 142 MG/DL (ref 70–99)
GLUCOSE BLD-MCNC: 153 MG/DL (ref 70–99)
GLUCOSE BLD-MCNC: 223 MG/DL (ref 70–99)
GLUCOSE BLD-MCNC: 345 MG/DL (ref 70–99)
MAGNESIUM: 1.8 MG/DL (ref 1.8–2.4)
PERFORMED ON: ABNORMAL
POTASSIUM SERPL-SCNC: 3.6 MMOL/L (ref 3.5–5.1)
SODIUM BLD-SCNC: 141 MMOL/L (ref 136–145)

## 2021-01-22 PROCEDURE — 6360000002 HC RX W HCPCS: Performed by: INTERNAL MEDICINE

## 2021-01-22 PROCEDURE — 6370000000 HC RX 637 (ALT 250 FOR IP): Performed by: FAMILY MEDICINE

## 2021-01-22 PROCEDURE — C9113 INJ PANTOPRAZOLE SODIUM, VIA: HCPCS | Performed by: INTERNAL MEDICINE

## 2021-01-22 PROCEDURE — 2580000003 HC RX 258: Performed by: INTERNAL MEDICINE

## 2021-01-22 PROCEDURE — 36415 COLL VENOUS BLD VENIPUNCTURE: CPT

## 2021-01-22 PROCEDURE — 73630 X-RAY EXAM OF FOOT: CPT

## 2021-01-22 PROCEDURE — 97129 THER IVNTJ 1ST 15 MIN: CPT

## 2021-01-22 PROCEDURE — 6360000002 HC RX W HCPCS: Performed by: FAMILY MEDICINE

## 2021-01-22 PROCEDURE — 83735 ASSAY OF MAGNESIUM: CPT

## 2021-01-22 PROCEDURE — 6370000000 HC RX 637 (ALT 250 FOR IP): Performed by: PODIATRIST

## 2021-01-22 PROCEDURE — 80048 BASIC METABOLIC PNL TOTAL CA: CPT

## 2021-01-22 PROCEDURE — 1200000000 HC SEMI PRIVATE

## 2021-01-22 PROCEDURE — 74230 X-RAY XM SWLNG FUNCJ C+: CPT

## 2021-01-22 PROCEDURE — 99233 SBSQ HOSP IP/OBS HIGH 50: CPT | Performed by: INTERNAL MEDICINE

## 2021-01-22 PROCEDURE — 92611 MOTION FLUOROSCOPY/SWALLOW: CPT

## 2021-01-22 RX ADMIN — INSULIN LISPRO 2 UNITS: 100 INJECTION, SOLUTION INTRAVENOUS; SUBCUTANEOUS at 21:26

## 2021-01-22 RX ADMIN — Medication 10 ML: at 08:13

## 2021-01-22 RX ADMIN — PANTOPRAZOLE SODIUM 40 MG: 40 INJECTION, POWDER, FOR SOLUTION INTRAVENOUS at 21:23

## 2021-01-22 RX ADMIN — VANCOMYCIN HYDROCHLORIDE 1250 MG: 10 INJECTION, POWDER, LYOPHILIZED, FOR SOLUTION INTRAVENOUS at 00:33

## 2021-01-22 RX ADMIN — CEFTRIAXONE 2000 MG: 2 INJECTION, POWDER, FOR SOLUTION INTRAMUSCULAR; INTRAVENOUS at 21:39

## 2021-01-22 RX ADMIN — MUPIROCIN: 20 OINTMENT TOPICAL at 17:36

## 2021-01-22 RX ADMIN — CEFTRIAXONE 2 G: 2 INJECTION, POWDER, FOR SOLUTION INTRAMUSCULAR; INTRAVENOUS at 08:13

## 2021-01-22 RX ADMIN — Medication 10 ML: at 21:23

## 2021-01-22 RX ADMIN — VANCOMYCIN HYDROCHLORIDE 1250 MG: 10 INJECTION, POWDER, LYOPHILIZED, FOR SOLUTION INTRAVENOUS at 17:36

## 2021-01-22 RX ADMIN — ENOXAPARIN SODIUM 30 MG: 30 INJECTION SUBCUTANEOUS at 17:36

## 2021-01-22 RX ADMIN — PANTOPRAZOLE SODIUM 40 MG: 40 INJECTION, POWDER, FOR SOLUTION INTRAVENOUS at 08:13

## 2021-01-22 RX ADMIN — INSULIN GLARGINE 10 UNITS: 100 INJECTION, SOLUTION SUBCUTANEOUS at 21:25

## 2021-01-22 RX ADMIN — INSULIN LISPRO 1 UNITS: 100 INJECTION, SOLUTION INTRAVENOUS; SUBCUTANEOUS at 12:00

## 2021-01-22 RX ADMIN — INSULIN LISPRO 2 UNITS: 100 INJECTION, SOLUTION INTRAVENOUS; SUBCUTANEOUS at 17:22

## 2021-01-22 ASSESSMENT — PAIN SCALES - GENERAL: PAINLEVEL_OUTOF10: 0

## 2021-01-22 NOTE — PROCEDURES
INSTRUMENTAL SWALLOW REPORT  MODIFIED BARIUM SWALLOW    NAME: Michaela Washburn   : 1960  MRN: [id number]       Date of Eval: 2021     Ordering Physician: Roxann Bolanos  Radiologist: Dr. Noah Keane Diagnosis: Diabetic Ketoacidosis without coma associated with other specified diabetes mellitus. · Coffee Ground Emesis and Anemia  · EGD 2021 with DX     1) There was LA class C erosive esophagitis noted in the distal 1/3 of the esophagus. The gastroesophageal junction was at 40 cm from the incisors. 2) There was a 2-3 cm sliding hiatal hernia noted in the stomach. There was some bile stained liquid noted in the fundus but no retained food was noted in the stomach. 3)  There was some mucosal ulceration and edema noted in the lesser curvature of the body of the stomach. This was likely related to patient's vomiting during the DKA. 4) Mild erythema of the antrum. Biopsies were obtained from the antrum and body of the stomach to rule out active gastritis and H.pylori gastritis. 5) There was a normal appearing duodenal bulb and second portion of the duodenum. 6) The ampulla was normal appearing. · PO diet was initiated and pt appeared to be having difficult as by documented coughing with po. Pt was made npo and TOM was ordered. Onset 2021    Referring Diagnosis(es): Oropharyngeal Dysphagia  · Type of Study: Initial MBS     Past Medical History:  has a past medical history of TORI (acute kidney injury) ([address] Bacteremia, Diabetes mellitus ([address] Diabetic neuropathy ([address] Gout, MRSA (methicillin resistant staph aureus) culture positive, Pneumonia, and Pyogenic inflammation of bone ([address] Past Surgical History:  has a past surgical history that includes eye surgery; other surgical history (2018); Toe amputation (Right, 2018); and Upper gastrointestinal endoscopy (N/A, 2021).     Current Diet  Current Diet Solid Consistency: NPO  Current Diet Liquid Consistency: NPO(except sips of clear liquids)    Recent CXR/CT of Chest:  Multiple Chest XR since admit reviewed  CT head/cervical spine; abdomen/pelvis noted. Lumbar Puncture noted    Patient Complaints/Reason for Referral:  Ernestine Gamez was referred for a MBS to assess the efficiency of his/her swallow function, assess for aspiration, and to make recommendations regarding safe dietary consistencies, effective compensatory strategies, and safe eating environment. Assessment Impressions:  1. Behavior/Cognition/Vision/Hearing:  · Behavior/Cognition: Alert; Cooperative;Pleasant mood;Distractible;Impulsive(pt can be redundant in verbalizations/questions  · Vision: Within Functional Limits/ Hearing: Within functional limits  · Pt currently on 2L/min O2 via nasal canula  · Pt with reports of headache pain  ·  Patient Position: Lateral and Patient Degrees: Upright in Videofluoroscopic Chair  · Consistencies Administered: Reg solid; Dysphagia Soft and Bite-Sized (Dysphagia III); Dysphagia Minced and Moist (Dysphagia II); Dysphagia Pureed (Dysphagia I); Honey cup;Nectar cup;Nectar  teaspoon; Thin cup; Thin straw(isolated sips and sips in consecutive succession)     2. Oropharyngeal Dysphagia characterized by generalized weakness; prolonged mastication; disorganized bolus formation/cohesion; prolonged A-P oral transit (lingual mashing pattern); delayed initiation of swallow; reduced tongue base retraction; reduced laryngeal elevation and reduced pharyngeal clearance. · Pt with deep penetration of thin liquids as pt fatigues; with sensation and reflexive cough to clear  · Pt with post swallow oral residue (lingual/palatal) of food consistencies  · Pt with post swallow food residue in the valleculae >pyriform sinus and pharyngeal wall (most symptomatic for soft/bite size and regular solid food)  · Pt with cough response during swallow of food consistencies but did not visualize any penetration or aspiration.   Compensatory Strategies:   · Goals  : The patient will tolerate dysphagia minced and moist food consistencies with thin liquid diet without observed clinical signs of aspiration; The patient/caregiver will demonstrate understanding of compensatory strategies for improved swallowing safety. Pt will tolerate soft/bite size and regular solid food consistencies alternated with liquids 15/15 without overt clinical s/s of penetration/aspiration. Pt will complete tongue base retraction ex and laryngeal elevation ex x 10 reps with models      Oral Preparation / Oral Phase  Oral Phase: Impaired  Oral Phase - Major Contributing Deficits  Weak Lingual Manipulation: All(noted across consistencies)  Reduced Posterior Propulsion: Reg solid; Soft solid;Mechanical soft solid(reduced initiation and limited to lingual mashing pattern)  Reduced Bolus Control: All(inconsistent)  Decreased Bolus Cohesion: (inconsistent across food consistencies)  Lingual / Palatal Residue: (pt spontaneously clears)  Premature Bolus Loss to Pharynx: (inconsistent across consistencies)  Reduced Tongue Base Retraction: Reg solid; Soft solid;Mechanical soft solid    Pharyngeal Phase  Pharyngeal Phase: Impaired  Pharyngeal Phase - Major Contributing Deficits  Delayed Swallow Initiation: All  Premature Spillage to Valleculae: (inconsistent across consistencies)  Reduced Pharyngeal Peristalsis: (most symptomatic for food consistencies)  Reduced Laryngeal Elevation: (as pt fatigues and most symptomatic for thin liquids)  Deep Penetration Before: Thin straw; Thin cup(at end of test as pt fatigued)  Deep Penetration During: Thin straw; Thin cup(at end of test as pt fatigued)  Adequate Cough Reflex: (noted for all penetration episodes.)  Pharyngeal Residue - Valleculae: Reg solid; Soft solid;Mechanical soft solid(minimal to mild)  Pharyngeal Residue - Pyriform: (trace to min)  Pharyngeal Residue - Posterior Pharynx: Reg solid; Soft solid(barium lines lower pharyngeal wall)      Esophageal Phase  Esophageal Screen: DNT ;please refer to GI documentation regarding recent EGD. However this procedure did observer barium residue below PES after swallow of soft/bite size and regular solid food with concern for potential reduced esophageal motility . Therapy Time:   Individual   Time In 0055   Time Out 1035   Minutes 40    coded treatment time: 13       Signed  Venice Maddox MS,CCC,SLP [id number]  Speech and Language Pathologist   1/22/2021, 11:23 AM

## 2021-01-22 NOTE — PROGRESS NOTES
INPATIENT CONSULTATION:    IDENTIFYING DATA/REASON FOR CONSULTATION   PATIENT:  Solange Knight  MRN:  [id number]  ADMIT DATE: 1/19/2021  TIME OF EVALUATION: 1/22/2021 8:42 AM  HOSPITAL STAY:   LOS: 3 days   CONSULTING PHYSICIAN: No att. providers found   REASON FOR CONSULTATION: UGIB    Subjective:    Patient seen in follow up. He has no complaints. He is tolerating food    MEDICATIONS   SCHEDULED:      insulin lispro, 0-6 Units, TID WC      insulin lispro, 0-3 Units, Nightly      insulin glargine, 10 Units, Nightly      vancomycin, 1,250 mg, Q18H      pantoprazole, 40 mg, BID    And      sodium chloride (PF), 10 mL, BID      cefTRIAXone (ROCEPHIN) IV, 2 g, Q12H      vancomycin (VANCOCIN) intermittent dosing (placeholder), , RX Placeholder      FLUIDS/DRIPS:     sodium chloride 75 mL/hr at 01/21/21 1741    dextrose 100 mL/hr (01/21/21 1155)     PRNs:     glucose, 15 g, PRN      dextrose, 12.5 g, PRN      glucagon (rDNA), 1 mg, PRN      dextrose, 100 mL/hr, PRN      potassium chloride, 40 mEq, PRN    Or      potassium alternative oral replacement, 40 mEq, PRN    Or      potassium chloride, 10 mEq, PRN      sodium phosphate IVPB, 0.16 mmol/kg, PRN    Or      sodium phosphate IVPB, 0.32 mmol/kg, PRN      dextrose, 12.5 g, PRN      magnesium sulfate, 1 g, PRN      acetaminophen, 650 mg, Q4H PRN      acetaminophen, 650 mg, Q4H PRN      ALLERGIES:    Allergies   Allergen Reactions    Hydrocodone-Acetaminophen Itching    Percocet [Oxycodone-Acetaminophen]      States \"hallucinations,disoriented, & freaked out\" per wife    Pregabalin Itching    Vicodin [Hydrocodone-Acetaminophen]      Spaces out. Pt. States 1/15/14 has been taking some vicodin without problems.          PHYSICAL EXAM   VITALS:  BP (!) 171/64   Pulse 74   Temp 99.6 °F (37.6 °C) (Oral)   Resp 18   Ht 5' 10\" (1.778 m)   Wt 198 lb 13.7 oz (90.2 kg)   SpO2 90%   BMI 28.53 kg/m²   TEMPERATURE:  Current - Temp: 99.6 °F (37.6 °C); noted in the stomach. 3)  There was some mucosal ulceration and edema noted in the lesser curvature of the body of the stomach. This was likely related to patient's vomiting during the DKA. 4) Mild erythema of the antrum. Biopsies were obtained from the antrum and body of the stomach to rule out active gastritis and H.pylori gastritis. 5) There was a normal appearing duodenal bulb and second portion of the duodenum. 6) The ampulla was normal appearing    ASSESSMENT AND RECOMMENDATIONS   Milo Olivier is a 56 y.o. male with PMH of IDDM, diabetic neuropathy, toe amputation, gout who presented on 1/19/2021 with altered mental status, nausea, vomiting with CGE. Admitted with DKA. We have been consulted regarding concern for upper GI bleed given drop in hgb and +heme stools    1. Coffee ground emesis, melena 2/2 Esophagitis  -EGD 1/21 showed LA class C erosive esophagitis  -on PPI BID  -no further vomiting episodes. Pt tolerating food  2. Acute blood loss anemia  -mild, stable, has not required blood transfusions. 3. DKA  -resolved    RECOMMENDATIONS:    Continue PPI BID  Diet as tolerated  Will sign off. Please call with ? If you have any questions or need any further information, please feel free to contact us [phone]. Thank you for allowing us to participate in the care of Fleet Mansfield. The note was completed using Dragon voice recognition transcription. Every effort was made to ensure accuracy; however, inadvertent transcription errors may be present despite my best efforts to edit errors. James CUELLAR    Attending physician addendum:      I have personally seen and examined the patient, reviewed the patient's medical record and pertinent labs and clinical imaging. I have personally staffed the case with POLO Granados. I agree with her consultation note, exam findings, assessment and plans  as written above.   I have made appropriate modifications and edited her assessment and plan where needed to reflect my impression and plans for this patient. Patient is less confused this morning. He is anxious to go home. He has had no further nausea vomiting or coffee-ground emesis. His hemoglobin has remained stable. BP (!) 147/76   Pulse 68   Temp 99.1 °F (37.3 °C) (Oral)   Resp 18   Ht 5' 10\" (1.778 m)   Wt 198 lb 13.7 oz (90.2 kg)   SpO2 93%   BMI 28.53 kg/m²      Gen- NAD  CV- RRR  Lungs CTAB  Abd- soft, NT    Labs reviewed      Impression:  DKA - resolved  Acute blood loss anemia- stable. Secondary to erosive esophagitis  Metabolic encephalopathy-improving    Plan:  Continue pantoprazole bid  Diet as tolerated  Dispo per Hospitalist  Will sign off. Call with? Thank you for allowing me to participate in this patient's care. If there are any questions or concerns regarding this patient, or the plan we have set in place, please feel free to contact me at [phone].      Riaz Lima, DO

## 2021-01-22 NOTE — PROGRESS NOTES
OTHER SURGICAL HISTORY  12/28/2018    partial right foot amputation with graft application    TOE AMPUTATION Right 12/28/2018    PARTIAL RIGHT FOOT AMPUTATION WITH GRAFT APPLICATION performed by Mirella Workman DPM [facility] 1/21/2021    EGD BIOPSY performed by Ivette Jeffers DO at [address] [facility]       Current Medications:    Outpatient Medications Marked as Taking for the 1/19/21 encounter [facility] Encounter)   Medication Sig Dispense Refill    insulin lispro (HUMALOG) 100 UNIT/ML injection vial Inject 65 Units into the skin Daily with supper      gabapentin (NEURONTIN) 600 MG tablet Take 600 mg by mouth 2 times daily.  cyclobenzaprine (FLEXERIL) 10 MG tablet Take 10 mg by mouth 3 times daily as needed for Muscle spasms      venlafaxine (EFFEXOR XR) 75 MG extended release capsule Take 75 mg by mouth daily         Allergies:  Hydrocodone-acetaminophen, Percocet [oxycodone-acetaminophen], Pregabalin, and Vicodin [hydrocodone-acetaminophen]    Immunizations :   Immunization History   Administered Date(s) Administered    Influenza Virus Vaccine 02/05/2001    Tdap (Boostrix, Adacel) 09/05/2008       Social History:    Social History     Tobacco Use    Smoking status: Never Smoker    Smokeless tobacco: Never Used   Substance Use Topics    Alcohol use: No     Comment: FORMER DRINKER approx.  quit 2005    Drug use: No     Social History     Tobacco Use   Smoking Status Never Smoker   Smokeless Tobacco Never Used      Family History   Problem Relation Age of Onset    Diabetes Maternal Grandfather     Heart Disease Paternal Uncle     High Blood Pressure Mother          REVIEW OF SYSTEMS:     Constitutional:   rfevers+, chills+ , night sweats  Eyes:  negative for blurred vision, eye discharge, visual disturbance   HEENT:  negative for hearing loss, ear drainage,nasal congestion  Respiratory:  negative for cough, shortness of breath or hemoptysis Cardiovascular:  negative for chest pain, palpitations, syncope  Gastrointestinal:  negative for nausea, vomiting, diarrhea, constipation, abdominal pain  Genitourinary:  negative for frequency, dysuria, urinary incontinence, hematuria  Hematologic/Lymphatic:  negative for easy bruising, bleeding and lymphadenopathy  Allergic/Immunologic:  negative for recurrent infections, angioedema, anaphylaxis   Endocrine:  negative for weight changes, polyuria, polydipsia and polyphagia  Musculoskeletal:  negative for joint  pain, swelling, decreased range of motion  Integumentary: No rashes, skin lesions  Neurological:  negative for headaches, slurred speech, unilateral weakness  Psychiatric: hallucinations+,confusion ,agitation.                 PHYSICAL EXAM:      Vitals:  t MAX  102.4 on admit    BP (!) 171/64   Pulse 74   Temp 99.6 °F (37.6 °C) (Oral)   Resp 18   Ht 5' 10\" (1.778 m)   Wt 198 lb 13.7 oz (90.2 kg)   SpO2 90%   BMI 28.53 kg/m²     General Appearance:awake + some intermittent confusion  and + in Some acute distress, +  pallor, no icterus chronic ill appearing man+  Skin: warm and dry, no rash or erythema  Head: normocephalic and atraumatic  Eyes: pupils equal, round, and reactive to light, conjunctivae normal  ENT: tympanic membrane, external ear and ear canal normal bilaterally, nose without deformity, nasal mucosa and turbinates normal without polyps  Neck: supple and non-tender without mass, no thyromegaly  no cervical lymphadenopathy  Pulmonary/Chest: clear to auscultation bilaterally- no wheezes, rales or rhonchi, normal air movement, no respiratory distress  Cardiovascular: normal rate, regular rhythm, normal S1 and S2, no murmurs, rubs, clicks, or gallops, no carotid bruits  Abdomen: soft, non-tender, non-distended, normal bowel sounds, no masses or organomegaly  Extremities: no cyanosis, clubbing or edema  Musculoskeletal: normal range of motion, no joint swelling, deformity or tenderness  Integumentary: No rashes, no abnormal skin lesions, no petechiae  Neurologic: reflexes normal and symmetric, no cranial nerve deficit  Lines: IV  David+  Left foot ulcer+  Diabetic foot changes+     Data Review:    CBC:   Lab Results   Component Value Date    WBC 12.2 (H) 01/21/2021    HGB 11.6 (L) 01/21/2021    HCT 35.5 (L) 01/21/2021    MCV 77.9 (L) 01/21/2021     01/21/2021     RENAL:   Lab Results   Component Value Date    CREATININE 1.3 01/22/2021    BUN 15 01/22/2021     01/22/2021    K 3.6 01/22/2021     01/22/2021    CO2 29 01/22/2021     SED RATE:   Lab Results   Component Value Date    SEDRATE 60 04/15/2016     CK: No results found for: CKTOTAL  CRP: No results found for: CRP  Hepatic Function Panel:   Lab Results   Component Value Date    ALKPHOS 195 01/18/2021    ALT 25 01/18/2021    AST 21 01/18/2021    PROT 8.3 01/18/2021    PROT 7.3 08/20/2012    BILITOT 0.6 01/18/2021    LABALBU 4.3 01/18/2021     UA:  Lab Results   Component Value Date    COLORU Straw 01/18/2021    CLARITYU Clear 01/18/2021    GLUCOSEU >=1000 01/18/2021    BILIRUBINUR Negative 01/18/2021    KETUA >=80 01/18/2021    SPECGRAV 1.020 01/18/2021    BLOODU SMALL 01/18/2021    PHUR 5.5 01/18/2021    PROTEINU TRACE 01/18/2021    UROBILINOGEN 0.2 01/18/2021    NITRU Negative 01/18/2021    LEUKOCYTESUR Negative 01/18/2021    LABMICR YES 01/18/2021    URINETYPE NotGiven 01/18/2021      Urine Microscopic:   Lab Results   Component Value Date    LABCAST 3-5 Hyaline 01/31/2019    BACTERIA Rare 01/18/2021    WBCUA 3-5 01/18/2021    RBCUA 0-2 01/18/2021    EPIU 2-5 01/18/2021     Urine Reflex to Culture:   Lab Results   Component Value Date    URRFLXCULT Not Indicated 01/18/2021       Creat  2.6  Down 1.7     lACTIC ACID   9.3  DOWN  1.5       PROCAL 0.46 /19/2021  5:30 PM - Krissy Gray Incoming Lab Results From Soft (Epic Adt)    Component Value Ref Range & Units Status Collected Lab   Color, CSF Colorless  Colorless Final 01/19/2021 11:08 AM [address] [facility]   Appearance, CSF Clear  Clear Final 01/19/2021 11:08 AM [address] [facility]   Tube Number + CELL CT + DIFF-CSF Tube 3   Final 01/19/2021 11:08 AM [facility]   Clot Evaluation CSF see below   Final 01/19/2021 11:08 AM [facility]   No Clots Seen   WBC, CSF 14High   0 - 5 /cumm Final 01/19/2021 11:08 AM [facility]   RBC, CSF 17Abnormal   0 /cumm Final 01/19/2021 11:08 AM [facility]   Neutrophils, CSF 84High   0 - 6 % Final 01/19/2021 11:08 AM [facility]   Lymphs, CSF 9Low   40 - 80 % Final 01/19/2021 11:08 AM [facility]   Monocytes, CSF 6Low   15 - 45 % Final 01/19/2021 11:08 AM [facility]   Atypical Lymphs CSF 1  % Final 01/19/2021 11:08 AM [facility]      MICRO: cultures reviewed and updated by me   Blood Culture:   Results    Procedure Component Value Units Date/Time   Culture, Wound [[id number]] (Abnormal) Collected: 01/21/21 1428   Order Status: Completed Specimen: Foot, Left Updated: 01/22/21 1029    Gram Stain Result No Epithelial Cells seen   No WBCs or organisms seen     Organism Staph aureus MRSAAbnormal     WOUND/ABSCESS --Abnormal     Moderate growth   Sensitivity to follow   CONTACT PRECAUTIONS INDICATED   PBP2= POSITIVE   Abnormal    Narrative:     ORDER#: [id number]                          ORDERED BY: NEELIMA CHANEY   SOURCE: Foot                               COLLECTED:  01/21/21 14:28   ANTIBIOTICS AT [facility]. :                      RECEIVED :  01/21/21 15:04   CALL  Dodd  [facility] tel. [phone],               Legionella Antigen, Urine [[id number]] Collected: 01/19/21 2209   Order Status: Completed Specimen: Urine, clean catch Updated: 01/20/21 1219    L. pneumophila Serogp 1 Ur Ag --    Presumptive Negative   No Legionella pneumophila serogroup 1 antigens detected.    A negative result does not exclude infection with   Legionella pneumophila serogroup 1 nor does it rule out   other microbial-caused respiratory infections or   disease caused by other serogroups of   Legionella pneumophila. Normal Range: Presumptive Negative    Narrative:     ORDER#: [id number]                          ORDERED BY: Vania Romero   SOURCE: Urine Clean Catch                  COLLECTED:  01/19/21 22:09   ANTIBIOTICS AT [facility]. :                      RECEIVED :  01/19/21 22:22   Performed at:   [facility]   [address] [place] [id number]   Phone [phone]   Strep Pneumoniae Antigen [[id number]] Collected: 01/19/21 2209   Order Status: Completed Specimen: Urine, clean catch Updated: 01/20/21 1217    STREP PNEUMONIAE ANTIGEN, URINE --    Presumptive Negative   Presumptive negative suggests no current or recent   pneumococcal infection. Infection due to Strep pneumoniae   cannot be ruled out since the antigen present in the sample   may be below the detection limit of the test.   Normal Range:Presumptive Negative    Narrative:     ORDER#: [id number]                          ORDERED BY: Vania Romero   SOURCE: Urine Clean Catch                  COLLECTED:  01/19/21 22:09   ANTIBIOTICS AT [facility]. :                      RECEIVED :  01/19/21 22:22   Performed at:   [facility]   [address] [address] [employer] [facility] [place] [address]   Phone [phone]   Culture, CSF [[id number]] Collected: 01/19/21 1108   Order Status: Sent Specimen: CSF Updated: 01/19/21 1359    Gram Stain Result WBC's (Polymorphonuclear)   No organisms seen   Cytospin performed,no quantitation    Narrative:     ORDER#: [id number]                          ORDERED BY: Shayy Avina   SOURCE: CSF (Spinal Fluid)                 COLLECTED:  01/19/21 11:08   ANTIBIOTICS AT [facility]. :                      RECEIVED :  01/19/21 11:47   Performed at:   [facility]   [address] [address] [employer], [place] [address]   Phone [phone] Meningitis Encephalitis Panel Report [[id number]] Collected: 01/19/21 1108   Order Status: Completed Updated: 01/19/21 1352    Report SEE IMAGE   Narrative:     Referred out by:   [facility]   [address] [place] [address]   Phone [phone]   Meningitis/Encephalitis Panel, CSF [[id number]] Collected: 01/19/21 1108   Order Status: Completed Specimen: CSF Updated: 01/19/21 1349    Meningitis Encephalitis Panel --    Meningitis Encephalitis Panel PCR Result: Not Detected   See additional report for complete Meningitis Encephalitis Panel. Narrative:     ORDER#: [id number]                          ORDERED BY: Helen Wilhelm   SOURCE: CSF (Spinal Fluid)                 COLLECTED:  01/19/21 11:08   ANTIBIOTICS AT [facility]. :                      RECEIVED :  01/19/21 12:20   Performed at:   [facility]   [address] [place] [address]   Phone [phone]   Culture, Blood 1 [[id number]]    Order Status: Sent Specimen: Blood    Culture, Blood 2 [[id number]]    Order Status: Sent Specimen: Blood    Culture, Blood 2 [[id number]]    Order Status: Canceled Specimen: Blood    Culture, Blood 1 [[id number]]    Order Status: Canceled Specimen: Blood        Lab Results   Component Value Date    [facility]  01/18/2021     No Growth to date. Any change in status will be called. BLOODCULT2  01/18/2021     No Growth to date. Any change in status will be called. Respiratory Culture:  Lab Results   Component Value Date    CULTRESP Light growth normal respiratory idris with 09/12/2017    CULTRESP  09/12/2017     Rare growth  Susceptibility testing of penicillin and other beta-lactams is  not necessary for beta hemolytic Streptococci since resistant  strains have not been identified.  (CLSI K516-H72, 2017)      CULTRESP  09/12/2017     Rare growth  Susceptibility testing of penicillin and other beta-lactams is  not necessary for beta hemolytic Streptococci since resistant  strains have not been identified. (CLSI G129-I13, 2017)      LABGRAM  01/21/2021     No Epithelial Cells seen  No WBCs or organisms seen       AFB:No results found for: AFBSMEAR  Viral Culture:  Lab Results   Component Value Date    COVID19 Not Detected 01/19/2021     Urine Culture: No results for input(s): Abena Linares in the last 72 hours. IMAGING:    IR LUMBAR PUNCTURE FOR DIAGNOSIS   Final Result   Successful fluoroscopic-guided lumbar puncture. XR CHEST PORTABLE   Final Result   Appropriate radiographic positioning of the nasogastric tube. Recommend clinical correlation prior to use.          FL MODIFIED BARIUM SWALLOW W VIDEO    (Results Pending)         All the pertinent images and reports for the current Hospitalization were reviewed by me     Scheduled Meds:   insulin lispro  0-6 Units Subcutaneous TID WC    insulin lispro  0-3 Units Subcutaneous Nightly    insulin glargine  10 Units Subcutaneous Nightly    vancomycin  1,250 mg Intravenous Q18H    pantoprazole  40 mg Intravenous BID    And    sodium chloride (PF)  10 mL Intravenous BID    cefTRIAXone (ROCEPHIN) IV  2 g Intravenous Q12H    vancomycin (VANCOCIN) intermittent dosing (placeholder)   Other RX Placeholder       Continuous Infusions:   sodium chloride 75 mL/hr at 01/21/21 1741    dextrose 100 mL/hr (01/21/21 1155)       PRN Meds:  glucose, dextrose, glucagon (rDNA), dextrose, potassium chloride **OR** potassium alternative oral replacement **OR** potassium chloride, sodium phosphate IVPB **OR** sodium phosphate IVPB, dextrose, magnesium sulfate, acetaminophen, acetaminophen      Assessment:     Patient Active Problem List   Diagnosis    Diabetic ketoacidosis without coma associated with type 2 diabetes mellitus (HCC)    Nausea & vomiting    Diabetic neuropathy (HCC)    DM2/IDDM    Gout    Hx MRSA    Hyperkalemia    Chronic pain on chronic opioids    Chronic LE diabetic ulcers  Mild protein-calorie malnutrition ([facility]    Cellulitis    TORI (acute kidney injury) ([facility] [address]    Neuropathy    Diabetic acidosis without coma ([facility] [address] [facility]     DKA with coma+ improving   Fevers resolving    Sepsis improved   Lactic acidosis resolved  TORI -creatinine now slowly downtrending   Wbc ELEVATION   Change in mentation - Encephalopathy resolving  CSF with WBC elevation but could likely be from severe DKA vs early CNS infection   Given the presentation it would be prudent to treat with IV abx to cover CNS pending clinical improvement   Left diabetic foot infection   COVID 19 -ve NAAT   CT brain -ve  CXR -VE  DM with poor control HbA1c > 13 for long time      He remains very ill from severe DKA and coma and sepsis now some improvement in mentation since acidosis is being corrected but concern is for infection precipitating DKA and given CSF abnormality with WBC higher than normal would cont IV abx for now pending clinical improvement     Mentation seems to be improving with improvement in  glucose control. CSF culture thus far negative. Blood cultures are in process no growth to date    Status post esophagogastroduodenoscopy to evaluate anemia and there was esophagitis . Left foot wound cx MRSA noted      Labs, Microbiology, Radiology and all the pertinent results from current hospitalization and  care every where were reviewed  by me as a part of the evaluation   Plan:   1. Cont IV Vancomycin by levels will cover  MRSA wound infection   2. Cont IV Ceftriaxone x 2 gm Q 12 HRS  3. EGD results note   4. CSF cx in process  5. CSF cell counts on tube 3  Wbc HIGHER than normal   6. Meningoencephalitis panel on CSF -ve  7. Blood cx in process  NGTD  8. Trend Procal check on Monday  9. HbA1c elevated  10. Control DM   11. COVID 19 PCR -VE  12.  Urine for legionella -ve and Pneumococcal antigen  -ve     Discussed with patient/Family and Nursing staff   Risk of Complications/Morbidity: High      · Illness(es)/ Infection present that pose threat to bodily function. · There is potential for severe exacerbation of infection/side effects of treatment. · Therapy requires intensive monitoring for antimicrobial agent toxicity. Thanks for allowing me to participate in your patient's care and please call me with any questions or concerns.     Shaina Guaman MD  Infectious Disease  [facility] ([facility]) Physician  Phone: [phone]   Fax : [phone]

## 2021-01-22 NOTE — PROGRESS NOTES
Clinical Pharmacy Note  Vancomycin Consult    Leena Medley is a 56 y.o. male ordered Vancomycin for Acute encephalopathy secondary to suspected acute pyogenic meningitis; consult received from Dr. Joanne Garcia to manage therapy. Also receiving Rocephin. Patient Active Problem List   Diagnosis    Diabetic ketoacidosis without coma associated with type 2 diabetes mellitus (HCC)    Nausea & vomiting    Diabetic neuropathy (HCC)    DM2/IDDM    Gout    Hx MRSA    Hyperkalemia    Chronic pain on chronic opioids    Chronic LE diabetic ulcers    Mild protein-calorie malnutrition (HCC)    Cellulitis    TORI (acute kidney injury) ([facility] [facility]    Neuropathy    Diabetic acidosis without coma (HCC)       Allergies:  Hydrocodone-acetaminophen, Percocet [oxycodone-acetaminophen], Pregabalin, and Vicodin [hydrocodone-acetaminophen]     Temp max:  Temp (24hrs), Av °F (37.2 °C), Min:98.1 °F (36.7 °C), Max:99.9 °F (37.7 °C)      Recent Labs     21  0335 21  0655   WBC 16.0* 12.2*       Recent Labs     21  1752 21  0514 21  0654   BUN 26* 23* 17   CREATININE 1.7* 1.7* 1.4*         Intake/Output Summary (Last 24 hours) at 2021 2332  Last data filed at 2021 1610  Gross per 24 hour   Intake 820 ml   Output 2350 ml   Net -1530 ml       Culture Results:  pending    Ht Readings from Last 1 Encounters:   21 5' 10\" (1.778 m)        Wt Readings from Last 1 Encounters:   21 190 lb (86.2 kg)         Estimated Creatinine Clearance: 56 mL/min (A) (based on SCr of 1.4 mg/dL (H)). Assessment/Plan:  Random level = 8 mg/L  (22 hours after 1250mg dose)  SCr down to 1.4 mg/dL    Will schedule vancomycin 1250 mg IV every 18 hours. Regimen projects a trough level of 15-20 mg/L. Level ordered for 1100 on 21. Thank you for the consult.      Darrell Saldana, PharmD  2021 11:32 PM

## 2021-01-22 NOTE — PLAN OF CARE
Problem: Falls - Risk of:  Goal: Will remain free from falls  Description: Will remain free from falls  Outcome: Ongoing  Goal: Absence of physical injury  Description: Absence of physical injury  Outcome: Ongoing     Problem: Skin Integrity:  Goal: Will show no infection signs and symptoms  Description: Will show no infection signs and symptoms  Outcome: Ongoing  Goal: Absence of new skin breakdown  Description: Absence of new skin breakdown  Outcome: Ongoing  Goal: Risk for impaired skin integrity will decrease  Description: Risk for impaired skin integrity will decrease  Outcome: Ongoing     Problem: Mental Status - Impaired:  Goal: Mental status will improve  Description: Mental status will improve  Outcome: Ongoing     Problem:  Activity:  Goal: Risk for activity intolerance will decrease  Description: Risk for activity intolerance will decrease  Outcome: Ongoing     Problem: Coping:  Goal: Ability to adjust to condition or change in health will improve  Description: Ability to adjust to condition or change in health will improve  Outcome: Ongoing     Problem: Fluid Volume:  Goal: Ability to maintain a balanced intake and output will improve  Description: Ability to maintain a balanced intake and output will improve  Outcome: Ongoing     Problem: Health Behavior:  Goal: Ability to identify and utilize available resources and services will improve  Description: Ability to identify and utilize available resources and services will improve  Outcome: Ongoing  Goal: Ability to manage health-related needs will improve  Description: Ability to manage health-related needs will improve  Outcome: Ongoing  Goal: Identification of resources available to assist in meeting health care needs will improve  Description: Identification of resources available to assist in meeting health care needs will improve  Outcome: Ongoing     Problem: Metabolic:  Goal: Ability to maintain appropriate glucose levels will

## 2021-01-22 NOTE — CONSULTS
Department of Podiatric Surgery  History and Physical        CHIEF COMPLAINT:  No chief complaint on file. Reason for Consultation:  Left foot wounds and infection with unknown source    History Obtained From:  electronic medical record, reason patient could not give history:  Patient did give history, but very poor historian. Oriented to person. Didn't believe that he was in [facility] [employer] [facility].     HISTORY OF PRESENT ILLNESS:      The patient is a 56 y.o. male who presents with sepsis, DKA, AMS, GI bleed, TORI and possible meningitis. He is very confused, but pleasant. His standard answer to any question regarding his left foot wounds was \"I don't know, I had a complete black out at home. \"  The patient was unaware that he was in [facility]. He lives in between  and [facility] in [facility]. He is a little more clear on the longerterm history. He lost his 2nd toe on the right foot a couple years ago as a result of him picking at a wound that did not heal. He has no pain in the foot at present.      Past Medical History:        Diagnosis Date    TORI (acute kidney injury) ([facility] [address] 9/12/2017    Bacteremia 05/05/2017    staph aureus    Diabetes mellitus ([facility] [address]     Diabetic neuropathy ([address]     Gout     MRSA (methicillin resistant staph aureus) culture positive 12/27/18, 9/12/17, 5/5/17,9/5/13, 1/15/14    ulcers bilateral legs    Pneumonia     Pyogenic inflammation of bone ([address]      Past Surgical History:        Procedure Laterality Date    EYE SURGERY      lens implants after removal of cataracts    OTHER SURGICAL HISTORY  12/28/2018    partial right foot amputation with graft application    TOE AMPUTATION Right 12/28/2018    PARTIAL RIGHT FOOT AMPUTATION WITH GRAFT APPLICATION performed by Omar Chandler DPM at [address] 1/21/2021    EGD BIOPSY performed by Meaghan Pardo DO at [address]    Family History   Problem 5' 10\" (1.778 m)   Wt 198 lb 13.7 oz (90.2 kg)   SpO2 90%   BMI 28.53 kg/m²   CONSTITUTIONAL:  awake, alert, cooperative, no apparent distress, and appears stated age  EYES:  pupils equal, round and reactive to light, extra ocular muscles intact, sclera clear, conjunctiva normal      LOWER EXTREMITY:  VASCULAR: Pedal Pulses present. negative signs of ischemia. MUSCULOSKELETAL:  positive gross deformity. There is evidence of absence of the 2nd digit, right without any evidence of any acute changes. NEUROLOGIC:  Epicritic sensation, light touch, joint position sense absent  SKIN:  Open wound sub 1st MPJ, left foot without signs of active infection. The wound base is 100% fibro-granular with a depth of subcutaneous tissues only. There is no evidence of any deep tissues, fascia or bone. There is no tenderness, fluctuance, crepitus or instability to suggest a deep or rapid spreading infection. A second wound is noted on the posterior medial leg just proximal to the medial malleolus. It appears to be more abrasion than deep wound. The wound base is also fibrogranular. No erythema, lymphangitis, or cellulitis. No fluctuance. The muscular compartment is supple and not tense. I/O:    Intake/Output Summary (Last 24 hours) at 1/22/2021 0906  Last data filed at 1/22/2021 0820  Gross per 24 hour   Intake 2046 ml   Output 1950 ml   Net 96 ml              Wt Readings from Last 3 Encounters:   01/22/21 198 lb 13.7 oz (90.2 kg)   01/18/21 160 lb (72.6 kg)   09/27/19 187 lb (84.8 kg)       LABS:    Recent Labs     01/20/21  0514 01/21/21  0655   WBC  --  12.2*   HGB 10.8* 11.6*   HCT 32.6* 35.5*   PLT  --  218        Recent Labs     01/20/21  0514 01/20/21  0514 01/22/21  0740      < > 141   K 3.9   < > 3.6      < > 102   CO2 24   < > 29   PHOS 2.2*  --   --    BUN 23*   < > 15   CREATININE 1.7*   < > 1.3    < > = values in this interval not displayed.       No results for input(s): PROT, INR, APTT in the last 72 hours. IMAGING:  X-rays: ordered for evaluation of the underlying osseous structures. MICRO:   MRSA +  Culture, Wound [[id number]] (Abnormal) Collected: 01/21/21 1428   Order Status: Completed Specimen: Foot, Left Updated: 01/22/21 1029    Gram Stain Result No Epithelial Cells seen   No WBCs or organisms seen     Organism Staph aureus [id number]     WOUND/ABSCESS --Abnormal     Moderate growth   Sensitivity to follow   CONTACT PRECAUTIONS INDICATED   PBP2= POSITIVE   Abnormal    Narrative:           ASSESSMENT:   Left foot diabetic foot ulceration  Left leg wound   Diabetes with peripheral neuropathy  Sepsis  Encephalopathy    PLAN:  Evaluation and Management x 30 minutes with greater than 50% of the time spent with the patient discussing the etiology and treatment options of the chief complaint. 1. Left foot Ulceration  - There is evidence of ulceration without gross infection  - MRSA +   - Mupirocin ordered for daily application  - X-rays ordered    2. Left leg wound  - Mupirocin ordered    DISPO: Patient will need better follow up with podiatry after discharge. Several options in his part of town. Will provide list at d/c. Thanks for the opportunity to participate in this patient's care.      Terri Gleason DPM  [facility]  Pager: [phone]  Office: [phone]  Fax: [phone]

## 2021-01-22 NOTE — PROGRESS NOTES
Hospitalist Progress Note    CC: <principal problem not specified>      Admit date: 1/19/2021  Days in hospital:  3    Subjective/interval history: Pt S/E. Pt less confused, but not at his baseline yet. Wife at bedside. ROS:   Pertinent items are noted in HPI. Objective:    BP (!) 171/64   Pulse 74   Temp 99.6 °F (37.6 °C) (Oral)   Resp 18   Ht 5' 10\" (1.778 m)   Wt 198 lb 13.7 oz (90.2 kg)   SpO2 90%   BMI 28.53 kg/m²     Gen: alert, NAD  HEENT: NC/AT, moist mucous membranes,  Neck: supple, trachea midline  Heart: Normal s1/s2, RRR, no murmurs, gallops, or rubs. Lungs: clear bilaterally, no wheezing, no rales, no rhonchi, no use of accessory muscles  Abd: bowel sounds present, soft, nontender, nondistended  Extrem: No clubbing, cyanosis, no edema. Skin: left foot with a plantar ulcer, no drainage and white granulation tissue. Left medial ankle with dry ulcer, no drainage or swelling  Psych: A & O to name, place, no agitation  Neuro: grossly intact, moves all four extremities spontaneously.  No focal deficits  Cap refill: +2 sec    Medications:  Scheduled Meds:   insulin lispro  0-6 Units Subcutaneous TID WC    insulin lispro  0-3 Units Subcutaneous Nightly    insulin glargine  10 Units Subcutaneous Nightly    vancomycin  1,250 mg Intravenous Q18H    pantoprazole  40 mg Intravenous BID    And    sodium chloride (PF)  10 mL Intravenous BID    cefTRIAXone (ROCEPHIN) IV  2 g Intravenous Q12H    vancomycin (VANCOCIN) intermittent dosing (placeholder)   Other RX Placeholder       PRN Meds:  glucose, dextrose, glucagon (rDNA), dextrose, potassium chloride **OR** potassium alternative oral replacement **OR** potassium chloride, sodium phosphate IVPB **OR** sodium phosphate IVPB, dextrose, magnesium sulfate, acetaminophen, acetaminophen    IV:   sodium chloride 75 mL/hr at 01/21/21 1741    dextrose 100 mL/hr (01/21/21 1155)         Intake/Output Summary (Last 24 hours) at 1/22/2021 0901  Last data filed at 1/22/2021 0820  Gross per 24 hour   Intake 2046 ml   Output 1950 ml   Net 96 ml       Results:  CBC:   Recent Labs     01/19/21  2221 01/20/21  0514 01/21/21  0655   WBC  --   --  12.2*   HGB 11.6* 10.8* 11.6*   HCT 35.2* 32.6* 35.5*   MCV  --   --  77.9*   PLT  --   --  218     BMP:   Recent Labs     01/19/21  1752 01/20/21  0300 01/20/21  0514 01/21/21  0654 01/22/21  0740     --  140 142 141   K 3.7 4.0 3.9 3.9 3.6     --  106 105 102   CO2 25  --  24 29 29   PHOS 2.8 2.3* 2.2*  --   --    BUN 26*  --  23* 17 15   CREATININE 1.7*  --  1.7* 1.4* 1.3     Mag: No results for input(s): MAG in the last 72 hours. Phos:   Lab Results   Component Value Date    PHOS 2.2 (L) 01/20/2021     No components found for: GLU    LIVER PROFILE:   No results for input(s): AST, ALT, LIPASE, BILIDIR, BILITOT, ALKPHOS in the last 72 hours. Invalid input(s): AMYLASE,  ALB  PT/INR:   No results for input(s): PROTIME, INR in the last 72 hours. APTT:   No results for input(s): APTT in the last 72 hours. UA:  No results for input(s): NITRITE, COLORU, PHUR, LABCAST, WBCUA, RBCUA, MUCUS, TRICHOMONAS, YEAST, BACTERIA, CLARITYU, SPECGRAV, LEUKOCYTESUR, UROBILINOGEN, BILIRUBINUR, BLOODU, GLUCOSEU, AMORPHOUS in the last 72 hours. Invalid input(s): Иван Garcia input(s): ABG  Lab Results   Component Value Date    CALCIUM 7.9 (L) 01/22/2021    PHOS 2.2 (L) 01/20/2021       Assessment:    Active Problems:    Diabetic acidosis without coma (HCC)  Resolved Problems:    * No resolved hospital problems. [facility] course: a 55 yo male with iddm admitted with dka and sepsis. He was found by his wife as unresponsive, covered in emesis and stool.      Plan:  possible acute pyogenic meningitis  - on vanc, Rocephin; stopped ampicillin  - lp done, pcr panel is negative  - csf cultures ngtd    Acute metabolic encephalopathy - improving  - due to dka, infection  - head CT negative  - will hold off on an mri brain as he is improving  - treat associated conditions  - avoid sedating meds as able  - supportive care    Sepsis, POA -improving  - with criteria: leukocytosis, lactic acidosis, fevers, tachycardia, tachypnea  - lactate normalized, wbc coming down  - blood and urine cultures ngtd  - ct abdomen/pelvis nonacute   - procalcitonin . 55 -> .18  - covid pcr negative  - abx as above  - ID consulted    Left foot and ankle ulcers  - possibly diabetic foot wound  - wound culture with mrsa  - on vanc  - consult podiatry    DKA -resolved  DM  - home lantus 80 units qhs, humalog 20 units tid -reduce here to lantus 10 units qhs, stop humalog due to hypoglycemia and increase when he starts to eat more  - ssi  - hba1c 10.7    TORI - improving  - crt baseline 1; was 2.7 on admission -> 1.4  - IVF  - Avoid nephrotoxins    Esophagitis   - egd 1/21 with erosive esophagitis   - avoid nsaids  - ppi     gib - coffee ground emesis, melena due to esophagitis  Acute blood loss anemia - no further bleeding or emesis episodes   - 2 units prbcs given  - hgb 12 -> 11, remaining stable  - emesis and stool occult +  - follow cbc  - transfuse for hgb < 7.0  - consulted GI      Code status:  full  DVT prophylaxis: [x] Lovenox  [] SQ Heparin  [] SCDs because of  [] warfarin/oral direct thrombin inhibitor [] Encourage ambulation      Disposition:  [] Home [] Rehab [] Psych [] SNF  [] LTAC  [] Transfer to ICU  [] Transfer to PCU [] Other: in pt      Electronically signed by Anai Young DO on 1/22/2021 at 9:01 AM

## 2021-01-23 ENCOUNTER — APPOINTMENT (OUTPATIENT)
Dept: GENERAL RADIOLOGY | Age: 61
DRG: 871 | End: 2021-01-23
Attending: INTERNAL MEDICINE
Payer: COMMERCIAL

## 2021-01-23 ENCOUNTER — APPOINTMENT (OUTPATIENT)
Dept: MRI IMAGING | Age: 61
DRG: 871 | End: 2021-01-23
Attending: INTERNAL MEDICINE
Payer: COMMERCIAL

## 2021-01-23 LAB
ANION GAP SERPL CALCULATED.3IONS-SCNC: 11 MMOL/L (ref 3–16)
BUN BLDV-MCNC: 16 MG/DL (ref 7–20)
CALCIUM SERPL-MCNC: 8 MG/DL (ref 8.3–10.6)
CHLORIDE BLD-SCNC: 98 MMOL/L (ref 99–110)
CO2: 26 MMOL/L (ref 21–32)
CREAT SERPL-MCNC: 1.3 MG/DL (ref 0.8–1.3)
GFR AFRICAN AMERICAN: >60
GFR NON-AFRICAN AMERICAN: 56
GLUCOSE BLD-MCNC: 224 MG/DL (ref 70–99)
GLUCOSE BLD-MCNC: 232 MG/DL (ref 70–99)
GLUCOSE BLD-MCNC: 276 MG/DL (ref 70–99)
GLUCOSE BLD-MCNC: 300 MG/DL (ref 70–99)
GLUCOSE BLD-MCNC: 305 MG/DL (ref 70–99)
GRAM STAIN RESULT: ABNORMAL
MAGNESIUM: 2.1 MG/DL (ref 1.8–2.4)
ORGANISM: ABNORMAL
PERFORMED ON: ABNORMAL
POTASSIUM SERPL-SCNC: 3.6 MMOL/L (ref 3.5–5.1)
SODIUM BLD-SCNC: 135 MMOL/L (ref 136–145)
VANCOMYCIN TROUGH: 8.2 UG/ML (ref 10–20)
WOUND/ABSCESS: ABNORMAL

## 2021-01-23 PROCEDURE — 80048 BASIC METABOLIC PNL TOTAL CA: CPT

## 2021-01-23 PROCEDURE — A9577 INJ MULTIHANCE: HCPCS | Performed by: INTERNAL MEDICINE

## 2021-01-23 PROCEDURE — 36415 COLL VENOUS BLD VENIPUNCTURE: CPT

## 2021-01-23 PROCEDURE — 6360000002 HC RX W HCPCS: Performed by: FAMILY MEDICINE

## 2021-01-23 PROCEDURE — 80202 ASSAY OF VANCOMYCIN: CPT

## 2021-01-23 PROCEDURE — 6360000002 HC RX W HCPCS: Performed by: INTERNAL MEDICINE

## 2021-01-23 PROCEDURE — C9113 INJ PANTOPRAZOLE SODIUM, VIA: HCPCS | Performed by: INTERNAL MEDICINE

## 2021-01-23 PROCEDURE — 70553 MRI BRAIN STEM W/O & W/DYE: CPT

## 2021-01-23 PROCEDURE — 71045 X-RAY EXAM CHEST 1 VIEW: CPT

## 2021-01-23 PROCEDURE — 94761 N-INVAS EAR/PLS OXIMETRY MLT: CPT

## 2021-01-23 PROCEDURE — 99233 SBSQ HOSP IP/OBS HIGH 50: CPT | Performed by: INTERNAL MEDICINE

## 2021-01-23 PROCEDURE — 6370000000 HC RX 637 (ALT 250 FOR IP): Performed by: FAMILY MEDICINE

## 2021-01-23 PROCEDURE — 6360000004 HC RX CONTRAST MEDICATION: Performed by: INTERNAL MEDICINE

## 2021-01-23 PROCEDURE — 2580000003 HC RX 258: Performed by: INTERNAL MEDICINE

## 2021-01-23 PROCEDURE — 83735 ASSAY OF MAGNESIUM: CPT

## 2021-01-23 PROCEDURE — 73630 X-RAY EXAM OF FOOT: CPT

## 2021-01-23 PROCEDURE — 1200000000 HC SEMI PRIVATE

## 2021-01-23 RX ORDER — INSULIN GLARGINE 100 [IU]/ML
13 INJECTION, SOLUTION SUBCUTANEOUS NIGHTLY
Status: DISCONTINUED | OUTPATIENT
Start: 2021-01-23 | End: 2021-01-24

## 2021-01-23 RX ADMIN — INSULIN LISPRO 5 UNITS: 100 INJECTION, SOLUTION INTRAVENOUS; SUBCUTANEOUS at 11:23

## 2021-01-23 RX ADMIN — INSULIN LISPRO 2 UNITS: 100 INJECTION, SOLUTION INTRAVENOUS; SUBCUTANEOUS at 08:33

## 2021-01-23 RX ADMIN — INSULIN LISPRO 4 UNITS: 100 INJECTION, SOLUTION INTRAVENOUS; SUBCUTANEOUS at 11:23

## 2021-01-23 RX ADMIN — CEFTRIAXONE 2000 MG: 2 INJECTION, POWDER, FOR SOLUTION INTRAMUSCULAR; INTRAVENOUS at 08:43

## 2021-01-23 RX ADMIN — MUPIROCIN: 20 OINTMENT TOPICAL at 11:54

## 2021-01-23 RX ADMIN — VANCOMYCIN HYDROCHLORIDE 1500 MG: 10 INJECTION, POWDER, LYOPHILIZED, FOR SOLUTION INTRAVENOUS at 13:41

## 2021-01-23 RX ADMIN — INSULIN LISPRO 5 UNITS: 100 INJECTION, SOLUTION INTRAVENOUS; SUBCUTANEOUS at 17:18

## 2021-01-23 RX ADMIN — GADOBENATE DIMEGLUMINE 15 ML: 529 INJECTION, SOLUTION INTRAVENOUS at 16:08

## 2021-01-23 RX ADMIN — Medication 10 ML: at 08:36

## 2021-01-23 RX ADMIN — INSULIN LISPRO 2 UNITS: 100 INJECTION, SOLUTION INTRAVENOUS; SUBCUTANEOUS at 21:28

## 2021-01-23 RX ADMIN — INSULIN GLARGINE 13 UNITS: 100 INJECTION, SOLUTION SUBCUTANEOUS at 21:29

## 2021-01-23 RX ADMIN — Medication 10 ML: at 21:24

## 2021-01-23 RX ADMIN — PANTOPRAZOLE SODIUM 40 MG: 40 INJECTION, POWDER, FOR SOLUTION INTRAVENOUS at 08:36

## 2021-01-23 RX ADMIN — ENOXAPARIN SODIUM 30 MG: 30 INJECTION SUBCUTANEOUS at 08:37

## 2021-01-23 RX ADMIN — PANTOPRAZOLE SODIUM 40 MG: 40 INJECTION, POWDER, FOR SOLUTION INTRAVENOUS at 21:24

## 2021-01-23 RX ADMIN — CEFTRIAXONE 2000 MG: 2 INJECTION, POWDER, FOR SOLUTION INTRAMUSCULAR; INTRAVENOUS at 21:23

## 2021-01-23 RX ADMIN — INSULIN LISPRO 4 UNITS: 100 INJECTION, SOLUTION INTRAVENOUS; SUBCUTANEOUS at 17:18

## 2021-01-23 ASSESSMENT — PAIN SCALES - GENERAL
PAINLEVEL_OUTOF10: 0

## 2021-01-23 NOTE — PROGRESS NOTES
Department of Podiatric Surgery  Progress Note  1/23/2021  Merian Meter      HISTORY OF PRESENT ILLNESS:      The patient is a 56 y.o. male who presents with sepsis, DKA, AMS, GI bleed, TORI and possible meningitis. He is more alert and coherent today. Reports that he follows with Dr. Donna Amaya at [facility] for his foot care and would like to follow up with him at d/c. PHYSICAL EXAM:  VITALS:  BP (!) 181/88   Pulse 72   Temp 99.6 °F (37.6 °C) (Oral)   Resp 16   Ht 5' 10\" (1.778 m)   Wt 198 lb 13.7 oz (90.2 kg)   SpO2 93%   BMI 28.53 kg/m²     LOWER EXTREMITY:  VASCULAR: Pedal Pulses present. negative signs of ischemia. MUSCULOSKELETAL:  positive gross deformity. There is evidence of absence of the 2nd digit, right without any evidence of any acute changes. NEUROLOGIC:  Epicritic sensation, light touch, joint position sense absent  SKIN:  Open wound sub 1st MPJ, left foot without signs of active infection. The wound base is 100% fibro-granular with a depth of subcutaneous tissues only. There is no evidence of any deep tissues, fascia or bone. There is no tenderness, fluctuance, crepitus or instability to suggest a deep or rapid spreading infection. A second wound is noted on the posterior medial leg just proximal to the medial malleolus. It appears to be more abrasion than deep wound. The wound base is also fibrogranular. No erythema, lymphangitis, or cellulitis. No fluctuance. The muscular compartment is supple and not tense.        I/O:    Intake/Output Summary (Last 24 hours) at 1/23/2021 1157  Last data filed at 1/23/2021 0438  Gross per 24 hour   Intake 660 ml   Output --   Net 660 ml              Wt Readings from Last 3 Encounters:   01/22/21 198 lb 13.7 oz (90.2 kg)   01/18/21 160 lb (72.6 kg)   09/27/19 187 lb (84.8 kg)       LABS:    Recent Labs     01/21/21  0655   WBC 12.2*   HGB 11.6*   HCT 35.5*           Recent Labs     01/23/21  0728   *   K 3.6   CL 98*   CO2 26   BUN 16 CREATININE 1.3      No results for input(s): PROT, INR, APTT in the last 72 hours. IMAGING:  X-rays:   Right   Impression   Moderate narrowing of the 1st MTP joint with subtle cortical erosions along   the base of the proximal phalanx laterally which may be slightly more   prominent and could represent a chronic erosive arthropathy or possible early   osteomyelitis.  Suggest a three-phase bone scan for follow-up.       Previous 2nd toe amputation with small bony fragments inferiorly which are   unchanged.       No obvious acute bony abnormality.             Left Pending    MICRO:   MRSA +  Culture, Wound [[id number]] (Abnormal) Collected: 01/21/21 142   Order Status: Completed Specimen: Foot, Left Updated: 01/22/21 1029    Gram Stain Result No Epithelial Cells seen   No WBCs or organisms seen     Organism Staph aureus [id number]     WOUND/ABSCESS --Abnormal     Moderate growth   Sensitivity to follow   CONTACT PRECAUTIONS INDICATED   PBP2= POSITIVE   Abnormal    Narrative:           ASSESSMENT:   Left foot diabetic foot ulceration  Left leg wound   Diabetes with peripheral neuropathy  Sepsis  Encephalopathy    PLAN:  Evaluation and Management x 30 minutes with greater than 50% of the time spent with the patient discussing the etiology and treatment options of the chief complaint. 1. Left foot Ulceration  - There is evidence of ulceration without gross infection  - MRSA +   - Mupirocin ordered for daily application  - X-rays ordered again for the left foot as well. 2. Left leg wound  - Mupirocin ordered      3. Right foot  - No open wounds  - May need to follow up outpatient for signs of erosive changes in the 1st MPJ    DISPO: Patient will need better follow up with podiatry after discharge. Can follow up at wound center with Dr. Shirley Maldonado. Thanks for the opportunity to participate in this patient's care.      Emma Berrios DPUSHA  [facility]  Pager: [phone]  Office: [phone]  Fax: [phone]

## 2021-01-23 NOTE — PROGRESS NOTES
Pt alert and oriented jennings was discontinue on day shift assist to bathroom pt went to the bathroom x 3 since jennings removed. Assessment completed and charted bed in low position call light and belongings within reach need met will continue to monitor.

## 2021-01-23 NOTE — PROGRESS NOTES
Hospitalist Progress Note    CC: <principal problem not specified>      Admit date: 1/19/2021  Days in hospital:  4    Subjective/interval history: Pt S/E. Pt almost back to baseline. Wants to go home. Wife at bedside. Has a cough  ROS:   Pertinent items are noted in HPI. Objective:    BP (!) 166/83   Pulse 71   Temp 98.9 °F (37.2 °C) (Oral)   Resp 18   Ht 5' 10\" (1.778 m)   Wt 198 lb 13.7 oz (90.2 kg)   SpO2 93%   BMI 28.53 kg/m²     Gen: alert, NAD  HEENT: NC/AT, moist mucous membranes  Neck: supple, trachea midline  Heart: Normal s1/s2, RRR, no murmurs, gallops, or rubs. Lungs: no wheezing, no rales, no rhonchi, no use of accessory muscles  Abd: bowel sounds present, soft, nontender, nondistended  Extrem: No clubbing, cyanosis, no edema. Skin: left foot with a plantar ulcer, no drainage and white granulation tissue. Left medial ankle with dry ulcer, no drainage or swelling  Psych: A & O to name, date, knows he's in a hospital but not which one  Neuro: grossly intact, moves all four extremities spontaneously.  No focal deficits  Cap refill: +2 sec    Medications:  Scheduled Meds:   mupirocin   Topical Daily    enoxaparin  30 mg Subcutaneous Daily    cefTRIAXone (ROCEPHIN) IV  2,000 mg Intravenous Q12H    insulin lispro  0-6 Units Subcutaneous TID WC    insulin lispro  0-3 Units Subcutaneous Nightly    insulin glargine  10 Units Subcutaneous Nightly    vancomycin  1,250 mg Intravenous Q18H    pantoprazole  40 mg Intravenous BID    And    sodium chloride (PF)  10 mL Intravenous BID    vancomycin (VANCOCIN) intermittent dosing (placeholder)   Other RX Placeholder       PRN Meds:  glucose, dextrose, glucagon (rDNA), dextrose, potassium chloride **OR** potassium alternative oral replacement **OR** potassium chloride, sodium phosphate IVPB **OR** sodium phosphate IVPB, dextrose, magnesium sulfate, acetaminophen, acetaminophen    IV:   dextrose 100 mL/hr (01/21/21 1155) criteria: leukocytosis, lactic acidosis, fevers, tachycardia, tachypnea  - lactate normalized, wbc coming down, fever curve improving   - blood and urine cultures ngtd  - ct abdomen/pelvis nonacute   - procalcitonin . 55 -> .18  - covid pcr negative  - abx as above  - ID consulted    Left foot and ankle ulcers  - possibly diabetic foot wound  - xray with possible early osteo, need a 3 phase bone scan?  - wound culture with mrsa  - on vanc  - consult podiatry    DKA -resolved  DM  - home lantus 80 units qhs, humalog 20 units tid   - increase lantus to 13 units qhs as he is now eating more  - ssi  - hba1c 10.7    TORI - resolved  - crt baseline 1; was 2.7 on admission -> 1.3  - IVF  - Avoid nephrotoxins    Esophagitis   - egd 1/21 with erosive esophagitis   - avoid nsaids  - ppi     gib - coffee ground emesis, melena due to esophagitis  Acute blood loss anemia - no further bleeding or emesis episodes   - 2 units prbcs given  - hgb 12 -> 11, remaining stable  - emesis and stool occult +  - follow cbc  - transfuse for hgb < 7.0  - consulted GI      Code status:  full  DVT prophylaxis: [x] Lovenox  [] SQ Heparin  [] SCDs because of  [] warfarin/oral direct thrombin inhibitor [] Encourage ambulation      Disposition:  [] Home [] Rehab [] Psych [] SNF  [] LTAC  [] Transfer to ICU  [] Transfer to PCU [] Other: in pt      Electronically signed by Blaise Lai DO on 1/23/2021 at 10:19 AM

## 2021-01-23 NOTE — PROGRESS NOTES
Infectious Disease Follow up Notes  Admit Date: 1/19/2021  Hospital Day: 5    Antibiotics :   IV Vancomycin  IV Ceftriaxone      CHIEF COMPLAINT:       DKA with coma  Sepsis  Change in Mentation   WBC elevation  Meningitis   Abnormal CSF     Subjective interval History :  56 y.o. Man with DM and previous Neuropathy, foot infections, and h/o DKA in the past and admitted from home with change in mentation and was found by his wife unresponsive and in emesis and incontinence, EMS was called and BG reading high and on admit BG > 600 AND Ph at  6.96 with severe acidosis and Beta hydroxy butyric acid > 8 and WBC elevation in TORI on admit with on going change in mentation but no seizures per his wife. He was tested -ve for COVID 19 and Blood, urine cx in process he had marked lactic acidosis at 9.3 on admission. Creatinine elevated 2.6 CSF analysis completed given the presentation and its abnormal with WBC elevation and Neutrophils   84% and WBC at 14, Protein 31, Gluc 243. He is currently on nasal cannula and we are consulted for IV antibiotic recommendations. CT brain admission negative, CT abdomen pelvis negative.     Interval History : Sitting up in the chair mental status slowly improving wife is at bedside.  Able to ambulate and WBC trend down and CXR with some volume over load and Wound cx from Left foot MRSA noted    Past Medical History:    Past Medical History:   Diagnosis Date    TORI (acute kidney injury) ([facility] 09/12/2017    Bacteremia 05/05/2017    staph aureus    Diabetes mellitus ([facility]     Diabetic neuropathy ([facility]     Gout     MRSA (methicillin resistant staph aureus) culture positive 12/27/18, 9/12/17, 5/5/17,9/5/13, 1/15/14    ulcers bilateral legs    MRSA (methicillin resistant staph aureus) culture positive 01/21/2021    foot wound    Pneumonia     Pyogenic inflammation of bone ([facility]        Past Surgical History: Past Surgical History:   Procedure Laterality Date    EYE SURGERY      lens implants after removal of cataracts    OTHER SURGICAL HISTORY  12/28/2018    partial right foot amputation with graft application    TOE AMPUTATION Right 12/28/2018    PARTIAL RIGHT FOOT AMPUTATION WITH GRAFT APPLICATION performed by Alona Green DPM at [address] 1/21/2021    EGD BIOPSY performed by Kaylene Chung DO at [address] [facility]       Current Medications:    Outpatient Medications Marked as Taking for the 1/19/21 encounter [facility]OS[facility] Encounter)   Medication Sig Dispense Refill    insulin lispro (HUMALOG) 100 UNIT/ML injection vial Inject 65 Units into the skin Daily with supper      gabapentin (NEURONTIN) 600 MG tablet Take 600 mg by mouth 2 times daily.  cyclobenzaprine (FLEXERIL) 10 MG tablet Take 10 mg by mouth 3 times daily as needed for Muscle spasms      venlafaxine (EFFEXOR XR) 75 MG extended release capsule Take 75 mg by mouth daily         Allergies:  Hydrocodone-acetaminophen, Percocet [oxycodone-acetaminophen], Pregabalin, and Vicodin [hydrocodone-acetaminophen]    Immunizations :   Immunization History   Administered Date(s) Administered    Influenza Virus Vaccine 02/05/2001    Tdap (Boostrix, Adacel) 09/05/2008       Social History:    Social History     Tobacco Use    Smoking status: Never Smoker    Smokeless tobacco: Never Used   Substance Use Topics    Alcohol use: No     Comment: FORMER DRINKER approx.  quit 2005    Drug use: No     Social History     Tobacco Use   Smoking Status Never Smoker   Smokeless Tobacco Never Used      Family History   Problem Relation Age of Onset    Diabetes Maternal Grandfather     Heart Disease Paternal Uncle     High Blood Pressure Mother          REVIEW OF SYSTEMS:     Constitutional:   fevers+, chills,  night sweats  Eyes:  negative for blurred vision, eye discharge, visual disturbance   HEENT:  negative for hearing loss, ear drainage,nasal congestion  Respiratory:  negative for cough, shortness of breath or hemoptysis   Cardiovascular:  negative for chest pain, palpitations, syncope  Gastrointestinal:  negative for nausea, vomiting, diarrhea, constipation, abdominal pain  Genitourinary:  negative for frequency, dysuria, urinary incontinence, hematuria  Hematologic/Lymphatic:  negative for easy bruising, bleeding and lymphadenopathy  Allergic/Immunologic:  negative for recurrent infections, angioedema, anaphylaxis   Endocrine:  negative for weight changes, polyuria, polydipsia and polyphagia  Musculoskeletal:  negative for joint  pain, swelling, decreased range of motion  Integumentary: No rashes, skin lesions  Neurological:  negative for headaches, slurred speech, unilateral weakness  Psychiatric: hallucinations , confusion ,agitation. PHYSICAL EXAM:      Vitals:   T MAX  102.4 on admit    BP (!) 166/83   Pulse 71   Temp 98.9 °F (37.2 °C) (Oral)   Resp 18   Ht 5' 10\" (1.778 m)   Wt 198 lb 13.7 oz (90.2 kg)   SpO2 93%   BMI 28.53 kg/m²     General Appearance:awake + some intermittent confusion  and + in Some acute distress, +  pallor, no icterus chronic ill appearing man+  Skin: warm and dry, no rash or erythema  Head: normocephalic and atraumatic  Eyes: pupils equal, round, and reactive to light, conjunctivae normal  ENT: tympanic membrane, external ear and ear canal normal bilaterally, nose without deformity, nasal mucosa and turbinates normal without polyps  Neck: supple and non-tender without mass, no thyromegaly  no cervical lymphadenopathy  Pulmonary/Chest: clear to auscultation bilaterally- no wheezes, rales or rhonchi, normal air movement, no respiratory distress  Cardiovascular: normal rate, regular rhythm, normal S1 and S2, no murmurs, rubs, clicks, or gallops, no carotid bruits  Abdomen: soft, non-tender, non-distended, normal bowel sounds, no masses or organomegaly  Extremities: no cyanosis, clubbing or edema  Musculoskeletal: normal range of motion, no joint swelling, deformity or tenderness  Integumentary: No rashes, no abnormal skin lesions, no petechiae  Neurologic: reflexes normal and symmetric, no cranial nerve deficit  Lines: IV  David+  Left foot ulcer+  Diabetic foot changes+     Data Review:    CBC:   Lab Results   Component Value Date    WBC 12.2 (H) 01/21/2021    HGB 11.6 (L) 01/21/2021    HCT 35.5 (L) 01/21/2021    MCV 77.9 (L) 01/21/2021     01/21/2021     RENAL:   Lab Results   Component Value Date    CREATININE 1.3 01/23/2021    BUN 16 01/23/2021     (L) 01/23/2021    K 3.6 01/23/2021    CL 98 (L) 01/23/2021    CO2 26 01/23/2021     SED RATE:   Lab Results   Component Value Date    SEDRATE 60 04/15/2016     CK: No results found for: CKTOTAL  CRP: No results found for: CRP  Hepatic Function Panel:   Lab Results   Component Value Date    ALKPHOS 195 01/18/2021    ALT 25 01/18/2021    AST 21 01/18/2021    PROT 8.3 01/18/2021    PROT 7.3 08/20/2012    BILITOT 0.6 01/18/2021    LABALBU 4.3 01/18/2021     UA:  Lab Results   Component Value Date    COLORU Straw 01/18/2021    CLARITYU Clear 01/18/2021    GLUCOSEU >=1000 01/18/2021    BILIRUBINUR Negative 01/18/2021    KETUA >=80 01/18/2021    SPECGRAV 1.020 01/18/2021    BLOODU SMALL 01/18/2021    PHUR 5.5 01/18/2021    PROTEINU TRACE 01/18/2021    UROBILINOGEN 0.2 01/18/2021    NITRU Negative 01/18/2021    LEUKOCYTESUR Negative 01/18/2021    LABMICR YES 01/18/2021    URINETYPE NotGiven 01/18/2021      Urine Microscopic:   Lab Results   Component Value Date    LABCAST 3-5 Hyaline 01/31/2019    BACTERIA Rare 01/18/2021    WBCUA 3-5 01/18/2021    RBCUA 0-2 01/18/2021    EPIU 2-5 01/18/2021     Urine Reflex to Culture:   Lab Results   Component Value Date    URRFLXCULT Not Indicated 01/18/2021       Creat  2.6  Down 1.7     lACTIC ACID   9.3  DOWN  1.5       PROCAL 0.46   /19/2021  5:30 PM - Mayo Donahue Incoming Lab Results From Soft (Epic Adt)    Component Value Ref Range & Units Status Collected Lab   Color, CSF Colorless  Colorless Final 01/19/2021 11:08 AM [facility]   Appearance, CSF Clear  Clear Final 01/19/2021 11:08 AM [facility]   Tube Number + CELL CT + DIFF-CSF Tube 3   Final 01/19/2021 11:08 AM [facility]   Clot Evaluation CSF see below   Final 01/19/2021 11:08 AM [facility]   No Clots Seen   WBC, CSF 14High   0 - 5 /cumm Final 01/19/2021 11:08 AM [facility]   RBC, CSF 17Abnormal   0 /cumm Final 01/19/2021 11:08 AM [facility]   Neutrophils, CSF 84High   0 - 6 % Final 01/19/2021 11:08 AM [facility]   Lymphs, CSF 9Low   40 - 80 % Final 01/19/2021 11:08 AM [facility]   Monocytes, CSF 6Low   15 - 45 % Final 01/19/2021 11:08 AM [facility]   Atypical Lymphs CSF 1  % Final 01/19/2021 11:08 AM [facility]      MICRO: cultures reviewed and updated by me   Blood Culture:   Results    Procedure Component Value Units Date/Time   Culture, Wound [[id number]] (Abnormal) Collected: 01/21/21 1428   Order Status: Completed Specimen: Foot, Left Updated: 01/22/21 1029    Gram Stain Result No Epithelial Cells seen   No WBCs or organisms seen     Organism Staph aureus MRSAAbnormal     WOUND/ABSCESS --Abnormal     Moderate growth   Sensitivity to follow   CONTACT PRECAUTIONS INDICATED   PBP2= POSITIVE   Abnormal    Narrative:     ORDER#: [id number]                          ORDERED BY: NEELIMA CHANEY   SOURCE: Foot                               COLLECTED:  01/21/21 14:28   ANTIBIOTICS AT [facility]. :                      RECEIVED :  01/21/21 15:04   CALL  [facility]odd  VOU[facility] tel. [phone],               Legionella Antigen, Urine [[id number]] Collected: 01/19/21 2209   Order Status: Completed Specimen: Urine, clean catch Updated: 01/20/21 1219    L. pneumophila Serogp 1 Ur Ag --    Presumptive Negative   No Legionella pneumophila serogroup 1 antigens detected.    A negative result does not exclude infection with   Legionella pneumophila serogroup 1 nor does it rule out   other microbial-caused respiratory infections or   disease caused by other serogroups of   Legionella pneumophila. Normal Range: Presumptive Negative    Narrative:     ORDER#: [id number]                          ORDERED BY: Jacob Martinez   SOURCE: Urine Clean Catch                  COLLECTED:  01/19/21 22:09   ANTIBIOTICS AT [facility]. :                      RECEIVED :  01/19/21 22:22   Performed at:   [facility]   [address] [facility] [employer] [address]   Phone [phone]   Strep Pneumoniae Antigen [[id number]] Collected: 01/19/21 2209   Order Status: Completed Specimen: Urine, clean catch Updated: 01/20/21 1217    STREP PNEUMONIAE ANTIGEN, URINE --    Presumptive Negative   Presumptive negative suggests no current or recent   pneumococcal infection. Infection due to Strep pneumoniae   cannot be ruled out since the antigen present in the sample   may be below the detection limit of the test.   Normal Range:Presumptive Negative    Narrative:     ORDER#: [id number]                          ORDERED BY: Jacob Martinez   SOURCE: Urine Clean Catch                  COLLECTED:  01/19/21 22:09   ANTIBIOTICS AT [facility]. :                      RECEIVED :  01/19/21 22:22   Performed at:   [facility]   [address] [place] [employer] [address]   Phone [phone]   Culture, CSF [[id number]] Collected: 01/19/21 1108   Order Status: Sent Specimen: CSF Updated: 01/19/21 1359    Gram Stain Result WBC's (Polymorphonuclear)   No organisms seen   Cytospin performed,no quantitation    Narrative:     ORDER#: [id number]                          ORDERED BY: Mika Brandt   SOURCE: CSF (Spinal Fluid)                 COLLECTED:  01/19/21 11:08   ANTIBIOTICS AT [facility]. :                      RECEIVED :  01/19/21 11:47   Performed at:   [address]  [facility]   [address] [place] [address]   Phone [phone]   Meningitis Encephalitis Panel Report [[id number]] Collected: 01/19/21 1108   Order Status: Completed Updated: 01/19/21 1352    Report SEE IMAGE   Narrative:     Referred out by:   [facility]   [address], [place] [address]   Phone [phone]   Meningitis/Encephalitis Panel, CSF [[id number]] Collected: 01/19/21 1108   Order Status: Completed Specimen: CSF Updated: 01/19/21 1349    Meningitis Encephalitis Panel --    Meningitis Encephalitis Panel PCR Result: Not Detected   See additional report for complete Meningitis Encephalitis Panel. Narrative:     ORDER#: [id number]                          ORDERED BY: Edmundo Mendoza   SOURCE: CSF (Spinal Fluid)                 COLLECTED:  01/19/21 11:08   ANTIBIOTICS AT [facility]. :                      RECEIVED :  01/19/21 12:20   Performed at:   [facility]   [address], [place] [address]   Phone [phone]   Culture, Blood 1 [[id number]]    Order Status: Sent Specimen: Blood    Culture, Blood 2 [[id number]]    Order Status: Sent Specimen: Blood    Culture, Blood 2 [[id number]]    Order Status: Canceled Specimen: Blood    Culture, Blood 1 [[id number]]    Order Status: Canceled Specimen: Blood        Lab Results   Component Value Date    BC No Growth after 4 days of incubation. 01/18/2021    BLOODCULT2 No Growth after 4 days of incubation. 01/18/2021       Respiratory Culture:  Lab Results   Component Value Date    CULTRESP Light growth normal respiratory idris with 09/12/2017    CULTRESP  09/12/2017     Rare growth  Susceptibility testing of penicillin and other beta-lactams is  not necessary for beta hemolytic Streptococci since resistant  strains have not been identified.  (CLSI K498-T48, 2017)      CULTRESP  09/12/2017     Rare growth  Susceptibility testing of penicillin and other beta-lactams is  not necessary for beta hemolytic Streptococci since resistant  strains have not been identified. (CLSI O155-P40, 2017)      LABGRAM  01/21/2021     No Epithelial Cells seen  No WBCs or organisms seen       AFB:No results found for: AFBSMEAR  Viral Culture:  Lab Results   Component Value Date    COVID19 Not Detected 01/19/2021     Urine Culture: No results for input(s): Durward Iba in the last 72 hours. IMAGING:    XR FOOT RIGHT (MIN 3 VIEWS)   Final Result   Moderate narrowing of the 1st MTP joint with subtle cortical erosions along   the base of the proximal phalanx laterally which may be slightly more   prominent and could represent a chronic erosive arthropathy or possible early   osteomyelitis. Suggest a three-phase bone scan for follow-up. Previous 2nd toe amputation with small bony fragments inferiorly which are   unchanged. No obvious acute bony abnormality. FL MODIFIED BARIUM SWALLOW W VIDEO   Final Result   Negative except for episode of flash laryngeal penetration of thin liquid   towards the end of the exam      Please see separate speech pathology report for full discussion of findings   and recommendations. IR LUMBAR PUNCTURE FOR DIAGNOSIS   Final Result   Successful fluoroscopic-guided lumbar puncture. XR CHEST PORTABLE   Final Result   Appropriate radiographic positioning of the nasogastric tube. Recommend clinical correlation prior to use.                All the pertinent images and reports for the current Hospitalization were reviewed by me     Scheduled Meds:   mupirocin   Topical Daily    enoxaparin  30 mg Subcutaneous Daily    cefTRIAXone (ROCEPHIN) IV  2,000 mg Intravenous Q12H    insulin lispro  0-6 Units Subcutaneous TID WC    insulin lispro  0-3 Units Subcutaneous Nightly    insulin glargine  10 Units Subcutaneous Nightly    vancomycin  1,250 mg Intravenous Q18H    pantoprazole  40 mg Intravenous BID    And    sodium chloride .  Left foot wound cx MRSA noted      CXR from 1/23 reviewed no consolidation - MRI brain pending today      Labs, Microbiology, Radiology and all the pertinent results from current hospitalization and  care every where were reviewed  by me as a part of the evaluation   Plan:   1. Cont IV Vancomycin by levels will cover  MRSA wound infection   2. Cont IV Ceftriaxone x 2 gm Q 12 HRS  3. EGD results note   4. CSF cx in process NGTD  5. CSF cell counts on tube 3  Wbc HIGHER than normal   6. Meningoencephalitis panel on CSF -ve  7. Blood cx in process  NGTD  8. Trend Procal check on Monday  9. HbA1c elevated  10. Control DM   11. COVID 19 PCR -VE  12. Urine for legionella -ve and Pneumococcal antigen  -ve   13. CXR no consolidation  14. Able to choose oral abx for D/C planning on Monday to cover Left foot infection     Discussed with patient/Family and Nursing staff   Risk of Complications/Morbidity: High      · Illness(es)/ Infection present that pose threat to bodily function. · There is potential for severe exacerbation of infection/side effects of treatment. · Therapy requires intensive monitoring for antimicrobial agent toxicity. Thanks for allowing me to participate in your patient's care and please call me with any questions or concerns.     Clarissa Thompson MD  Infectious Disease  [facility]) Physician  Phone: [phone]   Fax : [phone]

## 2021-01-23 NOTE — PROGRESS NOTES
Clinical Pharmacy Note  Vancomycin Consult    Sahil Lowry is a 56 y.o. male ordered Vancomycin for MRSA wound infection and r/o pyogenic meningitis; consult received from Dr. Shasta Keene to manage therapy. Also receiving Rocephin. Patient Active Problem List   Diagnosis    Diabetic ketoacidosis without coma associated with type 2 diabetes mellitus [facility]    Nausea & vomiting    Diabetic neuropathy (HCC)    DM2/IDDM    Gout    Hx MRSA    Hyperkalemia    Chronic pain on chronic opioids    Chronic LE diabetic ulcers    Mild protein-calorie malnutrition (HCC)    Cellulitis    TORI (acute kidney injury) ([facility]    Neuropathy    Diabetic acidosis without coma (HCC)       Allergies:  Hydrocodone-acetaminophen, Percocet [oxycodone-acetaminophen], Pregabalin, and Vicodin [hydrocodone-acetaminophen]     Temp max:  Temp (24hrs), Av °F (37.2 °C), Min:98.3 °F (36.8 °C), Max:99.6 °F (37.6 °C)      Recent Labs     21  0655   WBC 12.2*       Recent Labs     21  0654 21  0740 21  0728   BUN 17 15 16   CREATININE 1.4* 1.3 1.3         Intake/Output Summary (Last 24 hours) at 2021 1228  Last data filed at 2021 0438  Gross per 24 hour   Intake 180 ml   Output --   Net 180 ml       Culture Results:  Wound: MRSA (RENITA = 1 ug/mL)  CSF: No organisms identified    Ht Readings from Last 1 Encounters:   21 5' 10\" (1.778 m)        Wt Readings from Last 1 Encounters:   21 198 lb 13.7 oz (90.2 kg)         Estimated Creatinine Clearance: 67 mL/min (based on SCr of 1.3 mg/dL). Assessment/Plan:  Vancomycin trough = 8.2 ug/mL. Will increase Vancomycin to 1,500 mg IVPB Q18H at this time. Vancomycin trough ordered for 21 at 0000 to assist with subsequent dosing/management. Thank you for the consult. Will continue to follow.     Geri Agee, JayjayD, BCPS  2021 12:33 PM

## 2021-01-23 NOTE — PROGRESS NOTES
D: pt has been having cough, eating well, but this RN concerned about oxygen being at 89-90 percent on RA, pt is at risk for aspiration A: this RN reported concerns to Dr. Nancy Hodge R: will continue to monitor pt

## 2021-01-23 NOTE — PROGRESS NOTES
Pt awake and alert, pt dissatisfied with breakfast order, pt did not order himself last night, this RN reordered breakfast and asked dietary to ask pt about lunch and dinner, dietary agreed to this, pt satisfied with plan of care, pt in bed with bed alarm on, call light within reach

## 2021-01-23 NOTE — PROGRESS NOTES
Dr. Mccormick Links at bedside, this RN had just changed dressing and reported this to physician.  Pt to follow up outpt with Dr. Fang Cifuentes in [place], this RN to report to pt's wife

## 2021-01-24 LAB
ANION GAP SERPL CALCULATED.3IONS-SCNC: 11 MMOL/L (ref 3–16)
BUN BLDV-MCNC: 15 MG/DL (ref 7–20)
CALCIUM SERPL-MCNC: 8.3 MG/DL (ref 8.3–10.6)
CHLORIDE BLD-SCNC: 97 MMOL/L (ref 99–110)
CO2: 27 MMOL/L (ref 21–32)
CREAT SERPL-MCNC: 1.3 MG/DL (ref 0.8–1.3)
GFR AFRICAN AMERICAN: >60
GFR NON-AFRICAN AMERICAN: 56
GLUCOSE BLD-MCNC: 200 MG/DL (ref 70–99)
GLUCOSE BLD-MCNC: 264 MG/DL (ref 70–99)
GLUCOSE BLD-MCNC: 264 MG/DL (ref 70–99)
GLUCOSE BLD-MCNC: 278 MG/DL (ref 70–99)
GLUCOSE BLD-MCNC: 340 MG/DL (ref 70–99)
GLUCOSE BLD-MCNC: 363 MG/DL (ref 70–99)
MAGNESIUM: 2.3 MG/DL (ref 1.8–2.4)
PERFORMED ON: ABNORMAL
POTASSIUM SERPL-SCNC: 3.8 MMOL/L (ref 3.5–5.1)
SODIUM BLD-SCNC: 135 MMOL/L (ref 136–145)

## 2021-01-24 PROCEDURE — 80202 ASSAY OF VANCOMYCIN: CPT

## 2021-01-24 PROCEDURE — 1200000000 HC SEMI PRIVATE

## 2021-01-24 PROCEDURE — 99232 SBSQ HOSP IP/OBS MODERATE 35: CPT | Performed by: INTERNAL MEDICINE

## 2021-01-24 PROCEDURE — 6360000002 HC RX W HCPCS: Performed by: INTERNAL MEDICINE

## 2021-01-24 PROCEDURE — 6370000000 HC RX 637 (ALT 250 FOR IP): Performed by: FAMILY MEDICINE

## 2021-01-24 PROCEDURE — 83735 ASSAY OF MAGNESIUM: CPT

## 2021-01-24 PROCEDURE — 80048 BASIC METABOLIC PNL TOTAL CA: CPT

## 2021-01-24 PROCEDURE — 6370000000 HC RX 637 (ALT 250 FOR IP): Performed by: INTERNAL MEDICINE

## 2021-01-24 PROCEDURE — 2580000003 HC RX 258: Performed by: INTERNAL MEDICINE

## 2021-01-24 PROCEDURE — C9113 INJ PANTOPRAZOLE SODIUM, VIA: HCPCS | Performed by: INTERNAL MEDICINE

## 2021-01-24 PROCEDURE — 36415 COLL VENOUS BLD VENIPUNCTURE: CPT

## 2021-01-24 PROCEDURE — 6360000002 HC RX W HCPCS: Performed by: FAMILY MEDICINE

## 2021-01-24 RX ORDER — INSULIN GLARGINE 100 [IU]/ML
20 INJECTION, SOLUTION SUBCUTANEOUS NIGHTLY
Status: DISCONTINUED | OUTPATIENT
Start: 2021-01-24 | End: 2021-01-25

## 2021-01-24 RX ORDER — LISINOPRIL 5 MG/1
5 TABLET ORAL DAILY
Status: DISCONTINUED | OUTPATIENT
Start: 2021-01-24 | End: 2021-01-25

## 2021-01-24 RX ORDER — GABAPENTIN 300 MG/1
600 CAPSULE ORAL 2 TIMES DAILY
Status: DISCONTINUED | OUTPATIENT
Start: 2021-01-24 | End: 2021-01-25 | Stop reason: HOSPADM

## 2021-01-24 RX ORDER — PANTOPRAZOLE SODIUM 40 MG/1
40 TABLET, DELAYED RELEASE ORAL
Status: DISCONTINUED | OUTPATIENT
Start: 2021-01-24 | End: 2021-01-25 | Stop reason: HOSPADM

## 2021-01-24 RX ORDER — VENLAFAXINE HYDROCHLORIDE 75 MG/1
75 CAPSULE, EXTENDED RELEASE ORAL DAILY
Status: DISCONTINUED | OUTPATIENT
Start: 2021-01-24 | End: 2021-01-25 | Stop reason: HOSPADM

## 2021-01-24 RX ADMIN — CEFTRIAXONE 2000 MG: 2 INJECTION, POWDER, FOR SOLUTION INTRAMUSCULAR; INTRAVENOUS at 09:50

## 2021-01-24 RX ADMIN — LISINOPRIL 5 MG: 5 TABLET ORAL at 09:50

## 2021-01-24 RX ADMIN — ENOXAPARIN SODIUM 30 MG: 30 INJECTION SUBCUTANEOUS at 07:41

## 2021-01-24 RX ADMIN — ACETAMINOPHEN ORAL SOLUTION 650 MG: 650 SOLUTION ORAL at 03:32

## 2021-01-24 RX ADMIN — VENLAFAXINE HYDROCHLORIDE 75 MG: 75 CAPSULE, EXTENDED RELEASE ORAL at 09:50

## 2021-01-24 RX ADMIN — PANTOPRAZOLE SODIUM 40 MG: 40 INJECTION, POWDER, FOR SOLUTION INTRAVENOUS at 07:41

## 2021-01-24 RX ADMIN — Medication 10 ML: at 07:42

## 2021-01-24 RX ADMIN — INSULIN LISPRO 3 UNITS: 100 INJECTION, SOLUTION INTRAVENOUS; SUBCUTANEOUS at 11:51

## 2021-01-24 RX ADMIN — INSULIN LISPRO 4 UNITS: 100 INJECTION, SOLUTION INTRAVENOUS; SUBCUTANEOUS at 16:51

## 2021-01-24 RX ADMIN — INSULIN LISPRO 5 UNITS: 100 INJECTION, SOLUTION INTRAVENOUS; SUBCUTANEOUS at 07:50

## 2021-01-24 RX ADMIN — INSULIN LISPRO 6 UNITS: 100 INJECTION, SOLUTION INTRAVENOUS; SUBCUTANEOUS at 11:51

## 2021-01-24 RX ADMIN — MUPIROCIN: 20 OINTMENT TOPICAL at 09:50

## 2021-01-24 RX ADMIN — INSULIN LISPRO 3 UNITS: 100 INJECTION, SOLUTION INTRAVENOUS; SUBCUTANEOUS at 07:50

## 2021-01-24 RX ADMIN — CEFTRIAXONE 2000 MG: 2 INJECTION, POWDER, FOR SOLUTION INTRAMUSCULAR; INTRAVENOUS at 22:01

## 2021-01-24 RX ADMIN — ACETAMINOPHEN ORAL SOLUTION 650 MG: 650 SOLUTION ORAL at 07:49

## 2021-01-24 RX ADMIN — INSULIN LISPRO 3 UNITS: 100 INJECTION, SOLUTION INTRAVENOUS; SUBCUTANEOUS at 22:02

## 2021-01-24 RX ADMIN — INSULIN LISPRO 6 UNITS: 100 INJECTION, SOLUTION INTRAVENOUS; SUBCUTANEOUS at 16:51

## 2021-01-24 RX ADMIN — PANTOPRAZOLE SODIUM 40 MG: 40 TABLET, DELAYED RELEASE ORAL at 16:56

## 2021-01-24 RX ADMIN — GABAPENTIN 600 MG: 300 CAPSULE ORAL at 22:00

## 2021-01-24 RX ADMIN — INSULIN GLARGINE 20 UNITS: 100 INJECTION, SOLUTION SUBCUTANEOUS at 22:02

## 2021-01-24 RX ADMIN — VANCOMYCIN HYDROCHLORIDE 1500 MG: 10 INJECTION, POWDER, LYOPHILIZED, FOR SOLUTION INTRAVENOUS at 07:41

## 2021-01-24 ASSESSMENT — PAIN SCALES - GENERAL
PAINLEVEL_OUTOF10: 3
PAINLEVEL_OUTOF10: 7
PAINLEVEL_OUTOF10: 0
PAINLEVEL_OUTOF10: 4

## 2021-01-24 ASSESSMENT — PAIN DESCRIPTION - PROGRESSION: CLINICAL_PROGRESSION: NOT CHANGED

## 2021-01-24 ASSESSMENT — PAIN DESCRIPTION - PAIN TYPE: TYPE: ACUTE PAIN

## 2021-01-24 ASSESSMENT — PAIN DESCRIPTION - FREQUENCY: FREQUENCY: INTERMITTENT

## 2021-01-24 ASSESSMENT — PAIN DESCRIPTION - ONSET: ONSET: ON-GOING

## 2021-01-24 ASSESSMENT — PAIN DESCRIPTION - DESCRIPTORS: DESCRIPTORS: HEADACHE

## 2021-01-24 ASSESSMENT — PAIN DESCRIPTION - LOCATION: LOCATION: HEAD

## 2021-01-24 NOTE — PROGRESS NOTES
Date    EYE SURGERY      lens implants after removal of cataracts    OTHER SURGICAL HISTORY  12/28/2018    partial right foot amputation with graft application    TOE AMPUTATION Right 12/28/2018    PARTIAL RIGHT FOOT AMPUTATION WITH GRAFT APPLICATION performed by Linsey Murguia DPM at [address] [facility] 1/21/2021    EGD BIOPSY performed by Maria Valverde DO at [facility]       Current Medications:    Outpatient Medications Marked as Taking for the 1/19/21 encounter [facility] Encounter)   Medication Sig Dispense Refill    insulin lispro (HUMALOG) 100 UNIT/ML injection vial Inject 65 Units into the skin Daily with supper      gabapentin (NEURONTIN) 600 MG tablet Take 600 mg by mouth 2 times daily.  cyclobenzaprine (FLEXERIL) 10 MG tablet Take 10 mg by mouth 3 times daily as needed for Muscle spasms      venlafaxine (EFFEXOR XR) 75 MG extended release capsule Take 75 mg by mouth daily         Allergies:  Hydrocodone-acetaminophen, Percocet [oxycodone-acetaminophen], Pregabalin, and Vicodin [hydrocodone-acetaminophen]    Immunizations :   Immunization History   Administered Date(s) Administered    Influenza Virus Vaccine 02/05/2001    Tdap (Boostrix, Adacel) 09/05/2008       Social History:    Social History     Tobacco Use    Smoking status: Never Smoker    Smokeless tobacco: Never Used   Substance Use Topics    Alcohol use: No     Comment: FORMER DRINKER approx.  quit 2005    Drug use: No     Social History     Tobacco Use   Smoking Status Never Smoker   Smokeless Tobacco Never Used      Family History   Problem Relation Age of Onset    Diabetes Maternal Grandfather     Heart Disease Paternal Uncle     High Blood Pressure Mother          REVIEW OF SYSTEMS:     Constitutional:   No fevers , chills,  night sweats  Eyes:  negative for blurred vision, eye discharge, visual disturbance   HEENT:  negative for hearing loss, ear drainage,nasal congestion  Respiratory:  negative for cough, shortness of breath or hemoptysis   Cardiovascular:  negative for chest pain, palpitations, syncope  Gastrointestinal:  negative for nausea, vomiting, diarrhea, constipation, abdominal pain  Genitourinary:  negative for frequency, dysuria, urinary incontinence, hematuria  Hematologic/Lymphatic:  negative for easy bruising, bleeding and lymphadenopathy  Allergic/Immunologic:  negative for recurrent infections, angioedema, anaphylaxis   Endocrine:  negative for weight changes, polyuria, polydipsia and polyphagia  Musculoskeletal:  negative for joint  pain, swelling, decreased range of motion  Integumentary: No rashes, skin lesions  Neurological:  negative for headaches, slurred speech, unilateral weakness  Psychiatric: hallucinations , confusion ,agitation. PHYSICAL EXAM:      Vitals:   T MAX  102.4 on admit    BP (!) 151/84   Pulse 66   Temp 98.2 °F (36.8 °C) (Oral)   Resp 18   Ht 5' 10\" (1.778 m)   Wt 187 lb 9.8 oz (85.1 kg)   SpO2 94%   BMI 26.92 kg/m²     General Appearance:awake + following full commands   and  +  pallor, no icterus chronic ill appearing man+  Skin: warm and dry, no rash or erythema  Head: normocephalic and atraumatic  Eyes: pupils equal, round, and reactive to light, conjunctivae normal  ENT: tympanic membrane, external ear and ear canal normal bilaterally, nose without deformity, nasal mucosa and turbinates normal without polyps  Neck: supple and non-tender without mass, no thyromegaly  no cervical lymphadenopathy  Pulmonary/Chest: clear to auscultation bilaterally- no wheezes, rales or rhonchi, normal air movement, no respiratory distress  Cardiovascular: normal rate, regular rhythm, normal S1 and S2, no murmurs, rubs, clicks, or gallops, no carotid bruits  Abdomen: soft, non-tender, non-distended, normal bowel sounds, no masses or organomegaly  Extremities: no cyanosis, clubbing or edema  Musculoskeletal: normal range of motion, no joint swelling, deformity or tenderness  Integumentary: No rashes, no abnormal skin lesions, no petechiae  Neurologic: reflexes normal and symmetric, no cranial nerve deficit  Lines: IV  Left foot ulcer+  Diabetic foot changes+     Data Review:    CBC:   Lab Results   Component Value Date    WBC 12.2 (H) 01/21/2021    HGB 11.6 (L) 01/21/2021    HCT 35.5 (L) 01/21/2021    MCV 77.9 (L) 01/21/2021     01/21/2021     RENAL:   Lab Results   Component Value Date    CREATININE 1.3 01/24/2021    BUN 15 01/24/2021     (L) 01/24/2021    K 3.8 01/24/2021    CL 97 (L) 01/24/2021    CO2 27 01/24/2021     SED RATE:   Lab Results   Component Value Date    SEDRATE 60 04/15/2016     CK: No results found for: CKTOTAL  CRP: No results found for: CRP  Hepatic Function Panel:   Lab Results   Component Value Date    ALKPHOS 195 01/18/2021    ALT 25 01/18/2021    AST 21 01/18/2021    PROT 8.3 01/18/2021    PROT 7.3 08/20/2012    BILITOT 0.6 01/18/2021    LABALBU 4.3 01/18/2021     UA:  Lab Results   Component Value Date    COLORU Straw 01/18/2021    CLARITYU Clear 01/18/2021    GLUCOSEU >=1000 01/18/2021    BILIRUBINUR Negative 01/18/2021    KETUA >=80 01/18/2021    SPECGRAV 1.020 01/18/2021    BLOODU SMALL 01/18/2021    PHUR 5.5 01/18/2021    PROTEINU TRACE 01/18/2021    UROBILINOGEN 0.2 01/18/2021    NITRU Negative 01/18/2021    LEUKOCYTESUR Negative 01/18/2021    LABMICR YES 01/18/2021    URINETYPE NotGiven 01/18/2021      Urine Microscopic:   Lab Results   Component Value Date    LABCAST 3-5 Hyaline 01/31/2019    BACTERIA Rare 01/18/2021    WBCUA 3-5 01/18/2021    RBCUA 0-2 01/18/2021    EPIU 2-5 01/18/2021     Urine Reflex to Culture:   Lab Results   Component Value Date    URRFLXCULT Not Indicated 01/18/2021       Creat  2.6  Down 1.7     lACTIC ACID   9.3  DOWN  1.5       PROCAL 0.46 /19/2021  5:30 PM - Mayo Donahue Incoming Lab Results From Soft (Epic Adt)    Component Value Ref Range & Units Status Collected Lab Color, CSF Colorless  Colorless Final 01/19/2021 11:08 AM [address] [facility]   Appearance, CSF Clear  Clear Final 01/19/2021 11:08 AM [address] [facility]   Tube Number + CELL CT + DIFF-CSF Tube 3   Final 01/19/2021 11:08 AM [facility]   Clot Evaluation CSF see below   Final 01/19/2021 11:08 AM [facility]   No Clots Seen   WBC, CSF 14High   0 - 5 /cumm Final 01/19/2021 11:08 AM [facility]   RBC, CSF 17Abnormal   0 /cumm Final 01/19/2021 11:08 AM [facility]   Neutrophils, CSF 84High   0 - 6 % Final 01/19/2021 11:08 AM [facility]   Lymphs, CSF 9Low   40 - 80 % Final 01/19/2021 11:08 AM [facility]   Monocytes, CSF 6Low   15 - 45 % Final 01/19/2021 11:08 AM [facility]   Atypical Lymphs CSF 1  % Final 01/19/2021 11:08 AM [facility]      MICRO: cultures reviewed and updated by me   Blood Culture:   Results    Procedure Component Value Units Date/Time   Culture, Wound [[id number]] (Abnormal) Collected: 01/21/21 1428   Order Status: Completed Specimen: Foot, Left Updated: 01/22/21 1029    Gram Stain Result No Epithelial Cells seen   No WBCs or organisms seen     Organism Staph aureus MRSAAbnormal     WOUND/ABSCESS --Abnormal     Moderate growth   Sensitivity to follow   CONTACT PRECAUTIONS INDICATED   PBP2= POSITIVE   Abnormal    Narrative:     ORDER#: [id number]                          ORDERED BY: NEELIMA CHANEY   SOURCE: Foot                               COLLECTED:  01/21/21 14:28   ANTIBIOTICS AT [facility]NNIE. :                      RECEIVED :  01/21/21 15:04   CALL  Dodd  [id number] tel. [phone],               Legionella Antigen, Urine [[id number]] Collected: 01/19/21 2209   Order Status: Completed Specimen: Urine, clean catch Updated: 01/20/21 1219    L. pneumophila Serogp 1 Ur Ag --    Presumptive Negative   No Legionella pneumophila serogroup 1 antigens detected.    A negative result does not exclude infection with   Legionella [id number]   Phone [phone]   Meningitis Encephalitis Panel Report [[id number]] Collected: 01/19/21 1108   Order Status: Completed Updated: 01/19/21 1352    Report SEE IMAGE   Narrative:     Referred out by:   [facility]   [address] [place] [address] [employer] [place], [facility] [place] [address]   Phone [phone]   Meningitis/Encephalitis Panel, CSF [[id number]] Collected: 01/19/21 1108   Order Status: Completed Specimen: CSF Updated: 01/19/21 1349    Meningitis Encephalitis Panel --    Meningitis Encephalitis Panel PCR Result: Not Detected   See additional report for complete Meningitis Encephalitis Panel. Narrative:     ORDER#: [id number]                          ORDERED BY: Neetu Conde   SOURCE: CSF (Spinal Fluid)                 COLLECTED:  01/19/21 11:08   ANTIBIOTICS AT [facility]OHNNIE. :                      RECEIVED :  01/19/21 12:20   Performed at:   [facility]   [address] [place] [address] [employer] [place], [place] [facility] [place] [address]   Phone [phone]   Culture, Blood 1 [[id number]]    Order Status: Sent Specimen: Blood    Culture, Blood 2 [[id number]]    Order Status: Sent Specimen: Blood    Culture, Blood 2 [[id number]]    Order Status: Canceled Specimen: Blood    Culture, Blood 1 [[id number]]    Order Status: Canceled Specimen: Blood        Lab Results   Component Value Date    BC No Growth after 4 days of incubation. 01/18/2021    BLOODCULT2 No Growth after 4 days of incubation. 01/18/2021       Respiratory Culture:  Lab Results   Component Value Date    CULTRESP Light growth normal respiratory idris with 09/12/2017    CULTRESP  09/12/2017     Rare growth  Susceptibility testing of penicillin and other beta-lactams is  not necessary for beta hemolytic Streptococci since resistant  strains have not been identified.  (CLSI D621-C37, 2017)      CULTRESP  09/12/2017     Rare growth  Susceptibility testing of penicillin and other beta-lactams is  not necessary for beta hemolytic Streptococci since resistant  strains have not been identified. (CLSI P742-W33, 2017)      LABGRAM  01/21/2021     No Epithelial Cells seen  No WBCs or organisms seen       AFB:No results found for: AFBSMEAR  Viral Culture:  Lab Results   Component Value Date    COVID19 Not Detected 01/19/2021     Urine Culture: No results for input(s): Brittni Gaunt in the last 72 hours. IMAGING:    MRI BRAIN W WO CONTRAST   Final Result   No acute intracranial abnormality visualized. XR FOOT LEFT (MIN 3 VIEWS)   Final Result   1. No acute fracture evident. 2. Soft tissue and osseous findings at the medial aspect of the 1st   metatarsophalangeal joint typical for gout. 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 3. faint visualization of plantar wound on lateral radiograph without   associated gas formation or retained foreign body. 4. Vascular calcifications are noted reflecting calcific atherosclerosis. Follow-up imaging recommended if pain persists or worsens following   conservative management. XR CHEST PORTABLE   Final Result   Bilateral faint new perihilar disease favored to be due to edema, less likely   infection. XR FOOT RIGHT (MIN 3 VIEWS)   Final Result   Moderate narrowing of the 1st MTP joint with subtle cortical erosions along   the base of the proximal phalanx laterally which may be slightly more   prominent and could represent a chronic erosive arthropathy or possible early   osteomyelitis. Suggest a three-phase bone scan for follow-up. Previous 2nd toe amputation with small bony fragments inferiorly which are   unchanged. No obvious acute bony abnormality. FL MODIFIED BARIUM SWALLOW W VIDEO   Final Result   Negative except for episode of flash laryngeal penetration of thin liquid   towards the end of the exam      Please see separate speech pathology report for full discussion of findings   and recommendations.          IR LUMBAR PUNCTURE FOR DIAGNOSIS   Final Result   Successful fluoroscopic-guided lumbar puncture. XR CHEST PORTABLE   Final Result   Appropriate radiographic positioning of the nasogastric tube. Recommend clinical correlation prior to use.                All the pertinent images and reports for the current Hospitalization were reviewed by me     Scheduled Meds:   venlafaxine  75 mg Oral Daily    lisinopril  5 mg Oral Daily    insulin glargine  20 Units Subcutaneous Nightly    insulin lispro  6 Units Subcutaneous TID WC    pantoprazole  40 mg Oral BID AC    vancomycin  1,500 mg Intravenous Q18H    mupirocin   Topical Daily    enoxaparin  30 mg Subcutaneous Daily    cefTRIAXone (ROCEPHIN) IV  2,000 mg Intravenous Q12H    insulin lispro  0-6 Units Subcutaneous TID WC    insulin lispro  0-3 Units Subcutaneous Nightly    vancomycin (VANCOCIN) intermittent dosing (placeholder)   Other RX Placeholder       Continuous Infusions:   dextrose 100 mL/hr (01/21/21 1155)       PRN Meds:  glucose, dextrose, glucagon (rDNA), dextrose, potassium chloride **OR** potassium alternative oral replacement **OR** potassium chloride, sodium phosphate IVPB **OR** sodium phosphate IVPB, dextrose, magnesium sulfate, acetaminophen, acetaminophen      Assessment:     Patient Active Problem List   Diagnosis    Diabetic ketoacidosis without coma associated with type 2 diabetes mellitus (HCC)    Nausea & vomiting    Diabetic neuropathy (HCC)    DM2/IDDM    Gout    Hx MRSA    Hyperkalemia    Chronic pain on chronic opioids    Chronic LE diabetic ulcers    Mild protein-calorie malnutrition ([address]    Cellulitis    TORI (acute kidney injury) ([address]    Neuropathy    Diabetic acidosis without coma ([address]     DKA with coma+ on admit now improved   Fevers resolved  Sepsis improved   Lactic acidosis resolved  TORI -creatinine now slowly improved  Wbc ELEVATION   Change in mentation - Encephalopathy resolving  CSF with WBC elevation but could likely be from severe DKA vs early CNS

## 2021-01-24 NOTE — PROGRESS NOTES
Pt got dressing on left foot wet during shower, this RN cleansed wound with NSS, applied antibiotic ointment, wrapped with dry gauze and ace wrap

## 2021-01-24 NOTE — PROGRESS NOTES
Department of Podiatric Surgery  Progress Note  1/24/2021  Solange Knight      HISTORY OF PRESENT ILLNESS:      The patient is a 56 y.o. male who presents with sepsis, DKA, AMS, GI bleed, TORI and possible meningitis. He is more alert and coherent today. Reports that he follows with Dr. Laura Garcia at [facility] for his foot care and would like to follow up with him at d/c. The patient's wife is present today and reports she has been dressing the wounds at home, but the patient picks them back open often. He picks at his toenails as well. PHYSICAL EXAM:  VITALS:  BP (!) 151/78   Pulse 67   Temp 97.9 °F (36.6 °C) (Oral)   Resp 18   Ht 5' 10\" (1.778 m)   Wt 187 lb 9.8 oz (85.1 kg)   SpO2 94%   BMI 26.92 kg/m²     LOWER EXTREMITY:  VASCULAR: Pedal Pulses present. negative signs of ischemia. MUSCULOSKELETAL:  positive gross deformity. There is evidence of absence of the 2nd digit, right without any evidence of any acute changes. NEUROLOGIC:  Epicritic sensation, light touch, joint position sense absent  SKIN:  Open wound sub 1st MPJ, left foot without signs of active infection. The wound base is 100% fibro-granular with a depth of subcutaneous tissues only. There is no evidence of any deep tissues, fascia or bone. There is no tenderness, fluctuance, crepitus or instability to suggest a deep or rapid spreading infection. Improved in overall appearance and size of the wound noted. A second wound is noted on the posterior medial leg just proximal to the medial malleolus. It appears to be more abrasion than deep wound. The wound base is also fibrogranular. No erythema, lymphangitis, or cellulitis. No fluctuance. The muscular compartment is supple and not tense.        I/O:    Intake/Output Summary (Last 24 hours) at 1/24/2021 1511  Last data filed at 1/24/2021 1356  Gross per 24 hour   Intake 480 ml   Output --   Net 480 ml              Wt Readings from Last 3 Encounters:   01/24/21 187 lb 9.8 oz (85.1 kg) 01/18/21 160 lb (72.6 kg)   09/27/19 187 lb (84.8 kg)       LABS:    No results for input(s): WBC, HGB, HCT, PLT in the last 72 hours. Recent Labs     01/24/21  0710   *   K 3.8   CL 97*   CO2 27   BUN 15   CREATININE 1.3      No results for input(s): PROT, INR, APTT in the last 72 hours. IMAGING:  X-rays:   Right   Impression   Moderate narrowing of the 1st MTP joint with subtle cortical erosions along   the base of the proximal phalanx laterally which may be slightly more   prominent and could represent a chronic erosive arthropathy or possible early   osteomyelitis.  Suggest a three-phase bone scan for follow-up.       Previous 2nd toe amputation with small bony fragments inferiorly which are   unchanged.       No obvious acute bony abnormality.             Left:  Impression   1. No acute fracture evident. 2. Soft tissue and osseous findings at the medial aspect of the 1st   metatarsophalangeal joint typical for gout.  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 3. faint visualization of plantar wound on lateral radiograph without   associated gas formation or retained foreign body. 4. Vascular calcifications are noted reflecting calcific atherosclerosis. Follow-up imaging recommended if pain persists or worsens following   conservative management.             MICRO:   MRSA +  Culture, Wound [[id number]] (Abnormal) Collected: 01/21/21 1428   Order Status: Completed Specimen: Foot, Left Updated: 01/22/21 1029    Gram Stain Result No Epithelial Cells seen   No WBCs or organisms seen     Organism Staph aureus MRSAAbnormal     WOUND/ABSCESS --Abnormal     Moderate growth   Sensitivity to follow   CONTACT PRECAUTIONS INDICATED   PBP2= POSITIVE   Abnormal    Narrative:           ASSESSMENT:   Left foot diabetic foot ulceration  Left leg wound   Diabetes with peripheral neuropathy  Sepsis  Encephalopathy    PLAN:  Evaluation and Management x 30 minutes with greater than 50% of the time spent with the patient discussing the etiology and treatment options of the chief complaint. 1. Left foot Ulceration  - There is evidence of ulceration without gross infection  - MRSA +   - Mupirocin ordered for daily application  - X-rays negative    2. Left leg wound  - Mupirocin ordered  - Encouraged patient to not pick at wound. 3. Right foot  - No open wounds  - May need to follow up outpatient for signs of erosive changes in the 1st MPJ    DISPO: Patient will need better follow up with podiatry after discharge. Can follow up at wound center with Dr. Jay Olivares. Ok to d/c when medically stable.      Ana Luisa Fraga DPM  [facility]  Pager: [phone]  Office: [phone]  Fax: [phone]

## 2021-01-24 NOTE — PROGRESS NOTES
Hospitalist Progress Note    CC: <principal problem not specified>      Admit date: 1/19/2021  Days in hospital:  5    Subjective/interval history: Pt S/E. Pt back to baseline. Wife at bedside. He is asking for a regular diet now that his confusion has resolved. ROS:   Pertinent items are noted in HPI. Objective:    BP (!) 157/83   Pulse 71   Temp 98.2 °F (36.8 °C) (Oral)   Resp 20   Ht 5' 10\" (1.778 m)   Wt 187 lb 9.8 oz (85.1 kg)   SpO2 92%   BMI 26.92 kg/m²     Gen: alert, NAD  HEENT: NC/AT, moist mucous membranes  Neck: supple, trachea midline  Heart: Normal s1/s2, RRR, no murmurs  Lungs: no wheezing, no rales, no rhonchi, no use of accessory muscles  Abd: bowel sounds present, soft, nontender, nondistended  Extrem: No clubbing, cyanosis, no edema. Skin: left foot with a plantar ulcer, no drainage and white granulation tissue. Left medial ankle with dry ulcer, no drainage or swelling, dressing c/d/i  Psych: A & O x3  Neuro: grossly intact, moves all four extremities spontaneously.  No focal deficits  Cap refill: +2 sec    Medications:  Scheduled Meds:   insulin glargine  13 Units Subcutaneous Nightly    insulin lispro  5 Units Subcutaneous TID WC    vancomycin  1,500 mg Intravenous Q18H    mupirocin   Topical Daily    enoxaparin  30 mg Subcutaneous Daily    cefTRIAXone (ROCEPHIN) IV  2,000 mg Intravenous Q12H    insulin lispro  0-6 Units Subcutaneous TID WC    insulin lispro  0-3 Units Subcutaneous Nightly    pantoprazole  40 mg Intravenous BID    And    sodium chloride (PF)  10 mL Intravenous BID    vancomycin (VANCOCIN) intermittent dosing (placeholder)   Other RX Placeholder       PRN Meds:  glucose, dextrose, glucagon (rDNA), dextrose, potassium chloride **OR** potassium alternative oral replacement **OR** potassium chloride, sodium phosphate IVPB **OR** sodium phosphate IVPB, dextrose, magnesium sulfate, acetaminophen, acetaminophen    IV:   dextrose 100 mL/hr (01/21/21 1155)       No intake or output data in the 24 hours ending 01/24/21 0930    Results:  CBC:   No results for input(s): WBC, HGB, HCT, MCV, PLT in the last 72 hours. BMP:   Recent Labs     01/22/21  0740 01/23/21  0728 01/24/21  0710    135* 135*   K 3.6 3.6 3.8    98* 97*   CO2 29 26 27   BUN 15 16 15   CREATININE 1.3 1.3 1.3     Mag: No results for input(s): MAG in the last 72 hours. Phos:   Lab Results   Component Value Date    PHOS 2.2 (L) 01/20/2021     No components found for: GLU    LIVER PROFILE:   No results for input(s): AST, ALT, LIPASE, BILIDIR, BILITOT, ALKPHOS in the last 72 hours. Invalid input(s): AMYLASE,  ALB  PT/INR:   No results for input(s): PROTIME, INR in the last 72 hours. APTT:   No results for input(s): APTT in the last 72 hours. UA:  No results for input(s): NITRITE, COLORU, PHUR, LABCAST, WBCUA, RBCUA, MUCUS, TRICHOMONAS, YEAST, BACTERIA, CLARITYU, SPECGRAV, LEUKOCYTESUR, UROBILINOGEN, BILIRUBINUR, BLOODU, GLUCOSEU, AMORPHOUS in the last 72 hours. Invalid input(s): Lita Chin input(s): ABG  Lab Results   Component Value Date    CALCIUM 8.3 01/24/2021    PHOS 2.2 (L) 01/20/2021       Assessment:    Active Problems:    Diabetic acidosis without coma (HCC)  Resolved Problems:    * No resolved hospital problems. [facility] course: a 55 yo male with iddm admitted with dka and sepsis. He was found by his wife as unresponsive, covered in emesis and stool.      Plan:  possible acute pyogenic meningitis - ruled out  - on vanc, Rocephin; stopped ampicillin  - lp done, pcr panel is negative  - csf cultures ngtd  - mri brain w/wout contrast was unremarkable    Acute metabolic encephalopathy - resolved  - due to dka, infection  - head CT negative  - check an mri brain w/wout contrast was negative    Sepsis, POA -resolved  - with criteria: leukocytosis, lactic acidosis, fevers, tachycardia, tachypnea  - lactate normalized, wbc coming down, fever curve improving   - blood and urine cultures ngtd  - ct abdomen/pelvis nonacute   - procalcitonin . 55 -> .18  - covid pcr negative  - abx as above  - ID consulted    Left foot and ankle ulcers  - diabetic foot wound  - xray with possible early osteo, need a 3 phase bone scan?  - wound culture with mrsa  - on vanc - ID to rec discharge abx  - consulted podiatry    DKA -resolved  DM  - home lantus 80 units qhs, humalog 20 units tid   - increased lantus to 13 units qhs,  as he is now eating more  - ssi   - hba1c 10.7  - add on januvia vs glipizide     HTN  - will start lisinopril 5 mg and increase if needed    TORI - resolved  - crt baseline 1; was 2.7 on admission -> 1.3  - IVF  - Avoid nephrotoxins    Esophagitis   - egd 1/21 with erosive esophagitis   - avoid nsaids  - ppi     gib - coffee ground emesis, melena due to esophagitis  Acute blood loss anemia - no further bleeding or emesis episodes   - 2 units prbcs given  - hgb 12 -> 11, remaining stable  - emesis and stool occult +  - follow cbc  - transfuse for hgb < 7.0  - consulted GI      Code status:  full  DVT prophylaxis: [x] Lovenox  [] SQ Heparin  [] SCDs because of  [] warfarin/oral direct thrombin inhibitor [] Encourage ambulation      Disposition:  [] Home [] Rehab [] Psych [] SNF  [] LTAC  [] Transfer to ICU  [] Transfer to PCU [] Other: in pt      Electronically signed by Anai Young DO on 1/24/2021 at 9:30 AM

## 2021-01-24 NOTE — PLAN OF CARE
Problem: Falls - Risk of:  Goal: Will remain free from falls  Description: Will remain free from falls  Outcome: Ongoing  Goal: Absence of physical injury  Description: Absence of physical injury  Outcome: Ongoing     Problem: Skin Integrity:  Goal: Will show no infection signs and symptoms  Description: Will show no infection signs and symptoms  Outcome: Ongoing  Goal: Absence of new skin breakdown  Description: Absence of new skin breakdown  Outcome: Ongoing  Goal: Risk for impaired skin integrity will decrease  Description: Risk for impaired skin integrity will decrease  Outcome: Ongoing     Problem: Mental Status - Impaired:  Goal: Mental status will improve  Description: Mental status will improve  Outcome: Ongoing     Problem:  Activity:  Goal: Risk for activity intolerance will decrease  Description: Risk for activity intolerance will decrease  Outcome: Ongoing     Problem: Coping:  Goal: Ability to adjust to condition or change in health will improve  Description: Ability to adjust to condition or change in health will improve  Outcome: Ongoing     Problem: Fluid Volume:  Goal: Ability to maintain a balanced intake and output will improve  Description: Ability to maintain a balanced intake and output will improve  Outcome: Ongoing     Problem: Health Behavior:  Goal: Ability to identify and utilize available resources and services will improve  Description: Ability to identify and utilize available resources and services will improve  Outcome: Ongoing  Goal: Ability to manage health-related needs will improve  Description: Ability to manage health-related needs will improve  Outcome: Ongoing  Goal: Identification of resources available to assist in meeting health care needs will improve  Description: Identification of resources available to assist in meeting health care needs will improve  Outcome: Ongoing     Problem: Metabolic:  Goal: Ability to maintain appropriate glucose levels will improve  Description: Ability to maintain appropriate glucose levels will improve  Outcome: Ongoing     Problem: Physical Regulation:  Goal: Complications related to the disease process, condition or treatment will be avoided or minimized  Description: Complications related to the disease process, condition or treatment will be avoided or minimized  Outcome: Ongoing  Goal: Diagnostic test results will improve  Description: Diagnostic test results will improve  Outcome: Ongoing     Problem: Tissue Perfusion:  Goal: Adequacy of tissue perfusion will improve  Description: Adequacy of tissue perfusion will improve  Outcome: Ongoing     Problem: Safety:  Goal: Ability to chew and swallow food without choking will improve  Description: Ability to chew and swallow food without choking will improve  Outcome: Ongoing  Goal: Signs and symptoms of aspiration will decrease  Description: Signs and symptoms of aspiration will decrease  Outcome: Ongoing

## 2021-01-25 VITALS
HEIGHT: 70 IN | HEART RATE: 72 BPM | WEIGHT: 186.73 LBS | TEMPERATURE: 98.7 F | SYSTOLIC BLOOD PRESSURE: 141 MMHG | RESPIRATION RATE: 16 BRPM | OXYGEN SATURATION: 90 % | BODY MASS INDEX: 26.73 KG/M2 | DIASTOLIC BLOOD PRESSURE: 73 MMHG

## 2021-01-25 LAB
ANION GAP SERPL CALCULATED.3IONS-SCNC: 10 MMOL/L (ref 3–16)
BUN BLDV-MCNC: 14 MG/DL (ref 7–20)
CALCIUM SERPL-MCNC: 8.3 MG/DL (ref 8.3–10.6)
CHLORIDE BLD-SCNC: 101 MMOL/L (ref 99–110)
CO2: 28 MMOL/L (ref 21–32)
CREAT SERPL-MCNC: 1.2 MG/DL (ref 0.8–1.3)
GFR AFRICAN AMERICAN: >60
GFR NON-AFRICAN AMERICAN: >60
GLUCOSE BLD-MCNC: 129 MG/DL (ref 70–99)
GLUCOSE BLD-MCNC: 217 MG/DL (ref 70–99)
GLUCOSE BLD-MCNC: 233 MG/DL (ref 70–99)
GLUCOSE BLD-MCNC: 70 MG/DL (ref 70–99)
GLUCOSE BLD-MCNC: 93 MG/DL (ref 70–99)
MAGNESIUM: 2.2 MG/DL (ref 1.8–2.4)
PERFORMED ON: ABNORMAL
PERFORMED ON: ABNORMAL
PERFORMED ON: NORMAL
PERFORMED ON: NORMAL
POTASSIUM SERPL-SCNC: 4 MMOL/L (ref 3.5–5.1)
SODIUM BLD-SCNC: 139 MMOL/L (ref 136–145)
VANCOMYCIN TROUGH: 13.6 UG/ML (ref 10–20)

## 2021-01-25 PROCEDURE — 97129 THER IVNTJ 1ST 15 MIN: CPT

## 2021-01-25 PROCEDURE — 6370000000 HC RX 637 (ALT 250 FOR IP): Performed by: FAMILY MEDICINE

## 2021-01-25 PROCEDURE — 6360000002 HC RX W HCPCS: Performed by: INTERNAL MEDICINE

## 2021-01-25 PROCEDURE — 83735 ASSAY OF MAGNESIUM: CPT

## 2021-01-25 PROCEDURE — 36415 COLL VENOUS BLD VENIPUNCTURE: CPT

## 2021-01-25 PROCEDURE — 92526 ORAL FUNCTION THERAPY: CPT

## 2021-01-25 PROCEDURE — 6360000002 HC RX W HCPCS: Performed by: FAMILY MEDICINE

## 2021-01-25 PROCEDURE — 94760 N-INVAS EAR/PLS OXIMETRY 1: CPT

## 2021-01-25 PROCEDURE — 2580000003 HC RX 258: Performed by: INTERNAL MEDICINE

## 2021-01-25 PROCEDURE — 80048 BASIC METABOLIC PNL TOTAL CA: CPT

## 2021-01-25 RX ORDER — INSULIN GLARGINE 100 [IU]/ML
24 INJECTION, SOLUTION SUBCUTANEOUS NIGHTLY
Qty: 1 VIAL | Refills: 3 | Status: ON HOLD | OUTPATIENT
Start: 2021-01-25 | End: 2021-04-30 | Stop reason: SDUPTHER

## 2021-01-25 RX ORDER — INSULIN LISPRO 100 [IU]/ML
8 INJECTION, SOLUTION INTRAVENOUS; SUBCUTANEOUS
Qty: 5 CARTRIDGE | Refills: 3 | Status: ON HOLD | OUTPATIENT
Start: 2021-01-25 | End: 2021-04-30 | Stop reason: SDUPTHER

## 2021-01-25 RX ORDER — PANTOPRAZOLE SODIUM 40 MG/1
40 TABLET, DELAYED RELEASE ORAL
Qty: 30 TABLET | Refills: 3 | Status: SHIPPED | OUTPATIENT
Start: 2021-01-25 | End: 2021-01-25

## 2021-01-25 RX ORDER — DOXYCYCLINE HYCLATE 100 MG
100 TABLET ORAL 2 TIMES DAILY
Qty: 20 TABLET | Refills: 0 | Status: SHIPPED | OUTPATIENT
Start: 2021-01-25 | End: 2021-02-04

## 2021-01-25 RX ORDER — PANTOPRAZOLE SODIUM 40 MG/1
TABLET, DELAYED RELEASE ORAL
Qty: 30 TABLET | Refills: 3 | Status: SHIPPED | OUTPATIENT
Start: 2021-01-25 | End: 2021-03-05

## 2021-01-25 RX ORDER — INSULIN GLARGINE 100 [IU]/ML
24 INJECTION, SOLUTION SUBCUTANEOUS NIGHTLY
Status: DISCONTINUED | OUTPATIENT
Start: 2021-01-25 | End: 2021-01-25 | Stop reason: HOSPADM

## 2021-01-25 RX ORDER — LISINOPRIL 10 MG/1
10 TABLET ORAL ONCE
Status: COMPLETED | OUTPATIENT
Start: 2021-01-25 | End: 2021-01-25

## 2021-01-25 RX ORDER — LISINOPRIL 10 MG/1
15 TABLET ORAL DAILY
Qty: 30 TABLET | Refills: 3 | Status: ON HOLD | OUTPATIENT
Start: 2021-01-25 | End: 2021-04-30 | Stop reason: HOSPADM

## 2021-01-25 RX ORDER — ACETAMINOPHEN 325 MG/1
650 TABLET ORAL EVERY 4 HOURS PRN
Status: DISCONTINUED | OUTPATIENT
Start: 2021-01-25 | End: 2021-01-25 | Stop reason: HOSPADM

## 2021-01-25 RX ORDER — LISINOPRIL 5 MG/1
5 TABLET ORAL DAILY
Qty: 30 TABLET | Refills: 3 | Status: SHIPPED | OUTPATIENT
Start: 2021-01-26 | End: 2021-01-25 | Stop reason: HOSPADM

## 2021-01-25 RX ADMIN — INSULIN LISPRO 6 UNITS: 100 INJECTION, SOLUTION INTRAVENOUS; SUBCUTANEOUS at 07:50

## 2021-01-25 RX ADMIN — PANTOPRAZOLE SODIUM 40 MG: 40 TABLET, DELAYED RELEASE ORAL at 07:40

## 2021-01-25 RX ADMIN — GABAPENTIN 600 MG: 300 CAPSULE ORAL at 07:49

## 2021-01-25 RX ADMIN — MUPIROCIN: 20 OINTMENT TOPICAL at 07:51

## 2021-01-25 RX ADMIN — INSULIN LISPRO 8 UNITS: 100 INJECTION, SOLUTION INTRAVENOUS; SUBCUTANEOUS at 10:23

## 2021-01-25 RX ADMIN — CEFTRIAXONE 2000 MG: 2 INJECTION, POWDER, FOR SOLUTION INTRAMUSCULAR; INTRAVENOUS at 10:20

## 2021-01-25 RX ADMIN — LISINOPRIL 5 MG: 5 TABLET ORAL at 07:49

## 2021-01-25 RX ADMIN — INSULIN LISPRO 2 UNITS: 100 INJECTION, SOLUTION INTRAVENOUS; SUBCUTANEOUS at 07:50

## 2021-01-25 RX ADMIN — INSULIN LISPRO 8 UNITS: 100 INJECTION, SOLUTION INTRAVENOUS; SUBCUTANEOUS at 12:00

## 2021-01-25 RX ADMIN — ENOXAPARIN SODIUM 30 MG: 30 INJECTION SUBCUTANEOUS at 07:49

## 2021-01-25 RX ADMIN — ACETAMINOPHEN 650 MG: 325 TABLET ORAL at 12:00

## 2021-01-25 RX ADMIN — LISINOPRIL 10 MG: 10 TABLET ORAL at 15:20

## 2021-01-25 RX ADMIN — VANCOMYCIN HYDROCHLORIDE 1500 MG: 10 INJECTION, POWDER, LYOPHILIZED, FOR SOLUTION INTRAVENOUS at 01:34

## 2021-01-25 RX ADMIN — VENLAFAXINE HYDROCHLORIDE 75 MG: 75 CAPSULE, EXTENDED RELEASE ORAL at 07:49

## 2021-01-25 ASSESSMENT — PAIN SCALES - GENERAL
PAINLEVEL_OUTOF10: 0
PAINLEVEL_OUTOF10: 0
PAINLEVEL_OUTOF10: 6
PAINLEVEL_OUTOF10: 1

## 2021-01-25 NOTE — DISCHARGE SUMMARY
melena due to esophagitis with vomiting   Acute blood loss anemia - no further bleeding or emesis episodes   - 2 units prbcs given  - hgb 12 -> remaining stable  11  - emesis and stool occult +  - cont ppi bid  - consulted GI    Disposition:  Home    Exam:     BP (!) 181/90   Pulse 72   Temp 98.7 °F (37.1 °C) (Oral)   Resp 16   Ht 5' 10\" (1.778 m)   Wt 186 lb 11.7 oz (84.7 kg)   SpO2 90%   BMI 26.79 kg/m²     General appearance:  No apparent distress, appears stated age and cooperative. HEENT:  Normal cephalic, atraumatic without obvious deformity. Pupils equal, round, and reactive to light. Extra ocular muscles intact. Conjunctivae/corneas clear. Neck: Supple, with full range of motion. No jugular venous distention. Trachea midline. Respiratory:  Normal respiratory effort. Clear to auscultation, bilaterally without Rales/Wheezes/Rhonchi. Cardiovascular:  Regular rate and rhythm with normal S1/S2 without murmurs, rubs or gallops. Abdomen: Soft, non-tender, non-distended with normal bowel sounds. Musculoskeletal:  No clubbing, cyanosis or edema bilaterally. Full range of motion without deformity. Skin: Skin color, texture, turgor normal.  No rashes or lesions. Neurologic:  Neurovascularly intact without any focal sensory/motor deficits. Cranial nerves: II-XII intact, grossly non-focal.  Psychiatric:  Alert and oriented, thought content appropriate, normal insight    Consults:     IP CONSULT TO GI  PHARMACY TO DOSE VANCOMYCIN  IP CONSULT TO INFECTIOUS DISEASES  IP CONSULT TO PODIATRY  IP CONSULT TO HOME CARE NEEDS    Labs:  For convenience and continuity at follow-up the following most recent labs are provided:    Lab Results   Component Value Date    WBC 12.2 01/21/2021    HGB 11.6 01/21/2021    HCT 35.5 01/21/2021    MCV 77.9 01/21/2021     01/21/2021     01/25/2021    K 4.0 01/25/2021    K 5.1 01/18/2021     01/25/2021    CO2 28 01/25/2021    BUN 14 01/25/2021    CREATININE 1.2 01/25/2021    CALCIUM 8.3 01/25/2021    PHOS 2.2 01/20/2021    BNP 7 06/24/2012    ALKPHOS 195 01/18/2021    ALT 25 01/18/2021    AST 21 01/18/2021    BILITOT 0.6 01/18/2021    LABALBU 4.3 01/18/2021    LDLCALC 34 12/22/2013    TRIG 78 12/22/2013     Lab Results   Component Value Date    INR 1.05 01/18/2021    INR 0.97 09/12/2017       Radiology:  MRI BRAIN W WO CONTRAST   Final Result   No acute intracranial abnormality visualized. XR FOOT LEFT (MIN 3 VIEWS)   Final Result   1. No acute fracture evident. 2. Soft tissue and osseous findings at the medial aspect of the 1st   metatarsophalangeal joint typical for gout. 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 3. faint visualization of plantar wound on lateral radiograph without   associated gas formation or retained foreign body. 4. Vascular calcifications are noted reflecting calcific atherosclerosis. Follow-up imaging recommended if pain persists or worsens following   conservative management. XR CHEST PORTABLE   Final Result   Bilateral faint new perihilar disease favored to be due to edema, less likely   infection. XR FOOT RIGHT (MIN 3 VIEWS)   Final Result   Moderate narrowing of the 1st MTP joint with subtle cortical erosions along   the base of the proximal phalanx laterally which may be slightly more   prominent and could represent a chronic erosive arthropathy or possible early   osteomyelitis. Suggest a three-phase bone scan for follow-up. Previous 2nd toe amputation with small bony fragments inferiorly which are   unchanged. No obvious acute bony abnormality. FL MODIFIED BARIUM SWALLOW W VIDEO   Final Result   Negative except for episode of flash laryngeal penetration of thin liquid   towards the end of the exam      Please see separate speech pathology report for full discussion of findings   and recommendations. IR LUMBAR PUNCTURE FOR DIAGNOSIS   Final Result   Successful fluoroscopic-guided lumbar puncture. XR CHEST PORTABLE   Final Result   Appropriate radiographic positioning of the nasogastric tube. Recommend clinical correlation prior to use. Discharge Medications:   Current Discharge Medication List      START taking these medications    Details   insulin lispro (HUMALOG) 100 UNIT/ML injection cartridge Inject 8 Units into the skin 3 times daily (before meals)  Qty: 5 Cartridge, Refills: 3      mupirocin (BACTROBAN) 2 % ointment Apply topically 3 times daily. Qty: 15 g, Refills: 1      doxycycline hyclate (VIBRA-TABS) 100 MG tablet Take 1 tablet by mouth 2 times daily for 10 days  Qty: 20 tablet, Refills: 0      metFORMIN (GLUCOPHAGE) 500 MG tablet Take 1 tablet by mouth 2 times daily (with meals)  Qty: 60 tablet, Refills: 5      lisinopril (PRINIVIL;ZESTRIL) 10 MG tablet Take 1.5 tablets by mouth daily  Qty: 30 tablet, Refills: 3    Comments: Please cancel previous script for 5 mg      pantoprazole (PROTONIX) 40 MG tablet Take 1 tablet by mouth 2 times daily (before meals) for 14 days, THEN 1 tablet daily for 14 days. Qty: 30 tablet, Refills: 3           Current Discharge Medication List      CONTINUE these medications which have CHANGED    Details   insulin glargine (LANTUS) 100 UNIT/ML injection vial Inject 24 Units into the skin nightly  Qty: 1 vial, Refills: 3           Current Discharge Medication List      CONTINUE these medications which have NOT CHANGED    Details   gabapentin (NEURONTIN) 600 MG tablet Take 600 mg by mouth 2 times daily.       venlafaxine (EFFEXOR XR) 75 MG extended release capsule Take 75 mg by mouth daily      Insulin Pen Needle 32G X 4 MM MISC Use 4 daily      blood glucose test strips (ASCENSIA AUTODISC VI;ONE TOUCH ULTRA TEST VI) strip Test TID and as directed           Current Discharge Medication List      STOP taking these medications       insulin lispro (HUMALOG) 100 UNIT/ML injection vial Comments:   Reason for Stopping:         cyclobenzaprine (FLEXERIL) 10 MG tablet Comments:   Reason for Stopping:         insulin lispro (HUMALOG KWIKPEN) 100 UNIT/ML pen Comments:   Reason for Stopping: Follow-up appointments:  one week    Provider Follow-up:    pcp    Condition at Discharge:  Stable    The patient was seen and examined on day of discharge and this discharge summary is in conjunction with any daily progress note from day of discharge. Time Spent on discharge is 45 minutes  in the examination, evaluation, counseling and review of medications and discharge plan. Signed:    Tracy Laboy DO   1/25/2021      Thank you Mendoza Robert MD for the opportunity to be involved in this patient's care. If you have any questions or concerns please feel free to contact me at [phone].

## 2021-01-25 NOTE — CARE COORDINATION
[facility] aware of discharge. No answer to room phone upon CTN call. Patient will be seen by 1/27/2021. Orders faxed to [facility]. Electronically signed by Joselito Go LPN on 6/93/95 at 05:76 PM EST

## 2021-01-25 NOTE — CARE COORDINATION
CASE MANAGEMENT DISCHARGE SUMMARY:    DISCHARGE DATE: 1/25/2021    DISCHARGED TO: Home with home care     HOME CARE AGENCY: [facility]             PHONE NUMBER: [phone]               TRANSPORTATION: private vehicle               Adrien Butt RN, BSN, Case Management  [phone]  Electronically signed by Adrien Butt RN on 1/25/2021 at 10:34 AM

## 2021-01-25 NOTE — DISCHARGE INSTR - DIET

## 2021-01-25 NOTE — PROGRESS NOTES
Patient had a very good night. Called out appropriately x3 to use the bathroom. Ambulated independently and had no issues with stability. Very cooperative and pleasant.

## 2021-01-25 NOTE — PLAN OF CARE
Health Behavior:  Goal: Ability to manage health-related needs will improve  Description: Ability to manage health-related needs will improve  1/25/2021 0920 by Erick Denton RN  Outcome: Ongoing     Problem: Health Behavior:  Goal: Identification of resources available to assist in meeting health care needs will improve  Description: Identification of resources available to assist in meeting health care needs will improve  1/25/2021 0920 by Erick Denton RN  Outcome: Ongoing     Problem: Metabolic:  Goal: Ability to maintain appropriate glucose levels will improve  Description: Ability to maintain appropriate glucose levels will improve  1/25/2021 0920 by Erick Denton RN  Outcome: Ongoing     Problem: Nutritional:  Goal: Maintenance of adequate nutrition will improve  Description: Maintenance of adequate nutrition will improve  1/25/2021 0920 by Erick Denton RN  Outcome: Ongoing     Problem: Nutritional:  Goal: Progress toward achieving an optimal weight will improve  Description: Progress toward achieving an optimal weight will improve  1/25/2021 0920 by Erick Denton RN  Outcome: Ongoing     Problem: Nutritional:  Goal: Consumption of the prescribed amount of daily calories will improve  Description: Consumption of the prescribed amount of daily calories will improve  1/25/2021 0920 by Erick Denton RN  Outcome: Ongoing     Problem: Physical Regulation:  Goal: Complications related to the disease process, condition or treatment will be avoided or minimized  Description: Complications related to the disease process, condition or treatment will be avoided or minimized  1/25/2021 0920 by Erick Denton RN  Outcome: Ongoing     Problem: Physical Regulation:  Goal: Diagnostic test results will improve  Description: Diagnostic test results will improve  1/25/2021 0920 by Erick Denton RN  Outcome: Ongoing     Problem: Tissue Perfusion:  Goal: Adequacy of tissue perfusion will improve  Description: Adequacy of tissue perfusion will improve  1/25/2021 0920 by Chetan Mendez RN  Outcome: Ongoing     Problem: Safety:  Goal: Ability to chew and swallow food without choking will improve  Description: Ability to chew and swallow food without choking will improve  1/25/2021 0920 by Chetan Mendez RN  Outcome: Ongoing     Problem: Safety:  Goal: Signs and symptoms of aspiration will decrease  Description: Signs and symptoms of aspiration will decrease  1/25/2021 0920 by Chetan Mendez RN  Outcome: Ongoing

## 2021-01-25 NOTE — DISCHARGE SUMMARY
Hospital Medicine Discharge Summary    Patient: Opal Green     Gender: male  : 1960   Age: 56 y.o. MRN: [id number]    Code Status: Full Code     Primary Care Provider: Aleida Mayorga MD    Admit Date: 2021   Discharge Date:  2021    Admitting Physician: Mynor Lisa MD  Discharge Physician: Stacy Loyola DO     Discharge Diagnoses: Active Hospital Problems    Diagnosis Date Noted    Diabetic acidosis without coma ([facility] [address] [E11.10] 2021       Hospital Course: a 55 yo male with iddm admitted with dka and sepsis. He was found by his wife as unresponsive, covered in emesis and stool. He was treated for dka, mrsa foot ulcer, and esophagitis      Left foot and ankle ulcers  - diabetic foot wound  - xray completed and reviewed   - wound culture with mrsa  - on vanc here, discharge with doxycyline 100 mg bid x 10 days  - consulted podiatry    Esophagitis   - egd  with erosive esophagitis   - avoid nsaids  - ppi bid x 2 weeks, then daily      DKA -resolved  DM  - increased lantus to 24 units qhs, 8 units humalog prandial insulin   - add metformin 500 mg bid to go home with   - hba1c 10.7    HTN  - start lisinopril 20 mg    possible acute pyogenic meningitis - ruled out  - treated with vanc, Rocephin, ampicillin  - lp done, pcr panel is negative  - csf cultures negative  - mri brain w/wout contrast was unremarkable     Acute metabolic encephalopathy - resolved  - due to dka, infection  - head CT negative  - check an mri brain w/wout contrast was negative     Sepsis, POA -resolved  - with criteria: leukocytosis, lactic acidosis, fevers, tachycardia, tachypnea  - lactate normalized, wbc coming down, fever curve improving   - blood and urine cultures ngtd  - ct abdomen/pelvis nonacute   - procalcitonin . 46 -> .18  - covid pcr negative  - abx as above  - ID consulted     TORI - resolved  - crt baseline 1; was 2.7 on admission -> 1.2 today  - bun 14    gib - coffee ground emesis, melena due to esophagitis with vomiting   Acute blood loss anemia - no further bleeding or emesis episodes   - 2 units prbcs given  - hgb 12 -> remaining stable  11  - emesis and stool occult +  - cont ppi bid  - consulted GI    Disposition:  Home    Exam:     BP (!) 181/90   Pulse 72   Temp 98.7 °F (37.1 °C) (Oral)   Resp 16   Ht 5' 10\" (1.778 m)   Wt 186 lb 11.7 oz (84.7 kg)   SpO2 90%   BMI 26.79 kg/m²     General appearance:  No apparent distress, appears stated age and cooperative. HEENT:  Normal cephalic, atraumatic without obvious deformity. Pupils equal, round, and reactive to light. Extra ocular muscles intact. Conjunctivae/corneas clear. Neck: Supple, with full range of motion. No jugular venous distention. Trachea midline. Respiratory:  Normal respiratory effort. Clear to auscultation, bilaterally without Rales/Wheezes/Rhonchi. Cardiovascular:  Regular rate and rhythm with normal S1/S2 without murmurs, rubs or gallops. Abdomen: Soft, non-tender, non-distended with normal bowel sounds. Musculoskeletal:  No clubbing, cyanosis or edema bilaterally. Full range of motion without deformity. Skin: Skin color, texture, turgor normal.  No rashes or lesions. Neurologic:  Neurovascularly intact without any focal sensory/motor deficits. Cranial nerves: II-XII intact, grossly non-focal.  Psychiatric:  Alert and oriented, thought content appropriate, normal insight    Consults:     IP CONSULT TO GI  PHARMACY TO DOSE VANCOMYCIN  IP CONSULT TO INFECTIOUS DISEASES  IP CONSULT TO PODIATRY  IP CONSULT TO HOME CARE NEEDS    Labs:  For convenience and continuity at follow-up the following most recent labs are provided:    Lab Results   Component Value Date    WBC 12.2 01/21/2021    HGB 11.6 01/21/2021    HCT 35.5 01/21/2021    MCV 77.9 01/21/2021     01/21/2021     01/25/2021    K 4.0 01/25/2021    K 5.1 01/18/2021     01/25/2021    CO2 28 01/25/2021 Successful fluoroscopic-guided lumbar puncture. XR CHEST PORTABLE   Final Result   Appropriate radiographic positioning of the nasogastric tube. Recommend clinical correlation prior to use. Discharge Medications:   Current Discharge Medication List      START taking these medications    Details   insulin lispro (HUMALOG) 100 UNIT/ML injection cartridge Inject 8 Units into the skin 3 times daily (before meals)  Qty: 5 Cartridge, Refills: 3      lisinopril (PRINIVIL;ZESTRIL) 5 MG tablet Take 1 tablet by mouth daily  Qty: 30 tablet, Refills: 3      mupirocin (BACTROBAN) 2 % ointment Apply topically 3 times daily. Qty: 15 g, Refills: 1      pantoprazole (PROTONIX) 40 MG tablet Take 1 tablet by mouth 2 times daily (before meals)  Qty: 30 tablet, Refills: 3      doxycycline hyclate (VIBRA-TABS) 100 MG tablet Take 1 tablet by mouth 2 times daily for 10 days  Qty: 20 tablet, Refills: 0      metFORMIN (GLUCOPHAGE) 500 MG tablet Take 1 tablet by mouth 2 times daily (with meals)  Qty: 60 tablet, Refills: 5           Current Discharge Medication List      CONTINUE these medications which have CHANGED    Details   insulin glargine (LANTUS) 100 UNIT/ML injection vial Inject 24 Units into the skin nightly  Qty: 1 vial, Refills: 3           Current Discharge Medication List      CONTINUE these medications which have NOT CHANGED    Details   gabapentin (NEURONTIN) 600 MG tablet Take 600 mg by mouth 2 times daily.       venlafaxine (EFFEXOR XR) 75 MG extended release capsule Take 75 mg by mouth daily      Insulin Pen Needle 32G X 4 MM MISC Use 4 daily      blood glucose test strips (ASCENSIA AUTODISC VI;ONE TOUCH ULTRA TEST VI) strip Test TID and as directed           Current Discharge Medication List      STOP taking these medications       insulin lispro (HUMALOG) 100 UNIT/ML injection vial Comments:   Reason for Stopping:         cyclobenzaprine (FLEXERIL) 10 MG tablet Comments:   Reason for Stopping:

## 2021-01-25 NOTE — CARE COORDINATION
[facility]  Diabetes Education   Progress Note       NAME:  Leena Medley  MEDICAL RECORD NUMBER:  [id number]  AGE: 56 y.o. GENDER: male  : 1960  TODAY'S DATE:  2021    Subjective   Reason for Diabetic Education Evaluation and Assessment: general diabetes support    Georges Agee is alert and cooperative. He is planning discharge to home later today. He is receptive to diabetes self care activities (glucose monitoring and insulin taking)  at home but requests that I write everything down for his wife.       Visit Type: follow-up      Leena Medley is a 56 y.o. male referred by:     [x] Physician  [] Nursing  [] Chart Review   [] Other:     PAST MEDICAL HISTORY        Diagnosis Date    TORI (acute kidney injury) ([facility] [facility] 2017    Bacteremia 2017    staph aureus    Diabetes mellitus ([facility]     Diabetic neuropathy ([facility] [facility]     Gout     MRSA (methicillin resistant staph aureus) culture positive 18, 17, 17,13, 1/15/14    ulcers bilateral legs    MRSA (methicillin resistant staph aureus) culture positive 2021    foot wound    Pneumonia     Pyogenic inflammation of bone ([facility] [address] [facility]        PAST SURGICAL HISTORY    Past Surgical History:   Procedure Laterality Date    EYE SURGERY      lens implants after removal of cataracts    OTHER SURGICAL HISTORY  2018    partial right foot amputation with graft application    TOE AMPUTATION Right 2018    PARTIAL RIGHT FOOT AMPUTATION WITH GRAFT APPLICATION performed by Linsey Murguia DPM at [facility] 35 N/A 2021    EGD BIOPSY performed by Maria Valverde DO at [facility] ENDOSCOPY       FAMILY HISTORY    Family History   Problem Relation Age of Onset    Diabetes Maternal Grandfather     Heart Disease Paternal Uncle     High Blood Pressure Mother        SOCIAL HISTORY    Social History     Tobacco Use    Smoking status: Never Smoker    Smokeless tobacco: Never Used Substance Use Topics    Alcohol use: No     Comment: FORMER DRINKER approx. quit 2005    Drug use: No       ALLERGIES    Allergies   Allergen Reactions    Hydrocodone-Acetaminophen Itching    Percocet [Oxycodone-Acetaminophen]      States \"hallucinations,disoriented, & freaked out\" per wife    Pregabalin Itching    Vicodin [Hydrocodone-Acetaminophen]      Spaces out. Pt. States 1/15/14 has been taking some vicodin without problems.        MEDICATIONS     insulin lispro  8 Units Subcutaneous TID WC    insulin glargine  24 Units Subcutaneous Nightly    venlafaxine  75 mg Oral Daily    lisinopril  5 mg Oral Daily    pantoprazole  40 mg Oral BID AC    gabapentin  600 mg Oral BID    vancomycin  1,500 mg Intravenous Q18H    mupirocin   Topical Daily    enoxaparin  30 mg Subcutaneous Daily    cefTRIAXone (ROCEPHIN) IV  2,000 mg Intravenous Q12H    insulin lispro  0-6 Units Subcutaneous TID WC    insulin lispro  0-3 Units Subcutaneous Nightly    vancomycin (VANCOCIN) intermittent dosing (placeholder)   Other RX Placeholder       Objective        Patient Active Problem List   Diagnosis Code    Diabetic ketoacidosis without coma associated with type 2 diabetes mellitus (HCC) E11.10    Nausea & vomiting R11.2    Diabetic neuropathy (HCC) E11.40    DM2/IDDM E11.9    Gout M10.9    Hx MRSA Z22.322    Hyperkalemia E87.5    Chronic pain on chronic opioids G89.29    Chronic LE diabetic ulcers E11.622, L97.309    Mild protein-calorie malnutrition (HCC) E44.1    Cellulitis L03.90    TORI (acute kidney injury) ([facility] [address] N17.9    Neuropathy G62.9    Diabetic acidosis without coma (HCC) E11.10        BP (!) 181/90   Pulse 72   Temp 98.7 °F (37.1 °C) (Oral)   Resp 16   Ht 5' 10\" (1.778 m)   Wt 186 lb 11.7 oz (84.7 kg)   SpO2 90%   BMI 26.79 kg/m²     HgBA1c:    Lab Results   Component Value Date    LABA1C 10.7 01/20/2021       Recent Labs     01/24/21  1627 01/24/21 2021 01/25/21  0732 01/25/21  1112 POCGLU 340* 363* 217* 129*       BUN/Creatinine:    Lab Results   Component Value Date    BUN 14 01/25/2021    CREATININE 1.2 01/25/2021       Assessment        Diabetes Management and Education    Does the patient have a Primary Care Physician? Yes, Chuckie Sandoval MD       Does the patient require new medication instruction? Yes  Reinforced importance of consistent medication/insulin taking. Level of patient/caregiver understanding able to:       [x] Verbalized Understanding   [] Demonstrated Understanding       [] Teach Back       [] Needs Reinforcement     []  Other:        Does the patient/caregiver monitor Blood Glucoses? No:   Recommend BG targets 100 - 180 at home and transition to lower as tolerated. Reviewed glycemic control targets, testing frequency and when to call PCP. Level of patient/caregiver understanding able to:        [x] Verbalized Understanding   [] Demonstrated Understanding       [] Teach Back       [] Needs Reinforcement     []  Other:        Does the patient/caregiver follow a Meal Plan? No:   Recommend making water, unsweetened tea or coffee primary drinks. Reviewed importance of eating three meals per day. Level of patient/caregiver understanding able to:       [x] Verbalized Understanding   [] Demonstrated Understanding       [] Teach Back       [] Needs Reinforcement     []  Other:        Plan        Ongoing diabetes education and blood glucose monitoring.                                                 Discharge Plan:  Discharge needs: no prescription needs identified  Placement for patient upon discharge: home with support        Teaching Time Diabetes Education:  20 minutes     Electronically signed by Betsy Castellano on 1/25/2021 at 11:50 AM

## 2021-01-25 NOTE — PROGRESS NOTES
Orders to d/c patient. IV removed- pt tolerated well. AVS information reviewed and signed. Pt  And wife acknowledged understanding of information on AVS including: Activity, Diet, New medications, Continued medications, medications to stop taking, follow up appointments, and education on diagnosis. Pt being D/C'd home with wife. Wheeled patient to car at discharge.     Electronically signed by Emry Olszewski, RN on 1/25/2021 at 4:16 PM

## 2021-01-25 NOTE — PLAN OF CARE
improve  Description: Ability to maintain appropriate glucose levels will improve  Outcome: Ongoing     Problem: Nutritional:  Goal: Maintenance of adequate nutrition will improve  Description: Maintenance of adequate nutrition will improve  Outcome: Ongoing  Goal: Progress toward achieving an optimal weight will improve  Description: Progress toward achieving an optimal weight will improve  Outcome: Ongoing  Goal: Consumption of the prescribed amount of daily calories will improve  Description: Consumption of the prescribed amount of daily calories will improve  Outcome: Ongoing     Problem: Physical Regulation:  Goal: Complications related to the disease process, condition or treatment will be avoided or minimized  Description: Complications related to the disease process, condition or treatment will be avoided or minimized  Outcome: Ongoing  Goal: Diagnostic test results will improve  Description: Diagnostic test results will improve  Outcome: Ongoing     Problem: Tissue Perfusion:  Goal: Adequacy of tissue perfusion will improve  Description: Adequacy of tissue perfusion will improve  Outcome: Ongoing     Problem: Safety:  Goal: Ability to chew and swallow food without choking will improve  Description: Ability to chew and swallow food without choking will improve  Outcome: Ongoing  Goal: Signs and symptoms of aspiration will decrease  Description: Signs and symptoms of aspiration will decrease  Outcome: Ongoing

## 2021-01-25 NOTE — PROGRESS NOTES
Department of Podiatric Surgery  Progress Note  1/25/2021  Opal Green      HISTORY OF PRESENT ILLNESS:      The patient is a 56 y.o. male who presents with sepsis, DKA, AMS, GI bleed, TORI and possible meningitis. Healert and coherent today. Reports that he follows with Dr. Federica Calixto at [facility] for his foot care and would like to follow up with him upon d/c. PHYSICAL EXAM:  VITALS:  BP (!) 141/73   Pulse 72   Temp 98.7 °F (37.1 °C) (Oral)   Resp 16   Ht 5' 10\" (1.778 m)   Wt 186 lb 11.7 oz (84.7 kg)   SpO2 90%   BMI 26.79 kg/m²     LOWER EXTREMITY:  VASCULAR: Pedal Pulses present. negative signs of ischemia. MUSCULOSKELETAL:  positive gross deformity. There is evidence of absence of the 2nd digit, right without any evidence of any acute changes. NEUROLOGIC:  Epicritic sensation, light touch, joint position sense absent  SKIN:  Open wound sub 1st MPJ, LEFT foot without signs of active infection. The wound base is 100% fibro-granular with a depth of subcutaneous tissues only. There is no evidence of any deep tissues, fascia or bone. There is no tenderness, fluctuance, crepitus or instability to suggest a deep or rapid spreading infection. Improved in overall appearance and size    A second wound is noted on the posterior medial leg just proximal to the medial malleolus. It appears to be more abrasion than deep wound. The wound base is also fibrogranular. No erythema, lymphangitis, or cellulitis. No fluctuance. The muscular compartment is supple and not tense. I/O:    Intake/Output Summary (Last 24 hours) at 1/25/2021 1657  Last data filed at 1/24/2021 2304  Gross per 24 hour   Intake 1080 ml   Output --   Net 1080 ml              Wt Readings from Last 3 Encounters:   01/25/21 186 lb 11.7 oz (84.7 kg)   01/18/21 160 lb (72.6 kg)   09/27/19 187 lb (84.8 kg)       LABS:    No results for input(s): WBC, HGB, HCT, PLT in the last 72 hours.      Recent Labs     01/25/21  0718      K 4.0   CL use  - X-rays negative    2. Left leg wound  - Mupirocin ordered  - Encouraged patient compliance    3. Right foot  - No open wounds  - May need to follow up outpatient for signs of erosive changes in the 1st MPJ    DISPO: Patient will need better follow up with podiatry after discharge. Can follow up at wound center with Dr. Fang Cifuentes. Ok to d/c when medically stable.      [address] [facility]  Pager: [phone]  Office: [phone]  Fax: [phone]

## 2021-01-25 NOTE — CARE COORDINATION
Spoke with patient over the phone, he is agreeable to D/C. Discussed home care options and he was not sure who he would want to use. He advised to speak with his wife. Call to Ebony Rushing, patient's wife, she was agreeable to any home care company that takes her insurance and agreed to a referral to [facility]. Offered to give a list, but declined as they do not know of any preferences. The Plan for Transition of Care is related to the following treatment goals: strengthening, monitoring medications and wounds    The Patient and/or patient representative, wife, was provided with a choice of provider and agrees   with the discharge plan. [x] Yes [] No    Freedom of choice list was provided with basic dialogue that supports the patient's individualized plan of care/goals, treatment preferences and shares the quality data associated with the providers. [x] Yes [] No    Call to [facility] with [facility], she will check staffing and advise. Patricia Ho RN, BSN, Case Management  Phone: [phone]  Electronically signed by Patricia Ho RN on 1/25/2021 at 10:01 AM     Spoke with [facility] from [facility], need orders for wound care at home. Attempted to contact the patient's nurse to see if podiatry has signed off him yet, but she is not available. Will try back. Patricia Ho RN, BSN, Case Management  Phone: [phone]  Electronically signed by Patricia Ho RN on 1/25/2021 at 10:34 AM      Spoke with the patient's nurse, she states the patient's wife has been doing the wound care at home and they will continue the same treatment at D/C. No home care wound therapy needed.   Patricia Ho RN, BSN, Case Management  Phone: [phone]  Electronically signed by Patricia Ho RN on 1/25/2021 at 3:17 PM

## 2021-01-25 NOTE — PROGRESS NOTES
erosive esophagitis noted in the distal 1/3 of the esophagus.  The gastroesophageal junction was at 40 cm from the incisors.   2) There was a 2-3 cm sliding hiatal hernia noted in the stomach.   There was some bile stained liquid noted in the fundus but no retained food was noted in the stomach.  3)  There was some mucosal ulceration and edema noted in the lesser curvature of the body of the stomach.  This was likely related to patient's vomiting during the DKA.   4) Mild erythema of the antrum.  Biopsies were obtained from the antrum and body of the stomach to rule out active gastritis and H.pylori gastritis.  5) There was a normal appearing duodenal bulb and second portion of the duodenum. 6) The ampulla was normal appearing.     1/22/2021 MOST RECENT MBS  Oropharyngeal Dysphagia characterized by generalized weakness; prolonged mastication; disorganized bolus formation/cohesion; prolonged A-P oral transit (lingual mashing pattern); delayed initiation of swallow; reduced tongue base retraction; reduced laryngeal elevation and reduced pharyngeal clearance. · Pt with deep penetration of thin liquids as pt fatigues; with sensation and reflexive cough to clear  · Pt with post swallow oral residue (lingual/palatal) of food consistencies  · Pt with post swallow food residue in the valleculae >pyriform sinus and pharyngeal wall (most symptomatic for soft/bite size and regular solid food)  · Pt with cough response during swallow of food consistencies but did not visualize any penetration or aspiration. Compensatory Strategies:   ·  Pt was easily distracted and required re-direct for use of compensatory swallow strategies. · Pt required clearance swallow and alternating with liquids to clear pharyngeal food residue    Observation of barium residue below the PES post swallow of soft/bite size and regular solid food with concern for reduced esophageal motility.   Pt distractibility can further exacerbate deficits    Subjective:     Current diet  Regular diet texture (Minced/Moist recommended per MBS d/t concern for residue/dysmotility)  Thin    Comments regarding tolerating Current Diet:   Diet upgraded to regular 1/24/2021 from Minced/Moist: MD notes: Pt back to baseline. Wife at bedside. He is asking for a regular diet now that his confusion has resolved. Patient reports adequate tolerance      Objective:     Pain   Patient Currently in Pain: Denies    Cognitive/Behavior   Behavior/Cognition: Alert, Cooperative, Pleasant mood, Decreased recall noted    Presentations   Consistencies Administered: NA d/t focus on education prior to discharge home    Positioning   Upright in bed    Dysphagia Tx:   Patient noted with plan for discharge home today. Focus on education prior to discharge home. Patient recalls MBS with cueing. Unable to recall diet/liquid recommendations or recommended comp strats. Reviewed/reinforced current diet/liquid recommendations as well as rationale for recommendations. Provided patient with handout with recs written as well as handout with description for minded/moist diet items. Patient indicates understanding, but appears to need continued reinforcement. Recommend continued ST upon discharge which patient was agreeable to having. Goals:   Dysphagia Goals: The patient will tolerate recommended diet without observed clinical signs of aspiration, ongoing  The patient/caregiver will demonstrate understanding of compensatory strategies for improved swallowing safety. reinforced  Pt will tolerate soft/bite size and regular solid food consistencies alternated with liquids 15/15 without overt clinical s/s of penetration/aspiration. not addressed  Pt will complete tongue base retraction ex and laryngeal elevation ex x 10 reps with models not addressed    Assessment:   Impressions:   Accepted and tolerated tx at bedside.   Suspect swallow function comparable to 1/22/2021 MBS noted above DESPITE improved cognitive status. Patient noted with plan for discharge home today. Focus on education prior to discharge home. Patient recalls MBS with cueing. Unable to recall diet/liquid recommendations or recommended comp strats. Reviewed/reinforced current diet/liquid recommendations as well as rationale for recommendations. Provided patient with handout with recs written as well as handout with description for minded/moist diet items. Patient indicates understanding, but appears to need continued reinforcement. Recommend continued ST upon discharge which patient was agreeable to having. Diet Recommendations:  Solid consistency: Dysphagia Minced and Moist (Dysphagia II)   Liquid consistency: Thin(close monitor; if as pt fatigues; noting increase in s/s may need temporary mildly thick)   Recommended Form of Meds: Crushed in puree as able  Compensatory Swallowing Strategies: Upright as possible for all oral intake, Small bites/sips, Swallow 2 times per bite/sip, Remain upright for 30-45 minutes after meals    Education:  Consulted and agree with results and recommendations: Patient, RN  Patient Education: Completed on results/recs/plan  Patient Education Response: Verbalizes understanding, Needs reinforcement    Prognosis:   Good for dysphagia    Plan:     Continue Dysphagia Therapy: YES    Interventions: Therapeutic Interventions: Diet tolerance monitoring, Patient/Family education, Therapeutic PO trials with SLP, Laryngeal exercises, Tongue base strengthening  Duration/Frequency of therapy while on unit: Duration/Frequency of Treatment  Duration/Frequency of Treatment: 3-5 times a week while on acute medical floor; additional recommendations at d/c TBD     Discharge Instructions:   Anticipate NEED for further skilled Speech Therapy for Dysphagia at discharge    This note serves as a D/C Summary in the event that this patient is discharged prior to the next therapy session.     Coded treatment time: 10  Total treatment time: 25    Electronically signed by THOR Stringer on 1/25/2021 at 11:13 AM

## 2021-01-25 NOTE — DISCHARGE INSTR - COC
Continuity of Care Form    Patient Name: Jones Pizarro   :  1960  MRN:  [id number]    Admit date:  2021  Discharge date:  2021    Code Status Order: Full Code   Advance Directives:   Advance Care Flowsheet Documentation       Date/Time Healthcare Directive Type of Healthcare Directive Copy in [address] Agent's Name Healthcare Agent's Phone Number    21 1215  Yes, patient has an advance directive for healthcare treatment  --  No, copy requested from family  -- -- --    21 1206  Yes, patient has an advance directive for healthcare treatment  Durable power of  for health care  No, copy requested from family  Spouse -- --            Admitting Physician:  Virgilio Baker MD  PCP: Mitchell Perry MD    Discharging Nurse: [facility]/Room#: P5V-2979/8778-57  Discharging Unit Phone Number: [phone]    Emergency Contact:   Extended Emergency Contact Information  Primary Emergency Contact: Montana Martin  Address: [address] [place] [address] Phone: [phone]  Mobile Phone: [phone]  Relation: Spouse  Secondary Emergency Contact: [facility] Phone: [phone]  Relation: Parent    Past Surgical History:  Past Surgical History:   Procedure Laterality Date    EYE SURGERY      lens implants after removal of cataracts    OTHER SURGICAL HISTORY  2018    partial right foot amputation with graft application    TOE AMPUTATION Right 2018    PARTIAL RIGHT FOOT AMPUTATION WITH GRAFT APPLICATION performed by Brynn Livingston DPM at [facility] N/A 2021    EGD BIOPSY performed by Jaskaran Krause DO at [facility]       Immunization History:   Immunization History   Administered Date(s) Administered    Influenza Virus Vaccine 2001    Tdap (Boostrix, Adacel) 2008       Active Problems:  Patient Active Problem List   Diagnosis Code    Diabetic ketoacidosis without coma associated with type 2 diabetes mellitus ([facility] E11.10    Nausea & vomiting R11.2    Diabetic neuropathy ([facility] E11.40    DM2/IDDM E11.9    Gout M10.9    Hx MRSA Z22.322    Hyperkalemia E87.5    Chronic pain on chronic opioids G89.29    Chronic LE diabetic ulcers E11.622, L97.309    Mild protein-calorie malnutrition (HCC) E44.1    Cellulitis L03.90    TORI (acute kidney injury) ([facility] N17.9    Neuropathy G62.9    Diabetic acidosis without coma (HCC) E11.10       Isolation/Infection:   Isolation            Contact          Patient Infection Status       Infection Onset Added Last Indicated Last Indicated By Review Planned Expiration Resolved Resolved By    MRSA 01/21/21 01/22/21 01/21/21 Culture, Wound        Resolved    COVID-19 Rule Out 01/19/21 01/19/21 01/19/21 COVID-19 (Ordered)   01/20/21 Rule-Out Test Resulted    COVID-19 Rule Out 01/18/21 01/18/21 01/18/21 COVID-19 (Ordered)   01/18/21 Rule-Out Test Resulted    MRSA  05/07/17 12/27/18 Wound Culture   01/19/21 Amanda Torrez RN    MRSA  09/09/13 09/09/13 Jose Sosa RN   11/19/15 Jose Sosa RN            Nurse Assessment:  Last Vital Signs: BP (!) 181/90   Pulse 72   Temp 98.7 °F (37.1 °C) (Oral)   Resp 16   Ht 5' 10\" (1.778 m)   Wt 186 lb 11.7 oz (84.7 kg)   SpO2 90%   BMI 26.79 kg/m²     Last documented pain score (0-10 scale): Pain Level: 0  Last Weight:   Wt Readings from Last 1 Encounters:   01/25/21 186 lb 11.7 oz (84.7 kg)     Mental Status:  oriented, alert and forgetful at times    IV Access:  - None    Nursing Mobility/ADLs:  Walking   Independent  Transfer  Independent  Bathing  Assisted  Dressing  Assisted  Toileting  Independent  Feeding  [facility]   whole    Wound Care Documentation and Therapy:  Wound 01/31/19 Leg Inner; Lower Venous Ulcer, healing over 4 years  (Active)   Number of days: 724       Wound 09/26/19 Foot Left;Plantar healing veneous wound (Active)   Wound Etiology Diabetic 01/23/21 1130   Dressing Status New dressing applied;Clean;Dry; Intact 01/25/21 0757   Wound Cleansed Cleansed with saline 01/24/21 0800   Dressing/Treatment Ace wrap;Dry dressing; Pharmaceutical agent (see MAR) 01/25/21 0757   Offloading for Diabetic Foot Ulcers No 01/19/21 0100   Dressing Change Due 01/26/21 01/25/21 0757   Wound Length (cm) 1.5 cm 01/23/21 1130   Wound Width (cm) 1.5 cm 01/23/21 1130   Wound Surface Area (cm^2) 2.25 cm^2 01/23/21 1130   Change in Wound Size % (l*w) 55.36 01/23/21 1130   Drainage Amount Small 01/25/21 0757   Drainage Description Serosanguinous 01/25/21 0757   Odor None 01/25/21 0757   Laura-wound Assessment Blanchable erythema 01/23/21 1130   Number of days: 487       Wound 09/26/19 Tibial Distal;Left;Dorsal;Inner healing wound on inner lower leg (Active)   Wound Etiology Diabetic 01/20/21 2345   Dressing Status New dressing applied;Clean;Dry; Intact 01/25/21 0757   Wound Cleansed Cleansed with saline 01/24/21 0800   Dressing/Treatment Ace wrap;Dry dressing; Pharmaceutical agent (see MAR) 01/25/21 0757   Offloading for Diabetic Foot Ulcers No 01/19/21 0100   Dressing Change Due 01/26/21 01/25/21 0757   Wound Length (cm) 3.5 cm 01/23/21 1130   Wound Width (cm) 3 cm 01/23/21 1130   Wound Surface Area (cm^2) 10.5 cm^2 01/23/21 1130   Change in Wound Size % (l*w) -9.38 01/23/21 1130   Drainage Amount Small 01/25/21 0757   Drainage Description Serosanguinous 01/25/21 0757   Odor None 01/25/21 0757   Laura-wound Assessment Blanchable erythema 01/25/21 0757   Number of days: 487        Elimination:  Continence:   · Bowel:  Yes  · Bladder: Yes  Urinary Catheter: None   Colostomy/Ileostomy/Ileal Conduit: No       Date of Last BM: 01/22/2021    Intake/Output Summary (Last 24 hours) at 1/25/2021 1001  Last data filed at 1/24/2021 2304  Gross per 24 hour   Intake 1320 ml   Output --   Net 1320 ml     I/O last 3 completed shifts: In: 18 [P.O.:1560]  Out: -     Safety Concerns: At Risk for Falls    Impairments/Disabilities:      None    Nutrition Therapy:  Current Nutrition Therapy:   - Oral Diet:  Carb Control 4 carbs/meal (1800kcals/day)    Routes of Feeding: Oral  Liquids: No Restrictions  Daily Fluid Restriction: no  Last Modified Barium Swallow with Video (Video Swallowing Test): done on 01/2021    Treatments at the Time of Hospital Discharge:   Respiratory Treatments: none  Oxygen Therapy:  is not on home oxygen therapy. Ventilator:    - No ventilator support    Rehab Therapies: Physical Therapy, Occupational Therapy and Nurse,Speech  Weight Bearing Status/Restrictions: No weight bearing restirctions  Other Medical Equipment (for information only, NOT a DME order):  none  Other Treatments:     Diabetes education and support with focus on medication management, glucose monitoring, meal planning and preventing hypo/hyperglycemia.  (Electronically signed by Angy Collins RN on 1/25/2021 at 11:49 AM)       [address] [place] [address]: [facility] [address] [place] [address] to establish plan of care for patient over 60 day period   [facility] Initial home SN evaluation visit to occur within 24-48 hours for:  1)  medication management  2)  VS and clinical assessment  3)  S&S chronic disease exacerbation education + when to contact MD/NP  4)  care coordination   Medication Reconciliation during 1st SN visit     PT/OT    Evaluate with goal of regaining prior level of functioning    OT to evaluate if patient has [address] needs for personal care     PCP Visit scheduled within 7 days of hospital discharge    Telehealth-Homecare Vitals(If patient is agreeable and meets guidelines)      Patient's personal belongings (please select all that are sent with patient):  None    RN SIGNATURE:  Electronically signed by Susan Aldridge RN on 1/25/21 at 12:13 PM EST    CASE MANAGEMENT/SOCIAL WORK SECTION    Inpatient Status Date: 1/19/2021    Readmission Risk Assessment Score:  12     Discharging to Facility/ Agency    Name:  [facility] Address: [address] [place] [address]., [address] [place] [address]   Phone: [phone]   Fax: [phone]    / signature: Electronically signed by Chidi Ortiz RN on 1/25/21 at 10:02 AM EST    PHYSICIAN SECTION    Prognosis: Good    Condition at Discharge: Stable    Rehab Potential (if transferring to Rehab): Good    Recommended Labs or Other Treatments After Discharge: pt, ot, rn, speech therapy    Physician Certification: I certify the above information and transfer of Ada Licea  is necessary for the continuing treatment of the diagnosis listed and that he requires [address] for greater 30 days.      Update Admission H&P: No change in H&P see dc summary    PHYSICIAN SIGNATURE:  Electronically signed by Eleuterio Delgadillo DO on 1/25/21 at 10:17 AM EST

## 2021-01-26 LAB
CSF CULTURE: NORMAL
GRAM STAIN RESULT: NORMAL

## 2021-03-05 ENCOUNTER — APPOINTMENT (OUTPATIENT)
Dept: CT IMAGING | Age: 61
End: 2021-03-05
Payer: COMMERCIAL

## 2021-03-05 ENCOUNTER — HOSPITAL ENCOUNTER (OUTPATIENT)
Age: 61
Setting detail: OBSERVATION
Discharge: HOME | End: 2021-03-08
Attending: INTERNAL MEDICINE | Admitting: INTERNAL MEDICINE
Payer: COMMERCIAL

## 2021-03-05 ENCOUNTER — HOSPITAL ENCOUNTER (EMERGENCY)
Age: 61
Discharge: ANOTHER HOSPITAL | End: 2021-03-05
Attending: EMERGENCY MEDICINE
Payer: COMMERCIAL

## 2021-03-05 ENCOUNTER — APPOINTMENT (OUTPATIENT)
Dept: GENERAL RADIOLOGY | Age: 61
End: 2021-03-05
Payer: COMMERCIAL

## 2021-03-05 VITALS
OXYGEN SATURATION: 95 % | HEART RATE: 71 BPM | RESPIRATION RATE: 8 BRPM | BODY MASS INDEX: 26.48 KG/M2 | TEMPERATURE: 97.7 F | WEIGHT: 185 LBS | SYSTOLIC BLOOD PRESSURE: 107 MMHG | DIASTOLIC BLOOD PRESSURE: 81 MMHG | HEIGHT: 70 IN

## 2021-03-05 DIAGNOSIS — R42 DIZZINESS: Primary | ICD-10-CM

## 2021-03-05 DIAGNOSIS — R26.81 GAIT INSTABILITY: ICD-10-CM

## 2021-03-05 DIAGNOSIS — E86.0 DEHYDRATION: ICD-10-CM

## 2021-03-05 PROBLEM — I63.9 ACUTE CVA (CEREBROVASCULAR ACCIDENT) (HCC): Status: ACTIVE | Noted: 2021-03-05

## 2021-03-05 LAB
A/G RATIO: 1.2 (ref 1.1–2.2)
ALBUMIN SERPL-MCNC: 4.5 G/DL (ref 3.4–5)
ALP BLD-CCNC: 130 U/L (ref 40–129)
ALT SERPL-CCNC: 49 U/L (ref 10–40)
ANION GAP SERPL CALCULATED.3IONS-SCNC: 7 MMOL/L (ref 3–16)
AST SERPL-CCNC: 45 U/L (ref 15–37)
BACTERIA: ABNORMAL /HPF
BASE EXCESS VENOUS: -2.2 MMOL/L (ref -3–3)
BASOPHILS ABSOLUTE: 0 K/UL (ref 0–0.2)
BASOPHILS RELATIVE PERCENT: 0.8 %
BILIRUB SERPL-MCNC: 0.6 MG/DL (ref 0–1)
BILIRUBIN URINE: NEGATIVE
BLOOD, URINE: ABNORMAL
BUN BLDV-MCNC: 28 MG/DL (ref 7–20)
CALCIUM SERPL-MCNC: 9.5 MG/DL (ref 8.3–10.6)
CARBOXYHEMOGLOBIN: 1.4 % (ref 0–1.5)
CHLORIDE BLD-SCNC: 104 MMOL/L (ref 99–110)
CLARITY: CLEAR
CO2: 26 MMOL/L (ref 21–32)
COLOR: YELLOW
CREAT SERPL-MCNC: 1.6 MG/DL (ref 0.8–1.3)
EKG ATRIAL RATE: 79 BPM
EKG DIAGNOSIS: NORMAL
EKG P AXIS: 34 DEGREES
EKG P-R INTERVAL: 196 MS
EKG Q-T INTERVAL: 362 MS
EKG QRS DURATION: 98 MS
EKG QTC CALCULATION (BAZETT): 415 MS
EKG R AXIS: -56 DEGREES
EKG T AXIS: 24 DEGREES
EKG VENTRICULAR RATE: 79 BPM
EOSINOPHILS ABSOLUTE: 0.2 K/UL (ref 0–0.6)
EOSINOPHILS RELATIVE PERCENT: 2.7 %
EPITHELIAL CELLS, UA: ABNORMAL /HPF (ref 0–5)
ETHANOL: NORMAL MG/DL (ref 0–0.08)
GFR AFRICAN AMERICAN: 53
GFR NON-AFRICAN AMERICAN: 44
GLOBULIN: 3.8 G/DL
GLUCOSE BLD-MCNC: 144 MG/DL (ref 70–99)
GLUCOSE BLD-MCNC: 210 MG/DL (ref 70–99)
GLUCOSE BLD-MCNC: 247 MG/DL (ref 70–99)
GLUCOSE URINE: 100 MG/DL
HCO3 VENOUS: 24.1 MMOL/L (ref 23–29)
HCT VFR BLD CALC: 38.7 % (ref 40.5–52.5)
HEMOGLOBIN: 12.3 G/DL (ref 13.5–17.5)
HYALINE CASTS: >40 /LPF (ref 0–2)
KETONES, URINE: NEGATIVE MG/DL
LEUKOCYTE ESTERASE, URINE: NEGATIVE
LYMPHOCYTES ABSOLUTE: 1.3 K/UL (ref 1–5.1)
LYMPHOCYTES RELATIVE PERCENT: 21.5 %
MCH RBC QN AUTO: 25.7 PG (ref 26–34)
MCHC RBC AUTO-ENTMCNC: 31.8 G/DL (ref 31–36)
MCV RBC AUTO: 81 FL (ref 80–100)
METHEMOGLOBIN VENOUS: 0.3 %
MICROSCOPIC EXAMINATION: YES
MONOCYTES ABSOLUTE: 0.7 K/UL (ref 0–1.3)
MONOCYTES RELATIVE PERCENT: 11.2 %
MUCUS: ABNORMAL /LPF
NEUTROPHILS ABSOLUTE: 3.9 K/UL (ref 1.7–7.7)
NEUTROPHILS RELATIVE PERCENT: 63.8 %
NITRITE, URINE: NEGATIVE
O2 CONTENT, VEN: 18 VOL %
O2 SAT, VEN: 99 %
O2 THERAPY: ABNORMAL
PCO2, VEN: 47.3 MMHG (ref 40–50)
PDW BLD-RTO: 16.5 % (ref 12.4–15.4)
PERFORMED ON: ABNORMAL
PERFORMED ON: ABNORMAL
PH UA: 5.5 (ref 5–8)
PH VENOUS: 7.33 (ref 7.35–7.45)
PLATELET # BLD: 224 K/UL (ref 135–450)
PMV BLD AUTO: 9.1 FL (ref 5–10.5)
PO2, VEN: 135.4 MMHG (ref 25–40)
POTASSIUM REFLEX MAGNESIUM: 5.4 MMOL/L (ref 3.5–5.1)
PRO-BNP: 28 PG/ML (ref 0–124)
PROTEIN UA: 30 MG/DL
RBC # BLD: 4.78 M/UL (ref 4.2–5.9)
RBC UA: ABNORMAL /HPF (ref 0–4)
SODIUM BLD-SCNC: 137 MMOL/L (ref 136–145)
SPECIFIC GRAVITY UA: 1.02 (ref 1–1.03)
TCO2 CALC VENOUS: 26 MMOL/L
TOTAL PROTEIN: 8.3 G/DL (ref 6.4–8.2)
TROPONIN: <0.01 NG/ML
URINE REFLEX TO CULTURE: ABNORMAL
URINE TYPE: ABNORMAL
UROBILINOGEN, URINE: 0.2 E.U./DL
WBC # BLD: 6.1 K/UL (ref 4–11)
WBC UA: ABNORMAL /HPF (ref 0–5)

## 2021-03-05 PROCEDURE — 2580000003 HC RX 258: Performed by: INTERNAL MEDICINE

## 2021-03-05 PROCEDURE — 85025 COMPLETE CBC W/AUTO DIFF WBC: CPT

## 2021-03-05 PROCEDURE — 6370000000 HC RX 637 (ALT 250 FOR IP): Performed by: INTERNAL MEDICINE

## 2021-03-05 PROCEDURE — 83880 ASSAY OF NATRIURETIC PEPTIDE: CPT

## 2021-03-05 PROCEDURE — 1200000000 HC SEMI PRIVATE

## 2021-03-05 PROCEDURE — 71045 X-RAY EXAM CHEST 1 VIEW: CPT

## 2021-03-05 PROCEDURE — 70450 CT HEAD/BRAIN W/O DYE: CPT

## 2021-03-05 PROCEDURE — 80053 COMPREHEN METABOLIC PANEL: CPT

## 2021-03-05 PROCEDURE — 82077 ASSAY SPEC XCP UR&BREATH IA: CPT

## 2021-03-05 PROCEDURE — G0379 DIRECT REFER HOSPITAL OBSERV: HCPCS

## 2021-03-05 PROCEDURE — 81001 URINALYSIS AUTO W/SCOPE: CPT

## 2021-03-05 PROCEDURE — 84484 ASSAY OF TROPONIN QUANT: CPT

## 2021-03-05 PROCEDURE — 82803 BLOOD GASES ANY COMBINATION: CPT

## 2021-03-05 PROCEDURE — 93010 ELECTROCARDIOGRAM REPORT: CPT | Performed by: INTERNAL MEDICINE

## 2021-03-05 PROCEDURE — 2580000003 HC RX 258: Performed by: EMERGENCY MEDICINE

## 2021-03-05 PROCEDURE — 93005 ELECTROCARDIOGRAM TRACING: CPT | Performed by: EMERGENCY MEDICINE

## 2021-03-05 PROCEDURE — 96360 HYDRATION IV INFUSION INIT: CPT

## 2021-03-05 PROCEDURE — G0378 HOSPITAL OBSERVATION PER HR: HCPCS

## 2021-03-05 PROCEDURE — 96361 HYDRATE IV INFUSION ADD-ON: CPT

## 2021-03-05 PROCEDURE — 99283 EMERGENCY DEPT VISIT LOW MDM: CPT

## 2021-03-05 RX ORDER — GABAPENTIN 300 MG/1
600 CAPSULE ORAL 2 TIMES DAILY
Status: DISCONTINUED | OUTPATIENT
Start: 2021-03-05 | End: 2021-03-08 | Stop reason: HOSPADM

## 2021-03-05 RX ORDER — NICOTINE POLACRILEX 4 MG
15 LOZENGE BUCCAL PRN
Status: DISCONTINUED | OUTPATIENT
Start: 2021-03-05 | End: 2021-03-08 | Stop reason: HOSPADM

## 2021-03-05 RX ORDER — SODIUM CHLORIDE 0.9 % (FLUSH) 0.9 %
10 SYRINGE (ML) INJECTION EVERY 12 HOURS SCHEDULED
Status: DISCONTINUED | OUTPATIENT
Start: 2021-03-05 | End: 2021-03-08 | Stop reason: HOSPADM

## 2021-03-05 RX ORDER — DEXTROSE MONOHYDRATE 50 MG/ML
100 INJECTION, SOLUTION INTRAVENOUS PRN
Status: DISCONTINUED | OUTPATIENT
Start: 2021-03-05 | End: 2021-03-08 | Stop reason: HOSPADM

## 2021-03-05 RX ORDER — SODIUM CHLORIDE 0.9 % (FLUSH) 0.9 %
10 SYRINGE (ML) INJECTION PRN
Status: DISCONTINUED | OUTPATIENT
Start: 2021-03-05 | End: 2021-03-08 | Stop reason: HOSPADM

## 2021-03-05 RX ORDER — INSULIN GLARGINE 100 [IU]/ML
35 INJECTION, SOLUTION SUBCUTANEOUS NIGHTLY
Status: DISCONTINUED | OUTPATIENT
Start: 2021-03-05 | End: 2021-03-08 | Stop reason: HOSPADM

## 2021-03-05 RX ORDER — 0.9 % SODIUM CHLORIDE 0.9 %
1000 INTRAVENOUS SOLUTION INTRAVENOUS ONCE
Status: COMPLETED | OUTPATIENT
Start: 2021-03-05 | End: 2021-03-05

## 2021-03-05 RX ORDER — SODIUM CHLORIDE 9 MG/ML
INJECTION, SOLUTION INTRAVENOUS CONTINUOUS
Status: ACTIVE | OUTPATIENT
Start: 2021-03-05 | End: 2021-03-06

## 2021-03-05 RX ORDER — VENLAFAXINE HYDROCHLORIDE 37.5 MG/1
75 CAPSULE, EXTENDED RELEASE ORAL EVERY EVENING
Status: DISCONTINUED | OUTPATIENT
Start: 2021-03-05 | End: 2021-03-08 | Stop reason: HOSPADM

## 2021-03-05 RX ORDER — POLYETHYLENE GLYCOL 3350 17 G/17G
17 POWDER, FOR SOLUTION ORAL DAILY PRN
Status: DISCONTINUED | OUTPATIENT
Start: 2021-03-05 | End: 2021-03-08 | Stop reason: HOSPADM

## 2021-03-05 RX ORDER — PANTOPRAZOLE SODIUM 40 MG/1
40 TABLET, DELAYED RELEASE ORAL DAILY
COMMUNITY
End: 2022-08-26 | Stop reason: ALTCHOICE

## 2021-03-05 RX ORDER — DEXTROSE MONOHYDRATE 25 G/50ML
12.5 INJECTION, SOLUTION INTRAVENOUS PRN
Status: DISCONTINUED | OUTPATIENT
Start: 2021-03-05 | End: 2021-03-08 | Stop reason: HOSPADM

## 2021-03-05 RX ORDER — SILDENAFIL 100 MG/1
100 TABLET, FILM COATED ORAL DAILY PRN
COMMUNITY
Start: 2021-03-03

## 2021-03-05 RX ORDER — ONDANSETRON 2 MG/ML
4 INJECTION INTRAMUSCULAR; INTRAVENOUS EVERY 6 HOURS PRN
Status: DISCONTINUED | OUTPATIENT
Start: 2021-03-05 | End: 2021-03-08 | Stop reason: HOSPADM

## 2021-03-05 RX ORDER — PROMETHAZINE HYDROCHLORIDE 25 MG/1
12.5 TABLET ORAL EVERY 6 HOURS PRN
Status: DISCONTINUED | OUTPATIENT
Start: 2021-03-05 | End: 2021-03-08 | Stop reason: HOSPADM

## 2021-03-05 RX ADMIN — INSULIN LISPRO 10 UNITS: 100 INJECTION, SOLUTION INTRAVENOUS; SUBCUTANEOUS at 18:52

## 2021-03-05 RX ADMIN — GABAPENTIN 600 MG: 300 CAPSULE ORAL at 21:04

## 2021-03-05 RX ADMIN — INSULIN LISPRO 1 UNITS: 100 INJECTION, SOLUTION INTRAVENOUS; SUBCUTANEOUS at 21:07

## 2021-03-05 RX ADMIN — SODIUM CHLORIDE, PRESERVATIVE FREE 10 ML: 5 INJECTION INTRAVENOUS at 21:05

## 2021-03-05 RX ADMIN — SODIUM CHLORIDE: 9 INJECTION, SOLUTION INTRAVENOUS at 21:04

## 2021-03-05 RX ADMIN — INSULIN LISPRO 1 UNITS: 100 INJECTION, SOLUTION INTRAVENOUS; SUBCUTANEOUS at 18:52

## 2021-03-05 RX ADMIN — VENLAFAXINE HYDROCHLORIDE 75 MG: 37.5 CAPSULE, EXTENDED RELEASE ORAL at 17:57

## 2021-03-05 RX ADMIN — SODIUM CHLORIDE 1000 ML: 9 INJECTION, SOLUTION INTRAVENOUS at 11:31

## 2021-03-05 RX ADMIN — SODIUM CHLORIDE 1000 ML: 9 INJECTION, SOLUTION INTRAVENOUS at 09:06

## 2021-03-05 RX ADMIN — INSULIN GLARGINE 35 UNITS: 100 INJECTION, SOLUTION SUBCUTANEOUS at 21:07

## 2021-03-05 ASSESSMENT — PAIN SCALES - GENERAL
PAINLEVEL_OUTOF10: 5
PAINLEVEL_OUTOF10: 0

## 2021-03-05 NOTE — ED TRIAGE NOTES
Chief Complaint   Patient presents with    Dizziness     pt c/o problems with dizziness x 2-3 days, states has had multiple falls yesterday and today, states blood sugars have been running high and low as well

## 2021-03-05 NOTE — ED PROVIDER NOTES
[address] ED  EMERGENCY DEPARTMENT ENCOUNTER      Pt Name: Kellie Mir  MRN: [id number]  [place] 1960  Date of evaluation: 3/5/2021  Provider: Maty Nice MD    CHIEF COMPLAINT       Chief Complaint   Patient presents with    Dizziness     pt c/o problems with dizziness x 2-3 days, states has had multiple falls yesterday and today, states blood sugars have been running high and low as well         HISTORY OF PRESENT ILLNESS   (Location/Symptom, Timing/Onset, Context/Setting, Quality, Duration, Modifying Factors, Severity)  Note limiting factors. Kellie Mir is a 56 y.o. male with past medical history of insulin-dependent diabetes here today with dizziness and frequent falls    The patient notes over the past 2 days he has been falling frequently. States he feels off balance. He states he is not particularly tripped over anything but is fallen 4-5 times in the last 24 hours. He states he has not hit his head. He does feel somewhat dizzy and off balance and is to hold onto the wall when he was walking. Denies any headache. He has chronic tingling in his bilateral feet which he attributes to his peripheral neuropathy but no new numbness, tingling or weakness in extremities. No changes in speech or vision. Denies chest pain or shortness of breath. No fevers or chills. No vomiting or diarrhea. His wife notes that he has had some confusion since admission to the hospital for DKA just over 1 month ago. She states that this is persisted but is not particularly changed. Patient takes no blood thinners. HPI    Nursing Notes were reviewed. REVIEW OF SYSTEMS    (2-9 systems for level 4, 10 or more for level 5)     Review of Systems    Please see HPI for pertinent positive and negative review of system findings. A full 10 system ROS was performed and otherwise negative.         PAST MEDICAL HISTORY     Past Medical History:   Diagnosis Date    TORI (acute kidney injury) ([facility] 09/12/2017    Bacteremia 05/05/2017    staph aureus    Diabetes mellitus ([facility]     Diabetic neuropathy (HCC)     Gout     MRSA (methicillin resistant staph aureus) culture positive 12/27/18, 9/12/17, 5/5/17,9/5/13, 1/15/14    ulcers bilateral legs    MRSA (methicillin resistant staph aureus) culture positive 01/21/2021    foot wound    Pneumonia     Pyogenic inflammation of bone ([facility]          SURGICAL HISTORY       Past Surgical History:   Procedure Laterality Date    EYE SURGERY      lens implants after removal of cataracts    OTHER SURGICAL HISTORY  12/28/2018    partial right foot amputation with graft application    TOE AMPUTATION Right 12/28/2018    PARTIAL RIGHT FOOT AMPUTATION WITH GRAFT APPLICATION performed by Casey Starkey DPM at [address] 1/21/2021    EGD BIOPSY performed by Kraig Caceres DO at [address]        Previous Medications    BLOOD GLUCOSE TEST STRIPS (ASCENSIA AUTODISC VI;ONE TOUCH ULTRA TEST VI) STRIP    Test TID and as directed    GABAPENTIN (NEURONTIN) 600 MG TABLET    Take 600 mg by mouth 2 times daily.     INSULIN GLARGINE (LANTUS) 100 UNIT/ML INJECTION VIAL    Inject 24 Units into the skin nightly    INSULIN LISPRO (HUMALOG) 100 UNIT/ML INJECTION CARTRIDGE    Inject 8 Units into the skin 3 times daily (before meals)    INSULIN PEN NEEDLE 32G X 4 MM MISC    Use 4 daily    LISINOPRIL (PRINIVIL;ZESTRIL) 10 MG TABLET    Take 1.5 tablets by mouth daily    PANTOPRAZOLE (PROTONIX) 40 MG TABLET    Take 40 mg by mouth daily    SILDENAFIL (VIAGRA) 100 MG TABLET    Take 100 mg by mouth daily as needed    VENLAFAXINE (EFFEXOR XR) 75 MG EXTENDED RELEASE CAPSULE    Take 75 mg by mouth daily       ALLERGIES     Hydrocodone-acetaminophen, Percocet [oxycodone-acetaminophen], Pregabalin, and Vicodin [hydrocodone-acetaminophen]    FAMILY HISTORY       Family History   Problem Relation Age of Onset    Diabetes Maternal Grandfather     Heart Disease Paternal Uncle     High Blood Pressure Mother           SOCIAL HISTORY       Social History     Socioeconomic History    Marital status:      Spouse name: Addis Lilly Number of children: 1    Years of education: None    Highest education level: None   Occupational History    None   Social Needs    Financial resource strain: None    Food insecurity     Worry: None     Inability: None    Transportation needs     Medical: None     Non-medical: None   Tobacco Use    Smoking status: Never Smoker    Smokeless tobacco: Never Used   Substance and Sexual Activity    Alcohol use: No     Comment: FORMER DRINKER approx. quit 2005    Drug use: No    Sexual activity: Yes     Partners: Female   Lifestyle    Physical activity     Days per week: None     Minutes per session: None    Stress: None   Relationships    Social connections     Talks on phone: None     Gets together: None     Attends Spiritism service: None     Active member of club or organization: None     Attends meetings of clubs or organizations: None     Relationship status: None    Intimate partner violence     Fear of current or ex partner: None     Emotionally abused: None     Physically abused: None     Forced sexual activity: None   Other Topics Concern    None   Social History Narrative    None       SCREENINGS               PHYSICAL EXAM    (up to 7 for level 4, 8 or more for level 5)     ED Triage Vitals [03/05/21 0845]   BP Temp Temp Source Pulse Resp SpO2 Height Weight   133/84 97.7 °F (36.5 °C) Oral 80 16 100 % 5' 10\" (1.778 m) 185 lb (83.9 kg)       Physical Exam    General appearance:  Cooperative. No acute distress. Skin:  Warm. Dry. Eye:  Extraocular movements intact. Pupils are equally round and reactive to light and accommodation. Extraocular motions are intact. CN II-XII intact. Ears, nose, mouth and throat:  Oral mucosa moist,  Neck:  Trachea midline.    Heart:  Regular rate and rhythm  Perfusion:  intact  Respiratory:  Lungs clear to auscultation bilaterally. Respirations nonlabored. Abdominal:   Non distended. Nontender  Neurological:  Alert and oriented x 3. Moves all extremities spontaneously. Intact sensation throughout. Intact finger-to-nose bilaterally. No drift in the upper or lower extremities. Gait normal.  2+ bilateral patellar reflexes  Musculoskeletal:   Normal ROM, no deformities          Psychiatric:  Normal mood    NIH Stroke Scale     Interval: Baseline  Time: 10:54 AM  Person Administering Scale: Diego Farias     1a  Level of consciousness: 0=alert; keenly responsive   1b. LOC questions:  0=Performs both tasks correctly   1c. LOC commands: 0=Performs both tasks correctly   2. Best Gaze: 0=normal   3. Visual: 0=No visual loss   4. Facial Palsy: 0=Normal symmetric movement   5a. Motor left arm: 0=No drift, limb holds 90 (or 45) degrees for full 10 seconds   5b. Motor right arm: 0=No drift, limb holds 90 (or 45) degrees for full 10 seconds   6a. motor left le=No drift, limb holds 90 (or 45) degrees for full 10 seconds   6b  Motor right le=No drift, limb holds 90 (or 45) degrees for full 10 seconds   7. Limb Ataxia: 0=Absent   8. Sensory: 0=Normal; no sensory loss   9. Best Language:  0=No aphasia, normal   10. Dysarthria: 0=Normal   11.  Extinction and Inattention: 0=No abnormality         Total:  0         DIAGNOSTIC RESULTS       Labs Reviewed   CBC WITH AUTO DIFFERENTIAL - Abnormal; Notable for the following components:       Result Value    Hemoglobin 12.3 (*)     Hematocrit 38.7 (*)     MCH 25.7 (*)     RDW 16.5 (*)     All other components within normal limits    Narrative:     Performed at:  [facility],  [place], [facility]   Phone [phone]   COMPREHENSIVE METABOLIC PANEL W/ REFLEX TO MG FOR LOW K - Abnormal; Notable for the following components:    Potassium reflex Magnesium 5.4 (*) Glucose 247 (*)     BUN 28 (*)     CREATININE 1.6 (*)     GFR Non- 44 (*)     GFR  53 (*)     Total Protein 8.3 (*)     Alkaline Phosphatase 130 (*)     ALT 49 (*)     AST 45 (*)     All other components within normal limits    Narrative:     Performed at:  [facility],  [place], [place]   Phone [phone]   URINE RT REFLEX TO CULTURE - Abnormal; Notable for the following components:    Glucose, Ur 100 (*)     Blood, Urine SMALL (*)     Protein, UA 30 (*)     All other components within normal limits    Narrative:     Performed at:  [place] [address],  [place], [place]   Phone [phone]   BLOOD GAS, VENOUS - Abnormal; Notable for the following components:    pH, David 7.325 (*)     pO2, David 135.4 (*)     All other components within normal limits    Narrative:     Performed at:  [place] [address],  [place], [place]   Phone [phone]   MICROSCOPIC URINALYSIS - Abnormal; Notable for the following components:    Hyaline Casts, UA >40 (*)     Mucus, UA 2+ (*)     RBC, UA 11-20 (*)     Bacteria, UA 1+ (*)     All other components within normal limits    Narrative:     Performed at:  [facility],  [place], [place]   Phone [phone]   TROPONIN    Narrative:     Performed at:  [place] [address],  [place], [place]   Phone [phone]   ETHANOL    Narrative:     Performed at:  [facility],  [place], [place]   Phone [phone]   BRAIN NATRIURETIC PEPTIDE    Narrative:     Performed at:  [facility],  [place], [place]   Phone [phone]       Interpretation per the Radiologist below, if obtained/available at the time specified. DISCHARGE MEDICATIONS:  New Prescriptions    No medications on file     Controlled Substances Monitoring:     No flowsheet data found.     (Please note that portions of this note were completed with a voice recognition program.  Efforts were made to edit the dictations but occasionally words are mis-transcribed.)    Adriana Kulkarni MD (electronically signed)  Attending Emergency Physician            Margarita Dominguez MD  03/05/21 1666

## 2021-03-05 NOTE — ED NOTES
Call placed to [facility] and spoke with [facility] @ [phone]  [facility] returned the call with Dr. Tavo Salomon on the line @ [phone]  03/05/21 6798

## 2021-03-05 NOTE — H&P
Hospital Medicine History & Physical      PCP: Mitchell Perry MD    Date of Admission: 3/5/2021    Date of Service: Pt seen/examined on 3/5/21 and Admitted to Inpatient with expected LOS greater than two midnights due to medical therapy. Chief Complaint:  Dizziness/falls      History Of Present Illness:      56 y.o. male with gout, dm2, who presented to [facility] with dizziness and recent falls. Pt initially presented to [facility] for ongoing falls(4-5 times over the past 24 hrs) and loss of balance. No LOC and no head trauma. He fell around 10pm and noted weakness/legs giving out. He denies room spinning. No focal weakness, no speech issues, no facial weakness. He does note being chronically lightheaded since discharge from [place] [address].    -of note, pt was recently at [place] [address] where he was treated for dka, gib, and possible meningitis(ID was consulted, mri was neg)  Since then, hasn't 'felt right' and only new meds were acei/protonix,  -he stopped taking metformin at home(per pcp instruction and inc'd insulin regimen)  -he does note his mouth is dry  -No n/v/diarrhea/f/chills/cough  He was transferred to [facility] for neuro workup. ER course: +orthostatics, ivfs given, noted wide-based gait and difficulty ambulating.     Past Medical History:          Diagnosis Date    TORI (acute kidney injury) ([facility] [address] 09/12/2017    Bacteremia 05/05/2017    staph aureus    Diabetes mellitus ([address]     Diabetic neuropathy ([address]     Gout     MRSA (methicillin resistant staph aureus) culture positive 12/27/18, 9/12/17, 5/5/17,9/5/13, 1/15/14    ulcers bilateral legs    MRSA (methicillin resistant staph aureus) culture positive 01/21/2021    foot wound    Pneumonia     Pyogenic inflammation of bone ([address]        Past Surgical History:          Procedure Laterality Date    EYE SURGERY      lens implants after removal of cataracts    OTHER SURGICAL HISTORY  12/28/2018    partial right foot amputation with graft application    TOE AMPUTATION Right 12/28/2018    PARTIAL RIGHT FOOT AMPUTATION WITH GRAFT APPLICATION performed by Perry Gao DPM at [facility] 1/21/2021    EGD BIOPSY performed by Keron Ludwig DO at [facility]       Medications Prior to Admission:      Prior to Admission medications    Medication Sig Start Date End Date Taking? Authorizing Provider   pantoprazole (PROTONIX) 40 MG tablet Take 40 mg by mouth daily   Yes Historical Provider, MD   sildenafil (VIAGRA) 100 MG tablet Take 100 mg by mouth daily as needed 3/3/21  Yes Historical Provider, MD   insulin glargine (LANTUS) 100 UNIT/ML injection vial Inject 24 Units into the skin nightly  Patient taking differently: Inject 35 Units into the skin nightly  1/25/21  Yes Denia Romero DO   insulin lispro (HUMALOG) 100 UNIT/ML injection cartridge Inject 8 Units into the skin 3 times daily (before meals)  Patient taking differently: Inject 10 Units into the skin 3 times daily (before meals)  1/25/21  Yes Denia Romero DO   lisinopril (PRINIVIL;ZESTRIL) 10 MG tablet Take 1.5 tablets by mouth daily 1/25/21  Yes Denia Romero DO   gabapentin (NEURONTIN) 600 MG tablet Take 600 mg by mouth 2 times daily. Yes Historical Provider, MD   venlafaxine (EFFEXOR XR) 75 MG extended release capsule Take 75 mg by mouth daily   Yes Historical Provider, MD   Insulin Pen Needle 32G X 4 MM MISC Use 4 daily 6/28/17   Historical Provider, MD   blood glucose test strips (ASCENSIA AUTODISC VI;ONE TOUCH ULTRA TEST VI) strip Test TID and as directed 3/7/19   Historical Provider, MD       Allergies:  Hydrocodone-acetaminophen, Percocet [oxycodone-acetaminophen], Pregabalin, and Vicodin [hydrocodone-acetaminophen]    Social History:      The patient currently lives at home    TOBACCO:   reports that he has never smoked. He has never used smokeless tobacco.  ETOH:   reports no history of alcohol use.   E-Cigarettes/Vaping Use     Questions Responses E-Cigarette/Vaping Use     Start Date     Passive Exposure     Quit Date     Counseling Given     Comments             Family History:       Reviewed in detail and negative for DM, CAD, Cancer, CVA. Positive as follows:        Problem Relation Age of Onset    Diabetes Maternal Grandfather     Heart Disease Paternal Uncle     High Blood Pressure Mother        REVIEW OF SYSTEMS:   Pertinent positives as noted in the HPI. All other systems reviewed and negative. PHYSICAL EXAM PERFORMED:    BP (!) 157/92   Pulse 75   Temp 97.7 °F (36.5 °C) (Oral)   Resp 16     General appearance:  No apparent distress, appears stated age and cooperative. HEENT:  Normal cephalic, atraumatic without obvious deformity. Pupils equal, round, and reactive to light. Extra ocular muscles intact. Conjunctivae/corneas clear. Neck: Supple, with full range of motion. No jugular venous distention. Trachea midline. Respiratory:  Normal respiratory effort. Clear to auscultation, bilaterally without Rales/Wheezes/Rhonchi. Cardiovascular:  Regular rate and rhythm with normal S1/S2 without murmurs, rubs or gallops. Abdomen: Soft, non-tender, non-distended with normal bowel sounds. Musculoskeletal:  No clubbing, cyanosis or edema bilaterally. Full range of motion without deformity. Skin: Skin color, texture, turgor normal.  No rashes or lesions. Neurologic:  Neurovascularly intact without any focal sensory/motor deficits.  Cranial nerves: II-XII intact, grossly non-focal.  Psychiatric:  Alert and oriented, thought content appropriate, normal insight  Capillary Refill: Brisk,< 3 seconds   Peripheral Pulses: +2 palpable, equal bilaterally       Labs:     Recent Labs     03/05/21  0859   WBC 6.1   HGB 12.3*   HCT 38.7*        Recent Labs     03/05/21  0859      K 5.4*      CO2 26   BUN 28*   CREATININE 1.6*   CALCIUM 9.5     Recent Labs     03/05/21  0859   AST 45*   ALT 49*   BILITOT 0.6   ALKPHOS 130*     No results for input(s): INR in the last 72 hours. Recent Labs     03/05/21  0859   TROPONINI <0.01       Urinalysis:      Lab Results   Component Value Date    NITRU Negative 03/05/2021    WBCUA 0-2 03/05/2021    BACTERIA 1+ 03/05/2021    RBCUA 11-20 03/05/2021    BLOODU SMALL 03/05/2021    SPECGRAV 1.025 03/05/2021    GLUCOSEU 100 03/05/2021       Radiology:     CThead:  FINDINGS:   BRAIN/VENTRICLES: There is no acute intracranial hemorrhage, mass effect or   midline shift.  No abnormal extra-axial fluid collection.  There are mild   periventricular white hypodensities, similar in appearance to prior.  The   gray-white differentiation is maintained without evidence of an acute   infarct.  There is no evidence of hydrocephalus. No significant change in   brain parenchyma compared to prior.       ORBITS: The visualized portion of the orbits demonstrate no acute abnormality.       SINUSES: The visualized paranasal sinuses and mastoid air cells demonstrate   no acute abnormality.       SOFT TISSUES/SKULL:  No acute abnormality of the visualized skull or soft   tissues. Impression   No acute intracranial abnormality. CXR: I have reviewed the CXR with the following interpretation:      FINDINGS:   A  portable chest x-ray is submitted.       No evidence of pneumothorax or pleural effusion       Bilateral faint reticular opacities.  No focal dense consolidation. Kimberly Larve   evidence of acute process of the cardiomediastinal structures.        Impression   Low lung volume portable chest x-ray with bilateral faint opacity favored to   be vascular congestion and possible mild edema, less likely infection       EKG:  I have reviewed the EKG with the following interpretation: nsr, rate of 79, nl axis , incomplete rbbb    No orders to display       ASSESSMENT:    Active Hospital Problems    Diagnosis Date Noted    Acute CVA (cerebrovascular accident) ([facility] [address] [I63.9] 03/05/2021         PLAN:    Dizziness/weakness- with concerns for CVA/TIA by [facility] ER staff, of note pt recently had MRI in 1/2021 and was without acute abn, CThead was unremarkable  -tele  -check orthostatics again in AM  -pt/ot eval  -neuro consulted, ?related to recent meningitis, ?need for MRI again  -check tsh    TORI/Hyperkalemia- POA, mild  -trialled ivfs  -monitored labs    Transaminitis- new since admit  -check acute hepatitis panel    Dm2- with chronic neuropathy in LE  -continued home regimen    DVT Prophylaxis: heparin sq  Diet: DIET CARB CONTROL;  Code Status: Full Code    PT/OT Eval Status: ordered 3/5 pm    Dispo - pending workup       Dayami Beltrán MD    Thank you Nathanael Solis MD for the opportunity to be involved in this patient's care. If you have any questions or concerns please feel free to contact me at [phone].

## 2021-03-05 NOTE — PROGRESS NOTES
3/05/21 7050 -Neurology consult completed and Dr. Kandis Lezama added to treatment team.    -Cecil Huff, ARMIN

## 2021-03-05 NOTE — PROGRESS NOTES
4 Eyes Skin Assessment     The patient is being assess for   Admission    I agree that 2 RN's have performed a thorough Head to Toe Skin Assessment on the patient. ALL assessment sites listed below have been assessed. Areas assessed by both nurses:   [x]   Head, Face, and Ears   [x]   Shoulders, Back, and Chest, Abdomen  [x]   Arms, Elbows, and Hands   [x]   Coccyx, Sacrum, and Ischium  [x]   Legs, Feet, and Heels        Buttocks red, barely blanches. Bilateral knees & Bilateral lower extremities scattered abrasions. Left foot, 4th toe no toenail. Left foot plantar ball of foot (1.8x1.8x0.3). **SHARE this note so that the co-signing nurse is able to place an eSignature**    Co-signer eSignature: Electronically signed by Sultana Tristan RN on 3/5/21 at 6:20 PM EST    Does the Patient have Skin Breakdown?   Yes LDA WOUND CARE was Initiated documentation include the Laura-wound, Wound Assessment, Measurements, Dressing Treatment, Drainage, and Color\",          Slade Prevention initiated:  Yes   Wound Care Orders initiated:  Yes      [id number]  nurse consulted for Pressure Injury (Stage 3,4, Unstageable, DTI, NWPT, Complex wounds)and New or Established Ostomies:  Yes      Primary Nurse eSignature: Electronically signed by Love Soto RN on 3/5/21 at 3:39 PM EST       eye

## 2021-03-05 NOTE — ED NOTES
PT AMBULATED. GAIT UNSTEADY AND PT C/O BEING VERY LIGHT-HEADED. PT ASSISTED BACK TO BED.       Diana Elena RN  03/05/21 1407

## 2021-03-06 LAB
ANION GAP SERPL CALCULATED.3IONS-SCNC: 7 MMOL/L (ref 3–16)
BUN BLDV-MCNC: 14 MG/DL (ref 7–20)
CALCIUM SERPL-MCNC: 9.3 MG/DL (ref 8.3–10.6)
CHLORIDE BLD-SCNC: 106 MMOL/L (ref 99–110)
CHOLESTEROL, FASTING: 124 MG/DL (ref 0–199)
CO2: 27 MMOL/L (ref 21–32)
CREAT SERPL-MCNC: 1.3 MG/DL (ref 0.8–1.3)
GFR AFRICAN AMERICAN: >60
GFR NON-AFRICAN AMERICAN: 56
GLUCOSE BLD-MCNC: 108 MG/DL (ref 70–99)
GLUCOSE BLD-MCNC: 122 MG/DL (ref 70–99)
GLUCOSE BLD-MCNC: 140 MG/DL (ref 70–99)
GLUCOSE BLD-MCNC: 167 MG/DL (ref 70–99)
GLUCOSE BLD-MCNC: 91 MG/DL (ref 70–99)
HAV IGM SER IA-ACNC: NORMAL
HDLC SERPL-MCNC: 52 MG/DL (ref 40–60)
HEPATITIS B CORE IGM ANTIBODY: NORMAL
HEPATITIS B SURFACE ANTIGEN INTERPRETATION: NORMAL
HEPATITIS C ANTIBODY INTERPRETATION: NORMAL
LDL CHOLESTEROL CALCULATED: 48 MG/DL
PERFORMED ON: ABNORMAL
PERFORMED ON: NORMAL
POTASSIUM REFLEX MAGNESIUM: 4.9 MMOL/L (ref 3.5–5.1)
SODIUM BLD-SCNC: 140 MMOL/L (ref 136–145)
TRIGLYCERIDE, FASTING: 120 MG/DL (ref 0–150)
TSH SERPL DL<=0.05 MIU/L-ACNC: 2.15 UIU/ML (ref 0.27–4.2)
VLDLC SERPL CALC-MCNC: 24 MG/DL

## 2021-03-06 PROCEDURE — G0378 HOSPITAL OBSERVATION PER HR: HCPCS

## 2021-03-06 PROCEDURE — 97530 THERAPEUTIC ACTIVITIES: CPT

## 2021-03-06 PROCEDURE — 97535 SELF CARE MNGMENT TRAINING: CPT

## 2021-03-06 PROCEDURE — 97116 GAIT TRAINING THERAPY: CPT

## 2021-03-06 PROCEDURE — 6370000000 HC RX 637 (ALT 250 FOR IP): Performed by: INTERNAL MEDICINE

## 2021-03-06 PROCEDURE — 80074 ACUTE HEPATITIS PANEL: CPT

## 2021-03-06 PROCEDURE — 80048 BASIC METABOLIC PNL TOTAL CA: CPT

## 2021-03-06 PROCEDURE — 36415 COLL VENOUS BLD VENIPUNCTURE: CPT

## 2021-03-06 PROCEDURE — 97165 OT EVAL LOW COMPLEX 30 MIN: CPT

## 2021-03-06 PROCEDURE — 80061 LIPID PANEL: CPT

## 2021-03-06 PROCEDURE — 2580000003 HC RX 258: Performed by: INTERNAL MEDICINE

## 2021-03-06 PROCEDURE — 6360000002 HC RX W HCPCS: Performed by: INTERNAL MEDICINE

## 2021-03-06 PROCEDURE — 84443 ASSAY THYROID STIM HORMONE: CPT

## 2021-03-06 PROCEDURE — 99220 PR INITIAL OBSERVATION CARE/DAY 70 MINUTES: CPT | Performed by: PSYCHIATRY & NEUROLOGY

## 2021-03-06 PROCEDURE — 96372 THER/PROPH/DIAG INJ SC/IM: CPT

## 2021-03-06 PROCEDURE — 97162 PT EVAL MOD COMPLEX 30 MIN: CPT

## 2021-03-06 RX ADMIN — INSULIN GLARGINE 35 UNITS: 100 INJECTION, SOLUTION SUBCUTANEOUS at 20:57

## 2021-03-06 RX ADMIN — VENLAFAXINE HYDROCHLORIDE 75 MG: 37.5 CAPSULE, EXTENDED RELEASE ORAL at 17:59

## 2021-03-06 RX ADMIN — INSULIN LISPRO 10 UNITS: 100 INJECTION, SOLUTION INTRAVENOUS; SUBCUTANEOUS at 09:46

## 2021-03-06 RX ADMIN — SODIUM CHLORIDE, PRESERVATIVE FREE 10 ML: 5 INJECTION INTRAVENOUS at 20:58

## 2021-03-06 RX ADMIN — GABAPENTIN 600 MG: 300 CAPSULE ORAL at 09:45

## 2021-03-06 RX ADMIN — INSULIN LISPRO 1 UNITS: 100 INJECTION, SOLUTION INTRAVENOUS; SUBCUTANEOUS at 18:00

## 2021-03-06 RX ADMIN — ENOXAPARIN SODIUM 40 MG: 40 INJECTION SUBCUTANEOUS at 09:45

## 2021-03-06 RX ADMIN — GABAPENTIN 600 MG: 300 CAPSULE ORAL at 20:58

## 2021-03-06 RX ADMIN — INSULIN LISPRO 10 UNITS: 100 INJECTION, SOLUTION INTRAVENOUS; SUBCUTANEOUS at 18:00

## 2021-03-06 NOTE — PLAN OF CARE
Problem: Skin Integrity:  Goal: Will show no infection signs and symptoms  Description: Will show no infection signs and symptoms  3/6/2021 1349 by Zulma Castaneda RN  Outcome: Met This Shift  3/6/2021 0034 by Margarita Maier RN  Outcome: Ongoing  Goal: Absence of new skin breakdown  Description: Absence of new skin breakdown  3/6/2021 1349 by Zulma Castaneda RN  Outcome: Met This Shift  3/6/2021 0034 by Margarita Maier RN  Outcome: Ongoing

## 2021-03-06 NOTE — PROGRESS NOTES
Pt assessment completed and charted. VSS. Pt a/o. Pt denies any pain at this time. Pt reporting he wishes to go home today. Bed in lowest position and wheels locked. Call light within reach. Bedside table within reach. Non-skid footwear in place. Pt denies any other needs at this time. Pt calls out appropriately. Will continue to monitor.

## 2021-03-06 NOTE — PROGRESS NOTES
Hospitalist Progress Note      PCP: Jerry Clay MD    Date of Admission: 3/5/2021    Chief Complaint:  Dizziness/falls    Hospital Course:      Subjective:  Feels much better today       Medications:  Reviewed    Infusion Medications    dextrose       Scheduled Medications    insulin glargine  35 Units Subcutaneous Nightly    insulin lispro  10 Units Subcutaneous TID     gabapentin  600 mg Oral BID    venlafaxine  75 mg Oral QPM    insulin lispro  0-6 Units Subcutaneous TID     insulin lispro  0-3 Units Subcutaneous Nightly    sodium chloride flush  10 mL Intravenous 2 times per day    enoxaparin  40 mg Subcutaneous Daily     PRN Meds: glucose, dextrose, glucagon (rDNA), dextrose, sodium chloride flush, promethazine **OR** ondansetron, polyethylene glycol      Intake/Output Summary (Last 24 hours) at 3/6/2021 0835  Last data filed at 3/6/2021 0000  Gross per 24 hour   Intake 1004 ml   Output    Net 1004 ml       Physical Exam Performed:    /70   Pulse 93   Temp 98.9 °F (37.2 °C) (Oral)   Resp 20   Wt 171 lb 8.3 oz (77.8 kg)   SpO2 98%   BMI 24.61 kg/m²     General appearance: No apparent distress, appears stated age and cooperative. HEENT: Pupils equal, round, and reactive to light. Conjunctivae/corneas clear. Neck: Supple, with full range of motion. No jugular venous distention. Trachea midline. Respiratory:  Normal respiratory effort. Clear to auscultation, bilaterally without Rales/Wheezes/Rhonchi. Cardiovascular: Regular rate and rhythm with normal S1/S2 without murmurs, rubs or gallops. Abdomen: Soft, non-tender, non-distended with normal bowel sounds. Musculoskeletal: No clubbing, cyanosis or edema bilaterally. Full range of motion without deformity. Skin: Skin color, texture, turgor normal.  No rashes or lesions. Neurologic:  Neurovascularly intact without any focal sensory/motor deficits.  Cranial nerves: II-XII intact, grossly non-focal.  Psychiatric: Alert and oriented, thought content appropriate, normal insight  Capillary Refill: Brisk,< 3 seconds   Peripheral Pulses: +2 palpable, equal bilaterally       Labs:   Recent Labs     03/05/21  0859   WBC 6.1   HGB 12.3*   HCT 38.7*        Recent Labs     03/05/21  0859 03/06/21  0722    140   K 5.4* 4.9    106   CO2 26 27   BUN 28* 14   CREATININE 1.6* 1.3   CALCIUM 9.5 9.3     Recent Labs     03/05/21  0859   AST 45*   ALT 49*   BILITOT 0.6   ALKPHOS 130*     No results for input(s): INR in the last 72 hours.   Recent Labs     03/05/21  0859   TROPONINI <0.01       Urinalysis:      Lab Results   Component Value Date    NITRU Negative 03/05/2021    WBCUA 0-2 03/05/2021    BACTERIA 1+ 03/05/2021    RBCUA 11-20 03/05/2021    BLOODU SMALL 03/05/2021    SPECGRAV 1.025 03/05/2021    GLUCOSEU 100 03/05/2021       Radiology:  No orders to display           Assessment/Plan:    Active Hospital Problems    Diagnosis    Acute CVA (cerebrovascular accident) ([facility] [address] [I63.9]     Dizziness/weakness- with concerns for CVA/TIA by [facility] ER staff, of note pt recently had MRI in 1/2021 and was without acute abn, CThead was unremarkable  -tele  -check orthostatics again in AM  -pt/ot eval  -neuro consulted, ordered MRI  Carotid u/s ordered  -echo ordered  -check tsh     TORI/Hyperkalemia- POA, mild, improved  -trialled ivfs  -monitored labs     Transaminitis- new since admit  -check acute hepatitis panel     Dm2- with chronic neuropathy in LE  -continued home regimen     DVT Prophylaxis: heparin sq  Diet: DIET CARB CONTROL;  Code Status: Full Code     PT/OT Eval Status: prn assist, outpt pt/ot     Dispo - pending workup    Rosa Álvarez MD

## 2021-03-06 NOTE — PLAN OF CARE
Problem: Falls - Risk of:  Goal: Will remain free from falls  Description: Will remain free from falls  Outcome: Ongoing  Note: Bed in lowest position, wheels locked, 2/4 side rails up, nonskid footwear on. Bed/ chair check alarm in place, call light within reach. Pt instructed to call out when needing assistance. Pt stated understanding. Nurse will continue to monitor.

## 2021-03-06 NOTE — PROGRESS NOTES
with rails  Entrance Stairs - Number of Steps: 3 steps into house, 12-13 steps with UHR to second floor and basement  Entrance Stairs - Rails: Both  Bathroom Shower/Tub: Tub/Shower unit  Bathroom Toilet: Standard  Home Equipment: Standard walker, Gae Sleeper  ADL Assistance: Independent  Homemaking Responsibilities: Yes(shares with wife)  Meal Prep Responsibility: Primary  Laundry Responsibility: Primary  Cleaning Responsibility: Primary  Ambulation Assistance: Independent(Without AD)  Transfer Assistance: Independent  Active : Yes  Additional Comments: Pt reports  recent hx of frequent falls, 5 falls day prior to admission, 1 fall in last month additional 2-3 in past year; Wife works full time M-F    Objective     Observation/Palpation  Posture: Fair    AROM RLE (degrees)  RLE AROM: WFL  AROM LLE (degrees)  LLE AROM : WFL     Strength RLE  Strength RLE: WFL  Strength LLE  Strength LLE: WFL     Tone RLE  RLE Tone: Normotonic  Tone LLE  LLE Tone: Normotonic  Motor Control  Gross Motor?: WFL  Sensation  Overall Sensation Status: Impaired(Neuorpathy to B feet)     Bed mobility  Supine to Sit: Unable to assess  Sit to Supine: Unable to assess  Comment: Pt seated EOB on arrival, up in chair at end of session     Transfers  Sit to Stand: Stand by assistance  Stand to sit: Stand by assistance  Comment: EOB no AD, commode no AD SBA     Ambulation  Ambulation?: Yes  Ambulation 1  Surface: level tile  Device: No Device  Assistance: Stand by assistance  Quality of Gait: Reciprocal pattern, slightly widenend HUYEN, B decreased heel strike, B decreased toe clearance. Pt mildly unsteady no overt LOB. Gait Deviations: Slow Sri; Increased HUYEN; Decreased step length;Decreased step height;Decreased arm swing  Distance: 15' + 200'  Comments: Pt reports dizziness with mobility  Stairs/Curb  Stairs?: No        Balance  Posture: Fair  Sitting - Static: Good  Sitting - Dynamic: Good  Standing - Static: Good;-  Standing - Dynamic: Fair;+  Comments: Pt completes standing ADL task at sink and commode x 1 minute + 3 minutes while reaching within and outside HUYEN, slight bending at waist and crossing midline with SBA without LOB. Pt able to retrieve item from floor with SBA without LOB        Plan   Plan  Times per week: 3-5x/wk  Times per day: Daily  Plan weeks: Anticipate 1-2 additional sessions  Specific instructions for Next Treatment: Stairs, balance, progress mobility as tolerated  Current Treatment Recommendations: Strengthening, Neuromuscular Re-education, Home Exercise Program, Safety Education & Training, Balance Training, Endurance Training, Patient/Caregiver Education & Training, Functional Mobility Training, Transfer Training, Gait Training, Stair training  Safety Devices  Type of devices: All fall risk precautions in place, Left in chair, Call light within reach, Chair alarm in place, Nurse notified, Gait belt, Patient at risk for falls    AM-PAC Score  AM-PAC Inpatient Mobility Raw Score : 20 (03/06/21 1112)  AM-PAC Inpatient T-Scale Score : 47.67 (03/06/21 1112)  Mobility Inpatient CMS 0-100% Score: 35.83 (03/06/21 1112)  Mobility Inpatient CMS G-Code Modifier : Yoan Lenard (03/06/21 1112)          Goals  Short term goals  Time Frame for Short term goals: 5 days 3/11/21 (unless otherwise specified)  Short term goal 1: Pt will complete supine to/from sit with I  Short term goal 2: Pt will complete functional t/f without AD with I  Short term goal 3: Pt will ambulate >150' without AD with I without LOB  Short term goal 4: Pt will ascend/descend 12 steps with UHR with MI without LOB  Short term goal 5: 3/08/21: Pt will demonstrate 12-15 reps BLE exercises with S to increase strength and increase I with functional mobility  Patient Goals   Patient goals :  \"Feel better\"       Therapy Time   Individual Concurrent Group Co-treatment   Time In 7622         Time Out 0817         Minutes 38         Timed Code Treatment Minutes: 28 Minutes(10 minutes for eval)     If pt is unable to be seen after this session, please let this note serve as discharge summary. Please see case management note for discharge disposition. Thank you.     Ronnie Blunt, PT, DPT

## 2021-03-06 NOTE — CONSULTS
In patient Neurology consult        [facility] Neurology      MD Bridget Mcintyreradha Trevinoeste  1960    Date of Service: 3/6/2021    Referring Physician: Jung Johnson MD      Reason for the consult and CC: Acute falling and recurrent ataxia    HPI:   The patient is a 56y.o.  years old male with multiple medical problems with history of diabetes and hypertension was admitted from [facility] yesterday with the falling and dizziness. Symptoms started 2 days ago. He fell twice at home without any clear triggers. Degree was severe. Duration was seconds to minutes. No preceding lightheadedness or dizziness. Other associated symptom including feeling unsteady and off-balance at home. No severe headache, chest pain, dysphagia or dysarthria. No other relieving or aggravating factors. He was unable to function at home. He came to the ED where he was eventually admitted. Initial CT of the head showed no acute findings. Today he feels slightly better. He wants to go home. He was not taking aspirin at home. Patient was admitted to [facility] few weeks ago with the DKA. Had MRI brain at that time which showed chronic small vessel disease. He has not had a recent Doppler studies. Other review of system was unremarkable.       Family History   Problem Relation Age of Onset    Diabetes Maternal Grandfather     Heart Disease Paternal Uncle     High Blood Pressure Mother      Past Surgical History:   Procedure Laterality Date    EYE SURGERY      lens implants after removal of cataracts    OTHER SURGICAL HISTORY  12/28/2018    partial right foot amputation with graft application    TOE AMPUTATION Right 12/28/2018    PARTIAL RIGHT FOOT AMPUTATION WITH GRAFT APPLICATION performed by Jeferson Sotomayor DPM at [address] [place] [address] N/A 1/21/2021    EGD BIOPSY performed by Navjot Collins DO at [facility] ENDOSCOPY        Past Medical History:   Diagnosis Date    TORI (acute kidney injury) ([facility] 09/12/2017    Bacteremia 05/05/2017    staph aureus    Diabetes mellitus ([facility]     Diabetic neuropathy (HCC)     Gout     MRSA (methicillin resistant staph aureus) culture positive 12/27/18, 9/12/17, 5/5/17,9/5/13, 1/15/14    ulcers bilateral legs    MRSA (methicillin resistant staph aureus) culture positive 01/21/2021    foot wound    Pneumonia     Pyogenic inflammation of bone ([facility]      Social History     Tobacco Use    Smoking status: Never Smoker    Smokeless tobacco: Never Used   Substance Use Topics    Alcohol use: No     Comment: FORMER DRINKER approx. quit 2005    Drug use: No     Allergies   Allergen Reactions    Hydrocodone-Acetaminophen Itching    Percocet [Oxycodone-Acetaminophen]      States \"hallucinations,disoriented, & freaked out\" per wife    Pregabalin Itching    Vicodin [Hydrocodone-Acetaminophen]      Spaces out. Pt. States 1/15/14 has been taking some vicodin without problems.      Current Facility-Administered Medications   Medication Dose Route Frequency Provider Last Rate Last Admin    insulin glargine (LANTUS) injection vial 35 Units  35 Units Subcutaneous Nightly Fidelia Segundo MD   35 Units at 03/05/21 2107    insulin lispro (HUMALOG) injection vial 10 Units  10 Units Subcutaneous TID [facility] Fidelia Segundo MD   10 Units at 03/06/21 0946    gabapentin (NEURONTIN) capsule 600 mg  600 mg Oral BID Fidelia Segundo MD   600 mg at 03/06/21 0945    venlafaxine (EFFEXOR XR) extended release capsule 75 mg  75 mg Oral QPM Fidelia Segundo MD   75 mg at 03/05/21 1757    glucose (GLUTOSE) 40 % oral gel 15 g  15 g Oral PRN Fidelia Segundo MD        dextrose 50 % IV solution  12.5 g Intravenous PRN Fidelia Segundo MD        glucagon (rDNA) injection 1 mg  1 mg Intramuscular PRN Fidelia Segundo MD        dextrose 5 % solution  100 mL/hr Intravenous PRN Fidelia Segundo MD        insulin lispro (HUMALOG) injection vial 0-6 Units  0-6 Units Subcutaneous TID WC Darling Dick MD   1 Units at 03/05/21 1852    insulin lispro (HUMALOG) injection vial 0-3 Units  0-3 Units Subcutaneous Nightly Darling Dick MD   1 Units at 03/05/21 2107    sodium chloride flush 0.9 % injection 10 mL  10 mL Intravenous 2 times per day Darling Dick MD   10 mL at 03/05/21 2105    sodium chloride flush 0.9 % injection 10 mL  10 mL Intravenous PRN Darling Dick MD        enoxaparin (LOVENOX) injection 40 mg  40 mg Subcutaneous Daily Darling Dick MD   40 mg at 03/06/21 0945    promethazine (PHENERGAN) tablet 12.5 mg  12.5 mg Oral Q6H PRN Darling Dick MD        Or    ondansetron [facility]) injection 4 mg  4 mg Intravenous Q6H PRN Darling Dick MD        polyethylene glycol [facility]) packet 17 g  17 g Oral Daily PRN Darling Dick MD           ROS : A 10-14 system review of constitutional, cardiovascular, respiratory, eyes, musculoskeletal, endocrine, GI, ENT, skin, hematological, genitourinary, psychiatric and neurologic systems was obtained and updated today and is unremarkable except as mentioned in my HPI      Exam:     Constitutional:   Vitals:    03/06/21 0345 03/06/21 0746 03/06/21 0809 03/06/21 1228   BP: (!) 144/80 116/70 115/84 (!) 164/96   Pulse: 73 93 94 96   Resp: 20  19 18   Temp: 98.9 °F (37.2 °C)  97.4 °F (36.3 °C) 97.8 °F (36.6 °C)   TempSrc: Oral  Oral Oral   SpO2: 98%  98% 98%   Weight: 171 lb 8.3 oz (77.8 kg)          General appearance and observation: Normal development and appear in no acute distress. Eye:  Fundus: No blurring of optic disc. Neck: supple  Cardiovascular: Mild lower leg edema with good pulsation. No carotid bruit. Mental Status:   Oriented to person, place, problem, and time. Memory: Good immediate recall. Intact remote memory  Normal attention span and concentration. Language: intact naming, repeating and fluency   Good fund of Knowledge.  Aware of current events and vocabulary   Cranial Nerves: II: Visual fields: Full to confrontation and nl [facility]. Pupils: equal, round, reactive to light  III,IV,VI: Extra Ocular Movements are intact. No nystagmus  V: Facial sensation is intact  VII: Facial strength and movements: intact and symmetric  VIII: Hearing: Intact to finger rub bilaterally  IX: Palate elevation is symmetric  XI: Shoulder shrug is intact  XII: Tongue movements are normal  Musculoskeletal: 5/5 in all 4 extremities. Tone: Normal tone. Reflexes: Symmetric 2+ in the arms and 1 in the legs   Planters: flexor bilaterally. Coordination: no pronator drift, no dysmetria with FNF in upper extremities. Normal REM. Sensation: normal to all modalities in both arms and diminished in his distal legs. Gait/Posture: Unsteady    Data:  LABS:   Lab Results   Component Value Date     03/06/2021    K 4.9 03/06/2021     03/06/2021    CO2 27 03/06/2021    BUN 14 03/06/2021    CREATININE 1.3 03/06/2021    GFRAA >60 03/06/2021    GFRAA >60 08/20/2012    LABGLOM 56 03/06/2021    GLUCOSE 140 03/06/2021    PHOS 2.2 01/20/2021    MG 2.20 01/25/2021    CALCIUM 9.3 03/06/2021     Lab Results   Component Value Date    WBC 6.1 03/05/2021    RBC 4.78 03/05/2021    HGB 12.3 03/05/2021    HCT 38.7 03/05/2021    MCV 81.0 03/05/2021    RDW 16.5 03/05/2021     03/05/2021     Lab Results   Component Value Date    INR 1.05 01/18/2021    PROTIME 12.2 01/18/2021       Neuroimaging were independently reviewed by myself and discussed results with the patient and family this include last MRI brain from January. Reviewed notes from different physicians  Reviewed lab and blood testing  Reviewed recent records when he was admitted to [address] [place] [address]:  New onset falling and ataxia. Possible sensory ataxia from underlying diabetic polyneuropathy. New ischemic stroke/TIA cannot be fully excluded.   I feel the patient needs to repeat MRI of the brain and to have a vascular study with either carotid Doppler or

## 2021-03-07 ENCOUNTER — APPOINTMENT (OUTPATIENT)
Dept: MRI IMAGING | Age: 61
End: 2021-03-07
Attending: INTERNAL MEDICINE
Payer: COMMERCIAL

## 2021-03-07 LAB
GLUCOSE BLD-MCNC: 100 MG/DL (ref 70–99)
GLUCOSE BLD-MCNC: 128 MG/DL (ref 70–99)
GLUCOSE BLD-MCNC: 309 MG/DL (ref 70–99)
GLUCOSE BLD-MCNC: 391 MG/DL (ref 70–99)
GLUCOSE BLD-MCNC: 41 MG/DL (ref 70–99)
GLUCOSE BLD-MCNC: 425 MG/DL (ref 70–99)
GLUCOSE BLD-MCNC: 48 MG/DL (ref 70–99)
PERFORMED ON: ABNORMAL

## 2021-03-07 PROCEDURE — 6370000000 HC RX 637 (ALT 250 FOR IP): Performed by: INTERNAL MEDICINE

## 2021-03-07 PROCEDURE — 2580000003 HC RX 258: Performed by: INTERNAL MEDICINE

## 2021-03-07 PROCEDURE — 96372 THER/PROPH/DIAG INJ SC/IM: CPT

## 2021-03-07 PROCEDURE — 6360000002 HC RX W HCPCS: Performed by: INTERNAL MEDICINE

## 2021-03-07 PROCEDURE — G0378 HOSPITAL OBSERVATION PER HR: HCPCS

## 2021-03-07 PROCEDURE — 70551 MRI BRAIN STEM W/O DYE: CPT

## 2021-03-07 RX ADMIN — ENOXAPARIN SODIUM 40 MG: 40 INJECTION SUBCUTANEOUS at 08:14

## 2021-03-07 RX ADMIN — GABAPENTIN 600 MG: 300 CAPSULE ORAL at 08:14

## 2021-03-07 RX ADMIN — VENLAFAXINE HYDROCHLORIDE 75 MG: 37.5 CAPSULE, EXTENDED RELEASE ORAL at 17:06

## 2021-03-07 RX ADMIN — Medication 15 G: at 11:20

## 2021-03-07 RX ADMIN — INSULIN GLARGINE 35 UNITS: 100 INJECTION, SOLUTION SUBCUTANEOUS at 20:37

## 2021-03-07 RX ADMIN — INSULIN LISPRO 6 UNITS: 100 INJECTION, SOLUTION INTRAVENOUS; SUBCUTANEOUS at 17:06

## 2021-03-07 RX ADMIN — GABAPENTIN 600 MG: 300 CAPSULE ORAL at 20:37

## 2021-03-07 RX ADMIN — Medication 15 G: at 11:56

## 2021-03-07 RX ADMIN — SODIUM CHLORIDE, PRESERVATIVE FREE 10 ML: 5 INJECTION INTRAVENOUS at 09:00

## 2021-03-07 RX ADMIN — Medication 15 G: at 11:47

## 2021-03-07 RX ADMIN — INSULIN LISPRO 10 UNITS: 100 INJECTION, SOLUTION INTRAVENOUS; SUBCUTANEOUS at 17:07

## 2021-03-07 RX ADMIN — INSULIN LISPRO 2 UNITS: 100 INJECTION, SOLUTION INTRAVENOUS; SUBCUTANEOUS at 20:37

## 2021-03-07 RX ADMIN — INSULIN LISPRO 10 UNITS: 100 INJECTION, SOLUTION INTRAVENOUS; SUBCUTANEOUS at 08:15

## 2021-03-07 NOTE — PROGRESS NOTES
Pt assessment completed and charted. VSS. Pt a/o. Pt denies any pain at this time. Stroke scale assessed. Bed in lowest position and wheels locked. Call light within reach. Bedside table within reach. Non-skid footwear in place. Pt denies any other needs at this time. Pt calls out appropriately. Will continue to monitor.

## 2021-03-07 NOTE — PROGRESS NOTES
Dressing changed. Wound cleansed with NS. Wet to dry dressing in place. Pt tolerated well. Will continue to monitor.

## 2021-03-07 NOTE — PROGRESS NOTES
Physician Progress Note      Tonja Gilmore  [facility] #:                  [id number]  :                       1960  ADMIT DATE:       3/5/2021 3:08 PM  [address] [place] DATE:  RESPONDING  PROVIDER #:        Marisol Matos MD          QUERY TEXT:    Patient admitted with possible CVA. Noted documentation of Acute Kidney   Injury in H&P on 3/5/2021. In order to support the diagnosis of TORI, please   include additional clinical indicators in your documentation. Or please   document if the diagnosis of TORI has been ruled out after further study    The medical record reflects the following:  Risk Factors: DM, recent illness  Clinical Indicators: baseline creatinine appears 1.1-1.3, creatinine on   admission 1.6, hyperkalemia  Treatment: IVF, serial labs, supportive care    Thank you,  Shlely Walker RN, CDS  [phone]    Defined by Kidney Disease Improving Global Outcomes (KDIGO) clinical practice   guideline for acute kidney injury:  -Increase in SCr by greater than or equal to 0.3 mg/dl within 48 hours; or  -Increase in SCr to greater than or equal to 1.5 times baseline, which is   known or presumed to have occurred within the prior 7 days; or  -Urine volume < 0.5ml/kg/h for 6 hours  Options provided:  -- Acute kidney injury ruled out after study  -- Acute kidney injury evidenced by, Please document evidence as well as   baseline creatinine, if known. -- Currently resolved acute kidney injury was evidenced by, Please document   evidence as well as baseline creatinine, if known. -- Other - I will add my own diagnosis  -- Disagree - Not applicable / Not valid  -- Disagree - Clinically unable to determine / Unknown  -- Refer to Clinical Documentation Reviewer    PROVIDER RESPONSE TEXT:    Acute kidney injury was ruled out after study.     Query created by: Jameel Cazares on 3/6/2021 8:53 AM      Electronically signed by:  Marisol Matos MD 3/7/2021 4:24 PM

## 2021-03-07 NOTE — PROGRESS NOTES
Hospitalist Progress Note      PCP: Boyd Yusuf MD    Date of Admission: 3/5/2021       Chief Complaint:  Dizziness/falls     Hospital Course:       Subjective:  Feels much better today , resting in chair, no overnight issues, notes ongoing Lt foot ulcer(sees Dr. Janeth Katz)    Medications:  Reviewed    Infusion Medications    dextrose       Scheduled Medications    insulin glargine  35 Units Subcutaneous Nightly    insulin lispro  10 Units Subcutaneous TID WC    gabapentin  600 mg Oral BID    venlafaxine  75 mg Oral QPM    insulin lispro  0-6 Units Subcutaneous TID WC    insulin lispro  0-3 Units Subcutaneous Nightly    sodium chloride flush  10 mL Intravenous 2 times per day    enoxaparin  40 mg Subcutaneous Daily     PRN Meds: perflutren lipid microspheres, glucose, dextrose, glucagon (rDNA), dextrose, sodium chloride flush, promethazine **OR** ondansetron, polyethylene glycol      Intake/Output Summary (Last 24 hours) at 3/7/2021 0840  Last data filed at 3/6/2021 1129  Gross per 24 hour   Intake 480 ml   Output    Net 480 ml       Physical Exam Performed:    BP (!) 102/58   Pulse 73   Temp 97.5 °F (36.4 °C) (Oral)   Resp 20   Wt 166 lb 3.6 oz (75.4 kg)   SpO2 95%   BMI 23.85 kg/m²     General appearance: No apparent distress, appears stated age and cooperative. HEENT: Pupils equal, round, and reactive to light. Conjunctivae/corneas clear. Neck: Supple, with full range of motion. No jugular venous distention. Trachea midline. Respiratory:  Normal respiratory effort. Clear to auscultation, bilaterally without Rales/Wheezes/Rhonchi. Cardiovascular: Regular rate and rhythm with normal S1/S2 without murmurs, rubs or gallops. Abdomen: Soft, non-tender, non-distended with normal bowel sounds. Musculoskeletal: No clubbing, cyanosis or edema bilaterally. Full range of motion without deformity. Skin: Skin color, texture, turgor normal.  No rashes or lesions.   Neurologic:  Neurovascularly intact without any focal sensory/motor deficits. Cranial nerves: II-XII intact, grossly non-focal.  Psychiatric: Alert and oriented, thought content appropriate, normal insight  Capillary Refill: Brisk,< 3 seconds   Peripheral Pulses: +2 palpable, equal bilaterally       Labs:   Recent Labs     03/05/21  0859   WBC 6.1   HGB 12.3*   HCT 38.7*        Recent Labs     03/05/21  0859 03/06/21  0722    140   K 5.4* 4.9    106   CO2 26 27   BUN 28* 14   CREATININE 1.6* 1.3   CALCIUM 9.5 9.3     Recent Labs     03/05/21  0859   AST 45*   ALT 49*   BILITOT 0.6   ALKPHOS 130*     No results for input(s): INR in the last 72 hours. Recent Labs     03/05/21  0859   TROPONINI <0.01       Urinalysis:      Lab Results   Component Value Date    NITRU Negative 03/05/2021    WBCUA 0-2 03/05/2021    BACTERIA 1+ 03/05/2021    RBCUA 11-20 03/05/2021    BLOODU SMALL 03/05/2021    SPECGRAV 1.025 03/05/2021    GLUCOSEU 100 03/05/2021       Radiology:  MRI BRAIN WO CONTRAST    (Results Pending)   VL DUP CAROTID BILATERAL    (Results Pending)           Assessment/Plan:    Active Hospital Problems    Diagnosis    Fall [W19. XXXA]    HTN (hypertension), benign [I10]    Acute CVA (cerebrovascular accident) ([facility] [I63.9]    DM (diabetes mellitus), secondary, uncontrolled, w/neurologic complic ([facility] [E52.71, G05.62]     Dizziness/weakness- with concerns for CVA/TIA by [facility] ER staff, of note pt recently had MRI in 1/2021 and was without acute abn, CThead was unremarkable  -tele  -check orthostatics again in AM  -pt/ot eval  -neuro consulted, ordered MRI  Carotid u/s ordered  -echo ordered  -checked tsh and wnl   -FLP ordered(wnl)    TORI/Hyperkalemia- POA, mild, improved  -trialled ivfs  -monitored labs     Transaminitis- new since admit  -check acute hepatitis panel and negative   -  Dm2- with chronic neuropathy in LE, poorly controlled  -continued home regimen, f/u PCP for further adjustment   -a1c 10.7    DVT Prophylaxis:

## 2021-03-08 ENCOUNTER — APPOINTMENT (OUTPATIENT)
Dept: VASCULAR LAB | Age: 61
End: 2021-03-08
Attending: INTERNAL MEDICINE
Payer: COMMERCIAL

## 2021-03-08 VITALS
OXYGEN SATURATION: 94 % | BODY MASS INDEX: 23.85 KG/M2 | RESPIRATION RATE: 16 BRPM | DIASTOLIC BLOOD PRESSURE: 86 MMHG | HEART RATE: 75 BPM | WEIGHT: 166.23 LBS | SYSTOLIC BLOOD PRESSURE: 133 MMHG | TEMPERATURE: 97.5 F

## 2021-03-08 LAB
GLUCOSE BLD-MCNC: 111 MG/DL (ref 70–99)
GLUCOSE BLD-MCNC: 119 MG/DL (ref 70–99)
GLUCOSE BLD-MCNC: 150 MG/DL (ref 70–99)
LV EF: 58 %
LVEF MODALITY: NORMAL
PERFORMED ON: ABNORMAL

## 2021-03-08 PROCEDURE — G0378 HOSPITAL OBSERVATION PER HR: HCPCS

## 2021-03-08 PROCEDURE — 93880 EXTRACRANIAL BILAT STUDY: CPT

## 2021-03-08 PROCEDURE — 93306 TTE W/DOPPLER COMPLETE: CPT

## 2021-03-08 PROCEDURE — 6360000002 HC RX W HCPCS: Performed by: INTERNAL MEDICINE

## 2021-03-08 PROCEDURE — 96372 THER/PROPH/DIAG INJ SC/IM: CPT

## 2021-03-08 PROCEDURE — 6370000000 HC RX 637 (ALT 250 FOR IP): Performed by: INTERNAL MEDICINE

## 2021-03-08 PROCEDURE — 99225 PR SBSQ OBSERVATION CARE/DAY 25 MINUTES: CPT | Performed by: PSYCHIATRY & NEUROLOGY

## 2021-03-08 RX ADMIN — INSULIN LISPRO 10 UNITS: 100 INJECTION, SOLUTION INTRAVENOUS; SUBCUTANEOUS at 12:10

## 2021-03-08 RX ADMIN — INSULIN LISPRO 10 UNITS: 100 INJECTION, SOLUTION INTRAVENOUS; SUBCUTANEOUS at 10:21

## 2021-03-08 RX ADMIN — ENOXAPARIN SODIUM 40 MG: 40 INJECTION SUBCUTANEOUS at 09:35

## 2021-03-08 RX ADMIN — GABAPENTIN 600 MG: 300 CAPSULE ORAL at 09:35

## 2021-03-08 NOTE — DISCHARGE INSTR - COC
Continuity of Care Form    Patient Name: Leena Medley   :  1960  MRN:  [id number]    Admit date:  3/5/2021  Discharge date:  2021    Code Status Order: Full Code   Advance Directives:   [facility] Documentation       Date/Time Healthcare Directive Type of Healthcare Directive Copy in [facility] [address] Agent's Name Healthcare Agent's Phone Number    21 1547  No, patient does not have an advance directive for healthcare treatment -- -- -- -- --            Admitting Physician:  Jaycee Jung MD  PCP: Nico Skelton MD    Discharging Nurse: [address] [place] [address] Unit/Room#: 8159/9971-54  Discharging Unit Phone Number: [phone]    Emergency Contact:   Extended Emergency Contact Information  Primary Emergency Contact: Maki Bui  Address: [address] [address] [address] [place] [address],[address], [address] Phone: [phone]  Mobile Phone: [phone]  Relation: Spouse  Secondary Emergency Contact: [facility] Phone: [phone]  Relation: Parent    Past Surgical History:  Past Surgical History:   Procedure Laterality Date    EYE SURGERY      lens implants after removal of cataracts    OTHER SURGICAL HISTORY  2018    partial right foot amputation with graft application    TOE AMPUTATION Right 2018    PARTIAL RIGHT FOOT AMPUTATION WITH GRAFT APPLICATION performed by Linsey Murguia DPM at [facility] N/A 2021    EGD BIOPSY performed by Maria Valverde DO at [facility] ENDOSCOPY       Immunization History:   Immunization History   Administered Date(s) Administered    Influenza Virus Vaccine 2001    Tdap (Boostrix, Adacel) 2008       Active Problems:  Patient Active Problem List   Diagnosis Code    Diabetic ketoacidosis without coma associated with type 2 diabetes mellitus ([address] E11.10    Nausea & vomiting R11.2    Diabetic neuropathy ([address] E11.40    DM (diabetes mellitus), secondary, uncontrolled, w/neurologic complic ([facility] N24.34, P95.92    Gout M10.9    Hx MRSA Z22.322    Hyperkalemia E87.5    Chronic pain on chronic opioids G89.29    Chronic LE diabetic ulcers E11.622, L97.309    Mild protein-calorie malnutrition (HCC) E44.1    Cellulitis L03.90    TORI (acute kidney injury) ([facility] N17.9    Neuropathy G62.9    Diabetic acidosis without coma (HCC) E11.10    Acute CVA (cerebrovascular accident) [facility]) I63.9    Fall W19. Justin Alar    HTN (hypertension), benign I10       Isolation/Infection:   Isolation            No Isolation          Patient Infection Status       Infection Onset Added Last Indicated Last Indicated By Review Planned Expiration Resolved Resolved By    None active    Resolved    MRSA 01/21/21 01/22/21 01/21/21 Culture, Wound   03/07/21 Oliva Eid RN    COVID-19 Rule Out 01/19/21 01/19/21 01/19/21 COVID-19 (Ordered)   01/20/21 Rule-Out Test Resulted    COVID-19 Rule Out 01/18/21 01/18/21 01/18/21 COVID-19 (Ordered)   01/18/21 Rule-Out Test Resulted    MRSA  05/07/17 12/27/18 Wound Culture   01/19/21 Andrea Willis RN    MRSA  09/09/13 09/09/13 Diego Barba RN   11/19/15 Diego Barba RN            Nurse Assessment:  Last Vital Signs: /86   Pulse 75   Temp 97.5 °F (36.4 °C) (Oral)   Resp 16   Wt 166 lb 3.6 oz (75.4 kg)   SpO2 94%   BMI 23.85 kg/m²     Last documented pain score (0-10 scale): Pain Level: 0  Last Weight:   Wt Readings from Last 1 Encounters:   03/07/21 166 lb 3.6 oz (75.4 kg)     Mental Status:  oriented and alert    IV Access:  - None    Nursing Mobility/ADLs:  Walking   Independent  Transfer  Independent  Bathing  Independent  Dressing  Independent  [address]  Med Delivery   whole    Wound Care Documentation and Therapy:  Wound 01/31/19 Leg Inner; Lower Venous Ulcer, healing over 4 years  (Active)   Wound Image   03/08/21 1220   Wound Etiology Venous 03/08/21 1220   Dressing Status New drainage noted;New dressing applied 03/08/21 1220   Wound Cleansed Cleansed with saline 03/08/21 1220   Dressing/Treatment Hydrating gel;Dry dressing;Roll gauze; Ace wrap 03/08/21 1220   Dressing Change Due 03/08/21 03/08/21 1220   Wound Length (cm) 4 cm 03/08/21 1220   Wound Width (cm) 3 cm 03/08/21 1220   Wound Depth (cm) 0 cm 03/08/21 1220   Wound Surface Area (cm^2) 12 cm^2 03/08/21 1220   Change in Wound Size % (l*w) 0 03/08/21 1220   Wound Volume (cm^3) 0 cm^3 03/08/21 1220   Wound Assessment Dry;Pink/red 03/08/21 1220   Drainage Amount Scant 03/08/21 1220   Drainage Description Serosanguinous 03/08/21 1220   Odor None 03/08/21 1220   Laura-wound Assessment Dry/flaky 03/08/21 1220   Margins Attached edges; Defined edges 03/08/21 1220   Number of days: 767       Wound 09/26/19 Foot Left;Plantar healing veneous wound (Active)   Wound Image   03/08/21 1220   Wound Etiology Diabetic 03/08/21 1220   Dressing Status Intact; New drainage noted;New dressing applied 03/08/21 1220   Wound Cleansed Cleansed with saline 03/08/21 1220   Dressing/Treatment Alginate with Ag;Dry dressing;Roll gauze; Ace wrap 03/08/21 1220   Dressing Change Due 03/08/21 03/08/21 1220   Wound Length (cm) 1 cm 03/08/21 1220   Wound Width (cm) 1 cm 03/08/21 1220   Wound Depth (cm) 0.5 cm 03/08/21 1220   Wound Surface Area (cm^2) 1 cm^2 03/08/21 1220   Change in Wound Size % (l*w) 80.16 03/08/21 1220   Wound Volume (cm^3) 0.5 cm^3 03/08/21 1220   Wound Healing % 0 03/08/21 1220   Wound Assessment Pink/red 03/08/21 1220   Drainage Amount Scant 03/08/21 1220   Drainage Description Serosanguinous 03/08/21 1220   Odor None 03/08/21 1220   Laura-wound Assessment Dry/flaky; Hyperkeratosis (callous) 03/08/21 1220   Margins Defined edges 03/08/21 1220   Number of days: 529       Wound 09/26/19 Tibial Distal;Left;Dorsal;Inner healing wound on inner lower leg (Active)   Number of days: 529   L Foot Plantar:       L Lower Leg: {Esignature:[id number]}    PHYSICIAN SECTION    Prognosis: Good    Condition at Discharge: Stable    Rehab Potential (if transferring to Rehab): Good    Recommended Labs or Other Treatments After Discharge: None    Physician Certification: I certify the above information and transfer of Jad Ivy  is necessary for the continuing treatment of the diagnosis listed and that he requires [address] for less 30 days.      Update Admission H&P: No change in H&P    PHYSICIAN SIGNATURE:  Electronically signed by Molly Brooks MD on 3/8/21 at 1:26 PM EST

## 2021-03-08 NOTE — PROGRESS NOTES
Subcutaneous Nightly Kam Andersen MD   2 Units at 03/07/21 2037    sodium chloride flush 0.9 % injection 10 mL  10 mL Intravenous 2 times per day Kam Andersen MD   10 mL at 03/07/21 0900    sodium chloride flush 0.9 % injection 10 mL  10 mL Intravenous PRN Kam Andersen MD        enoxaparin (LOVENOX) injection 40 mg  40 mg Subcutaneous Daily Kam Andersen MD   40 mg at 03/08/21 0935    promethazine (PHENERGAN) tablet 12.5 mg  12.5 mg Oral Q6H PRN Kam Andersen MD        Or    ondansetron [facility]) injection 4 mg  4 mg Intravenous Q6H PRN Kam Andersen MD        polyethylene glycol [facility]) packet 17 g  17 g Oral Daily PRN Kam Andersen MD         Allergies   Allergen Reactions    Hydrocodone-Acetaminophen Itching    Percocet [Oxycodone-Acetaminophen]      States \"hallucinations,disoriented, & freaked out\" per wife    Pregabalin Itching    Vicodin [Hydrocodone-Acetaminophen]      Spaces out. Pt. States 1/15/14 has been taking some vicodin without problems. Objective:  Exam:   Constitutional:   Vitals:    03/07/21 1931 03/07/21 2318 03/08/21 0304 03/08/21 0749   BP: (!) 142/91 133/88 118/85 133/86   Pulse: 78 79 76 75   Resp: 18 18 18 16   Temp: 98 °F (36.7 °C) 98 °F (36.7 °C) 97.8 °F (36.6 °C) 97.5 °F (36.4 °C)   TempSrc: Oral Oral Oral Oral   SpO2: 93% 96% 96% 94%   Weight:         General appearance:  Normal development and appear in no acute distress. Mental Status:   Oriented to person, place, problem, and time. Memory: Good immediate recall. Intact remote memory  Normal attention span and concentration. Language: intact naming, repeating and fluency   Good fund of Knowledge. Cranial Nerves:   II:   Pupils: equal, round, reactive to light  III,IV,VI: Extra Ocular Movements are intact.  No nystagmus  V: Facial sensation is intact  VII: Facial strength and movements: intact and symmetric  XII: Tongue movements are normal  Musculoskeletal: 5/5 in all 4 extremities. Coordination: no pronator drift, no dysmetria with FNF. Normal REM. Sensation: normal to all modalities in both arms and legs. Gait/Posture: steady gait        Data:  LABS:   Lab Results   Component Value Date     03/06/2021    K 4.9 03/06/2021     03/06/2021    CO2 27 03/06/2021    BUN 14 03/06/2021    CREATININE 1.3 03/06/2021    GFRAA >60 03/06/2021    GFRAA >60 08/20/2012    LABGLOM 56 03/06/2021    GLUCOSE 140 03/06/2021    PHOS 2.2 01/20/2021    MG 2.20 01/25/2021    CALCIUM 9.3 03/06/2021     Lab Results   Component Value Date    WBC 6.1 03/05/2021    RBC 4.78 03/05/2021    HGB 12.3 03/05/2021    HCT 38.7 03/05/2021    MCV 81.0 03/05/2021    RDW 16.5 03/05/2021     03/05/2021     Lab Results   Component Value Date    INR 1.05 01/18/2021    PROTIME 12.2 01/18/2021       Neuroimaging was independently reviewed by me and discussed results with the patient  I reviewed blood testing and other test results and discussed results with the patient      Impression:  Recurrent falling and ataxia. Possible sensory ataxia from underlying diabetic polyneuropathy. Recent MRI showed no acute stroke  Diabetes, poorly controlled  Diabetic neuropathy  Hypertension  Depression      Recommendation  Test results discussed with the patient  Discussed role prevention including blood sugar control and monitor closely at home  Aspirin. He was not consistent with aspirin  Statin  Insulin sliding scale  Continue gabapentin  Blood pressure monitor at home  Discussed risk of falling with diabetic with neuropathy  Can be discharged from neurology  Follow-up with PCP  No further recommendation. Wendy Street MD   [phone]      This dictation was generated by voice recognition computer software. Although all attempts are made to edit the dictation for accuracy, there may be errors in the transcription that are not intended.

## 2021-03-08 NOTE — PROGRESS NOTES
Patient discharged home with [place] [id number]. Reviewed d/c instructions with patient and patient's wife at bedside. Patient stable. All questions answered. Patient requested to ambulate self out. Patient and patient's wife feels comfortable with patient doing so. Noted, patient ambulated self downstairs.

## 2021-03-08 NOTE — PLAN OF CARE
Problem: Falls - Risk of:  Goal: Will remain free from falls  Description: Will remain free from falls  Outcome: Ongoing  Note: Bed in lowest position, wheels locked, 2/4 side rails up, nonskid footwear on. call light within reach. Pt instructed to call out when needing assistance. Pt stated understanding. Nurse will continue to monitor.

## 2021-03-08 NOTE — PROGRESS NOTES
Shift assessment completed and charted. Stroke scale performed. Pt states he has no request at this time and denies being in pain. Pt up in chair with call light within reach and non-skid socks on. Pt instructed to call out if he needs anything. Will continue to monitor.

## 2021-03-08 NOTE — PROGRESS NOTES
Hospitalist Progress Note      PCP: Mendoza Robert MD    Date of Admission: 3/5/2021    Chief Complaint: dizziness/weakness    Subjective: no new c/o. Medications:  Reviewed    Infusion Medications    dextrose       Scheduled Medications    insulin glargine  35 Units Subcutaneous Nightly    insulin lispro  10 Units Subcutaneous TID WC    gabapentin  600 mg Oral BID    venlafaxine  75 mg Oral QPM    insulin lispro  0-6 Units Subcutaneous TID WC    insulin lispro  0-3 Units Subcutaneous Nightly    sodium chloride flush  10 mL Intravenous 2 times per day    enoxaparin  40 mg Subcutaneous Daily     PRN Meds: perflutren lipid microspheres, glucose, dextrose, glucagon (rDNA), dextrose, sodium chloride flush, promethazine **OR** ondansetron, polyethylene glycol    No intake or output data in the 24 hours ending 03/08/21 1104    Physical Exam Performed:    /86   Pulse 75   Temp 97.5 °F (36.4 °C) (Oral)   Resp 16   Wt 166 lb 3.6 oz (75.4 kg)   SpO2 94%   BMI 23.85 kg/m²     General appearance: No apparent distress, appears stated age and cooperative. HEENT: Pupils equal, round, and reactive to light. Conjunctivae/corneas clear. Neck: Supple, with full range of motion. No jugular venous distention. Trachea midline. Respiratory:  Normal respiratory effort. Clear to auscultation, bilaterally without Rales/Wheezes/Rhonchi. Cardiovascular: Regular rate and rhythm with normal S1/S2 without murmurs, rubs or gallops. Abdomen: Soft, non-tender, non-distended with normal bowel sounds. Musculoskeletal: No clubbing, cyanosis or edema bilaterally. Full range of motion without deformity. Skin: Skin color, texture, turgor normal.  No rashes or lesions. Neurologic:  Neurovascularly intact without any focal sensory/motor deficits.  Cranial nerves: II-XII intact, grossly non-focal.  Psychiatric: Alert and oriented, thought content appropriate, normal insight  Capillary Refill: Brisk,< 3 seconds Peripheral Pulses: +2 palpable, equal bilaterally       Labs:   No results for input(s): WBC, HGB, HCT, PLT in the last 72 hours. Recent Labs     03/06/21  0722      K 4.9      CO2 27   BUN 14   CREATININE 1.3   CALCIUM 9.3     No results for input(s): AST, ALT, BILIDIR, BILITOT, ALKPHOS in the last 72 hours. No results for input(s): INR in the last 72 hours. No results for input(s): Robbin Praveen in the last 72 hours. Urinalysis:      Lab Results   Component Value Date    NITRU Negative 03/05/2021    WBCUA 0-2 03/05/2021    BACTERIA 1+ 03/05/2021    RBCUA 11-20 03/05/2021    BLOODU SMALL 03/05/2021    SPECGRAV 1.025 03/05/2021    GLUCOSEU 100 03/05/2021       Consults:    IP CONSULT TO NEUROLOGY      Assessment/Plan:    Active Hospital Problems    Diagnosis    Fall [W19. XXXA]    HTN (hypertension), benign [I10]    Acute CVA (cerebrovascular accident) ([facility] [address] [I63.9]    DM (diabetes mellitus), secondary, uncontrolled, w/neurologic complic ([facility] [address] [N45.00, T73.65]       CVA - w/ dizziness/weakness on presentation, clinically suspected in ED ruled out by negative MRI. Echo ordered 6 March and pending. Carotid dopplers ordered and pending. Neurology consulted and appreciated in advance. HTN - w/out known CAD and no evidence of active signs/sxs of ischemia/failure. Currently controlled on home meds w/ vitals reviewed and documented as above. ARF - w/ elevated BUN/Cr ratio c/w pre-renal azotemia. Given IVF hydration and follow serial labs. Reviewed and documented as above. HyperKalemia - etiology clinically unable to determine. Follow serial labs. Reviewed and documented as above. Transaminitis - new since admit. Acute hepatitis panel negative. Will continue to follow serial labs. Reviewed and documented as above. GERD - w/out active signs/sxs of dysphagia/odynophagia. No evidence of active PUD or hx of GI bleed. Controlled on home PPI - continue.     DM2 - controlled onInsulin - held/continued. Follow FSBS/SSI low regimen. Last HbA1c 10.7% dated Jan 2021. Anticipate resuming/continuing home regimen at discharge.        DVT Prophylaxis: LMWH  Diet: DIET GENERAL;  Code Status: Full Code      PT/OT Eval Status: seen w/ res for home w/ assist.     Dispo - Monday/Tues 8/9 March at the earliest pending clinical course and subspecialty recs.      Molly Brooks MD

## 2021-03-08 NOTE — CARE COORDINATION
CASE MANAGEMENT DISCHARGE SUMMARY      Discharge to: home w/no needs    Transportation: private   Confirmed discharge plan with:     Patient: yes     RN, name: Gladis Cruz RN    Note: Pt is a/o, CM observed pt walking in the room. Pt IPTA, lives in a multilevel home w/2 steps up to main living area. Pt ha no issues ambulating, lives w/spouse and drives. Pt has no DCP needs. Please contact DCP should needs arise.   Maryjane Olmedo RN

## 2021-03-09 NOTE — DISCHARGE SUMMARY
Hospital Medicine Discharge Summary    Patient ID: Shannanhernan Fried      Patient's PCP: Tracey Freitas MD    Admit Date: 3/5/2021     Discharge Date: 3/8/2021      Admitting Physician: Omer Alejo MD     Discharge Physician: Vanita Hawthorne MD     Discharge Diagnoses: Active Hospital Problems    Diagnosis    Fall [W19. XXXA]    HTN (hypertension), benign [I10]    Acute CVA (cerebrovascular accident) ([facility] [address] [I63.9]    DM (diabetes mellitus), secondary, uncontrolled, w/neurologic complic ([facility] [address] [J38.08, F06.54]       The patient was seen and examined on day of discharge and this discharge summary is in conjunction with any daily progress note from day of discharge. Hospital Course:          CVA - w/ dizziness/weakness on presentation, clinically suspected in ED ruled out by negative MRI. Echo ordered 6 March and non-contributory. Carotid dopplers negative for flow-limiting stenosis. Neurology consulted and appreciated in advance.       HTN - w/out known CAD and no evidence of active signs/sxs of ischemia/failure. Currently controlled on home meds      ARF - w/ elevated BUN/Cr ratio c/w pre-renal azotemia. Given IVF hydration and followed serial labs.       HyperKalemia - etiology clinically unable to determine. Followed serial labs.       Transaminitis - new since admit. Acute hepatitis panel negative.       GERD - w/out active signs/sxs of dysphagia/odynophagia. No evidence of active PUD or hx of GI bleed. Controlled on home PPI - continue.     DM2 - controlled onInsulin - held/continued. Follow FSBS/SSI low regimen. Last HbA1c 10.7% dated Jan 2021. Resumed home regimen at discharge. Labs:  For convenience and continuity at follow-up the following most recent labs are provided:      CBC:    Lab Results   Component Value Date    WBC 6.1 03/05/2021    HGB 12.3 03/05/2021    HCT 38.7 03/05/2021     03/05/2021       Renal:    Lab Results   Component Value Date     03/06/2021 K 4.9 03/06/2021     03/06/2021    CO2 27 03/06/2021    BUN 14 03/06/2021    CREATININE 1.3 03/06/2021    CALCIUM 9.3 03/06/2021    PHOS 2.2 01/20/2021         Significant Diagnostic Studies    Radiology:   VL DUP CAROTID BILATERAL   Final Result      MRI BRAIN WO CONTRAST   Final Result   1. No acute infarct or acute intracranial process identified. 2. Mild chronic small vessel ischemic changes. Consults:     IP CONSULT TO NEUROLOGY    Disposition: Home     Condition at Discharge: Stable    Discharge Instructions/Follow-up:  w/ PCP 1-2 weeks and subspecialists as arranged. Code Status:  Full Code    Activity: activity as tolerated    Diet: regular diet      Discharge Medications:     Discharge Medication List as of 3/8/2021  3:51 PM           Details   pantoprazole (PROTONIX) 40 MG tablet Take 40 mg by mouth dailyHistorical Med      sildenafil (VIAGRA) 100 MG tablet Take 100 mg by mouth daily as neededHistorical Med      insulin glargine (LANTUS) 100 UNIT/ML injection vial Inject 24 Units into the skin nightly, Disp-1 vial, R-3Normal      insulin lispro (HUMALOG) 100 UNIT/ML injection cartridge Inject 8 Units into the skin 3 times daily (before meals), Disp-5 Cartridge, R-3Normal      lisinopril (PRINIVIL;ZESTRIL) 10 MG tablet Take 1.5 tablets by mouth daily, Disp-30 tablet, R-3Please cancel previous script for 5 mgNormal      gabapentin (NEURONTIN) 600 MG tablet Take 600 mg by mouth 2 times daily. Historical Med      Insulin Pen Needle 32G X 4 MM MISC Historical Med      blood glucose test strips (ASCENSIA AUTODISC VI;ONE TOUCH ULTRA TEST VI) strip Historical Med      venlafaxine (EFFEXOR XR) 75 MG extended release capsule Take 75 mg by mouth dailyHistorical Med             Time Spent on discharge is more than 30 minutes in the examination, evaluation, counseling and review of medications and discharge plan.       Signed:    Rodney Avila MD   3/9/2021      Thank you Shivani Walker MD

## 2021-04-25 ENCOUNTER — HOSPITAL ENCOUNTER (INPATIENT)
Age: 61
LOS: 4 days | Discharge: HOME | DRG: 629 | End: 2021-04-30
Attending: STUDENT IN AN ORGANIZED HEALTH CARE EDUCATION/TRAINING PROGRAM | Admitting: INTERNAL MEDICINE
Payer: COMMERCIAL

## 2021-04-25 DIAGNOSIS — A41.9 SEPSIS WITHOUT ACUTE ORGAN DYSFUNCTION, DUE TO UNSPECIFIED ORGANISM (HCC): Primary | ICD-10-CM

## 2021-04-25 DIAGNOSIS — N17.9 ACUTE KIDNEY INJURY (HCC): ICD-10-CM

## 2021-04-25 DIAGNOSIS — E11.10 DIABETIC KETOACIDOSIS WITHOUT COMA ASSOCIATED WITH TYPE 2 DIABETES MELLITUS (HCC): ICD-10-CM

## 2021-04-25 DIAGNOSIS — E11.621 DIABETIC ULCER OF OTHER PART OF LEFT FOOT ASSOCIATED WITH TYPE 2 DIABETES MELLITUS, UNSPECIFIED ULCER STAGE (HCC): ICD-10-CM

## 2021-04-25 DIAGNOSIS — L97.529 DIABETIC ULCER OF OTHER PART OF LEFT FOOT ASSOCIATED WITH TYPE 2 DIABETES MELLITUS, UNSPECIFIED ULCER STAGE (HCC): ICD-10-CM

## 2021-04-25 LAB
A/G RATIO: 0.8 (ref 1.1–2.2)
ALBUMIN SERPL-MCNC: 3.3 G/DL (ref 3.4–5)
ALP BLD-CCNC: 144 U/L (ref 40–129)
ALT SERPL-CCNC: 12 U/L (ref 10–40)
ANION GAP SERPL CALCULATED.3IONS-SCNC: 17 MMOL/L (ref 3–16)
AST SERPL-CCNC: 11 U/L (ref 15–37)
BASOPHILS ABSOLUTE: 0 K/UL (ref 0–0.2)
BASOPHILS RELATIVE PERCENT: 0.5 %
BILIRUB SERPL-MCNC: 0.6 MG/DL (ref 0–1)
BUN BLDV-MCNC: 30 MG/DL (ref 7–20)
CALCIUM SERPL-MCNC: 8.8 MG/DL (ref 8.3–10.6)
CHLORIDE BLD-SCNC: 87 MMOL/L (ref 99–110)
CO2: 19 MMOL/L (ref 21–32)
CREAT SERPL-MCNC: 1.8 MG/DL (ref 0.8–1.3)
EOSINOPHILS ABSOLUTE: 0 K/UL (ref 0–0.6)
EOSINOPHILS RELATIVE PERCENT: 0.4 %
GFR AFRICAN AMERICAN: 46
GFR NON-AFRICAN AMERICAN: 38
GLOBULIN: 4.2 G/DL
GLUCOSE BLD-MCNC: 555 MG/DL (ref 70–99)
HCT VFR BLD CALC: 31.4 % (ref 40.5–52.5)
HEMOGLOBIN: 10.1 G/DL (ref 13.5–17.5)
LACTIC ACID: 1.2 MMOL/L (ref 0.4–2)
LYMPHOCYTES ABSOLUTE: 0.7 K/UL (ref 1–5.1)
LYMPHOCYTES RELATIVE PERCENT: 6.6 %
MCH RBC QN AUTO: 26.1 PG (ref 26–34)
MCHC RBC AUTO-ENTMCNC: 32.3 G/DL (ref 31–36)
MCV RBC AUTO: 80.7 FL (ref 80–100)
MONOCYTES ABSOLUTE: 0.8 K/UL (ref 0–1.3)
MONOCYTES RELATIVE PERCENT: 8 %
NEUTROPHILS ABSOLUTE: 8.9 K/UL (ref 1.7–7.7)
NEUTROPHILS RELATIVE PERCENT: 84.5 %
PDW BLD-RTO: 14.7 % (ref 12.4–15.4)
PLATELET # BLD: 253 K/UL (ref 135–450)
PMV BLD AUTO: 9.4 FL (ref 5–10.5)
POTASSIUM REFLEX MAGNESIUM: 5 MMOL/L (ref 3.5–5.1)
RBC # BLD: 3.89 M/UL (ref 4.2–5.9)
SODIUM BLD-SCNC: 123 MMOL/L (ref 136–145)
TOTAL PROTEIN: 7.5 G/DL (ref 6.4–8.2)
WBC # BLD: 10.5 K/UL (ref 4–11)

## 2021-04-25 PROCEDURE — 83605 ASSAY OF LACTIC ACID: CPT

## 2021-04-25 PROCEDURE — 85025 COMPLETE CBC W/AUTO DIFF WBC: CPT

## 2021-04-25 PROCEDURE — 80053 COMPREHEN METABOLIC PANEL: CPT

## 2021-04-25 PROCEDURE — 83036 HEMOGLOBIN GLYCOSYLATED A1C: CPT

## 2021-04-25 PROCEDURE — 96365 THER/PROPH/DIAG IV INF INIT: CPT

## 2021-04-25 PROCEDURE — 36415 COLL VENOUS BLD VENIPUNCTURE: CPT

## 2021-04-25 PROCEDURE — 82010 KETONE BODYS QUAN: CPT

## 2021-04-25 PROCEDURE — 99284 EMERGENCY DEPT VISIT MOD MDM: CPT

## 2021-04-25 ASSESSMENT — PAIN SCALES - GENERAL: PAINLEVEL_OUTOF10: 6

## 2021-04-25 ASSESSMENT — PAIN DESCRIPTION - PAIN TYPE: TYPE: ACUTE PAIN

## 2021-04-25 ASSESSMENT — PAIN DESCRIPTION - FREQUENCY: FREQUENCY: CONTINUOUS

## 2021-04-26 ENCOUNTER — APPOINTMENT (OUTPATIENT)
Dept: GENERAL RADIOLOGY | Age: 61
DRG: 629 | End: 2021-04-26
Payer: COMMERCIAL

## 2021-04-26 PROBLEM — E11.10 DKA, TYPE 2, NOT AT GOAL (HCC): Status: ACTIVE | Noted: 2021-04-26

## 2021-04-26 LAB
AMORPHOUS: NORMAL /HPF
ANION GAP SERPL CALCULATED.3IONS-SCNC: 6 MMOL/L (ref 3–16)
ANION GAP SERPL CALCULATED.3IONS-SCNC: 7 MMOL/L (ref 3–16)
ANION GAP SERPL CALCULATED.3IONS-SCNC: 9 MMOL/L (ref 3–16)
BASE EXCESS VENOUS: -7.2 MMOL/L (ref -3–3)
BASOPHILS ABSOLUTE: 0.1 K/UL (ref 0–0.2)
BASOPHILS RELATIVE PERCENT: 0.7 %
BETA-HYDROXYBUTYRATE: 3.9 MMOL/L (ref 0–0.27)
BILIRUBIN URINE: NEGATIVE
BLOOD, URINE: ABNORMAL
BUN BLDV-MCNC: 21 MG/DL (ref 7–20)
BUN BLDV-MCNC: 22 MG/DL (ref 7–20)
BUN BLDV-MCNC: 25 MG/DL (ref 7–20)
CALCIUM SERPL-MCNC: 8.2 MG/DL (ref 8.3–10.6)
CALCIUM SERPL-MCNC: 8.2 MG/DL (ref 8.3–10.6)
CALCIUM SERPL-MCNC: 8.6 MG/DL (ref 8.3–10.6)
CARBOXYHEMOGLOBIN: 1.2 % (ref 0–1.5)
CHLORIDE BLD-SCNC: 101 MMOL/L (ref 99–110)
CHLORIDE BLD-SCNC: 101 MMOL/L (ref 99–110)
CHLORIDE BLD-SCNC: 103 MMOL/L (ref 99–110)
CLARITY: CLEAR
CO2: 23 MMOL/L (ref 21–32)
CO2: 25 MMOL/L (ref 21–32)
CO2: 26 MMOL/L (ref 21–32)
COLOR: YELLOW
CREAT SERPL-MCNC: 1.4 MG/DL (ref 0.8–1.3)
CREAT SERPL-MCNC: 1.5 MG/DL (ref 0.8–1.3)
CREAT SERPL-MCNC: 1.6 MG/DL (ref 0.8–1.3)
EOSINOPHILS ABSOLUTE: 0.2 K/UL (ref 0–0.6)
EOSINOPHILS RELATIVE PERCENT: 2.1 %
ESTIMATED AVERAGE GLUCOSE: 277.6 MG/DL
FERRITIN: 220 NG/ML (ref 30–400)
FOLATE: 10.89 NG/ML (ref 4.78–24.2)
GFR AFRICAN AMERICAN: 53
GFR AFRICAN AMERICAN: 57
GFR AFRICAN AMERICAN: >60
GFR NON-AFRICAN AMERICAN: 44
GFR NON-AFRICAN AMERICAN: 47
GFR NON-AFRICAN AMERICAN: 51
GLUCOSE BLD-MCNC: 146 MG/DL (ref 70–99)
GLUCOSE BLD-MCNC: 153 MG/DL (ref 70–99)
GLUCOSE BLD-MCNC: 176 MG/DL (ref 70–99)
GLUCOSE BLD-MCNC: 184 MG/DL (ref 70–99)
GLUCOSE BLD-MCNC: 230 MG/DL (ref 70–99)
GLUCOSE BLD-MCNC: 232 MG/DL (ref 70–99)
GLUCOSE BLD-MCNC: 253 MG/DL (ref 70–99)
GLUCOSE BLD-MCNC: 259 MG/DL (ref 70–99)
GLUCOSE BLD-MCNC: 308 MG/DL (ref 70–99)
GLUCOSE BLD-MCNC: 315 MG/DL (ref 70–99)
GLUCOSE BLD-MCNC: 399 MG/DL (ref 70–99)
GLUCOSE BLD-MCNC: 411 MG/DL (ref 70–99)
GLUCOSE BLD-MCNC: 413 MG/DL (ref 70–99)
GLUCOSE BLD-MCNC: 512 MG/DL (ref 70–99)
GLUCOSE URINE: >=1000 MG/DL
HBA1C MFR BLD: 11.3 %
HCO3 VENOUS: 20 MMOL/L (ref 23–29)
HCT VFR BLD CALC: 27.2 % (ref 40.5–52.5)
HEMOGLOBIN: 9 G/DL (ref 13.5–17.5)
IRON SATURATION: 10 % (ref 20–50)
IRON: 16 UG/DL (ref 59–158)
KETONES, URINE: 40 MG/DL
LEUKOCYTE ESTERASE, URINE: NEGATIVE
LYMPHOCYTES ABSOLUTE: 0.9 K/UL (ref 1–5.1)
LYMPHOCYTES RELATIVE PERCENT: 11.1 %
MAGNESIUM: 1.9 MG/DL (ref 1.8–2.4)
MAGNESIUM: 2 MG/DL (ref 1.8–2.4)
MAGNESIUM: 2.1 MG/DL (ref 1.8–2.4)
MCH RBC QN AUTO: 26.2 PG (ref 26–34)
MCHC RBC AUTO-ENTMCNC: 33 G/DL (ref 31–36)
MCV RBC AUTO: 79.4 FL (ref 80–100)
METHEMOGLOBIN VENOUS: 0.3 %
MICROSCOPIC EXAMINATION: YES
MONOCYTES ABSOLUTE: 0.8 K/UL (ref 0–1.3)
MONOCYTES RELATIVE PERCENT: 10 %
NEUTROPHILS ABSOLUTE: 6.3 K/UL (ref 1.7–7.7)
NEUTROPHILS RELATIVE PERCENT: 76.1 %
NITRITE, URINE: NEGATIVE
O2 CONTENT, VEN: 9 VOL %
O2 SAT, VEN: 47 %
O2 THERAPY: ABNORMAL
PCO2, VEN: 47.3 MMHG (ref 40–50)
PDW BLD-RTO: 14.5 % (ref 12.4–15.4)
PERFORMED ON: ABNORMAL
PH UA: 5.5 (ref 5–8)
PH VENOUS: 7.24 (ref 7.35–7.45)
PHOSPHORUS: 2.3 MG/DL (ref 2.5–4.9)
PHOSPHORUS: 2.6 MG/DL (ref 2.5–4.9)
PHOSPHORUS: 3.1 MG/DL (ref 2.5–4.9)
PLATELET # BLD: 230 K/UL (ref 135–450)
PMV BLD AUTO: 9 FL (ref 5–10.5)
PO2, VEN: 29.8 MMHG (ref 25–40)
POTASSIUM SERPL-SCNC: 3.9 MMOL/L (ref 3.5–5.1)
POTASSIUM SERPL-SCNC: 4 MMOL/L (ref 3.5–5.1)
POTASSIUM SERPL-SCNC: 4.4 MMOL/L (ref 3.5–5.1)
PROTEIN UA: ABNORMAL MG/DL
RBC # BLD: 3.43 M/UL (ref 4.2–5.9)
RBC UA: NORMAL /HPF (ref 0–4)
SARS-COV-2, NAAT: NOT DETECTED
SODIUM BLD-SCNC: 133 MMOL/L (ref 136–145)
SODIUM BLD-SCNC: 134 MMOL/L (ref 136–145)
SODIUM BLD-SCNC: 134 MMOL/L (ref 136–145)
SPECIFIC GRAVITY UA: 1.01 (ref 1–1.03)
TCO2 CALC VENOUS: 22 MMOL/L
TOTAL IRON BINDING CAPACITY: 166 UG/DL (ref 260–445)
URINE TYPE: ABNORMAL
UROBILINOGEN, URINE: 0.2 E.U./DL
VITAMIN B-12: 308 PG/ML (ref 211–911)
WBC # BLD: 8.2 K/UL (ref 4–11)
WBC UA: NORMAL /HPF (ref 0–5)

## 2021-04-26 PROCEDURE — 83540 ASSAY OF IRON: CPT

## 2021-04-26 PROCEDURE — 99291 CRITICAL CARE FIRST HOUR: CPT | Performed by: INTERNAL MEDICINE

## 2021-04-26 PROCEDURE — 2580000003 HC RX 258: Performed by: STUDENT IN AN ORGANIZED HEALTH CARE EDUCATION/TRAINING PROGRAM

## 2021-04-26 PROCEDURE — 6360000002 HC RX W HCPCS: Performed by: INTERNAL MEDICINE

## 2021-04-26 PROCEDURE — 87635 SARS-COV-2 COVID-19 AMP PRB: CPT

## 2021-04-26 PROCEDURE — 2580000003 HC RX 258: Performed by: INTERNAL MEDICINE

## 2021-04-26 PROCEDURE — 82728 ASSAY OF FERRITIN: CPT

## 2021-04-26 PROCEDURE — 87040 BLOOD CULTURE FOR BACTERIA: CPT

## 2021-04-26 PROCEDURE — 82803 BLOOD GASES ANY COMBINATION: CPT

## 2021-04-26 PROCEDURE — 82746 ASSAY OF FOLIC ACID SERUM: CPT

## 2021-04-26 PROCEDURE — 83735 ASSAY OF MAGNESIUM: CPT

## 2021-04-26 PROCEDURE — 84100 ASSAY OF PHOSPHORUS: CPT

## 2021-04-26 PROCEDURE — C9113 INJ PANTOPRAZOLE SODIUM, VIA: HCPCS | Performed by: INTERNAL MEDICINE

## 2021-04-26 PROCEDURE — 6370000000 HC RX 637 (ALT 250 FOR IP): Performed by: INTERNAL MEDICINE

## 2021-04-26 PROCEDURE — 6370000000 HC RX 637 (ALT 250 FOR IP): Performed by: STUDENT IN AN ORGANIZED HEALTH CARE EDUCATION/TRAINING PROGRAM

## 2021-04-26 PROCEDURE — 1200000000 HC SEMI PRIVATE

## 2021-04-26 PROCEDURE — 71045 X-RAY EXAM CHEST 1 VIEW: CPT

## 2021-04-26 PROCEDURE — 2580000003 HC RX 258

## 2021-04-26 PROCEDURE — 82607 VITAMIN B-12: CPT

## 2021-04-26 PROCEDURE — 99223 1ST HOSP IP/OBS HIGH 75: CPT | Performed by: INTERNAL MEDICINE

## 2021-04-26 PROCEDURE — 81001 URINALYSIS AUTO W/SCOPE: CPT

## 2021-04-26 PROCEDURE — 36415 COLL VENOUS BLD VENIPUNCTURE: CPT

## 2021-04-26 PROCEDURE — 83550 IRON BINDING TEST: CPT

## 2021-04-26 PROCEDURE — 80048 BASIC METABOLIC PNL TOTAL CA: CPT

## 2021-04-26 PROCEDURE — 6360000002 HC RX W HCPCS: Performed by: STUDENT IN AN ORGANIZED HEALTH CARE EDUCATION/TRAINING PROGRAM

## 2021-04-26 PROCEDURE — 6370000000 HC RX 637 (ALT 250 FOR IP)

## 2021-04-26 PROCEDURE — 85025 COMPLETE CBC W/AUTO DIFF WBC: CPT

## 2021-04-26 PROCEDURE — 73620 X-RAY EXAM OF FOOT: CPT

## 2021-04-26 RX ORDER — ACETAMINOPHEN 325 MG/1
650 TABLET ORAL EVERY 6 HOURS PRN
Status: DISCONTINUED | OUTPATIENT
Start: 2021-04-26 | End: 2021-04-30 | Stop reason: HOSPADM

## 2021-04-26 RX ORDER — SODIUM CHLORIDE 0.9 % (FLUSH) 0.9 %
5-40 SYRINGE (ML) INJECTION PRN
Status: DISCONTINUED | OUTPATIENT
Start: 2021-04-26 | End: 2021-04-30 | Stop reason: HOSPADM

## 2021-04-26 RX ORDER — DEXTROSE MONOHYDRATE 25 G/50ML
12.5 INJECTION, SOLUTION INTRAVENOUS PRN
Status: DISCONTINUED | OUTPATIENT
Start: 2021-04-26 | End: 2021-04-26

## 2021-04-26 RX ORDER — PANTOPRAZOLE SODIUM 40 MG/10ML
40 INJECTION, POWDER, LYOPHILIZED, FOR SOLUTION INTRAVENOUS DAILY
Status: DISCONTINUED | OUTPATIENT
Start: 2021-04-26 | End: 2021-04-30 | Stop reason: HOSPADM

## 2021-04-26 RX ORDER — MAGNESIUM SULFATE 1 G/100ML
1000 INJECTION INTRAVENOUS PRN
Status: DISCONTINUED | OUTPATIENT
Start: 2021-04-26 | End: 2021-04-26

## 2021-04-26 RX ORDER — SODIUM CHLORIDE 9 MG/ML
INJECTION, SOLUTION INTRAVENOUS
Status: COMPLETED
Start: 2021-04-26 | End: 2021-04-26

## 2021-04-26 RX ORDER — GABAPENTIN 300 MG/1
600 CAPSULE ORAL 2 TIMES DAILY
Status: DISCONTINUED | OUTPATIENT
Start: 2021-04-26 | End: 2021-04-30 | Stop reason: HOSPADM

## 2021-04-26 RX ORDER — NICOTINE POLACRILEX 4 MG
15 LOZENGE BUCCAL PRN
Status: DISCONTINUED | OUTPATIENT
Start: 2021-04-26 | End: 2021-04-26

## 2021-04-26 RX ORDER — DEXTROSE MONOHYDRATE 25 G/50ML
12.5 INJECTION, SOLUTION INTRAVENOUS PRN
Status: DISCONTINUED | OUTPATIENT
Start: 2021-04-26 | End: 2021-04-28 | Stop reason: DRUGHIGH

## 2021-04-26 RX ORDER — DEXTROSE AND SODIUM CHLORIDE 5; .45 G/100ML; G/100ML
INJECTION, SOLUTION INTRAVENOUS CONTINUOUS PRN
Status: DISCONTINUED | OUTPATIENT
Start: 2021-04-26 | End: 2021-04-26

## 2021-04-26 RX ORDER — DEXTROSE MONOHYDRATE 50 MG/ML
100 INJECTION, SOLUTION INTRAVENOUS PRN
Status: DISCONTINUED | OUTPATIENT
Start: 2021-04-26 | End: 2021-04-26

## 2021-04-26 RX ORDER — 0.9 % SODIUM CHLORIDE 0.9 %
1000 INTRAVENOUS SOLUTION INTRAVENOUS ONCE
Status: COMPLETED | OUTPATIENT
Start: 2021-04-26 | End: 2021-04-26

## 2021-04-26 RX ORDER — ACETAMINOPHEN 500 MG
1000 TABLET ORAL ONCE
Status: COMPLETED | OUTPATIENT
Start: 2021-04-26 | End: 2021-04-26

## 2021-04-26 RX ORDER — SODIUM CHLORIDE 9 MG/ML
25 INJECTION, SOLUTION INTRAVENOUS PRN
Status: DISCONTINUED | OUTPATIENT
Start: 2021-04-26 | End: 2021-04-26

## 2021-04-26 RX ORDER — INSULIN GLARGINE 100 [IU]/ML
24 INJECTION, SOLUTION SUBCUTANEOUS NIGHTLY
Status: DISCONTINUED | OUTPATIENT
Start: 2021-04-26 | End: 2021-04-29

## 2021-04-26 RX ORDER — SODIUM CHLORIDE 0.9 % (FLUSH) 0.9 %
5-40 SYRINGE (ML) INJECTION EVERY 12 HOURS SCHEDULED
Status: DISCONTINUED | OUTPATIENT
Start: 2021-04-26 | End: 2021-04-30 | Stop reason: HOSPADM

## 2021-04-26 RX ORDER — POTASSIUM CHLORIDE 7.45 MG/ML
10 INJECTION INTRAVENOUS PRN
Status: DISCONTINUED | OUTPATIENT
Start: 2021-04-26 | End: 2021-04-26

## 2021-04-26 RX ORDER — SODIUM CHLORIDE 9 MG/ML
INJECTION, SOLUTION INTRAVENOUS CONTINUOUS
Status: DISCONTINUED | OUTPATIENT
Start: 2021-04-26 | End: 2021-04-26

## 2021-04-26 RX ORDER — VENLAFAXINE HYDROCHLORIDE 75 MG/1
75 CAPSULE, EXTENDED RELEASE ORAL DAILY
Status: DISCONTINUED | OUTPATIENT
Start: 2021-04-26 | End: 2021-04-30 | Stop reason: HOSPADM

## 2021-04-26 RX ADMIN — GABAPENTIN 600 MG: 300 CAPSULE ORAL at 21:20

## 2021-04-26 RX ADMIN — ENOXAPARIN SODIUM 40 MG: 40 INJECTION SUBCUTANEOUS at 08:38

## 2021-04-26 RX ADMIN — VANCOMYCIN HYDROCHLORIDE 2000 MG: 1 INJECTION, POWDER, LYOPHILIZED, FOR SOLUTION INTRAVENOUS at 02:06

## 2021-04-26 RX ADMIN — GABAPENTIN 600 MG: 300 CAPSULE ORAL at 08:39

## 2021-04-26 RX ADMIN — VENLAFAXINE HYDROCHLORIDE 75 MG: 75 CAPSULE, EXTENDED RELEASE ORAL at 08:39

## 2021-04-26 RX ADMIN — INSULIN LISPRO 2 UNITS: 100 INJECTION, SOLUTION INTRAVENOUS; SUBCUTANEOUS at 11:53

## 2021-04-26 RX ADMIN — CEFEPIME 2000 MG: 2 INJECTION, POWDER, FOR SOLUTION INTRAVENOUS at 11:55

## 2021-04-26 RX ADMIN — SODIUM CHLORIDE 1000 ML: 9 INJECTION, SOLUTION INTRAVENOUS at 00:48

## 2021-04-26 RX ADMIN — SODIUM CHLORIDE: 9 INJECTION, SOLUTION INTRAVENOUS at 04:10

## 2021-04-26 RX ADMIN — CEFEPIME 2000 MG: 2 INJECTION, POWDER, FOR SOLUTION INTRAVENOUS at 21:21

## 2021-04-26 RX ADMIN — PANTOPRAZOLE SODIUM 40 MG: 40 INJECTION, POWDER, FOR SOLUTION INTRAVENOUS at 08:38

## 2021-04-26 RX ADMIN — MUPIROCIN: 20 OINTMENT TOPICAL at 08:39

## 2021-04-26 RX ADMIN — DEXTROSE AND SODIUM CHLORIDE: 5; 450 INJECTION, SOLUTION INTRAVENOUS at 07:49

## 2021-04-26 RX ADMIN — ACETAMINOPHEN 1000 MG: 500 TABLET ORAL at 00:48

## 2021-04-26 RX ADMIN — Medication 10 ML: at 08:40

## 2021-04-26 RX ADMIN — SODIUM CHLORIDE 250 ML: 9 INJECTION, SOLUTION INTRAVENOUS at 21:31

## 2021-04-26 RX ADMIN — INSULIN LISPRO 4 UNITS: 100 INJECTION, SOLUTION INTRAVENOUS; SUBCUTANEOUS at 16:36

## 2021-04-26 RX ADMIN — PIPERACILLIN SODIUM AND TAZOBACTAM SODIUM 3375 MG: 3; .375 INJECTION, POWDER, LYOPHILIZED, FOR SOLUTION INTRAVENOUS at 08:39

## 2021-04-26 RX ADMIN — ACETAMINOPHEN 650 MG: 325 TABLET ORAL at 21:19

## 2021-04-26 RX ADMIN — ACETAMINOPHEN 650 MG: 325 TABLET ORAL at 12:06

## 2021-04-26 RX ADMIN — INSULIN GLARGINE 24 UNITS: 100 INJECTION, SOLUTION SUBCUTANEOUS at 21:37

## 2021-04-26 RX ADMIN — INSULIN HUMAN 15 UNITS: 100 INJECTION, SUSPENSION SUBCUTANEOUS at 11:53

## 2021-04-26 RX ADMIN — PIPERACILLIN SODIUM AND TAZOBACTAM SODIUM 4500 MG: 4; .5 INJECTION, POWDER, LYOPHILIZED, FOR SOLUTION INTRAVENOUS at 00:48

## 2021-04-26 RX ADMIN — SODIUM CHLORIDE 0.1 UNITS/KG/HR: 9 INJECTION, SOLUTION INTRAVENOUS at 01:28

## 2021-04-26 RX ADMIN — SODIUM CHLORIDE 1000 ML: 9 INJECTION, SOLUTION INTRAVENOUS at 02:08

## 2021-04-26 RX ADMIN — Medication 10 ML: at 21:36

## 2021-04-26 ASSESSMENT — PAIN SCALES - GENERAL
PAINLEVEL_OUTOF10: 0
PAINLEVEL_OUTOF10: 3

## 2021-04-26 NOTE — PROGRESS NOTES
Spoke with Dr. Kimberly Crews. The patient does not need telemetry on [address]. Report given to 2 [facility]. Will transport the patient in a wheel chair.  Electronically signed by Juana Daley RN on 4/26/2021 at 1:41 PM

## 2021-04-26 NOTE — H&P
Hospital Medicine History & Physical      PCP: Donna Brunson MD    Date of Admission: 4/25/2021    Date of Service: Pt seen/examined on 4/26/2021    Chief Complaint:    Chief Complaint   Patient presents with    Wound Check     pt states he has wound to left foot, has been seen by . pt states fever today of 102 was told to come to ER. Wife gave tylenol      History Of Present Illness: The patient is a 56 y.o. male with DM with neuropathy, chronic wound of LLE who presented to [facility] ED with complaint of wound check and high fevers of 102 at home with draining left foot plantar ulcer. Noted to have acute cellulitis of left foot with high sugars  Above 550 with mild anion gap acidosis. Pt reports he has nausea but no vomiting or abd pain . Admitted to ICU with DKA protocol and on insulin gtt  Denies any non compliance to insulin or diet. No recent other infections.  Has chronic foot wound f/w Dr. Griselda Rey    Today he feels much improved and is off DKA protocol    Past Medical History:        Diagnosis Date    TORI (acute kidney injury) ([facility] [address] 09/12/2017    Bacteremia 05/05/2017    staph aureus    Diabetes mellitus ([address]     Diabetic neuropathy (HCC)     Gout     MRSA (methicillin resistant staph aureus) culture positive 12/27/18, 9/12/17, 5/5/17,9/5/13, 1/15/14    ulcers bilateral legs    MRSA (methicillin resistant staph aureus) culture positive 01/21/2021    foot wound    Pneumonia     Pyogenic inflammation of bone ([facility] [address]        Past Surgical History:        Procedure Laterality Date    EYE SURGERY      lens implants after removal of cataracts    OTHER SURGICAL HISTORY  12/28/2018    partial right foot amputation with graft application    TOE AMPUTATION Right 12/28/2018    PARTIAL RIGHT FOOT AMPUTATION WITH GRAFT APPLICATION performed by Kellie Hernández DPM at [facility] 1/21/2021    EGD BIOPSY performed by Arsalan Serrano DO at [facility] Medications Prior to Admission:    Prior to Admission medications    Medication Sig Start Date End Date Taking? Authorizing Provider   pantoprazole (PROTONIX) 40 MG tablet Take 40 mg by mouth daily   Yes Historical Provider, MD   insulin glargine (LANTUS) 100 UNIT/ML injection vial Inject 24 Units into the skin nightly  Patient taking differently: Inject 35 Units into the skin nightly  1/25/21  Yes Denia Romero DO   insulin lispro (HUMALOG) 100 UNIT/ML injection cartridge Inject 8 Units into the skin 3 times daily (before meals)  Patient taking differently: Inject 10 Units into the skin 3 times daily (before meals)  1/25/21  Yes Denia Romero DO   lisinopril (PRINIVIL;ZESTRIL) 10 MG tablet Take 1.5 tablets by mouth daily 1/25/21  Yes Denia Romero DO   gabapentin (NEURONTIN) 600 MG tablet Take 600 mg by mouth 2 times daily. Yes Historical Provider, MD   venlafaxine (EFFEXOR XR) 75 MG extended release capsule Take 75 mg by mouth daily   Yes Historical Provider, MD   sildenafil (VIAGRA) 100 MG tablet Take 100 mg by mouth daily as needed 3/3/21   Historical Provider, MD   Insulin Pen Needle 32G X 4 MM MISC Use 4 daily 6/28/17   Historical Provider, MD   blood glucose test strips (ASCENSIA AUTODISC VI;ONE TOUCH ULTRA TEST VI) strip Test TID and as directed 3/7/19   Historical Provider, MD       Allergies:  Hydrocodone-acetaminophen, Percocet [oxycodone-acetaminophen], Pregabalin, and Vicodin [hydrocodone-acetaminophen]    Social History:  The patient currently lives at home    TOBACCO:   reports that he has never smoked. He has never used smokeless tobacco.  ETOH:   reports no history of alcohol use.       Family History:   Positive as follows:        Problem Relation Age of Onset    Diabetes Maternal Grandfather     Heart Disease Paternal Uncle     High Blood Pressure Mother        REVIEW OF SYSTEMS:       Constitutional: +ve for fever + malaise  HENT: Negative for sore throat   Eyes: Negative for redness Respiratory: Negative  for dyspnea, cough   Cardiovascular: Negative for chest pain   Gastrointestinal: Negative for vomiting, diarrhea  + nausea  Genitourinary: Negative for hematuria   Musculoskeletal: Negative for arthralgias   Skin: Negative for rash + left foot wound  Neurological: Negative for syncope   Hematological: Negative for adenopathy   Psychiatric/Behavorial: Negative for anxiety    PHYSICAL EXAM:    BP (!) 157/63   Pulse 80   Temp 99.3 °F (37.4 °C) (Oral)   Resp 11   Ht 5' 10\" (1.778 m)   Wt 177 lb 14.4 oz (80.7 kg)   SpO2 90%   BMI 25.53 kg/m²         General:  Middle aged male, appropriately build Awake, alert and oriented. Appears to be not in any distress  Mucous Membranes:  Pink , anicteric  Neck: No JVD, no carotid bruit, no thyromegaly  Chest:  Clear to auscultation bilaterally, no added sounds  Cardiovascular:  RRR S1S2 heard, no murmurs or gallops  Abdomen:  Soft, undistended, non tender, no organomegaly, BS present  Extremities: left foot large plantar ulcer with yellow drainage. Dressing not removed  Diminished sensation in foot chronic. Left calf ulcer - healed and chronic scar with superficial crusting. No cellulitis  No edema or cyanosis.  Distal pulses well felt  Neurological : grossly normal      CBC:   Recent Labs     04/25/21  2226 04/26/21  0643   WBC 10.5 8.2   HGB 10.1* 9.0*   HCT 31.4* 27.2*   MCV 80.7 79.4*    230     BMP:   Recent Labs     04/26/21  0644 04/26/21  1031 04/26/21  1257   * 134* 133*   K 3.9 4.4 4.0    101 101   CO2 25 26 23   PHOS 3.1 2.6 2.3*   BUN 25* 22* 21*   CREATININE 1.6* 1.5* 1.4*     LIVER PROFILE:   Recent Labs     04/25/21  2226   AST 11*   ALT 12   BILITOT 0.6   ALKPHOS 144*     UA:  Recent Labs     04/26/21  0119   COLORU Yellow   PHUR 5.5   WBCUA 0-2   RBCUA 3-4   CLARITYU Clear   SPECGRAV 1.010   LEUKOCYTESUR Negative   UROBILINOGEN 0.2   BILIRUBINUR Negative   BLOODU MODERATE*   GLUCOSEU >=1000*   AMORPHOUS 1+ CULTURES  Blood Cx: pending   COVID-19: not detected     EKG:    No EKG from this admission for review    RADIOLOGY  XR CHEST PORTABLE   Final Result   Increased perihilar markings. Atelectasis, infectious or inflammatory airway   process, and interstitial edema are in the differential.         XR FOOT LEFT (2 VIEWS)   Final Result   Plantar soft tissue ulcer without radiographic evidence for osteomyelitis.            Pertinent previous results reviewed   Wound Cx: 21  Staph aureus mrsa (1)    Antibiotic Interpretation RENITA Status    clindamycin Sensitive <=0.25 mcg/mL     erythromycin Resistant >=8 mcg/mL     oxacillin Resistant >=4 mcg/mL     tetracycline Sensitive <=1 mcg/mL     trimethoprim-sulfamethoxazole Sensitive <=10 mcg/mL     vancomycin Sensitive 1 mcg/mL           ASSESSMENT/PLAN:    DKA  DM Type 2, uncontrolled  - pH: 7.244, A, CO2: 19, B, BHB: 3.9  - Started on insulin gtt, admit to ICU   - DKA protocol with Q4 hour labs, IVF, electrolyte management   - Critical Care consult   - AG closed, transition to Lantus and SSI, carb controlled diet   - Continue home Neurontin for neuropathy     Chronic LLE wound   Acute cellulitis L foot  - diabetic foot would to the LLE, with acute infectious changes  - Follows with Dr. Tino La  - Hx of MRSA  - Xray shows plantar soft tissue ulcer without radiographic evidence for osteomyelitis   - Continue IV Vanc/ Cefepime D#2 (received Zosyn x 2 doses)  - Podiatry consulted    Abnormal CXR  - atelectasis vs infection vs edema   - Clinically  Looks normal . No pna suspected    Microcytic Anemia   - Baseline hgb ~11-12  - Hgb on admission 10.1 > 9.0  - no acute bleeding    - Check iron studies, B12/folate    TORI with CKD 3a  - Baseline creatinine ~1.3   - Creatinine on admission 1.8 > 1.6 > 1.5  - Hold home ACE   - IVF and monitor     Pseudohyponatremia  - Na: 123 on admission with corrected Na: 130   - Improving with BG control     HTN  - BP is elevated   - home lisinopril held 2/2 TORI - can resume in AM    GERD  - Continue PPI     DVT Prophylaxis: Lovenox  Diet: DIET CARB CONTROL; Carb Control: 3 carb choices (45 gms)/meal   Code Status: Full Code    Transfer out of ICU to med/surg, continue IV Abx    Updated wife in room  [id number] Claudia Lozano for transfer     Aida Ruvalcaba MD 4/26/2021 5:50 PM

## 2021-04-26 NOTE — CARE COORDINATION
Case Management Assessment  Initial Evaluation      Patient Name: Mitchell Matt  YOB: 1960  Diagnosis: DKA, type 2, not at goal [facility]) [E11.10]  Date / Time: 4/25/2021 11:54 PM    Admission status/Date:inpt  Chart Reviewed: Yes      Patient Interviewed: No   Family Interviewed:  Yes - spouse      Hospitalization in the last 30 days:  No      Health Care Decision Maker :   Primary Decision Maker: Sachi Rainey - Spouse - [phone]    (CM - must 1st enter selection under Navigator - emergency contact- Health Care Decision Maker Relationship and pick relationship)   Who do you trust or have selected to make healthcare decisions for you      Met with: pt's spouse via TC  Interview conducted  (bedside/phone):    Current PCP:   Elise Duong MD      Financial  Commercial  Precert required for SNF : Y, N          3 night stay required - Y, N    ADLS  Support Systems/Care Needs: Family Members, Spouse/Significant Other  Transportation: self    Meal Preparation: spouse,self    Housing  Living Arrangements: home with spouse  Steps: 3  Intent for return to present living arrangements: Yes  Identified Issues: no    Home Care Information  Active with [address] : No Agency:(Services)  Type of Home Care Services: None  Passport/Waiver : No  :                      Phone Number:    Passport/Waiver Services: no          Durable Medical Equiptment   DME Provider: [place]  Equipment:   Walker___Cane___RTS___ BSC___Shower Chair___Hospital Bed___W/C____Other________  02 at ____Liter(s)---wears(frequency)_______ HHN ___ CPAP___ BiPap___   N/A____      Home O2 Use :  No    If No for home O2---if presently on O2 during hospitalization:  No  if yes CM to follow for potential DC O2 need  Informed of need for care provider to bring portable home O2 tank on day of discharge for nursing to connect prior to leaving:   Not Indicated  Verbalized agreement/Understanding:   Not Indicated    Community Service Affiliation  Dialysis:  No    · Agency:  · Location:  · Dialysis Schedule:  · Phone:   · Fax: Other Community Services: (ex:PT/OT,Mental Health,Wound Clinic, Cardio/Pul [address])    DISCHARGE PLAN: Explained Case Management role/services. Reviewed chart. Care team at bedside. Writer spoke with pt's spouse via TC for initial interview. Pt from home with spouse. Pt IPTA. Pt active with Dr. Sravanthi Valera for L foot DM wound. Following for d/c needs.

## 2021-04-26 NOTE — PROGRESS NOTES
Patient transferred to room 231 from ICU at 1400. Patient alert an oriented, call light in reach, patient up in recliner, denies discomfort at this time. .Patient is able to demonstrate the ability to move from a reclining position to an upright position within the recliner.

## 2021-04-26 NOTE — PROGRESS NOTES
Patient brought to unit on stretcher by 2 RN's. Patient is on RA. NSR on monitor. Blood pressure 128/81, pulse 67, temperature 97.7 °F (36.5 °C), temperature source Oral, resp. rate 14, height 5' 10\" (1.778 m), weight 177 lb 14.4 oz (80.7 kg), SpO2 95 %. Blood sugar checked and insulin gtt changed via orders. Insulin infusing at 6.15 U/H  Normal Saline infusing at 250 ml/H    Lung sounds clear, abdomen soft non tender, BS + x 4 quads. Redness noted to bottom of Left lower leg. Two wounds noted on patients skin. See Flow sheet. Will continue to monitor.      Electronically signed by Ricky Contreras RN on 4/26/2021 at 4:33 AM

## 2021-04-26 NOTE — CONSULTS
Pharmacy Note  Vancomycin Consult    Gregory Taylor is a 56 y.o. male started on Vancomycin for Diabetic foot wound; consult received from Dr. Radhika Phillips to manage therapy. Also receiving the following antibiotics: Zosyn. Patient Active Problem List   Diagnosis    Diabetic ketoacidosis without coma associated with type 2 diabetes mellitus (HCC)    Nausea & vomiting    Diabetic neuropathy [facility]    DM (diabetes mellitus), secondary, uncontrolled, w/neurologic complic ([facility]    Gout    Hx MRSA    Hyperkalemia    Chronic pain on chronic opioids    Chronic LE diabetic ulcers    Mild protein-calorie malnutrition (HCC)    Cellulitis    TORI (acute kidney injury) ([facility]    Neuropathy    Diabetic acidosis without coma (HCC)    Acute CVA (cerebrovascular accident) ([facility]    HTN (hypertension), benign    DKA, type 2, not at goal [facility])     Allergies:  Hydrocodone-acetaminophen, Percocet [oxycodone-acetaminophen], Pregabalin, and Vicodin [hydrocodone-acetaminophen]     Temp max: 101.3    Recent Labs     04/25/21  2226   BUN 30*   CREATININE 1.8*   WBC 10.5     No intake or output data in the 24 hours ending 04/26/21 0453  Culture Date      Source                       Results      Ht Readings from Last 1 Encounters:   04/26/21 5' 10\" (1.778 m)        Wt Readings from Last 1 Encounters:   04/26/21 177 lb 14.4 oz (80.7 kg)       Body mass index is 25.53 kg/m². Estimated Creatinine Clearance: 44 mL/min (A) (based on SCr of 1.8 mg/dL (H)). Goal Trough Level: 13-19 mcg/mL    Assessment/Plan:  Patient received Vancomycin 2000 mg x 1 dose in ER. Start Vancomycin 1250 mg IV every 18 hours. Timing of trough level will be determined based on culture results, renal function, and clinical response. Vanco trough ordered for 0930 on 4/28/21. Thank you for the consult. Will continue to follow.    Donis Lama [facility]

## 2021-04-26 NOTE — PLAN OF CARE
55 yo M presented for wound check of his chronic wound of his L DM foot wound 2/2 increased pain and swelling of his L foot. He was incidentally found to be in DKA. DKA  Sepsis  TORI  DM foot wound w/ acute infection.

## 2021-04-26 NOTE — CONSULTS
Patient is being seen at the request of Dr. Wil Douglas for a consultation for DKA, left diabetic foot wound    HISTORY OF PRESENT ILLNESS: This is a 77-year-old male with diabetes and history of MRSA infection who presented to the emergency department on 4/25/2021 with a several day history of worsening severe redness and swelling of his left foot, associated with fever to 102. He has had similar diabetic infections in the past and is followed by Dr. Patel Bowles with podiatry. He is feeling better with antibiotics in the hospital.  He tells me the swelling in his great toe is greatly decreased    PAST MEDICAL HISTORY:  Past Medical History:   Diagnosis Date    TORI (acute kidney injury) ([facility] 09/12/2017    Bacteremia 05/05/2017    staph aureus    Diabetes mellitus ([facility]     Diabetic neuropathy (HCC)     Gout     MRSA (methicillin resistant staph aureus) culture positive 12/27/18, 9/12/17, 5/5/17,9/5/13, 1/15/14    ulcers bilateral legs    MRSA (methicillin resistant staph aureus) culture positive 01/21/2021    foot wound    Pneumonia     Pyogenic inflammation of bone ([facility]      PAST SURGICAL HISTORY:  Past Surgical History:   Procedure Laterality Date    EYE SURGERY      lens implants after removal of cataracts    OTHER SURGICAL HISTORY  12/28/2018    partial right foot amputation with graft application    TOE AMPUTATION Right 12/28/2018    PARTIAL RIGHT FOOT AMPUTATION WITH GRAFT APPLICATION performed by Mela Rosario DPM at [address] [place] [address] N/A 1/21/2021    EGD BIOPSY performed by Pati Sacks., DO at [facility] ENDOSCOPY       FAMILY HISTORY:  family history includes Diabetes in his maternal grandfather; Heart Disease in his paternal uncle; High Blood Pressure in his mother. SOCIAL HISTORY:   reports that he has never smoked.  He has never used smokeless tobacco.    Scheduled Meds:   piperacillin-tazobactam  3,375 mg Intravenous Q8H    gabapentin  600 mg Oral BID    pantoprazole  40 mg Intravenous Daily    venlafaxine  75 mg Oral Daily    enoxaparin  40 mg Subcutaneous Daily    sodium chloride flush  5-40 mL Intravenous 2 times per day    vancomycin  1,250 mg Intravenous Q18H     Continuous Infusions:   sodium chloride      sodium chloride 250 mL/hr at 04/26/21 0410    dextrose 5 % and 0.45 % NaCl      insulin 4.6 Units/hr (04/26/21 0606)     PRN Meds:  dextrose, potassium chloride, magnesium sulfate, sodium phosphate IVPB **OR** sodium phosphate IVPB **OR** sodium phosphate IVPB, sodium chloride flush, sodium chloride, dextrose 5 % and 0.45 % NaCl    ALLERGIES:  Patient is allergic to hydrocodone-acetaminophen; percocet [oxycodone-acetaminophen]; pregabalin; and vicodin [hydrocodone-acetaminophen]. REVIEW OF SYSTEMS:  Constitutional: Negative for fever  HENT: Negative for sore throat  Eyes: Negative for redness   Respiratory: No dyspnea, no cough  Cardiovascular: Negative for chest pain  Gastrointestinal: No nausea/vomiting   Genitourinary: Negative for hematuria   Musculoskeletal: Left leg and foot pain  Skin: Negative for rash  Neurological: Negative for syncope  Hematological: Negative for adenopathy  Psychiatric/Behavorial: Negative for anxiety    PHYSICAL EXAM:  Blood pressure 134/67, pulse 66, temperature 97.7 °F (36.5 °C), temperature source Oral, resp. rate 15, height 5' 10\" (1.778 m), weight 177 lb 14.4 oz (80.7 kg), SpO2 95 %.' on room air  Gen: No distress. Eyes: PERRL. No sclera icterus. No conjunctival injection. ENT: No discharge. Pharynx clear. Neck: Trachea midline. No obvious mass. Resp: No accessory muscle use. No crackles. No wheezes. No rhonchi. No dullness on percussion. CV: Regular rate. Regular rhythm. No murmur or rub. No edema. Peripheral pulses are 2+. Capillary refill is less than 3 seconds. GI: Non-tender. Non-distended. No hernia. Skin: Warm and dry. No nodule on exposed extremities.   Left 1st toe erythema with left plantar surface ulcer bandaged   Lymph: No cervical LAD. No supraclavicular LAD. M/S: No cyanosis. No joint deformity. No clubbing. Neuro: Awake. Alert. Moves all four extremities. Psych: Oriented x 3. No anxiety. LABS:  CBC:   Recent Labs     04/25/21 2226   WBC 10.5   HGB 10.1*   HCT 31.4*   MCV 80.7        BMP:   Recent Labs     04/25/21 2226   *   K 5.0   CL 87*   CO2 19*   BUN 30*   CREATININE 1.8*     LIVER PROFILE:   Recent Labs     04/25/21 2226   AST 11*   ALT 12   BILITOT 0.6   ALKPHOS 144*     PT/INR: No results for input(s): PROTIME, INR in the last 72 hours. APTT: No results for input(s): APTT in the last 72 hours. UA:  Recent Labs     04/26/21  0119   COLORU Yellow   PHUR 5.5   WBCUA 0-2   RBCUA 3-4   CLARITYU Clear   SPECGRAV 1.010   LEUKOCYTESUR Negative   UROBILINOGEN 0.2   BILIRUBINUR Negative   BLOODU MODERATE*   GLUCOSEU >=1000*   AMORPHOUS 1+     No results for input(s): PHART, UFH1PNU, PO2ART in the last 72 hours. Microbiology:  1/21/21 Foot culture MRSA    Chest imaging was reviewed by me and showed   4/26/2021 perihilar infiltrates    4/26/2021 left foot plantar ulcer, great toe swelling    ASSESSMENT:  · Diabetic ketoacidosis   · Diabetes mellitus   · Left diabetic foot infection  · Acute on chronic kidney failure, baseline creatinine 1.3    PLAN:  IVF resuscitation with normal saline (change to D51/2 per protocol), electrolytes repletion, glucose monitoring and insulin drip    Cardiac monitor and pulse oximtery   Podiatry consulted  Cefepime Vanc D#1, Monitoring of vancomycin levels to prevent toxicity. Med SSI, NPH 15 once, then lantus 24 HS  Prophylaxis: DVT - lovenox; Mupirocin  Okay for floor   I will not plan to follow once transferred out of ICU. Please call with any questions or concerns: [phone] (personal cell) or [phone] (service).

## 2021-04-26 NOTE — ACP (ADVANCE CARE PLANNING)
Advance Care Planning     General Advance Care Planning (ACP) Conversation    Date of Conversation: 4/25/2021  Conducted with: pt's spouse via [employer]:      Primary Decision Maker: [address] [place] [address] [phone]    Secondary Decision Maker: Jamie Carrillo [facility] [phone]    Click here to complete [place] including selection of the Healthcare Decision Maker Relationship (ie \"Primary\")      Alisa Fuelling

## 2021-04-26 NOTE — CONSULTS
CONSULT    Admit Date:  4/25/2021    Subjective:  56 y.o. male who is seen for evaluation of cellulitis and ulcers on the left LE. Patient known to me. Was on his feet a lot on Thursday in old pair of shoes that were tight. Noticed some pain in the foot that night. This weekend developed fever and presented to the ER. States foot was more red and swollen on admission. Was also found to be in DKA on admission.          Past Medical History:        Diagnosis Date    TORI (acute kidney injury) ([facility] 09/12/2017    Bacteremia 05/05/2017    staph aureus    Diabetes mellitus ([facility]     Diabetic neuropathy ([facility]     Gout     MRSA (methicillin resistant staph aureus) culture positive 12/27/18, 9/12/17, 5/5/17,9/5/13, 1/15/14    ulcers bilateral legs    MRSA (methicillin resistant staph aureus) culture positive 01/21/2021    foot wound    Pneumonia     Pyogenic inflammation of bone ([facility]        Past Surgical History:        Procedure Laterality Date    EYE SURGERY      lens implants after removal of cataracts    OTHER SURGICAL HISTORY  12/28/2018    partial right foot amputation with graft application    TOE AMPUTATION Right 12/28/2018    PARTIAL RIGHT FOOT AMPUTATION WITH GRAFT APPLICATION performed by Kellie Hernández DPM at [address] [place] [address] 1/21/2021    EGD BIOPSY performed by Arsalan Serrano DO at [facility] ENDOSCOPY       Current Medications:     gabapentin  600 mg Oral BID    pantoprazole  40 mg Intravenous Daily    venlafaxine  75 mg Oral Daily    enoxaparin  40 mg Subcutaneous Daily    sodium chloride flush  5-40 mL Intravenous 2 times per day    cefepime  2,000 mg Intravenous Q12H    insulin lispro  0-12 Units Subcutaneous TID WC    insulin lispro  0-6 Units Subcutaneous Nightly    insulin glargine  24 Units Subcutaneous Nightly    [START ON 4/27/2021] vancomycin  1,250 mg Intravenous Q24H       Allergies:  Hydrocodone-acetaminophen, Percocet [oxycodone-acetaminophen], Pregabalin, and Vicodin [hydrocodone-acetaminophen]    Social History:    Social History     Tobacco Use    Smoking status: Never Smoker    Smokeless tobacco: Never Used   Substance Use Topics    Alcohol use: No     Comment: FORMER DRINKER approx. quit 2005    Drug use: No       Family History:       Problem Relation Age of Onset    Diabetes Maternal Grandfather     Heart Disease Paternal Uncle     High Blood Pressure Mother        Review of Systems    CONSTITUTIONAL:  negative  EYES:  negative  HEENT:  negative  RESPIRATORY:  negative  CARDIOVASCULAR:  negative  GASTROINTESTINAL:  negative  GENITOURINARY:  negative  INTEGUMENT/BREAST:  positive for ulcers  MUSCULOSKELETAL:  positive for  pain  NEUROLOGICAL:  positive for numbness      Objective:   BP (!) 157/63   Pulse 80   Temp 99.3 °F (37.4 °C) (Oral)   Resp 11   Ht 5' 10\" (1.778 m)   Wt 177 lb 14.4 oz (80.7 kg)   SpO2 90%   BMI 25.53 kg/m²     Data:  CBC:   Recent Labs     04/25/21  2226 04/26/21  0643   WBC 10.5 8.2   HGB 10.1* 9.0*   HCT 31.4* 27.2*   MCV 80.7 79.4*    230     BMP:   Recent Labs     04/26/21  0644 04/26/21  1031 04/26/21  1257   * 134* 133*   K 3.9 4.4 4.0    101 101   CO2 25 26 23   PHOS 3.1 2.6 2.3*   BUN 25* 22* 21*   CREATININE 1.6* 1.5* 1.4*     LIVER PROFILE:   Recent Labs     04/25/21 2226   AST 11*   ALT 12   BILITOT 0.6   ALKPHOS 144*     PT/INR:   Recent Labs     04/25/21  2226   PROT 7.5     HgBA1c:  Lab Results   Component Value Date    LABA1C 11.3 04/25/2021     SARS-CoV-2, NAATNot Detected       Cultures: Blood x2 - in progress    Imaging: xray left foot - Soft tissue swelling is identified in the great toe without soft tissue gas. There is an ulcer on the plantar surface of the foot adjacent to the great   toe metatarsal-phalangeal joint. No erosions are identified.   There is no   fracture and joint space alignment is normal.  Arterial calcifications are   again

## 2021-04-26 NOTE — PROGRESS NOTES
4 Eyes Skin Assessment     The patient is being assess for   Admission    I agree that 2 RN's have performed a thorough Head to Toe Skin Assessment on the patient. ALL assessment sites listed below have been assessed. Areas assessed by both nurses:   [x]   Head, Face, and Ears   [x]   Shoulders, Back, and Chest, Abdomen  [x]   Arms, Elbows, and Hands   [x]   Coccyx, Sacrum, and Ischium  [x]   Legs, Feet, and Heels        Pt has open cellulitis type of area on Left, inner, lower leg and large ulcer on ball of left foot. See wound doc flow. **SHARE this note so that the co-signing nurse is able to place an eSignature**    Co-signer eSignature: Electronically signed by Adrien Dougherty RN on 4/26/21 at 4:28 AM EDT    Does the Patient have Skin Breakdown? Yes LDA WOUND CARE was Initiated documentation include the Laura-wound, Wound Assessment, Measurements, Dressing Treatment, Drainage, and Color\",          Slade Prevention initiated:  Yes   Wound Care Orders initiated:  Yes      [id number]  nurse consulted for Pressure Injury (Stage 3,4, Unstageable, DTI, NWPT, Complex wounds)and New or Established Ostomies:  Yes      Primary Nurse eSignature: Electronically signed by Geovanni Bailey RN on 4/26/21 at 4:29 AM EDT        Patient is able to demonstrate the ability to move from a reclining position to an upright position within the recliner.

## 2021-04-26 NOTE — ED PROVIDER NOTES
[address] ED      CHIEF COMPLAINT  Wound Check (pt states he has wound to left foot, has been seen by . pt states fever today of 102 was told to come to ER. Wife gave tylenol )     HISTORY OF PRESENT ILLNESS  Michael Leyva is a 56 y.o. male  who presents to the ED complaining of wound check and fevers. Patient states that he has a chronic wound of his left foot, over the past few days has had worsening redness and swelling of his left foot. He does see Dr. Srini Zurita and podiatry. He had a fever at home to 102, also complains of some generalized malaise and was advised to come to the ER. He denies any abdominal pain, nausea or vomiting. He also has a chronic wound of his medial left lower extremity and he states that this is actually significantly improved from previous. He denies any cough or shortness of breath. He denies any other complaints or concerns. No other complaints, modifying factors or associated symptoms. I have reviewed the following from the nursing documentation.     Past Medical History:   Diagnosis Date    TORI (acute kidney injury) ([facility] 09/12/2017    Bacteremia 05/05/2017    staph aureus    Diabetes mellitus ([address]     Diabetic neuropathy ([address]     Gout     MRSA (methicillin resistant staph aureus) culture positive 12/27/18, 9/12/17, 5/5/17,9/5/13, 1/15/14    ulcers bilateral legs    MRSA (methicillin resistant staph aureus) culture positive 01/21/2021    foot wound    Pneumonia     Pyogenic inflammation of bone ([facility] [address]      Past Surgical History:   Procedure Laterality Date    EYE SURGERY      lens implants after removal of cataracts    OTHER SURGICAL HISTORY  12/28/2018    partial right foot amputation with graft application    TOE AMPUTATION Right 12/28/2018    PARTIAL RIGHT FOOT AMPUTATION WITH GRAFT APPLICATION performed by Nani Flores DPM at [address] 1/21/2021    EGD BIOPSY performed by [place] Intravenous PRN Nichole Gibbs MD        glucagon (rDNA) injection 1 mg  1 mg Intramuscular PRN Nichole Gibbs MD        dextrose 5 % solution  100 mL/hr Intravenous PRN Nichole Gibbs MD        insulin regular (HUMULIN R;NOVOLIN R) 100 Units in sodium chloride 0.9 % 100 mL infusion  0.1 Units/kg/hr Intravenous Continuous Nichole Gibbs MD 8.2 mL/hr at 04/26/21 0128 0.1 Units/kg/hr at 04/26/21 0128    0.9 % sodium chloride bolus  1,000 mL Intravenous Once Nichole Gibbs MD 1,000 mL/hr at 04/26/21 0208 1,000 mL at 04/26/21 0208     Current Outpatient Medications   Medication Sig Dispense Refill    pantoprazole (PROTONIX) 40 MG tablet Take 40 mg by mouth daily      sildenafil (VIAGRA) 100 MG tablet Take 100 mg by mouth daily as needed      insulin glargine (LANTUS) 100 UNIT/ML injection vial Inject 24 Units into the skin nightly (Patient taking differently: Inject 35 Units into the skin nightly ) 1 vial 3    insulin lispro (HUMALOG) 100 UNIT/ML injection cartridge Inject 8 Units into the skin 3 times daily (before meals) (Patient taking differently: Inject 10 Units into the skin 3 times daily (before meals) ) 5 Cartridge 3    lisinopril (PRINIVIL;ZESTRIL) 10 MG tablet Take 1.5 tablets by mouth daily 30 tablet 3    gabapentin (NEURONTIN) 600 MG tablet Take 600 mg by mouth 2 times daily.  Insulin Pen Needle 32G X 4 MM MISC Use 4 daily      blood glucose test strips (ASCENSIA AUTODISC VI;ONE TOUCH ULTRA TEST VI) strip Test TID and as directed      venlafaxine (EFFEXOR XR) 75 MG extended release capsule Take 75 mg by mouth daily       Allergies   Allergen Reactions    Hydrocodone-Acetaminophen Itching    Percocet [Oxycodone-Acetaminophen]      States \"hallucinations,disoriented, & freaked out\" per wife    Pregabalin Itching    Vicodin [Hydrocodone-Acetaminophen]      Spaces out. Pt. States 1/15/14 has been taking some vicodin without problems.        REVIEW OF SYSTEMS  10 systems reviewed, pertinent positives per HPI otherwise noted to be negative. PHYSICAL EXAM  BP (!) 149/116   Pulse 76   Temp 101.3 °F (38.5 °C) (Oral)   Resp 19   Ht 5' 10\" (1.778 m)   Wt 181 lb (82.1 kg)   SpO2 94%   BMI 25.97 kg/m²    GENERAL APPEARANCE: Awake and alert. Cooperative. No acute distress. HENT: Normocephalic. Atraumatic. NECK: Supple. EYES: PERRL. EOM's grossly intact. HEART/CHEST: RRR. No murmurs. LUNGS: Respirations unlabored. CTAB. Good air exchange. Speaking comfortably in full sentences. ABDOMEN: No tenderness. Soft. Non-distended. No masses. No organomegaly. No guarding or rebound. MUSCULOSKELETAL: Wound on the plantar aspect of the patient's left foot, some surrounding erythema extending to his left great toe with associated edema, no crepitus. Also a circular, approximately 2 x 6 2 cm ulceration on the medial left lower extremity with significant surrounding erythema or tenderness palpation. Compartments soft. All extremities neurovascularly intact. SKIN: Warm and dry. No acute rashes. NEUROLOGICAL: Alert and oriented. CN's 2-12 intact. No gross facial drooping. Strength 5/5, sensation intact. PSYCHIATRIC: Normal mood and affect. LABS  I have reviewed all labs for this visit.    Results for orders placed or performed during the hospital encounter of 04/25/21   COVID-19, Rapid    Specimen: Nasopharyngeal Swab; Throat   Result Value Ref Range    SARS-CoV-2, NAAT Not Detected Not Detected   CBC auto differential   Result Value Ref Range    WBC 10.5 4.0 - 11.0 K/uL    RBC 3.89 (L) 4.20 - 5.90 M/uL    Hemoglobin 10.1 (L) 13.5 - 17.5 g/dL    Hematocrit 31.4 (L) 40.5 - 52.5 %    MCV 80.7 80.0 - 100.0 fL    MCH 26.1 26.0 - 34.0 pg    MCHC 32.3 31.0 - 36.0 g/dL    RDW 14.7 12.4 - 15.4 %    Platelets 590 583 - 801 K/uL    MPV 9.4 5.0 - 10.5 fL    Neutrophils % 84.5 %    Lymphocytes % 6.6 %    Monocytes % 8.0 %    Eosinophils % 0.4 %    Basophils % 0.5 %    Neutrophils Absolute 8.9 (H) 1.7 - 7.7 K/uL    Lymphocytes Absolute 0.7 (L) 1.0 - 5.1 K/uL    Monocytes Absolute 0.8 0.0 - 1.3 K/uL    Eosinophils Absolute 0.0 0.0 - 0.6 K/uL    Basophils Absolute 0.0 0.0 - 0.2 K/uL   Comprehensive Metabolic Panel w/ Reflex to MG   Result Value Ref Range    Sodium 123 (L) 136 - 145 mmol/L    Potassium reflex Magnesium 5.0 3.5 - 5.1 mmol/L    Chloride 87 (L) 99 - 110 mmol/L    CO2 19 (L) 21 - 32 mmol/L    Anion Gap 17 (H) 3 - 16    Glucose 555 (H) 70 - 99 mg/dL    BUN 30 (H) 7 - 20 mg/dL    CREATININE 1.8 (H) 0.8 - 1.3 mg/dL    GFR Non- 38 (A) >60    GFR  46 (A) >60    Calcium 8.8 8.3 - 10.6 mg/dL    Total Protein 7.5 6.4 - 8.2 g/dL    Albumin 3.3 (L) 3.4 - 5.0 g/dL    Albumin/Globulin Ratio 0.8 (L) 1.1 - 2.2    Total Bilirubin 0.6 0.0 - 1.0 mg/dL    Alkaline Phosphatase 144 (H) 40 - 129 U/L    ALT 12 10 - 40 U/L    AST 11 (L) 15 - 37 U/L    Globulin 4.2 g/dL   Lactic acid, plasma   Result Value Ref Range    Lactic Acid 1.2 0.4 - 2.0 mmol/L   Blood Gas, Venous   Result Value Ref Range    pH, David 7.244 (L) 7.350 - 7.450    pCO2, Advid 47.3 40.0 - 50.0 mmHg    pO2, David 29.8 25 - 40 mmHg    HCO3, Venous 20.0 (L) 23.0 - 29.0 mmol/L    Base Excess, David -7.2 (L) -3.0 - 3.0 mmol/L    O2 Sat, David 47 Not Established %    Carboxyhemoglobin 1.2 0.0 - 1.5 %    MetHgb, David 0.3 <1.5 %    TC02 (Calc), David 22 Not Established mmol/L    O2 Content, David 9 Not Established VOL %    O2 Therapy Unknown    Beta-Hydroxybutyrate   Result Value Ref Range    Beta-Hydroxybutyrate 3.90 (H) 0.00 - 0.27 mmol/L   Urinalysis, reflex to microscopic   Result Value Ref Range    Color, UA Yellow Straw/Yellow    Clarity, UA Clear Clear    Glucose, Ur >=1000 (A) Negative mg/dL    Bilirubin Urine Negative Negative    Ketones, Urine 40 (A) Negative mg/dL    Specific Gravity, UA 1.010 1.005 - 1.030    Blood, Urine MODERATE (A) Negative    pH, UA 5.5 5.0 - 8.0    Protein, UA TRACE (A) Negative mg/dL    Urobilinogen, Urine 0.2 <2.0 E.U./dL    Nitrite, Urine Negative Negative    Leukocyte Esterase, Urine Negative Negative    Microscopic Examination YES     Urine Type NotGiven    Microscopic Urinalysis   Result Value Ref Range    WBC, UA 0-2 0 - 5 /HPF    RBC, UA 3-4 0 - 4 /HPF    Amorphous, UA 1+ /HPF   POCT Glucose   Result Value Ref Range    POC Glucose 512 (H) 70 - 99 mg/dl    Performed on ACCU-CHEK      RADIOLOGY  XR CHEST PORTABLE   Final Result   Increased perihilar markings. Atelectasis, infectious or inflammatory airway   process, and interstitial edema are in the differential.         XR FOOT LEFT (2 VIEWS)   Final Result   Plantar soft tissue ulcer without radiographic evidence for osteomyelitis. ED COURSE/MDM  Patient seen and evaluated. Old records reviewed. Labs and imaging reviewed and results discussed with patient. Patient is a 40-year-old male with history of diabetes, presenting with concerns for fevers and left foot redness and swelling has been worsening. Full HPI as detailed above. Upon arrival in the ED, vitals remarkable for tachycardia and fever temperature of 101.3. Labs were performed, revealed hyperglycemia is a glucose of 555, CO2 of 19. Pseudohyponatremia with a sodium of 123. Potassium is 5. Patient with acute kidney injury with creatinine elevated at 1.8, it appears that baseline is closer to 1.3. No leukocytosis. Hemoglobin is stable. Beta hydroxybutyrate elevated at 3.9. Venous pH found to be 7.244 with a bicarb of 20. Patient started on IV fluids, broad-spectrum antibiotics, as well as insulin drip for diabetic ketoacidosis. Patient be hospitalized for further treatment of his condition. The total Critical Care time is 45 minutes which excludes separately billable procedures.     During the patient's ED course, the patient was given:  Medications   vancomycin (VANCOCIN) 2,000 mg in dextrose 5 % 500 mL IVPB (2,000 mg Intravenous New Bag 4/26/21 0206)   glucose

## 2021-04-26 NOTE — ED NOTES
Perfect serve message to Dr. Henry Mclain @ [address] [place] [address]  04/26/21 0104    Dr Henry Mclain returned the call @ [address] [place] [address]  04/26/21 7522

## 2021-04-27 LAB
ANION GAP SERPL CALCULATED.3IONS-SCNC: 9 MMOL/L (ref 3–16)
BUN BLDV-MCNC: 19 MG/DL (ref 7–20)
CALCIUM SERPL-MCNC: 8.4 MG/DL (ref 8.3–10.6)
CHLORIDE BLD-SCNC: 97 MMOL/L (ref 99–110)
CO2: 23 MMOL/L (ref 21–32)
CREAT SERPL-MCNC: 1.6 MG/DL (ref 0.8–1.3)
GFR AFRICAN AMERICAN: 53
GFR NON-AFRICAN AMERICAN: 44
GLUCOSE BLD-MCNC: 226 MG/DL (ref 70–99)
GLUCOSE BLD-MCNC: 236 MG/DL (ref 70–99)
GLUCOSE BLD-MCNC: 237 MG/DL (ref 70–99)
GLUCOSE BLD-MCNC: 250 MG/DL (ref 70–99)
GLUCOSE BLD-MCNC: 280 MG/DL (ref 70–99)
GLUCOSE BLD-MCNC: 306 MG/DL (ref 70–99)
MAGNESIUM: 2.1 MG/DL (ref 1.8–2.4)
PERFORMED ON: ABNORMAL
POTASSIUM SERPL-SCNC: 4.2 MMOL/L (ref 3.5–5.1)
SODIUM BLD-SCNC: 129 MMOL/L (ref 136–145)

## 2021-04-27 PROCEDURE — 6370000000 HC RX 637 (ALT 250 FOR IP): Performed by: INTERNAL MEDICINE

## 2021-04-27 PROCEDURE — 2580000003 HC RX 258

## 2021-04-27 PROCEDURE — 6360000002 HC RX W HCPCS: Performed by: INTERNAL MEDICINE

## 2021-04-27 PROCEDURE — C9113 INJ PANTOPRAZOLE SODIUM, VIA: HCPCS | Performed by: INTERNAL MEDICINE

## 2021-04-27 PROCEDURE — 80048 BASIC METABOLIC PNL TOTAL CA: CPT

## 2021-04-27 PROCEDURE — 83735 ASSAY OF MAGNESIUM: CPT

## 2021-04-27 PROCEDURE — 2580000003 HC RX 258: Performed by: INTERNAL MEDICINE

## 2021-04-27 PROCEDURE — 1200000000 HC SEMI PRIVATE

## 2021-04-27 PROCEDURE — 99232 SBSQ HOSP IP/OBS MODERATE 35: CPT | Performed by: INTERNAL MEDICINE

## 2021-04-27 PROCEDURE — 36415 COLL VENOUS BLD VENIPUNCTURE: CPT

## 2021-04-27 RX ORDER — SODIUM CHLORIDE 9 MG/ML
INJECTION, SOLUTION INTRAVENOUS
Status: COMPLETED
Start: 2021-04-27 | End: 2021-04-27

## 2021-04-27 RX ORDER — GUAIFENESIN/DEXTROMETHORPHAN 100-10MG/5
5 SYRUP ORAL EVERY 4 HOURS PRN
Status: DISCONTINUED | OUTPATIENT
Start: 2021-04-27 | End: 2021-04-30 | Stop reason: HOSPADM

## 2021-04-27 RX ADMIN — CEFEPIME 2000 MG: 2 INJECTION, POWDER, FOR SOLUTION INTRAVENOUS at 09:45

## 2021-04-27 RX ADMIN — SODIUM CHLORIDE 25 ML: 9 INJECTION, SOLUTION INTRAVENOUS at 09:47

## 2021-04-27 RX ADMIN — VANCOMYCIN HYDROCHLORIDE 1250 MG: 10 INJECTION, POWDER, LYOPHILIZED, FOR SOLUTION INTRAVENOUS at 02:00

## 2021-04-27 RX ADMIN — GABAPENTIN 600 MG: 300 CAPSULE ORAL at 09:32

## 2021-04-27 RX ADMIN — GUAIFENESIN AND DEXTROMETHORPHAN 5 ML: 100; 10 SYRUP ORAL at 22:52

## 2021-04-27 RX ADMIN — Medication 10 ML: at 09:33

## 2021-04-27 RX ADMIN — INSULIN GLARGINE 24 UNITS: 100 INJECTION, SOLUTION SUBCUTANEOUS at 22:45

## 2021-04-27 RX ADMIN — INSULIN LISPRO 6 UNITS: 100 INJECTION, SOLUTION INTRAVENOUS; SUBCUTANEOUS at 08:10

## 2021-04-27 RX ADMIN — INSULIN LISPRO 6 UNITS: 100 INJECTION, SOLUTION INTRAVENOUS; SUBCUTANEOUS at 17:13

## 2021-04-27 RX ADMIN — ACETAMINOPHEN 650 MG: 325 TABLET ORAL at 03:49

## 2021-04-27 RX ADMIN — Medication 5 ML: at 22:44

## 2021-04-27 RX ADMIN — ENOXAPARIN SODIUM 40 MG: 40 INJECTION SUBCUTANEOUS at 09:32

## 2021-04-27 RX ADMIN — CEFEPIME 2000 MG: 2 INJECTION, POWDER, FOR SOLUTION INTRAVENOUS at 22:43

## 2021-04-27 RX ADMIN — PANTOPRAZOLE SODIUM 40 MG: 40 INJECTION, POWDER, FOR SOLUTION INTRAVENOUS at 09:33

## 2021-04-27 RX ADMIN — INSULIN LISPRO 4 UNITS: 100 INJECTION, SOLUTION INTRAVENOUS; SUBCUTANEOUS at 11:39

## 2021-04-27 RX ADMIN — GABAPENTIN 600 MG: 300 CAPSULE ORAL at 22:44

## 2021-04-27 RX ADMIN — VENLAFAXINE HYDROCHLORIDE 75 MG: 75 CAPSULE, EXTENDED RELEASE ORAL at 09:32

## 2021-04-27 ASSESSMENT — PAIN SCALES - GENERAL
PAINLEVEL_OUTOF10: 0
PAINLEVEL_OUTOF10: 3
PAINLEVEL_OUTOF10: 0

## 2021-04-27 NOTE — PROGRESS NOTES
Progress Note    Admit Date:  4/25/2021    Admitted with left foot cellulitis and DKA. . S/p post insulin drip per DKA protocol . Transferred out of ICU to [facility]. Continued IV antibiotics for left foot cellulitis. seen by Dr. Juno Saldivar podiatry    Subjective:  Mr. Raquel Huynh is well. Blood sugars have improved      Objective:   /66   Pulse 81   Temp 99.7 °F (37.6 °C) (Oral)   Resp 16   Ht 5' 10\" (1.778 m)   Wt 182 lb 6.4 oz (82.7 kg)   SpO2 94%   BMI 26.17 kg/m²       Intake/Output Summary (Last 24 hours) at 4/27/2021 1626  Last data filed at 4/27/2021 1244  Gross per 24 hour   Intake 1320 ml   Output    Net 1320 ml         Physical Exam:  General:  Middle aged male, appropriately build Awake, alert and oriented. Appears to be not in any distress  Mucous Membranes:  Pink , anicteric  Neck: No JVD, no carotid bruit, no thyromegaly  Chest:  Clear to auscultation bilaterally, no added sounds  Cardiovascular:  RRR S1S2 heard, no murmurs or gallops  Abdomen:  Soft, undistended, non tender, no organomegaly, BS present  Extremities: left foot large plantar ulcer with yellow drainage. Dressing not removed  Diminished sensation in foot chronic. Left calf ulcer - healed and chronic scar with superficial crusting. No cellulitis  No edema or cyanosis.  Distal pulses well felt  Neurological : grossly normal            Medications:   Scheduled Meds:   gabapentin  600 mg Oral BID    pantoprazole  40 mg Intravenous Daily    venlafaxine  75 mg Oral Daily    enoxaparin  40 mg Subcutaneous Daily    sodium chloride flush  5-40 mL Intravenous 2 times per day    cefepime  2,000 mg Intravenous Q12H    insulin lispro  0-12 Units Subcutaneous TID WC    insulin lispro  0-6 Units Subcutaneous Nightly    insulin glargine  24 Units Subcutaneous Nightly    vancomycin  1,250 mg Intravenous Q24H       Continuous Infusions:      Data:  CBC:   Recent Labs     04/25/21  2226 04/26/21  0643   WBC 10.5 8.2   RBC 3.89* 3.43*   HGB 10.1* 9.0*   HCT 31.4* 27.2*   MCV 80.7 79.4*   RDW 14.7 14.5    230     BMP:   Recent Labs     04/26/21  0644 04/26/21  1031 04/26/21  1257 04/27/21  0534   * 134* 133* 129*   K 3.9 4.4 4.0 4.2    101 101 97*   CO2 25 26 23 23   PHOS 3.1 2.6 2.3*  --    BUN 25* 22* 21* 19   CREATININE 1.6* 1.5* 1.4* 1.6*     BNP: No results for input(s): BNP in the last 72 hours. PT/INR: No results for input(s): PROTIME, INR in the last 72 hours. APTT: No results for input(s): APTT in the last 72 hours. CARDIAC ENZYMES: No results for input(s): CKMB, CKMBINDEX, TROPONINI in the last 72 hours. Invalid input(s): CKTOTAL;3  FASTING LIPID PANEL:  Lab Results   Component Value Date    CHOL 93 12/22/2013    HDL 52 03/06/2021    TRIG 78 12/22/2013     LIVER PROFILE:   Recent Labs     04/25/21  2226   AST 11*   ALT 12   BILITOT 0.6   ALKPHOS 144*        CULTURES  Blood Cx: pending   COVID-19: not detected      EKG:    No EKG from this admission for review     RADIOLOGY  XR CHEST PORTABLE   Final Result   Increased perihilar markings. Atelectasis, infectious or inflammatory airway   process, and interstitial edema are in the differential.         XR FOOT LEFT (2 VIEWS)   Final Result   Plantar soft tissue ulcer without radiographic evidence for osteomyelitis.              Pertinent previous results reviewed   Wound Cx: 1/21/21  Staph aureus mrsa (1)             Antibiotic Interpretation RENITA Status     clindamycin Sensitive <=0.25 mcg/mL       erythromycin Resistant >=8 mcg/mL       oxacillin Resistant >=4 mcg/mL       tetracycline Sensitive <=1 mcg/mL       trimethoprim-sulfamethoxazole Sensitive <=10 mcg/mL       vancomycin Sensitive 1 mcg/mL         Assessment:  Active Problems:    Diabetic ulcer of left foot associated with type 2 diabetes mellitus (HCC)    Acute kidney injury ([facility] [address]    DKA, type 2, not at goal [facility])    Diabetic foot infection ([address]    Sepsis without acute organ dysfunction ([facility] [address]  Resolved Problems:    * No resolved hospital problems. *      Plan:     DKA- resolved  DM Type 2, uncontrolled  - pH: 7.244, A, CO2: 19, B, BHB: 3.9  - Started on insulin gtt, admit to ICU   - DKA protocol with Q4 hour labs, IVF, electrolyte management   - Critical Care consult   - AG closed, transition to Lantus and SSI, carb controlled diet   - Continue home Neurontin for neuropathy   DKA has resolved.    cont lantus , SSI     Chronic LLE wound   Acute cellulitis L foot  - diabetic foot would to the LLE, with acute infectious changes  - Follows with Dr. Jase Weiner  - Hx of MRSA  - Xray shows plantar soft tissue ulcer without radiographic evidence for osteomyelitis   - Continue IV Vanc/ Cefepime D#3 (received Zosyn x 2 doses)  - Podiatry consulted     Abnormal CXR  - atelectasis vs infection vs edema   - Clinically  Looks normal . No pna suspected     Microcytic Anemia   - Baseline hgb ~11-12  - Hgb on admission 10.1 > 9.0  - no acute bleeding    - Check iron studies, B12/folate     CKD 3a  - Baseline creatinine ~1.3   - Creatinine on admission 1.8 > 1.6 > 1.5  - Hold home ACE   - IVF and monitor    no TORI     Pseudohyponatremia  - Na: 123 on admission with corrected Na: 130   - Improving with BG control      HTN  - BP is elevated   - home lisinopril held 2/2 to CKD    monitor   Use hydralazine prn     GERD  - Continue PPI      DVT Prophylaxis: Lovenox  Diet: DIET CARB CONTROL; Carb Control: 3 carb choices (45 gms)/meal   Code Status: Full Code              Robin Rogers MD 2021 4:26 PM

## 2021-04-27 NOTE — PROGRESS NOTES
Vanc-Day 2  Srcr 1.6, continue with 1250mg q24h, level due on 29th   Raquel Terry 4/27/202112:44 PM  .

## 2021-04-27 NOTE — PROGRESS NOTES
Physician Progress Note      Curtis BOOKER #:                  [id number]  :                       1960  ADMIT DATE:       2021 11:54 PM  [address] [place] DATE:  RESPONDING  PROVIDER #:        Serge Benton MD          QUERY TEXT:    Patient admitted with DKA. Noted documentation of TORI on CKD 3a. Creatinine   1.8 on admission and down to 1.6 with ED noting a baseline of 1.3. The   diagnosis does not meet KDIGO with a creatinine of 1.5 times the baseline in a   pt with known CKD. In order to support the diagnosis of TORI, please include   additional clinical indicators in your documentation. Or please document if   the diagnosis of TOIR has been ruled out after further study    The medical record reflects the following:  Risk Factors: DM, HTN, CKD  Clinical Indicators: creatinine 1.8 on admission and down to 1.6 with a   baseline mentioned in the ED of 1.3  Treatment: ***    Defined by Kidney Disease Improving Global Outcomes (KDIGO) clinical practice   guideline for acute kidney injury:  -Increase in SCr by greater than or equal to 0.3 mg/dl within 48 hours; or  -Increase or decrease in SCr to greater than or equal to 1.5 times baseline,   which is known or presumed to have occurred within the prior 7 days; or  -Urine volume < 0.5ml/kg/h for 6 hours    Thank you for your assistance,  Anh Mao RN,BSN,CCDS,CRCR  Options provided:  -- Acute kidney injury evidenced by, Please document evidence as well as   baseline creatinine, if known. -- Currently resolved acute kidney injury was evidenced by, Please document   evidence as well as baseline creatinine, if known. -- Acute kidney injury ruled out after study  -- Other - I will add my own diagnosis  -- Disagree - Not applicable / Not valid  -- Disagree - Clinically unable to determine / Unknown  -- Refer to Clinical Documentation Reviewer    PROVIDER RESPONSE TEXT:    Acute kidney injury was ruled out after study.     Query created by: Portillo English on 4/27/2021 8:32 AM      Electronically signed by:  Saadia Yip MD 4/27/2021 3:16 PM

## 2021-04-27 NOTE — PROGRESS NOTES
Pt resting in bed this evening watching TV. VSS. BP rechecked. A&O x 4. Shift assessment complete and all meds given per MAR. Beverage and snack offered. Bed in lowest position and call light within reach. Pt denies any further needs at this time. Will continue to monitor and assess.

## 2021-04-28 ENCOUNTER — ANESTHESIA EVENT (OUTPATIENT)
Dept: OPERATING ROOM | Age: 61
DRG: 629 | End: 2021-04-28
Payer: COMMERCIAL

## 2021-04-28 ENCOUNTER — APPOINTMENT (OUTPATIENT)
Dept: GENERAL RADIOLOGY | Age: 61
DRG: 629 | End: 2021-04-28
Payer: COMMERCIAL

## 2021-04-28 ENCOUNTER — ANESTHESIA (OUTPATIENT)
Dept: OPERATING ROOM | Age: 61
DRG: 629 | End: 2021-04-28
Payer: COMMERCIAL

## 2021-04-28 VITALS — OXYGEN SATURATION: 94 % | DIASTOLIC BLOOD PRESSURE: 64 MMHG | SYSTOLIC BLOOD PRESSURE: 108 MMHG

## 2021-04-28 LAB
ANION GAP SERPL CALCULATED.3IONS-SCNC: 11 MMOL/L (ref 3–16)
BUN BLDV-MCNC: 16 MG/DL (ref 7–20)
CALCIUM SERPL-MCNC: 8.6 MG/DL (ref 8.3–10.6)
CHLORIDE BLD-SCNC: 101 MMOL/L (ref 99–110)
CO2: 24 MMOL/L (ref 21–32)
CREAT SERPL-MCNC: 1.6 MG/DL (ref 0.8–1.3)
GFR AFRICAN AMERICAN: 53
GFR NON-AFRICAN AMERICAN: 44
GLUCOSE BLD-MCNC: 235 MG/DL (ref 70–99)
GLUCOSE BLD-MCNC: 263 MG/DL (ref 70–99)
GLUCOSE BLD-MCNC: 266 MG/DL (ref 70–99)
GLUCOSE BLD-MCNC: 289 MG/DL (ref 70–99)
GLUCOSE BLD-MCNC: 292 MG/DL (ref 70–99)
GLUCOSE BLD-MCNC: 315 MG/DL (ref 70–99)
PERFORMED ON: ABNORMAL
POTASSIUM SERPL-SCNC: 4.3 MMOL/L (ref 3.5–5.1)
SODIUM BLD-SCNC: 136 MMOL/L (ref 136–145)

## 2021-04-28 PROCEDURE — 87076 CULTURE ANAEROBE IDENT EACH: CPT

## 2021-04-28 PROCEDURE — 2580000003 HC RX 258: Performed by: PODIATRIST

## 2021-04-28 PROCEDURE — 87075 CULTR BACTERIA EXCEPT BLOOD: CPT

## 2021-04-28 PROCEDURE — 3600000002 HC SURGERY LEVEL 2 BASE: Performed by: PODIATRIST

## 2021-04-28 PROCEDURE — 0QBP0ZZ EXCISION OF LEFT METATARSAL, OPEN APPROACH: ICD-10-PCS | Performed by: PODIATRIST

## 2021-04-28 PROCEDURE — 3700000000 HC ANESTHESIA ATTENDED CARE: Performed by: PODIATRIST

## 2021-04-28 PROCEDURE — 2500000003 HC RX 250 WO HCPCS: Performed by: NURSE ANESTHETIST, CERTIFIED REGISTERED

## 2021-04-28 PROCEDURE — 7100000010 HC PHASE II RECOVERY - FIRST 15 MIN: Performed by: PODIATRIST

## 2021-04-28 PROCEDURE — 87185 SC STD ENZYME DETCJ PER NZM: CPT

## 2021-04-28 PROCEDURE — 6360000002 HC RX W HCPCS: Performed by: INTERNAL MEDICINE

## 2021-04-28 PROCEDURE — 6360000002 HC RX W HCPCS: Performed by: NURSE ANESTHETIST, CERTIFIED REGISTERED

## 2021-04-28 PROCEDURE — 3700000001 HC ADD 15 MINUTES (ANESTHESIA): Performed by: PODIATRIST

## 2021-04-28 PROCEDURE — 2580000003 HC RX 258: Performed by: INTERNAL MEDICINE

## 2021-04-28 PROCEDURE — 36415 COLL VENOUS BLD VENIPUNCTURE: CPT

## 2021-04-28 PROCEDURE — 6370000000 HC RX 637 (ALT 250 FOR IP): Performed by: INTERNAL MEDICINE

## 2021-04-28 PROCEDURE — C9113 INJ PANTOPRAZOLE SODIUM, VIA: HCPCS | Performed by: INTERNAL MEDICINE

## 2021-04-28 PROCEDURE — 6370000000 HC RX 637 (ALT 250 FOR IP): Performed by: PODIATRIST

## 2021-04-28 PROCEDURE — 2500000003 HC RX 250 WO HCPCS: Performed by: PODIATRIST

## 2021-04-28 PROCEDURE — 6360000002 HC RX W HCPCS: Performed by: PODIATRIST

## 2021-04-28 PROCEDURE — 73620 X-RAY EXAM OF FOOT: CPT

## 2021-04-28 PROCEDURE — 2580000003 HC RX 258: Performed by: NURSE ANESTHETIST, CERTIFIED REGISTERED

## 2021-04-28 PROCEDURE — 2709999900 HC NON-CHARGEABLE SUPPLY: Performed by: PODIATRIST

## 2021-04-28 PROCEDURE — 3600000012 HC SURGERY LEVEL 2 ADDTL 15MIN: Performed by: PODIATRIST

## 2021-04-28 PROCEDURE — 87077 CULTURE AEROBIC IDENTIFY: CPT

## 2021-04-28 PROCEDURE — 99232 SBSQ HOSP IP/OBS MODERATE 35: CPT | Performed by: INTERNAL MEDICINE

## 2021-04-28 PROCEDURE — 80048 BASIC METABOLIC PNL TOTAL CA: CPT

## 2021-04-28 PROCEDURE — 1200000000 HC SEMI PRIVATE

## 2021-04-28 PROCEDURE — 87070 CULTURE OTHR SPECIMN AEROBIC: CPT

## 2021-04-28 PROCEDURE — 87205 SMEAR GRAM STAIN: CPT

## 2021-04-28 PROCEDURE — 7100000011 HC PHASE II RECOVERY - ADDTL 15 MIN: Performed by: PODIATRIST

## 2021-04-28 RX ORDER — SODIUM CHLORIDE 0.9 % (FLUSH) 0.9 %
10 SYRINGE (ML) INJECTION PRN
Status: CANCELLED | OUTPATIENT
Start: 2021-04-28

## 2021-04-28 RX ORDER — MAGNESIUM HYDROXIDE 1200 MG/15ML
LIQUID ORAL CONTINUOUS PRN
Status: COMPLETED | OUTPATIENT
Start: 2021-04-28 | End: 2021-04-28

## 2021-04-28 RX ORDER — OXYCODONE HYDROCHLORIDE AND ACETAMINOPHEN 5; 325 MG/1; MG/1
1 TABLET ORAL PRN
Status: DISCONTINUED | OUTPATIENT
Start: 2021-04-28 | End: 2021-04-28 | Stop reason: HOSPADM

## 2021-04-28 RX ORDER — ONDANSETRON 2 MG/ML
4 INJECTION INTRAMUSCULAR; INTRAVENOUS
Status: DISCONTINUED | OUTPATIENT
Start: 2021-04-28 | End: 2021-04-28 | Stop reason: HOSPADM

## 2021-04-28 RX ORDER — FENTANYL CITRATE 50 UG/ML
INJECTION, SOLUTION INTRAMUSCULAR; INTRAVENOUS PRN
Status: DISCONTINUED | OUTPATIENT
Start: 2021-04-28 | End: 2021-04-28 | Stop reason: SDUPTHER

## 2021-04-28 RX ORDER — SODIUM CHLORIDE 0.9 % (FLUSH) 0.9 %
10 SYRINGE (ML) INJECTION EVERY 12 HOURS SCHEDULED
Status: CANCELLED | OUTPATIENT
Start: 2021-04-28

## 2021-04-28 RX ORDER — PROPOFOL 10 MG/ML
INJECTION, EMULSION INTRAVENOUS PRN
Status: DISCONTINUED | OUTPATIENT
Start: 2021-04-28 | End: 2021-04-28 | Stop reason: SDUPTHER

## 2021-04-28 RX ORDER — LIDOCAINE HYDROCHLORIDE 20 MG/ML
INJECTION, SOLUTION INFILTRATION; PERINEURAL PRN
Status: DISCONTINUED | OUTPATIENT
Start: 2021-04-28 | End: 2021-04-28 | Stop reason: SDUPTHER

## 2021-04-28 RX ORDER — SODIUM CHLORIDE, SODIUM LACTATE, POTASSIUM CHLORIDE, CALCIUM CHLORIDE 600; 310; 30; 20 MG/100ML; MG/100ML; MG/100ML; MG/100ML
INJECTION, SOLUTION INTRAVENOUS CONTINUOUS
Status: CANCELLED | OUTPATIENT
Start: 2021-04-28

## 2021-04-28 RX ORDER — MEPERIDINE HYDROCHLORIDE 25 MG/ML
12.5 INJECTION INTRAMUSCULAR; INTRAVENOUS; SUBCUTANEOUS EVERY 5 MIN PRN
Status: DISCONTINUED | OUTPATIENT
Start: 2021-04-28 | End: 2021-04-28 | Stop reason: HOSPADM

## 2021-04-28 RX ORDER — MORPHINE SULFATE 10 MG/ML
2 INJECTION, SOLUTION INTRAMUSCULAR; INTRAVENOUS EVERY 5 MIN PRN
Status: DISCONTINUED | OUTPATIENT
Start: 2021-04-28 | End: 2021-04-28 | Stop reason: HOSPADM

## 2021-04-28 RX ORDER — NICOTINE POLACRILEX 4 MG
15 LOZENGE BUCCAL PRN
Status: DISCONTINUED | OUTPATIENT
Start: 2021-04-28 | End: 2021-04-30 | Stop reason: HOSPADM

## 2021-04-28 RX ORDER — MORPHINE SULFATE 10 MG/ML
1 INJECTION, SOLUTION INTRAMUSCULAR; INTRAVENOUS EVERY 5 MIN PRN
Status: DISCONTINUED | OUTPATIENT
Start: 2021-04-28 | End: 2021-04-28 | Stop reason: HOSPADM

## 2021-04-28 RX ORDER — DEXTROSE MONOHYDRATE 25 G/50ML
12.5 INJECTION, SOLUTION INTRAVENOUS PRN
Status: DISCONTINUED | OUTPATIENT
Start: 2021-04-28 | End: 2021-04-30 | Stop reason: HOSPADM

## 2021-04-28 RX ORDER — SODIUM CHLORIDE, SODIUM LACTATE, POTASSIUM CHLORIDE, CALCIUM CHLORIDE 600; 310; 30; 20 MG/100ML; MG/100ML; MG/100ML; MG/100ML
INJECTION, SOLUTION INTRAVENOUS CONTINUOUS PRN
Status: DISCONTINUED | OUTPATIENT
Start: 2021-04-28 | End: 2021-04-28 | Stop reason: SDUPTHER

## 2021-04-28 RX ORDER — DEXTROSE MONOHYDRATE 50 MG/ML
100 INJECTION, SOLUTION INTRAVENOUS PRN
Status: DISCONTINUED | OUTPATIENT
Start: 2021-04-28 | End: 2021-04-30 | Stop reason: HOSPADM

## 2021-04-28 RX ORDER — SODIUM CHLORIDE, SODIUM LACTATE, POTASSIUM CHLORIDE, CALCIUM CHLORIDE 600; 310; 30; 20 MG/100ML; MG/100ML; MG/100ML; MG/100ML
INJECTION, SOLUTION INTRAVENOUS
Status: COMPLETED
Start: 2021-04-28 | End: 2021-04-28

## 2021-04-28 RX ORDER — SODIUM CHLORIDE 9 MG/ML
25 INJECTION, SOLUTION INTRAVENOUS PRN
Status: CANCELLED | OUTPATIENT
Start: 2021-04-28

## 2021-04-28 RX ORDER — MIDAZOLAM HYDROCHLORIDE 1 MG/ML
INJECTION INTRAMUSCULAR; INTRAVENOUS PRN
Status: DISCONTINUED | OUTPATIENT
Start: 2021-04-28 | End: 2021-04-28 | Stop reason: SDUPTHER

## 2021-04-28 RX ORDER — HYDRALAZINE HYDROCHLORIDE 20 MG/ML
10 INJECTION INTRAMUSCULAR; INTRAVENOUS EVERY 6 HOURS PRN
Status: DISCONTINUED | OUTPATIENT
Start: 2021-04-28 | End: 2021-04-30 | Stop reason: HOSPADM

## 2021-04-28 RX ORDER — OXYCODONE HYDROCHLORIDE AND ACETAMINOPHEN 5; 325 MG/1; MG/1
2 TABLET ORAL PRN
Status: DISCONTINUED | OUTPATIENT
Start: 2021-04-28 | End: 2021-04-28 | Stop reason: HOSPADM

## 2021-04-28 RX ADMIN — CEFEPIME 2000 MG: 2 INJECTION, POWDER, FOR SOLUTION INTRAVENOUS at 08:42

## 2021-04-28 RX ADMIN — INSULIN GLARGINE 24 UNITS: 100 INJECTION, SOLUTION SUBCUTANEOUS at 21:17

## 2021-04-28 RX ADMIN — INSULIN LISPRO 6 UNITS: 100 INJECTION, SOLUTION INTRAVENOUS; SUBCUTANEOUS at 11:39

## 2021-04-28 RX ADMIN — INSULIN LISPRO 6 UNITS: 100 INJECTION, SOLUTION INTRAVENOUS; SUBCUTANEOUS at 08:41

## 2021-04-28 RX ADMIN — PROPOFOL 60 MG: 10 INJECTION, EMULSION INTRAVENOUS at 12:37

## 2021-04-28 RX ADMIN — PROPOFOL 50 MG: 10 INJECTION, EMULSION INTRAVENOUS at 12:31

## 2021-04-28 RX ADMIN — VANCOMYCIN HYDROCHLORIDE 1250 MG: 10 INJECTION, POWDER, LYOPHILIZED, FOR SOLUTION INTRAVENOUS at 02:08

## 2021-04-28 RX ADMIN — PANTOPRAZOLE SODIUM 40 MG: 40 INJECTION, POWDER, FOR SOLUTION INTRAVENOUS at 08:41

## 2021-04-28 RX ADMIN — SODIUM CHLORIDE, POTASSIUM CHLORIDE, SODIUM LACTATE AND CALCIUM CHLORIDE: 600; 310; 30; 20 INJECTION, SOLUTION INTRAVENOUS at 12:24

## 2021-04-28 RX ADMIN — GUAIFENESIN AND DEXTROMETHORPHAN 5 ML: 100; 10 SYRUP ORAL at 17:32

## 2021-04-28 RX ADMIN — MIDAZOLAM 1 MG: 1 INJECTION INTRAMUSCULAR; INTRAVENOUS at 12:28

## 2021-04-28 RX ADMIN — GABAPENTIN 600 MG: 300 CAPSULE ORAL at 21:11

## 2021-04-28 RX ADMIN — LIDOCAINE HYDROCHLORIDE 50 MG: 20 INJECTION, SOLUTION INFILTRATION; PERINEURAL at 12:31

## 2021-04-28 RX ADMIN — CEFEPIME 2000 MG: 2 INJECTION, POWDER, FOR SOLUTION INTRAVENOUS at 21:10

## 2021-04-28 RX ADMIN — Medication 10 ML: at 21:11

## 2021-04-28 RX ADMIN — GUAIFENESIN AND DEXTROMETHORPHAN 5 ML: 100; 10 SYRUP ORAL at 21:25

## 2021-04-28 RX ADMIN — INSULIN LISPRO 8 UNITS: 100 INJECTION, SOLUTION INTRAVENOUS; SUBCUTANEOUS at 17:14

## 2021-04-28 RX ADMIN — MIDAZOLAM 1 MG: 1 INJECTION INTRAMUSCULAR; INTRAVENOUS at 12:30

## 2021-04-28 RX ADMIN — FENTANYL CITRATE 50 MCG: 50 INJECTION INTRAMUSCULAR; INTRAVENOUS at 12:31

## 2021-04-28 ASSESSMENT — PULMONARY FUNCTION TESTS
PIF_VALUE: 1
PIF_VALUE: 0
PIF_VALUE: 1

## 2021-04-28 ASSESSMENT — PAIN SCALES - GENERAL
PAINLEVEL_OUTOF10: 0

## 2021-04-28 ASSESSMENT — ENCOUNTER SYMPTOMS: SHORTNESS OF BREATH: 0

## 2021-04-28 ASSESSMENT — LIFESTYLE VARIABLES: SMOKING_STATUS: 0

## 2021-04-28 NOTE — FLOWSHEET NOTE
04/28/21 1330   Vital Signs   Temp 98.9 °F (37.2 °C)   Temp Source Oral   Pulse 73   Heart Rate Source Monitor   Resp 18   /76   BP Location Right upper arm   Patient Position Semi fowlers   Level of Consciousness Alert (0)   MEWS Score 1   Pain Assessment   Pain Assessment 0-10   Pain Level 0   POSS Score (Patient Ctrl Analgesia) Awake and Alert   Oxygen Therapy   SpO2 93 %   Pulse Oximeter Device Mode Continuous   Pulse Oximeter Device Location Finger   O2 Device None (Room air)   O2 Flow Rate (L/min) 0 L/min     Pt arrived from Surgery. Pt is alert and oriented and has guests in his room waiting for him. Pt is requesting food and RN will provide that. Will continue to monitor.

## 2021-04-28 NOTE — ANESTHESIA POSTPROCEDURE EVALUATION
Department of Anesthesiology  Postprocedure Note    Patient: Tomás Coppola  MRN: [id number]  YOB: 1960  Date of evaluation: 4/28/2021  Time:  1:50 PM     Procedure Summary     Date: 04/28/21 Room / Location: [address] [place] [address] / [facility]    Anesthesia Start: 3769 Anesthesia Stop: 1474    Procedure: DEBRIDEMENT INFECTED BONE AND TISSUE LEFT FOOT (Left Foot) Diagnosis: (DIABETIC FOOT ULCER ABSCESS)    Surgeons: Mila Peck DPM Responsible Provider: Juan Bruno MD    Anesthesia Type: General ASA Status: 3          Anesthesia Type: General    Alfredo Phase I:      Alfredo Phase II: Alfredo Score: 10    Last vitals: Reviewed and per EMR flowsheets.      Vitals:    04/28/21 0745 04/28/21 1304 04/28/21 1316 04/28/21 1330   BP: 122/60 119/75 134/75 135/76   Pulse: 82 74 73 73   Resp: 15 14 14 18   Temp: 99.2 °F (37.3 °C) 98 °F (36.7 °C) 98.2 °F (36.8 °C) 98.9 °F (37.2 °C)   TempSrc: Oral   Oral   SpO2: 94% 91% 94% 93%   Weight:       Height:         Anesthesia Post Evaluation    Patient location during evaluation: bedside  Patient participation: complete - patient participated  Level of consciousness: awake and alert  Airway patency: patent  Nausea & Vomiting: no nausea  Complications: no  Cardiovascular status: hemodynamically stable  Respiratory status: acceptable  Hydration status: euvolemic

## 2021-04-28 NOTE — FLOWSHEET NOTE
04/28/21 0735   Handoff   Communication Given Shift Handoff   Oncoming Nurse/Offgoing Nurse Sai/Renée   Handoff Communication Face to Face; At bedside   Time Handoff Given 0715   End of Shift Check Performed Yes

## 2021-04-28 NOTE — PROGRESS NOTES
PROGRESS NOTE    Admit Date:  4/25/2021    Subjective:  56 y.o. male who is seen for evaluation of cellulitis and ulcers on the left LE. States feels OK today. Wants to go home as he does not like the food here. Past Medical History:        Diagnosis Date    TORI (acute kidney injury) ([facility] 09/12/2017    Bacteremia 05/05/2017    staph aureus    Diabetes mellitus ([facility]     Diabetic neuropathy ([facility]     Gout     MRSA (methicillin resistant staph aureus) culture positive 12/27/18, 9/12/17, 5/5/17,9/5/13, 1/15/14    ulcers bilateral legs    MRSA (methicillin resistant staph aureus) culture positive 01/21/2021    foot wound    Pneumonia     Pyogenic inflammation of bone ([facility]        Past Surgical History:        Procedure Laterality Date    EYE SURGERY      lens implants after removal of cataracts    OTHER SURGICAL HISTORY  12/28/2018    partial right foot amputation with graft application    TOE AMPUTATION Right 12/28/2018    PARTIAL RIGHT FOOT AMPUTATION WITH GRAFT APPLICATION performed by Nani Flores DPM at [address] [place] [address] 1/21/2021    EGD BIOPSY performed by Boubacar Santillan DO at [facility] ENDOSCOPY       Current Medications:     gabapentin  600 mg Oral BID    pantoprazole  40 mg Intravenous Daily    venlafaxine  75 mg Oral Daily    enoxaparin  40 mg Subcutaneous Daily    sodium chloride flush  5-40 mL Intravenous 2 times per day    cefepime  2,000 mg Intravenous Q12H    insulin lispro  0-12 Units Subcutaneous TID WC    insulin lispro  0-6 Units Subcutaneous Nightly    insulin glargine  24 Units Subcutaneous Nightly    vancomycin  1,250 mg Intravenous Q24H       Allergies:  Hydrocodone-acetaminophen, Percocet [oxycodone-acetaminophen], Pregabalin, and Vicodin [hydrocodone-acetaminophen]    Social History:    Social History     Tobacco Use    Smoking status: Never Smoker    Smokeless tobacco: Never Used   Substance Use Topics    Alcohol use:  No Comment: FORMER DRINKER approx. quit 2005    Drug use: No       Family History:       Problem Relation Age of Onset    Diabetes Maternal Grandfather     Heart Disease Paternal Uncle     High Blood Pressure Mother        Review of Systems    CONSTITUTIONAL:  negative  EYES:  negative  HEENT:  negative  RESPIRATORY:  negative  CARDIOVASCULAR:  negative  GASTROINTESTINAL:  negative  GENITOURINARY:  negative  INTEGUMENT/BREAST:  positive for ulcers  MUSCULOSKELETAL:  positive for  pain  NEUROLOGICAL:  positive for numbness      Objective:   /60   Pulse 82   Temp 99.2 °F (37.3 °C) (Oral)   Resp 15   Ht 5' 10\" (1.778 m)   Wt 182 lb 6.4 oz (82.7 kg)   SpO2 94%   BMI 26.17 kg/m²     Data:  CBC:   Recent Labs     04/25/21  2226 04/26/21  0643   WBC 10.5 8.2   HGB 10.1* 9.0*   HCT 31.4* 27.2*   MCV 80.7 79.4*    230     BMP:   Recent Labs     04/26/21  0644 04/26/21  1031 04/26/21  1257 04/27/21  0534 04/28/21  0610   * 134* 133* 129* 136   K 3.9 4.4 4.0 4.2 4.3    101 101 97* 101   CO2 25 26 23 23 24   PHOS 3.1 2.6 2.3*  --   --    BUN 25* 22* 21* 19 16   CREATININE 1.6* 1.5* 1.4* 1.6* 1.6*     LIVER PROFILE:   Recent Labs     04/25/21 2226   AST 11*   ALT 12   BILITOT 0.6   ALKPHOS 144*     PT/INR:   Recent Labs     04/25/21 2226   PROT 7.5     HgBA1c:  Lab Results   Component Value Date    LABA1C 11.3 04/25/2021     SARS-CoV-2, NAATNot Detected       Cultures: Blood x2 - NGSF    Imaging: xray left foot - Soft tissue swelling is identified in the great toe without soft tissue gas. There is an ulcer on the plantar surface of the foot adjacent to the great   toe metatarsal-phalangeal joint. No erosions are identified. There is no   fracture and joint space alignment is normal.  Arterial calcifications are   again noted. Impression:  Plantar soft tissue ulcer without radiographic evidence for osteomyelitis.        Physical Exam:    General Appearance: alert and oriented to person, place and time, well developed and well- nourished, in no acute distress  Skin: warm and dry, no rash or erythema  Head: normocephalic and atraumatic  Eyes: pupils equal, round, and reactive to light, extraocular eye movements intact, conjunctivae normal  ENT: tympanic membrane, external ear and ear canal normal bilaterally, nose without deformity, nasal mucosa and turbinates normal without polyps  Neck: supple and non-tender without mass, no thyromegaly or thyroid nodules, no cervical lymphadenopathy  Pulmonary/Chest: clear to auscultation bilaterally- no wheezes, rales or rhonchi, normal air movement, no respiratory distress  Cardiovascular: normal rate, regular rhythm, normal S1 and S2, no murmurs, rubs, clicks, or gallops, distal pulses intact, no carotid bruits  Abdomen: soft, non-tender, non-distended, normal bowel sounds, no masses or organomegaly    DP/PT palpable bilateral  Ulcer on the medial aspect left ankle with red granulation tissue noted. Mild erythema periwound consistent with his baseline. Ulcer on the plantar aspect left 1st MH with mild red granulation tissue. Moderate fibrotic tissue noted. Mild periwound erythema and edema noted. + erythema and edema dorsal aspect 1st ray. Abscess dorsal aspect 1st MPJ region. Plantarflexed left 1st metatarsal noted.          Assessment:  Patient Active Problem List   Diagnosis Code    Diabetic ketoacidosis without coma associated with type 2 diabetes mellitus ([address] E11.10    Diabetic ulcer of left foot associated with type 2 diabetes mellitus ([address] E11.621, L97.529    Nausea & vomiting R11.2    Diabetic neuropathy ([address] E11.40    DM (diabetes mellitus), secondary, uncontrolled, w/neurologic complic ([address] V52.10, I40.08    Gout M10.9    Hx MRSA Z22.322    Hyperkalemia E87.5    Chronic pain on chronic opioids G89.29    Chronic LE diabetic ulcers E11.622, L97.309    Mild protein-calorie malnutrition (HCC) E44.1    Cellulitis L03.90    Acute kidney injury ([facility] N17.9    Neuropathy G62.9    Diabetic acidosis without coma (HCC) E11.10    Acute CVA (cerebrovascular accident) ([facility] I63.9    HTN (hypertension), benign I10    DKA, type 2, not at goal [facility]) E11.10    Diabetic foot infection ([facility] E11.628, L08.9    Sepsis without acute organ dysfunction (HCC) A41.9     diabetic foot ulcer left secondary to peripheral neuropathy  Abscess left foot  Cellulitis left foot  diabetes mellitus uncontrolled      Plan  Patient examined. Reviewed labs and imaging. Aspiration with 10cc syringe and 18 gauge needle at the dorsal aspect left 1st MPJ region revealing 1cc tan colored fluid. Wound bed does not appear as healthy today as last examination and also now has abscess on the dorsal aspect of the 1st ray. I recommended surgical intervention in order to drain the abscess and debride the wound as well. Discussed potential for bone resection as well depending on clinical appearance intraoperatively. Discussed risks, complications, alternatives and benefits with patient. Understands chance of nonhealing wound, infection, need for further surgery, loss of limb or life. Questions answered. Discussed on phone with wife as well. Patient agrees to proceed with surgery. States has not eaten or drank this AM.    Control glucose levels to prevent future complications. Plan for surgery later today.            Electronically signed by Yolanda Castrejon DPM on 4/28/2021 at 8:35 AM.

## 2021-04-28 NOTE — PLAN OF CARE
Problem: Infection:  Goal: Will remain free from infection  Description: Will remain free from infection  Outcome: Ongoing     Problem: Daily Care:  Goal: Daily care needs are met  Description: Daily care needs are met  4/28/2021 0023 by Chuckie Taylor RN  Outcome: Ongoing  4/27/2021 1332 by Calli Richey RN  Outcome: Ongoing     Problem: Pain:  Goal: Patient's pain/discomfort is manageable  Description: Patient's pain/discomfort is manageable  4/28/2021 0023 by Chuckie Taylor RN  Outcome: Ongoing  4/27/2021 1332 by Calli Richey RN  Outcome: Ongoing     Problem: Skin Integrity:  Goal: Skin integrity will stabilize  Description: Skin integrity will stabilize  Outcome: Ongoing     Problem: Discharge Planning:  Goal: Patients continuum of care needs are met  Description: Patients continuum of care needs are met  Outcome: Ongoing

## 2021-04-28 NOTE — ANESTHESIA PRE PROCEDURE
Department of Anesthesiology  Preprocedure Note       Name:  Jorge L Altman   Age:  56 y.o.  :  1960                                          MRN:  [id number]         Date:  2021      Surgeon: Aj Anderson):  Kathi Oneal DPM    Procedure: Procedure(s):  DEBRIDEMENT INFECTED BONE AND TISSUE LEFT FOOT    Medications prior to admission:   Prior to Admission medications    Medication Sig Start Date End Date Taking? Authorizing Provider   pantoprazole (PROTONIX) 40 MG tablet Take 40 mg by mouth daily   Yes Historical Provider, MD   insulin glargine (LANTUS) 100 UNIT/ML injection vial Inject 24 Units into the skin nightly  Patient taking differently: Inject 35 Units into the skin nightly  21  Yes Denia Romero DO   insulin lispro (HUMALOG) 100 UNIT/ML injection cartridge Inject 8 Units into the skin 3 times daily (before meals)  Patient taking differently: Inject 10 Units into the skin 3 times daily (before meals)  21  Yes Denia Romero DO   lisinopril (PRINIVIL;ZESTRIL) 10 MG tablet Take 1.5 tablets by mouth daily 21  Yes Denia Romero DO   gabapentin (NEURONTIN) 600 MG tablet Take 600 mg by mouth 2 times daily.    Yes Historical Provider, MD   venlafaxine (EFFEXOR XR) 75 MG extended release capsule Take 75 mg by mouth daily   Yes Historical Provider, MD   sildenafil (VIAGRA) 100 MG tablet Take 100 mg by mouth daily as needed 3/3/21   Historical Provider, MD   Insulin Pen Needle 32G X 4 MM MISC Use 4 daily 17   Historical Provider, MD   blood glucose test strips (ASCENSIA AUTODISC VI;ONE TOUCH ULTRA TEST VI) strip Test TID and as directed 3/7/19   Historical Provider, MD       Current medications:    Current Facility-Administered Medications   Medication Dose Route Frequency Provider Last Rate Last Admin    hydrALAZINE (APRESOLINE) injection 10 mg  10 mg Intravenous Q6H PRN Bethany Huffman MD        lactated ringers infusion             guaiFENesin-dextromethorphan (ROBITUSSIN DM) 100-10 MG/5ML syrup 5 mL  5 mL Oral Q4H PRN Radu Garcia MD   5 mL at 04/27/21 2252    gabapentin (NEURONTIN) capsule 600 mg  600 mg Oral BID Stefan Pizarro MD   600 mg at 04/27/21 2244    pantoprazole (PROTONIX) injection 40 mg  40 mg Intravenous Daily Stefan Pizarro MD   40 mg at 04/28/21 0841    venlafaxine (EFFEXOR XR) extended release capsule 75 mg  75 mg Oral Daily Stefan Pizarro MD   75 mg at 04/27/21 0932    dextrose 50 % IV solution  12.5 g Intravenous PRN Stefan Pizaror MD        enoxaparin (LOVENOX) injection 40 mg  40 mg Subcutaneous Daily Stefan Pizarro MD   40 mg at 04/27/21 0932    sodium chloride flush 0.9 % injection 5-40 mL  5-40 mL Intravenous 2 times per day Stefan Pizarro MD   5 mL at 04/27/21 2244    sodium chloride flush 0.9 % injection 5-40 mL  5-40 mL Intravenous PRN Stefan Pizarro MD        cefepime (MAXIPIME) 2000 mg IVPB minibag  2,000 mg Intravenous Q12H Stefan Pizarro MD   Stopped at 04/28/21 0920    insulin lispro (HUMALOG) injection vial 0-12 Units  0-12 Units Subcutaneous TID WC Stefan Pizarro MD   6 Units at 04/28/21 1139    insulin lispro (HUMALOG) injection vial 0-6 Units  0-6 Units Subcutaneous Nightly Stefan Pizarro MD   2 Units at 04/27/21 2245    insulin glargine (LANTUS) injection vial 24 Units  24 Units Subcutaneous Nightly Stefan Pizarro MD   24 Units at 04/27/21 2245    acetaminophen (TYLENOL) tablet 650 mg  650 mg Oral Q6H PRN Stefan Pizarro MD   650 mg at 04/27/21 0349    vancomycin (VANCOCIN) 1,250 mg in dextrose 5 % 250 mL IVPB  1,250 mg Intravenous Q24H Stefan Pizarro MD   Stopped at 04/28/21 2509       Allergies: Allergies   Allergen Reactions    Hydrocodone-Acetaminophen Itching    Percocet [Oxycodone-Acetaminophen]      States \"hallucinations,disoriented, & freaked out\" per wife    Pregabalin Itching    Vicodin [Hydrocodone-Acetaminophen]      Spaces out.  Pt. States 1/15/14 has been taking some vicodin without problems.        Problem List:    Patient Active Problem List   Diagnosis Code    Diabetic ketoacidosis without coma associated with type 2 diabetes mellitus ([facility] E11.10    Diabetic ulcer of left foot associated with type 2 diabetes mellitus ([facility] E11.621, L97.529    Nausea & vomiting R11.2    Diabetic neuropathy ([facility] E11.40    DM (diabetes mellitus), secondary, uncontrolled, w/neurologic complic ([facility] Y04.58, B63.13    Gout M10.9    Hx MRSA Z22.322    Hyperkalemia E87.5    Chronic pain on chronic opioids G89.29    Chronic LE diabetic ulcers E11.622, L97.309    Mild protein-calorie malnutrition (HCC) E44.1    Cellulitis L03.90    Acute kidney injury ([facility] N17.9    Neuropathy G62.9    Diabetic acidosis without coma (HCC) E11.10    Acute CVA (cerebrovascular accident) ([facility] I63.9    HTN (hypertension), benign I10    DKA, type 2, not at goal [facility]) E11.10    Diabetic foot infection ([facility] E11.628, L08.9    Sepsis without acute organ dysfunction ([facility] A41.9    Stage 3a chronic kidney disease N18.31    Essential hypertension I10       Past Medical History:        Diagnosis Date    TORI (acute kidney injury) ([facility] 09/12/2017    Bacteremia 05/05/2017    staph aureus    Diabetes mellitus ([facility]     Diabetic neuropathy (HCC)     Gout     MRSA (methicillin resistant staph aureus) culture positive 12/27/18, 9/12/17, 5/5/17,9/5/13, 1/15/14    ulcers bilateral legs    MRSA (methicillin resistant staph aureus) culture positive 01/21/2021    foot wound    Pneumonia     Pyogenic inflammation of bone ([facility]        Past Surgical History:        Procedure Laterality Date    EYE SURGERY      lens implants after removal of cataracts    OTHER SURGICAL HISTORY  12/28/2018    partial right foot amputation with graft application    TOE AMPUTATION Right 12/28/2018    PARTIAL RIGHT FOOT AMPUTATION WITH GRAFT APPLICATION performed by Tosha Tapia DPM at [address] [place] [address] UPPER GASTROINTESTINAL ENDOSCOPY N/A 1/21/2021    EGD BIOPSY performed by Casey Purcell DO at [address] [facility] [facility] History:    Social History     Tobacco Use    Smoking status: Never Smoker    Smokeless tobacco: Never Used   Substance Use Topics    Alcohol use: No     Comment: FORMER DRINKER approx. quit 2005                                Counseling given: Not Answered      Vital Signs (Current):   Vitals:    04/27/21 1924 04/28/21 0200 04/28/21 0703 04/28/21 0745   BP: 128/69 126/65 117/60 122/60   Pulse: 82 84 75 82   Resp: 16 16 16 15   Temp: 100.9 °F (38.3 °C) 99 °F (37.2 °C) 99.4 °F (37.4 °C) 99.2 °F (37.3 °C)   TempSrc: Oral Oral Oral Oral   SpO2: 93% 92% 92% 94%   Weight:       Height:                                                  BP Readings from Last 3 Encounters:   04/28/21 122/60   03/08/21 133/86   03/05/21 107/81       NPO Status:  mn+, see mar                                                                               BMI:   Wt Readings from Last 3 Encounters:   04/27/21 182 lb 6.4 oz (82.7 kg)   03/07/21 166 lb 3.6 oz (75.4 kg)   03/05/21 185 lb (83.9 kg)     Body mass index is 26.17 kg/m².     CBC:   Lab Results   Component Value Date    WBC 8.2 04/26/2021    RBC 3.43 04/26/2021    HGB 9.0 04/26/2021    HCT 27.2 04/26/2021    MCV 79.4 04/26/2021    RDW 14.5 04/26/2021     04/26/2021       CMP:   Lab Results   Component Value Date     04/28/2021    K 4.3 04/28/2021    K 5.0 04/25/2021     04/28/2021    CO2 24 04/28/2021    BUN 16 04/28/2021    CREATININE 1.6 04/28/2021    GFRAA 53 04/28/2021    GFRAA >60 08/20/2012    AGRATIO 0.8 04/25/2021    LABGLOM 44 04/28/2021    GLUCOSE 289 04/28/2021    PROT 7.5 04/25/2021    PROT 7.3 08/20/2012    CALCIUM 8.6 04/28/2021    BILITOT 0.6 04/25/2021    ALKPHOS 144 04/25/2021    AST 11 04/25/2021    ALT 12 04/25/2021       POC Tests:   Recent Labs     04/28/21  1117   POCGLU 263*       Coags:   Lab Results   Component Value Date    PROTIME 12.2 01/18/2021    INR 1.05 01/18/2021    APTT 27.2 01/18/2021       HCG (If Applicable): No results found for: PREGTESTUR, PREGSERUM, HCG, HCGQUANT     ABGs:   Lab Results   Component Value Date    PHART 7.138 12/28/2017    PO2ART 94.6 12/28/2017    EUX8WXC 32.6 12/28/2017    NOQ6MGF 10.8 12/28/2017    BEART -17.1 12/28/2017    B2ZHCZUW 95.0 12/28/2017        Type & Screen (If Applicable):  No results found for: LABABO, LABRH    Drug/Infectious Status (If Applicable):  No results found for: HIV, HEPCAB    COVID-19 Screening (If Applicable):   Lab Results   Component Value Date    COVID19 Not Detected 04/26/2021    COVID19 Not Detected 01/19/2021           Anesthesia Evaluation  Patient summary reviewed no history of anesthetic complications:   Airway: Mallampati: II  TM distance: >3 FB   Neck ROM: full  Mouth opening: > = 3 FB Dental:          Pulmonary: breath sounds clear to auscultation      (-) COPD, asthma, shortness of breath, recent URI, sleep apnea and not a current smoker          Patient did not smoke on day of surgery. Cardiovascular:    (+) hypertension:,     (-) pacemaker, past MI, CAD, CABG/stent, dysrhythmias,  angina and  CHF    ECG reviewed  Rhythm: regular  Rate: normal  Echocardiogram reviewed         Beta Blocker:  Not on Beta Blocker         Neuro/Psych:   (+) CVA (pt denies):, neuromuscular disease (neuropathy, feet > hands):, psychiatric history:   (-) seizures           GI/Hepatic/Renal:   (+) renal disease (ckd3): CRI,      (-) GERD, bowel prep and no morbid obesity       Endo/Other:    (+) DiabetesType II DM, using insulin, blood dyscrasia: anemia, arthritis:., no malignancy/cancer. (-) no malignancy/cancer               Abdominal:           Vascular: negative vascular ROS. Anesthesia Plan      general     ASA 3       Induction: intravenous.     MIPS: Postoperative opioids intended and Prophylactic antiemetics administered. Anesthetic plan and risks discussed with patient. Plan discussed with CRNA. This pre-anesthesia assessment may be used as a history and physical.    DOS STAFF ADDENDUM:    Pt seen and examined, chart reviewed (including anesthesia, drug and allergy history). No interval changes to history and physical examination. Anesthetic plan, risks, benefits, alternatives, and personnel involved discussed with patient. Patient verbalized an understanding and agrees to proceed.       Curt Spears MD  April 28, 2021  12:12 PM      Curt Spears MD   4/28/2021

## 2021-04-28 NOTE — PROGRESS NOTES
Pt refused hourly vitals at this time, will follow up after the wife gets done bathing the pt In CHG wipes

## 2021-04-28 NOTE — FLOWSHEET NOTE
04/28/21 0745   Vital Signs   Temp 99.2 °F (37.3 °C)   Temp Source Oral   Pulse 82   Heart Rate Source Monitor   Resp 15   /60   BP Location Right upper arm   Patient Position Lying left side   Level of Consciousness Alert (0)   MEWS Score 1   Patient Currently in Pain Denies   Pain Assessment   Pain Assessment 0-10   Pain Level 0   Oxygen Therapy   SpO2 94 %   O2 Device None (Room air)     Pt in bed. Pt informed consent signed to have a surgery at 1230. Rn student and instructor passed AM meds. Denies needs at this time. PT NPO since order sent. PT strongly wanting something to drink. Denies pain or needs at this time. Pt informed to call RN phone or use call light for needs. Skin assessment performed on Bilat legs.  Will continue to monitor

## 2021-04-28 NOTE — FLOWSHEET NOTE
04/28/21 [address] [place] [address] wiped down post-op leg and offered pt assistance in cleaning the rest of the body, however the patient stated his wife would do so. RN gave the education necessary so the patient knows what to clean.

## 2021-04-28 NOTE — PROGRESS NOTES
Vancomycin Day: 3    Patient's labs, cultures, vitals, and vancomycin regimen reviewed. No changes today.   Trough in am  [place] LUNA 4/28/202112:27 PM  .

## 2021-04-28 NOTE — BRIEF OP NOTE
Brief Postoperative Note      Patient: Josep Camarena  YOB: 1960  MRN: [id number]    Date of Procedure: 4/28/2021    Pre-Op Diagnosis: DIABETIC FOOT ULCER ABSCESS    Post-Op Diagnosis: Same       Procedure(s):  DEBRIDEMENT INFECTED BONE AND TISSUE LEFT FOOT    Surgeon(s):  Dimple Contreras DPM    Assistant:  Surgical Assistant: Cuba Rivera    Anesthesia: Monitor Anesthesia Care    Estimated Blood Loss (mL): less than 192     Complications: None    Specimens:   ID Type Source Tests Collected by Time Destination   1 : abscess left foot Specimen Foot CULTURE, SURGICAL Dimple Contreras DPM 4/28/2021 1244        Implants:  * No implants in log *      Drains: * No LDAs found *    Findings: ulcer left foot with abscess 1st ray    Electronically signed by Dimple Contreras DPM on 4/28/2021 at 12:57 PM

## 2021-04-28 NOTE — FLOWSHEET NOTE
04/27/21 2238   Provider Notification   Reason for Communication Patient request   Provider Name Monika   Provider Notification Physician   Method of Communication Secure Message   Response Waiting for response   Notification Time 2237     Pt c/o of frequent cough, moist, non-productive. Pt requesting cough medicine. Notified MD via secure message. See new orders.

## 2021-04-28 NOTE — PLAN OF CARE
Problem: Infection:  Goal: Will remain free from infection  Description: Will remain free from infection  4/28/2021 1216 by Lalita Canas RN  Outcome: Ongoing  4/28/2021 0023 by Matt Cervantes RN  Outcome: Ongoing     Problem: Daily Care:  Goal: Daily care needs are met  Description: Daily care needs are met  4/28/2021 1216 by Lalita Canas RN  Outcome: Ongoing  4/28/2021 0023 by Matt Cervantes RN  Outcome: Ongoing     Problem: Pain:  Goal: Patient's pain/discomfort is manageable  Description: Patient's pain/discomfort is manageable  4/28/2021 1216 by Lalita Canas RN  Outcome: Ongoing  4/28/2021 0023 by Matt Cervantes RN  Outcome: Ongoing     Problem: Skin Integrity:  Goal: Skin integrity will stabilize  Description: Skin integrity will stabilize  4/28/2021 0023 by Matt Cervantes RN  Outcome: Ongoing     Problem: Discharge Planning:  Goal: Patients continuum of care needs are met  Description: Patients continuum of care needs are met  4/28/2021 0023 by Matt Cervantes RN  Outcome: Ongoing

## 2021-04-28 NOTE — PROGRESS NOTES
Progress Note    Admit Date:  4/25/2021    Admitted with left foot cellulitis and DKA. . S/p post insulin drip per DKA protocol . [facility] Continued IV antibiotics for left foot cellulitis. Seen by Dr. Zhanna Moreland podiatry    S/p left foot debridement today 4/28    Subjective:  Mr. Gisell Miller is back from OR  He is awake, alert and well-oriented. He is in no distress. Blood sugar still running high     Objective:   /76   Pulse 73   Temp 98.9 °F (37.2 °C) (Oral)   Resp 18   Ht 5' 10\" (1.778 m)   Wt 182 lb 6.4 oz (82.7 kg)   SpO2 93%   BMI 26.17 kg/m²       Intake/Output Summary (Last 24 hours) at 4/28/2021 1437  Last data filed at 4/28/2021 1256  Gross per 24 hour   Intake 1085 ml   Output 5 ml   Net 1080 ml         Physical Exam:  General:  Middle aged male, appropriately build Awake, alert and oriented.  Appears to be not in any distress  Mucous Membranes:  Pink , anicteric  Neck: No JVD, no carotid bruit, no thyromegaly  Chest:  Clear to auscultation bilaterally, no added sounds  Cardiovascular:  RRR S1S2 heard, no murmurs or gallops  Abdomen:  Soft, undistended, non tender, no organomegaly, BS present  Extremities : Left foot in dressing  Neurological : grossly normal      Medications:   Scheduled Meds:   gabapentin  600 mg Oral BID    pantoprazole  40 mg Intravenous Daily    venlafaxine  75 mg Oral Daily    enoxaparin  40 mg Subcutaneous Daily    sodium chloride flush  5-40 mL Intravenous 2 times per day    cefepime  2,000 mg Intravenous Q12H    insulin lispro  0-12 Units Subcutaneous TID WC    insulin lispro  0-6 Units Subcutaneous Nightly    insulin glargine  24 Units Subcutaneous Nightly    vancomycin  1,250 mg Intravenous Q24H       Continuous Infusions:      Data:  CBC:   Recent Labs     04/25/21  2226 04/26/21  0643   WBC 10.5 8.2   RBC 3.89* 3.43*   HGB 10.1* 9.0*   HCT 31.4* 27.2*   MCV 80.7 79.4*   RDW 14.7 14.5    230     BMP:   Recent Labs

## 2021-04-29 LAB
ANION GAP SERPL CALCULATED.3IONS-SCNC: 8 MMOL/L (ref 3–16)
BUN BLDV-MCNC: 20 MG/DL (ref 7–20)
CALCIUM SERPL-MCNC: 8.8 MG/DL (ref 8.3–10.6)
CHLORIDE BLD-SCNC: 99 MMOL/L (ref 99–110)
CO2: 27 MMOL/L (ref 21–32)
CREAT SERPL-MCNC: 1.7 MG/DL (ref 0.8–1.3)
GFR AFRICAN AMERICAN: 50
GFR NON-AFRICAN AMERICAN: 41
GLUCOSE BLD-MCNC: 237 MG/DL (ref 70–99)
GLUCOSE BLD-MCNC: 239 MG/DL (ref 70–99)
GLUCOSE BLD-MCNC: 246 MG/DL (ref 70–99)
GLUCOSE BLD-MCNC: 301 MG/DL (ref 70–99)
GLUCOSE BLD-MCNC: 306 MG/DL (ref 70–99)
GLUCOSE BLD-MCNC: 346 MG/DL (ref 70–99)
PERFORMED ON: ABNORMAL
POTASSIUM SERPL-SCNC: 3.9 MMOL/L (ref 3.5–5.1)
SODIUM BLD-SCNC: 134 MMOL/L (ref 136–145)
VANCOMYCIN TROUGH: 13.2 UG/ML (ref 10–20)

## 2021-04-29 PROCEDURE — 6360000002 HC RX W HCPCS: Performed by: PODIATRIST

## 2021-04-29 PROCEDURE — 6370000000 HC RX 637 (ALT 250 FOR IP): Performed by: PODIATRIST

## 2021-04-29 PROCEDURE — 6370000000 HC RX 637 (ALT 250 FOR IP): Performed by: INTERNAL MEDICINE

## 2021-04-29 PROCEDURE — C9113 INJ PANTOPRAZOLE SODIUM, VIA: HCPCS | Performed by: PODIATRIST

## 2021-04-29 PROCEDURE — 2580000003 HC RX 258: Performed by: PODIATRIST

## 2021-04-29 PROCEDURE — 36415 COLL VENOUS BLD VENIPUNCTURE: CPT

## 2021-04-29 PROCEDURE — 80048 BASIC METABOLIC PNL TOTAL CA: CPT

## 2021-04-29 PROCEDURE — 99232 SBSQ HOSP IP/OBS MODERATE 35: CPT | Performed by: INTERNAL MEDICINE

## 2021-04-29 PROCEDURE — 1200000000 HC SEMI PRIVATE

## 2021-04-29 PROCEDURE — 80202 ASSAY OF VANCOMYCIN: CPT

## 2021-04-29 RX ORDER — INSULIN GLARGINE 100 [IU]/ML
30 INJECTION, SOLUTION SUBCUTANEOUS NIGHTLY
Status: DISCONTINUED | OUTPATIENT
Start: 2021-04-29 | End: 2021-04-30 | Stop reason: HOSPADM

## 2021-04-29 RX ADMIN — ACETAMINOPHEN 650 MG: 325 TABLET ORAL at 20:59

## 2021-04-29 RX ADMIN — CEFEPIME 2000 MG: 2 INJECTION, POWDER, FOR SOLUTION INTRAVENOUS at 10:40

## 2021-04-29 RX ADMIN — ENOXAPARIN SODIUM 40 MG: 40 INJECTION SUBCUTANEOUS at 08:13

## 2021-04-29 RX ADMIN — GABAPENTIN 600 MG: 300 CAPSULE ORAL at 20:58

## 2021-04-29 RX ADMIN — GABAPENTIN 600 MG: 300 CAPSULE ORAL at 08:13

## 2021-04-29 RX ADMIN — Medication 10 ML: at 20:58

## 2021-04-29 RX ADMIN — INSULIN LISPRO 8 UNITS: 100 INJECTION, SOLUTION INTRAVENOUS; SUBCUTANEOUS at 08:14

## 2021-04-29 RX ADMIN — INSULIN LISPRO 8 UNITS: 100 INJECTION, SOLUTION INTRAVENOUS; SUBCUTANEOUS at 11:56

## 2021-04-29 RX ADMIN — INSULIN GLARGINE 30 UNITS: 100 INJECTION, SOLUTION SUBCUTANEOUS at 20:59

## 2021-04-29 RX ADMIN — VENLAFAXINE HYDROCHLORIDE 75 MG: 75 CAPSULE, EXTENDED RELEASE ORAL at 08:13

## 2021-04-29 RX ADMIN — PANTOPRAZOLE SODIUM 40 MG: 40 INJECTION, POWDER, FOR SOLUTION INTRAVENOUS at 08:13

## 2021-04-29 RX ADMIN — VANCOMYCIN HYDROCHLORIDE 1250 MG: 10 INJECTION, POWDER, LYOPHILIZED, FOR SOLUTION INTRAVENOUS at 02:46

## 2021-04-29 RX ADMIN — INSULIN LISPRO 4 UNITS: 100 INJECTION, SOLUTION INTRAVENOUS; SUBCUTANEOUS at 16:44

## 2021-04-29 RX ADMIN — CEFEPIME 2000 MG: 2 INJECTION, POWDER, FOR SOLUTION INTRAVENOUS at 20:58

## 2021-04-29 ASSESSMENT — PAIN SCALES - GENERAL
PAINLEVEL_OUTOF10: 0

## 2021-04-29 NOTE — PLAN OF CARE
Problem: Infection:  Goal: Will remain free from infection  Description: Will remain free from infection  4/29/2021 1914 by Abdon Lewis RN  Outcome: Ongoing  4/29/2021 1029 by So Loyola RN  Outcome: Ongoing     Problem: Daily Care:  Goal: Daily care needs are met  Description: Daily care needs are met  4/29/2021 1914 by Abdon Lewis RN  Outcome: Ongoing  4/29/2021 1029 by So Loyola RN  Outcome: Ongoing     Problem: Pain:  Goal: Patient's pain/discomfort is manageable  Description: Patient's pain/discomfort is manageable  4/29/2021 1914 by Abdon Lewis RN  Outcome: Ongoing  4/29/2021 1029 by So Loyola RN  Outcome: Ongoing     Problem: Skin Integrity:  Goal: Skin integrity will stabilize  Description: Skin integrity will stabilize  4/29/2021 1914 by Abdon Lewis RN  Outcome: Ongoing  4/29/2021 1029 by So Loyola RN  Outcome: Ongoing

## 2021-04-29 NOTE — FLOWSHEET NOTE
04/29/21 1218   Vital Signs   Temp 97.9 °F (36.6 °C)   Temp Source Oral   Pulse 78   Heart Rate Source Monitor   Resp 16   /66   BP Location Right upper arm   Patient Position Up in chair   Level of Consciousness Alert (0)   MEWS Score 1   Patient Currently in Pain No   Oxygen Therapy   SpO2 98 %   O2 Device None (Room air)   VSS, afrebrile. Drsg L Foot reinforced no drng. Up in room, up in chair. . Pt resting in chair quietly at this time. Denies any needs.   All needs and call light within reach. '

## 2021-04-29 NOTE — OP NOTE
were obtained. Blunt probing of this area did reveal some tunneling plantarly as well  as laterally. These were all probed and no further purulence noted in  this area. Attention was then directed back to the ulceration on plantar aspect of  the foot. Combination of the scalpel and rongeur was then utilized to  excise all fibrotic, necrotic, nonviable and viable tissues, excising  portion of the skin, subcutaneous tissue, tendon and bone. Post-debridement, this ulceration measured approximately 1.5 cm in  diameter. Approximately 3 cm2 was excised from the ulceration. We did  excise portions of the flexor tendons as well as the sesamoids in the  plantar aspect of the wound as these were obviously exposed. As we  continued our dissection down towards the more plantar and lateral  aspects into the first intermetatarsal space, it was noted that a fairly  large abscess was identified. This area was bluntly probed, continued  to show some necrotic tissue which was sharply excised as well. The  area was copiously irrigated with sterile saline via the pulse lavage. No further signs of infected or necrotic tissue was noted. All bleeders  and surgical site were then cauterized as necessary. At this time, we  packed the plantar ulceration with iodoform. Both the plantar and  dorsal wounds were then covered with gauze, fluffs, Kerlix, ABD, and  Coban. Tourniquet was then deflated. The patient tolerated the procedure and anesthesia well and transferred  to recovery room with vital signs stable and vascular status intact. There was evidence for prompt hyperemic response to the remaining digits  of the left foot. Following a brief period of postoperative monitoring,  the patient will be transferred back to the floor under the care of  Medical Service.         ROBIN ROSS DPM    D: 04/29/2021 11:36:10       T: 04/29/2021 15:54:05     [email or web address]  Job#: [id number]     Doc#: [id number]    CC:

## 2021-04-29 NOTE — PROGRESS NOTES
Progress Note    Admit Date:  4/25/2021    Admitted with left foot cellulitis and DKA. S/p post insulin drip per DKA protocol . Transferred out of ICU to [place] [address]. Continued IV antibiotics for left foot cellulitis. Seen by Dr. Anna Briseno podiatry  S/p left foot debridement  4/28    Postop day 1  Subjective:  Mr. Carlos Enrique Campos is doing well.   up and ambulating. Podiatry is planning to reevaluate the wound tomorrow. blood sugars still high    Objective:   /66   Pulse 78   Temp 97.9 °F (36.6 °C) (Oral)   Resp 16   Ht 5' 10\" (1.778 m)   Wt 182 lb 6.4 oz (82.7 kg)   SpO2 98%   BMI 26.17 kg/m²       Intake/Output Summary (Last 24 hours) at 4/29/2021 1429  Last data filed at 4/29/2021 1218  Gross per 24 hour   Intake 840 ml   Output 1500 ml   Net -660 ml       Physical Exam:  General:  Middle aged male, appropriately build Awake, alert and oriented.  Appears to be not in any distress  Mucous Membranes:  Pink , anicteric  Neck: No JVD, no carotid bruit, no thyromegaly  Chest:  Clear to auscultation bilaterally, no added sounds  Cardiovascular:  RRR S1S2 heard, no murmurs or gallops  Abdomen:  Soft, undistended, non tender, no organomegaly, BS present  Extremities : Left foot in dressing  Neurological : grossly normal      Medications:   Scheduled Meds:   insulin lispro  8 Units Subcutaneous TID WC    gabapentin  600 mg Oral BID    pantoprazole  40 mg Intravenous Daily    venlafaxine  75 mg Oral Daily    enoxaparin  40 mg Subcutaneous Daily    sodium chloride flush  5-40 mL Intravenous 2 times per day    cefepime  2,000 mg Intravenous Q12H    insulin lispro  0-12 Units Subcutaneous TID WC    insulin lispro  0-6 Units Subcutaneous Nightly    insulin glargine  24 Units Subcutaneous Nightly    vancomycin  1,250 mg Intravenous Q24H       Continuous Infusions:   dextrose         Data:  BMP:   Recent Labs     04/27/21  0534 04/28/21  0610 04/29/21  0524   * 136 134*   K 4.2 4.3 3.9   CL 97* 101 99   CO2 23 insulin gtt, admitted to ICU   - DKA protocol with Q4 hour labs, IVF, electrolyte management   - Critical Care consulted   - AG closed, transitioned to Lantus and SSI, carb controlled diet   - Continued home Neurontin for neuropathy   - DKA has resolved. - cont lantus 24 units nightly, medium dose SSI. - Added Humalog 3 times daily on 4/28, BG remains uncontrolled in mid 200-300s->  increase lantus to 30 units qhs      Chronic LLE wound   Acute cellulitis L foot  - diabetic foot would to the LLE, with acute infectious changes  - Follows with Dr. Karen Hart  - Hx of MRSA  - Xray showed plantar soft tissue ulcer without radiographic evidence for osteomyelitis   - Continue IV Vanc/ Cefepime D#5 (received Zosyn x 2 doses)  - Podiatry consulted - aspirated dorsal aspect of left 1st MPJ couple of days ago .   - S/P left foot debridement 4/28, plan for dc tomorrow on PO abx     Abnormal CXR  - atelectasis vs infection vs edema   - Clinically looks normal . No PNA suspected     Microcytic Anemia   - Baseline hgb ~11-12  - Hgb on admission 10.1 > 9.0  - no acute bleeding    - Checked iron studies, B12/folate - consistent with ACD     CKD 3a  - Baseline creatinine ~1.3   - Cr on admission 1.8 > 1.6 > 1.5 > 1.6 > 1.7  - Hold home ACE   - IVF - off  - no TORI     Pseudohyponatremia - Resolved  - Na: 123 on admission with corrected Na: 130   - Improving with BG control - 134 today      HTN  - BP controlled  - home lisinopril held 2/2 to CKD   - Use hydralazine prn, will monitor     GERD  - Continue PPI      DVT Prophylaxis: Lovenox  Diet: DIET CARB CONTROL; Carb Control: 3 carb choices (45 gms)/meal   Code Status: Full Code     CBC and BMP ordered for 4/30      Mathieu Browne MD   4/29/2021

## 2021-04-29 NOTE — PROGRESS NOTES
PROGRESS NOTE    Admit Date:  4/25/2021    Subjective:  56 y.o. male who is seen for evaluation of cellulitis and ulcers on the left LE. States feels OK today. Wants to go home. States some pain in the heel area of the left foot. No pain at the ulcer sites.          Past Medical History:        Diagnosis Date    TORI (acute kidney injury) ([facility] 09/12/2017    Bacteremia 05/05/2017    staph aureus    Diabetes mellitus ([facility]     Diabetic neuropathy ([facility]     Gout     MRSA (methicillin resistant staph aureus) culture positive 12/27/18, 9/12/17, 5/5/17,9/5/13, 1/15/14    ulcers bilateral legs    MRSA (methicillin resistant staph aureus) culture positive 01/21/2021    foot wound    Pneumonia     [facility]        Past Surgical History:        Procedure Laterality Date    EYE SURGERY      lens implants after removal of cataracts    OTHER SURGICAL HISTORY  12/28/2018    partial right foot amputation with graft application    TOE AMPUTATION Right 12/28/2018    PARTIAL RIGHT FOOT AMPUTATION WITH GRAFT APPLICATION performed by Tosha Tapia DPM at [address] [place] [address] N/A 1/21/2021    EGD BIOPSY performed by Toma Maurice DO at [facility] ENDOSCOPY       Current Medications:     insulin lispro  8 Units Subcutaneous TID WC    gabapentin  600 mg Oral BID    pantoprazole  40 mg Intravenous Daily    venlafaxine  75 mg Oral Daily    enoxaparin  40 mg Subcutaneous Daily    sodium chloride flush  5-40 mL Intravenous 2 times per day    cefepime  2,000 mg Intravenous Q12H    insulin lispro  0-12 Units Subcutaneous TID WC    insulin lispro  0-6 Units Subcutaneous Nightly    insulin glargine  24 Units Subcutaneous Nightly    vancomycin  1,250 mg Intravenous Q24H       Allergies:  Hydrocodone-acetaminophen, Percocet [oxycodone-acetaminophen], Pregabalin, and Vicodin [hydrocodone-acetaminophen]    Social History:    Social History     Tobacco Use    Smoking status: Never Smoker    Smokeless tobacco: Never Used   Substance Use Topics    Alcohol use: No     Comment: FORMER DRINKER approx. quit 2005    Drug use: No       Family History:       Problem Relation Age of Onset    Diabetes Maternal Grandfather     Heart Disease Paternal Uncle     High Blood Pressure Mother        Review of Systems    CONSTITUTIONAL:  negative  EYES:  negative  HEENT:  negative  RESPIRATORY:  negative  CARDIOVASCULAR:  negative  GASTROINTESTINAL:  negative  GENITOURINARY:  negative  INTEGUMENT/BREAST:  positive for ulcers  MUSCULOSKELETAL:  positive for pain  NEUROLOGICAL:  positive for numbness      Objective:   BP (!) 110/55   Pulse 77   Temp 98.4 °F (36.9 °C) (Oral)   Resp 18   Ht 5' 10\" (1.778 m)   Wt 182 lb 6.4 oz (82.7 kg)   SpO2 93%   BMI 26.17 kg/m²     Data:  CBC:   No results for input(s): WBC, HGB, HCT, MCV, PLT in the last 72 hours. BMP:   Recent Labs     04/26/21  1031 04/26/21  1257 04/27/21  0534 04/28/21  0610 04/29/21  0524   * 133* 129* 136 134*   K 4.4 4.0 4.2 4.3 3.9    101 97* 101 99   CO2 26 23 23 24 27   PHOS 2.6 2.3*  --   --   --    BUN 22* 21* 19 16 20   CREATININE 1.5* 1.4* 1.6* 1.6* 1.7*     LIVER PROFILE:   No results for input(s): AST, ALT, LIPASE, BILIDIR, BILITOT, ALKPHOS in the last 72 hours. Invalid input(s): AMYLASE,  ALB  PT/INR:   No results for input(s): PROT, INR in the last 72 hours. HgBA1c:  Lab Results   Component Value Date    LABA1C 11.3 04/25/2021     SARS-CoV-2, NAATNot Detected       Cultures: Blood x2 - NGSF  Wound - NGSF    Imaging: xray left foot - no osteomyelitis noted.        Physical Exam:    General Appearance: alert and oriented to person, place and time, well developed and well- nourished, in no acute distress  Skin: warm and dry, no rash or erythema  Head: normocephalic and atraumatic  Eyes: pupils equal, round, and reactive to light, extraocular eye movements intact, conjunctivae normal  ENT: tympanic membrane, accident) ([facility] I63.9    HTN (hypertension), benign I10    DKA, type 2, not at goal [facility]) E11.10    Diabetic foot infection ([facility] [address] E11.628, L08.9    Sepsis without acute organ dysfunction (HCC) A41.9    Stage 3a chronic kidney disease N18.31    Essential hypertension I10    Type 2 diabetes mellitus with hyperglycemia, with long-term current use of insulin (HCC) E11.65, Z79.4     diabetic foot ulcer left secondary to peripheral neuropathy  Abscess left foot - S/P debridement 4/28/21  Cellulitis left foot - improving  diabetes mellitus uncontrolled      Plan  Patient examined. Reviewed labs. Control glucose levels to prevent future complications. Dressing removed. [employer] AG and dry dressing to the plantar ulcer and covered with dry dressing. Elevation of legs to decrease pain, swelling and bleeding. Continue IV antibiotics. Hopefully able to D/C tomorrow on oral antibiotics.            Electronically signed by Vamsi Weldon DPM on 4/29/2021 at 9:53 AM.

## 2021-04-29 NOTE — PLAN OF CARE
Problem: Infection:  Goal: Will remain free from infection  Outcome: Ongoing     Problem: Daily Care:  Goal: Daily care needs are met  Outcome: Ongoing     Problem: Pain:  Goal: Patient's pain/discomfort is manageable  4/29/2021 1029 by Mendoza Whitney RN  Outcome: Ongoing  4/29/2021 0145 by Wilbert Burgos RN  Outcome: Ongoing     Problem: Skin Integrity:  Goal: Skin integrity will stabilize  4/29/2021 1029 by Mendoza Whitney RN  Outcome: Ongoing  4/29/2021 0145 by Wilbert Burgos RN  Outcome: Ongoing     Problem: Discharge Planning:  Goal: Patients continuum of care needs are met  Outcome: Ongoing

## 2021-04-30 VITALS
DIASTOLIC BLOOD PRESSURE: 75 MMHG | SYSTOLIC BLOOD PRESSURE: 135 MMHG | HEIGHT: 70 IN | HEART RATE: 68 BPM | RESPIRATION RATE: 18 BRPM | BODY MASS INDEX: 26.11 KG/M2 | TEMPERATURE: 97.6 F | WEIGHT: 182.4 LBS | OXYGEN SATURATION: 93 %

## 2021-04-30 LAB
ANION GAP SERPL CALCULATED.3IONS-SCNC: 8 MMOL/L (ref 3–16)
BASOPHILS ABSOLUTE: 0 K/UL (ref 0–0.2)
BASOPHILS RELATIVE PERCENT: 0.6 %
BLOOD CULTURE, ROUTINE: NORMAL
BUN BLDV-MCNC: 23 MG/DL (ref 7–20)
CALCIUM SERPL-MCNC: 8.7 MG/DL (ref 8.3–10.6)
CHLORIDE BLD-SCNC: 99 MMOL/L (ref 99–110)
CO2: 26 MMOL/L (ref 21–32)
CREAT SERPL-MCNC: 1.8 MG/DL (ref 0.8–1.3)
CULTURE, BLOOD 2: NORMAL
EOSINOPHILS ABSOLUTE: 0.4 K/UL (ref 0–0.6)
EOSINOPHILS RELATIVE PERCENT: 5.5 %
GFR AFRICAN AMERICAN: 46
GFR NON-AFRICAN AMERICAN: 38
GLUCOSE BLD-MCNC: 305 MG/DL (ref 70–99)
GLUCOSE BLD-MCNC: 350 MG/DL (ref 70–99)
GLUCOSE BLD-MCNC: 384 MG/DL (ref 70–99)
GLUCOSE BLD-MCNC: 384 MG/DL (ref 70–99)
HCT VFR BLD CALC: 27.3 % (ref 40.5–52.5)
HEMOGLOBIN: 8.9 G/DL (ref 13.5–17.5)
LYMPHOCYTES ABSOLUTE: 1 K/UL (ref 1–5.1)
LYMPHOCYTES RELATIVE PERCENT: 12.1 %
MCH RBC QN AUTO: 26.4 PG (ref 26–34)
MCHC RBC AUTO-ENTMCNC: 32.7 G/DL (ref 31–36)
MCV RBC AUTO: 80.7 FL (ref 80–100)
MONOCYTES ABSOLUTE: 1 K/UL (ref 0–1.3)
MONOCYTES RELATIVE PERCENT: 12.7 %
NEUTROPHILS ABSOLUTE: 5.6 K/UL (ref 1.7–7.7)
NEUTROPHILS RELATIVE PERCENT: 69.1 %
PDW BLD-RTO: 14.5 % (ref 12.4–15.4)
PERFORMED ON: ABNORMAL
PLATELET # BLD: 307 K/UL (ref 135–450)
PMV BLD AUTO: 8.5 FL (ref 5–10.5)
POTASSIUM SERPL-SCNC: 4.2 MMOL/L (ref 3.5–5.1)
RBC # BLD: 3.38 M/UL (ref 4.2–5.9)
SODIUM BLD-SCNC: 133 MMOL/L (ref 136–145)
WBC # BLD: 8.1 K/UL (ref 4–11)

## 2021-04-30 PROCEDURE — 36415 COLL VENOUS BLD VENIPUNCTURE: CPT

## 2021-04-30 PROCEDURE — 6360000002 HC RX W HCPCS: Performed by: PODIATRIST

## 2021-04-30 PROCEDURE — 6370000000 HC RX 637 (ALT 250 FOR IP): Performed by: PODIATRIST

## 2021-04-30 PROCEDURE — 99238 HOSP IP/OBS DSCHRG MGMT 30/<: CPT | Performed by: PHYSICIAN ASSISTANT

## 2021-04-30 PROCEDURE — 2580000003 HC RX 258: Performed by: PODIATRIST

## 2021-04-30 PROCEDURE — C9113 INJ PANTOPRAZOLE SODIUM, VIA: HCPCS | Performed by: PODIATRIST

## 2021-04-30 PROCEDURE — 80048 BASIC METABOLIC PNL TOTAL CA: CPT

## 2021-04-30 PROCEDURE — 85025 COMPLETE CBC W/AUTO DIFF WBC: CPT

## 2021-04-30 RX ORDER — INSULIN GLARGINE 100 [IU]/ML
40 INJECTION, SOLUTION SUBCUTANEOUS NIGHTLY
Qty: 1 VIAL | Refills: 0 | Status: SHIPPED | OUTPATIENT
Start: 2021-04-30

## 2021-04-30 RX ORDER — INSULIN LISPRO 100 [IU]/ML
10 INJECTION, SOLUTION INTRAVENOUS; SUBCUTANEOUS
Qty: 5 CARTRIDGE | Refills: 0 | Status: ON HOLD | OUTPATIENT
Start: 2021-04-30 | End: 2021-05-29 | Stop reason: SDUPTHER

## 2021-04-30 RX ORDER — CEPHALEXIN 500 MG/1
500 CAPSULE ORAL 2 TIMES DAILY
Qty: 10 CAPSULE | Refills: 0 | Status: SHIPPED | OUTPATIENT
Start: 2021-04-30 | End: 2021-05-05

## 2021-04-30 RX ORDER — AMLODIPINE BESYLATE 5 MG/1
5 TABLET ORAL DAILY
Qty: 30 TABLET | Refills: 3 | Status: SHIPPED | OUTPATIENT
Start: 2021-04-30

## 2021-04-30 RX ORDER — GREEN TEA/HOODIA GORDONII 315-12.5MG
1 CAPSULE ORAL DAILY
Qty: 10 TABLET | Refills: 0 | Status: SHIPPED | OUTPATIENT
Start: 2021-04-30 | End: 2021-05-10

## 2021-04-30 RX ORDER — DOXYCYCLINE HYCLATE 100 MG
100 TABLET ORAL 2 TIMES DAILY
Qty: 10 TABLET | Refills: 0 | Status: SHIPPED | OUTPATIENT
Start: 2021-04-30 | End: 2021-05-05

## 2021-04-30 RX ADMIN — VANCOMYCIN HYDROCHLORIDE 1250 MG: 10 INJECTION, POWDER, LYOPHILIZED, FOR SOLUTION INTRAVENOUS at 02:30

## 2021-04-30 RX ADMIN — ENOXAPARIN SODIUM 40 MG: 40 INJECTION SUBCUTANEOUS at 08:25

## 2021-04-30 RX ADMIN — GUAIFENESIN AND DEXTROMETHORPHAN 5 ML: 100; 10 SYRUP ORAL at 08:26

## 2021-04-30 RX ADMIN — CEFEPIME 2000 MG: 2 INJECTION, POWDER, FOR SOLUTION INTRAVENOUS at 10:19

## 2021-04-30 RX ADMIN — GABAPENTIN 600 MG: 300 CAPSULE ORAL at 08:25

## 2021-04-30 RX ADMIN — PANTOPRAZOLE SODIUM 40 MG: 40 INJECTION, POWDER, FOR SOLUTION INTRAVENOUS at 08:25

## 2021-04-30 RX ADMIN — INSULIN LISPRO 8 UNITS: 100 INJECTION, SOLUTION INTRAVENOUS; SUBCUTANEOUS at 11:42

## 2021-04-30 RX ADMIN — Medication 10 ML: at 08:26

## 2021-04-30 RX ADMIN — INSULIN LISPRO 8 UNITS: 100 INJECTION, SOLUTION INTRAVENOUS; SUBCUTANEOUS at 08:32

## 2021-04-30 RX ADMIN — VENLAFAXINE HYDROCHLORIDE 75 MG: 75 CAPSULE, EXTENDED RELEASE ORAL at 08:26

## 2021-04-30 ASSESSMENT — PAIN SCALES - GENERAL: PAINLEVEL_OUTOF10: 0

## 2021-04-30 NOTE — PROGRESS NOTES
Vancomycin Day: 5    Patient's labs, cultures, vitals, and vancomycin regimen reviewed. No changes today.   Check trough on 5/1 at Herib[facility] LUNA 8/23/[id number]:90 PM  .

## 2021-04-30 NOTE — DISCHARGE INSTR - COC
Continuity of Care Form    Patient Name: Janeth Yan   :  1960  MRN:  [id number]    Admit date:  2021  Discharge date:  ***    Code Status Order: Full Code   Advance Directives:   Advance Care Flowsheet Documentation       Date/Time Healthcare Directive Type of Healthcare Directive Copy in [address] Agent's Name Healthcare Agent's Phone Number    21 1638  No, patient does not have an advance directive for healthcare treatment -- -- -- -- --            Admitting Physician:  Cristofer Tyler MD  PCP: Gui Keene MD    Discharging Nurse: [facility] Unit/Room#: 9150/6478-95  Discharging Unit Phone Number: ***    Emergency Contact:   Extended Emergency Contact Information  Primary Emergency Contact: Glenda Garay  Address: [address] [place] [address] Phone: [phone]  Mobile Phone: [phone]  Relation: Spouse  Secondary Emergency Contact: [address] [facility] Phone: [phone]  Relation: Parent    Past Surgical History:  Past Surgical History:   Procedure Laterality Date    EYE SURGERY      lens implants after removal of cataracts    FOOT DEBRIDEMENT Left 2021    DEBRIDEMENT INFECTED BONE AND TISSUE LEFT FOOT performed by Asia Anderson DPM at [address] [facility] [address]  2018    partial right foot amputation with graft application    TOE AMPUTATION Right 2018    PARTIAL RIGHT FOOT AMPUTATION WITH GRAFT APPLICATION performed by Asia Anderson DPM at [address] N/A 2021    EGD BIOPSY performed by Alvarado Fisher DO at [facility]       Immunization History:   Immunization History   Administered Date(s) Administered    Influenza Virus Vaccine 2001    Tdap (Boostrix, Adacel) 2008       Active Problems:  Patient Active Problem List   Diagnosis Code    Diabetic ketoacidosis without coma associated with type 2 diabetes mellitus ([facility] E11.10    Diabetic ulcer of left foot associated with type 2 diabetes mellitus ([facility] E11.621, L97.529    Nausea & vomiting R11.2    Diabetic neuropathy ([facility]) E11.40    DM (diabetes mellitus), secondary, uncontrolled, w/neurologic complic ([facility]) N61.44, T12.24    Gout M10.9    Hx MRSA Z22.322    Hyperkalemia E87.5    Chronic pain on chronic opioids G89.29    Chronic LE diabetic ulcers E11.622, L97.309    Mild protein-calorie malnutrition ([facility]) E44.1    Cellulitis L03.90    Acute kidney injury ([facility] N17.9    Neuropathy G62.9    Diabetic acidosis without coma ([facility]) E11.10    Acute CVA (cerebrovascular accident) ([facility] I63.9    HTN (hypertension), benign I10    DKA, type 2, not at goal [facility]) E11.10    Diabetic foot infection ([facility] E11.628, L08.9    Sepsis without acute organ dysfunction ([facility] A41.9    Stage 3a chronic kidney disease N18.31    Essential hypertension I10    Type 2 diabetes mellitus with hyperglycemia, with long-term current use of insulin ([facility]) E11.65, Z79.4       Isolation/Infection:   Isolation            No Isolation          Patient Infection Status       Infection Onset Added Last Indicated Last Indicated By Review Planned Expiration Resolved Resolved By    None active    Resolved    COVID-19 Rule Out 04/26/21 04/26/21 04/26/21 COVID-19, Rapid (Ordered)   04/26/21 Rule-Out Test Resulted    MRSA 01/21/21 01/22/21 01/21/21 Culture, Wound   03/07/21 Miranda Chambers RN    COVID-19 Rule Out 01/19/21 01/19/21 01/19/21 COVID-19 (Ordered)   01/20/21 Rule-Out Test Resulted    COVID-19 Rule Out 01/18/21 01/18/21 01/18/21 COVID-19 (Ordered)   01/18/21 Rule-Out Test Resulted    MRSA  05/07/17 12/27/18 Wound Culture   01/19/21 Jacklynn Fothergill, RN    MRSA  09/09/13 09/09/13 Preethi Sanchez RN   11/19/15 Preethi Sanchez RN            Nurse Assessment:  Last Vital Signs: /75   Pulse 68   Temp 97.6 °F (36.4 °C) (Oral)   Resp 18   Ht 5' 10\" (1.778 m)   Wt 182 lb 6.4 oz (82.7 kg)   SpO2 93%   BMI 26.17 kg/m²     Last documented pain score (0-10 scale): Pain Level: 0  Last Weight:   Wt Readings from Last 1 Encounters:   04/27/21 182 lb 6.4 oz (82.7 kg)     Mental Status:  {IP PT MENTAL STATUS:20030:::0}    IV Access:  [address] ANTONIO IV ACCESS:622550979:::0}    Nursing Mobility/ADLs:  Walking   {CHP DME ADLs:509854104:::0}  Transfer  {CHP DME ADLs:926248304:::0}  Bathing  {CHP DME ADLs:921637953:::0}  Dressing  {CHP DME ADLs:673692953:::0}  Toileting  {CHP DME ADLs:878024905:::0}  Feeding  {CHP DME ADLs:869441456:::0}  Med Admin  {CHP DME ADLs:860736800:::0}  Med Delivery   { ANTONIO MED Delivery:590998497:::0}    Wound Care Documentation and Therapy:  Wound 01/31/19 Leg Inner; Lower Venous Ulcer, healing over 4 years  (Active)   Number of days: 820       Wound 09/26/19 Foot Left;Plantar healing veneous wound (Active)   Number of days: 582       Wound 09/26/19 Tibial Distal;Left;Dorsal;Inner healing wound on inner lower leg (Active)   Number of days: 582       Wound 04/26/21 Pretibial Distal;Left round open area 2.2 x 2.2 (Active)   Wound Image   04/26/21 0421   Dressing Status Intact 04/30/21 0823   Wound Cleansed Cleansed with saline 04/29/21 1226   Dressing/Treatment Dry dressing 04/30/21 0823   Wound Length (cm) 2.2 cm 04/26/21 0421   Wound Width (cm) 2.2 cm 04/26/21 0421   Wound Depth (cm) 0.2 cm 04/26/21 0421   Wound Surface Area (cm^2) 4.84 cm^2 04/26/21 0421   Wound Volume (cm^3) 0.97 cm^3 04/26/21 0421   Wound Assessment Dry 04/29/21 1226   Drainage Amount None 04/29/21 1226   Odor None 04/29/21 1226   Laura-wound Assessment Intact; Blanchable erythema 04/28/21 1319   Number of days: 4       Wound 04/26/21  Left;Plantar Round, no drainage, 1.5 x 1.5 x 0.5 (Active)   Wound Image   04/26/21 0421   Dressing Status Clean;Dry; Intact 04/30/21 0823   Wound Cleansed Not Cleansed 04/28/21 1319   Dressing/Treatment Open to air 04/28/21 1319   Wound Length (cm) 1.5 cm 04/26/21 0421   Wound Width (cm) 1.5 cm 04/26/21 0421   Wound Depth (cm) 0.5 cm 21   Wound Surface Area (cm^2) 2.25 cm^2 21 0421   Wound Volume (cm^3) 1.12 cm^3 21 0421   Wound Assessment Pale granulation tissue 21 1319   Drainage Amount None 21 1226   Odor None 21 1226   Laura-wound Assessment Intact 21 1319   Number of days: 4        Elimination:  Continence: Bowel: {YES / JX:20122}  Bladder: {YES / QT:92842}  Urinary Catheter: {Urinary Catheter:602728882:::0}   Colostomy/Ileostomy/Ileal Conduit: {YES / MO:72890}       Date of Last BM: ***    Intake/Output Summary (Last 24 hours) at 2021 1203  Last data filed at 2021 0923  Gross per 24 hour   Intake 1784 ml   Output    Net 1784 ml     I/O last 3 completed shifts: In: 9646 [P.O.:1320;  I.V.:464]  Out: -     Safety Concerns:     [address] Safety Concerns:422888602:::0}    Impairments/Disabilities:      [address] Impairments/Disabilities:537729602:::0}    Nutrition Therapy:  Current Nutrition Therapy:   [address] Diet List:522367290:::0}    Routes of Feeding: {CHP DME Other Feedings:965383531:::0}  Liquids: {Slp liquid thickness:74950}  Daily Fluid Restriction: {CHP DME Yes amt example:167599624:::0}  Last Modified Barium Swallow with Video (Video Swallowing Test): {Done Not Done EVXT:447690797:::3}    Treatments at the Time of Hospital Discharge:   Respiratory Treatments: ***  Oxygen Therapy:  {Therapy; copd oxygen:38482:::0}  Ventilator:    { CC Vent List:407725459:::0}    Rehab Therapies: {THERAPEUTIC INTERVENTION:0621486423}  Weight Bearing Status/Restrictions: [address] Weight Bearin:::0}  Other Medical Equipment (for information only, NOT a DME order):  {EQUIPMENT:415510746}  Other Treatments: ***    Patient's personal belongings (please select all that are sent with patient):  {CHP DME Belongings:860739851:::0}    RN SIGNATURE:  {Esignature:121107277:::0}    CASE MANAGEMENT/SOCIAL WORK SECTION    Inpatient Status Date: ***    Readmission Risk Assessment Score: Readmission Risk              Risk of Unplanned Readmission:        20           Discharging to Facility/ Agency   Name:   Address:  Phone:  Fax:    Dialysis Facility (if applicable)   Name:  Address:  Dialysis Schedule:  Phone:  Fax:    / signature: {Esignature:648102970:::0}    PHYSICIAN SECTION    Prognosis: {Prognosis:8668855941:::0}    Condition at Discharge: Danya Butler Patient Condition:179422586:::0}    Rehab Potential (if transferring to Rehab): {Prognosis:9106999730:::0}    Recommended Labs or Other Treatments After Discharge: ***  OK to leave dressing intact until seen in Dr. Steffi Rivera office next week. Please call [phone] to schedule appointment for Tuesday 5/4/21. Elevation of left leg to decrease swelling, bleeding and pain. Minimal walking. Physician Certification: I certify the above information and transfer of Jake Barrientos  is necessary for the continuing treatment of the diagnosis listed and that he requires {Admit to Appropriate Level of Care:34937:::0} for {GREATER/LESS:001866824} 30 days.      Update Admission H&P: {CHP DME Changes in HandP:466904269:::0}    PHYSICIAN SIGNATURE:  {Esignature:044253855:::0}

## 2021-04-30 NOTE — CARE COORDINATION
DISCHARGE ORDER  Date/Time 2021 2:25 PM  Completed by: David Calle, Case Management    Patient Name: Carl Estrada      : 1960  Admitting Diagnosis: DKA, type 2, not at goal [facility]) [E11.10]      Admit order Date and Status: IP 2021  (verify MD's last order for status of admission)      Noted discharge order. If applicable PT/OT recommendation at Discharge: NA  DME recommendation by PT/OT: NA  Confirmed discharge plan with patient   Discharge Plan: Chart reviewed. Met with pt at bedside and explained the role of the CM. Pt will DC home today in care of spouse. He will follow up with podiatry in one week. Reviewed chart. Role of discharge planner explained and patient verbalized understanding. Discharge order is noted. Has Home O2 in place on admit:  No  Informed of need to bring portable home O2 tank on day of discharge for nursing to connect prior to leaving:   Not Indicated  Verbalized agreement/Understanding:   Not Indicated  Pt is being d/c'd to home today. Pt's O2 sats are 93% on RA. Discharge timeout done with Carlos Drake. All discharge needs and concerns addressed.

## 2021-04-30 NOTE — PROGRESS NOTES
PROGRESS NOTE    Admit Date:  4/25/2021    Subjective:  56 y.o. male who is seen for evaluation of cellulitis and ulcers on the left LE. States feels OK today. Wants to go home. States some pain in the heel area of the left foot. No pain at the ulcer sites.          Past Medical History:        Diagnosis Date    TORI (acute kidney injury) ([facility] 09/12/2017    Bacteremia 05/05/2017    staph aureus    Diabetes mellitus ([facility]     Diabetic neuropathy ([facility]     Gout     MRSA (methicillin resistant staph aureus) culture positive 12/27/18, 9/12/17, 5/5/17,9/5/13, 1/15/14    ulcers bilateral legs    MRSA (methicillin resistant staph aureus) culture positive 01/21/2021    foot wound    Pneumonia     Pyogenic inflammation of bone ([facility]        Past Surgical History:        Procedure Laterality Date    EYE SURGERY      lens implants after removal of cataracts    FOOT DEBRIDEMENT Left 4/28/2021    DEBRIDEMENT INFECTED BONE AND TISSUE LEFT FOOT performed by Shannon Fleming DPM at [address] [place] [address]  12/28/2018    partial right foot amputation with graft application    TOE AMPUTATION Right 12/28/2018    PARTIAL RIGHT FOOT AMPUTATION WITH GRAFT APPLICATION performed by Shannon Fleming DPM at [address] [facility] N/A 1/21/2021    EGD BIOPSY performed by Waleska Vera DO at [facility] ENDOSCOPY       Current Medications:     insulin glargine  30 Units Subcutaneous Nightly    insulin lispro  8 Units Subcutaneous TID WC    gabapentin  600 mg Oral BID    pantoprazole  40 mg Intravenous Daily    venlafaxine  75 mg Oral Daily    enoxaparin  40 mg Subcutaneous Daily    sodium chloride flush  5-40 mL Intravenous 2 times per day    cefepime  2,000 mg Intravenous Q12H    insulin lispro  0-12 Units Subcutaneous TID WC    insulin lispro  0-6 Units Subcutaneous Nightly    vancomycin  1,250 mg Intravenous Q24H       Allergies:  Hydrocodone-acetaminophen, Percocet [oxycodone-acetaminophen], distress  Skin: warm and dry, no rash or erythema  Head: normocephalic and atraumatic  Eyes: pupils equal, round, and reactive to light, extraocular eye movements intact, conjunctivae normal  ENT: tympanic membrane, external ear and ear canal normal bilaterally, nose without deformity, nasal mucosa and turbinates normal without polyps  Neck: supple and non-tender without mass, no thyromegaly or thyroid nodules, no cervical lymphadenopathy  Pulmonary/Chest: clear to auscultation bilaterally- no wheezes, rales or rhonchi, normal air movement, no respiratory distress  Cardiovascular: normal rate, regular rhythm, normal S1 and S2, no murmurs, rubs, clicks, or gallops, distal pulses intact, no carotid bruits  Abdomen: soft, non-tender, non-distended, normal bowel sounds, no masses or organomegaly    Dressing left foot - clean, dry and intact  DP/PT palpable bilateral  Ulcer on the medial aspect left ankle with red granulation tissue noted. Mild erythema periwound consistent with his baseline. Ulcer at incision site with red granulation tissue and mild serous drainage noted. No purulence noted. Mild erythema and edema noted. Minimal edema noted. Ulcer on the plantar aspect left 1st MH with mild red granulation tissue. No fibrotic tissue noted. Mild periwound erythema and edema noted. No increase in skin temperature noted. Probes to bone of the 1st metatarsal.  Plantarflexed left 1st metatarsal noted.          Assessment:  Patient Active Problem List   Diagnosis Code    Diabetic ketoacidosis without coma associated with type 2 diabetes mellitus ([address] E11.10    Diabetic ulcer of left foot associated with type 2 diabetes mellitus ([address] E11.621, L97.529    Nausea & vomiting R11.2    Diabetic neuropathy ([address] E11.40    DM (diabetes mellitus), secondary, uncontrolled, w/neurologic complic ([address] I92.54, G08.77    Gout M10.9    Hx MRSA Z22.322    Hyperkalemia E87.5    Chronic pain on chronic opioids G89.29    Chronic LE diabetic ulcers E11.622, L97.309    Mild protein-calorie malnutrition (HCC) E44.1    Cellulitis L03.90    Acute kidney injury ([facility] N17.9    Neuropathy G62.9    Diabetic acidosis without coma (HCC) E11.10    Acute CVA (cerebrovascular accident) ([facility] I63.9    HTN (hypertension), benign I10    DKA, type 2, not at goal [facility]) E11.10    Diabetic foot infection ([facility] E11.628, L08.9    Sepsis without acute organ dysfunction (HCC) A41.9    Stage 3a chronic kidney disease N18.31    Essential hypertension I10    Type 2 diabetes mellitus with hyperglycemia, with long-term current use of insulin ([facility]) E11.65, Z79.4     diabetic foot ulcer left secondary to peripheral neuropathy  Abscess left foot - S/P debridement 4/28/21  Cellulitis left foot - improving  diabetes mellitus uncontrolled        Plan  Patient examined. Reviewed labs. Control glucose levels to prevent future complications. Dressing removed. [employer] and dry dressing to the plantar ulcer and covered with dry dressing. Elevation of legs to decrease pain, swelling and bleeding. OK to D/C on oral antibiotics. No current growth from intraop culture. However previous culture showed MRSA. I would recommend coverage for MRSA and broad spectrum such as Keflex or Augmentin as well. OK for D/C home from Podiatry standpoint.            Electronically signed by Saud Cline DPM on 4/30/2021 at 11:47 AM.

## 2021-04-30 NOTE — DISCHARGE SUMMARY
radiographic evidence for osteomyelitis   - was on IV Vanc/ Cefepime D#5 (received Zosyn x 2 doses)  - Podiatry consulted - aspirated dorsal aspect of left 1st MPJ couple of days ago . - S/P left foot debridement 4/28  - discharged with Keflex, Doxycycline, and lactobacillus x 5 days with plans to f/u with Podiatry next week     Abnormal CXR  - atelectasis vs infection vs edema   - Clinically looks normal . No PNA suspected     Microcytic Anemia   - Baseline hgb ~11-12  - Hgb on admission 10.1 > 9.0  - no acute bleeding    - Checked iron studies, B12/folate - consistent with ACD     CKD 3a  - Baseline creatinine ~1.3   - Cr on admission 1.8 > 1.6 > 1.5 > 1.6 > 1.7 > 1.8  - Hold home ACE   - s/p IVF  - Cr trended up, no decrease in UOP, stopped IV Vanco and Lisinopril, plan for repeat BMP within a week, pt will need to f/u with PCP before restarting Lisinopril pending results of repeat BMP, encouraged PO hydration, pt aware     Pseudohyponatremia - Resolved  - Na: 123 on admission with corrected Na: 130   - Improving with BG control - 133 at d/c     HTN  - BP controlled  - home lisinopril held 2/2 to CKD   - Stopped Lisinopril at d/c 2/2 rising Cr, start Norvasc at discharge  - f/u with PCP on discharge before resuming lisinopril pending repeat BMP     GERD  - Continued PPI     Procedures (Please Review Full Report for Details)  Debridement of infected bone and tissue, left foot on 4/28    Consults    Critical Care  Podiatry    Physical Exam at Discharge:    /75   Pulse 68   Temp 97.6 °F (36.4 °C) (Oral)   Resp 18   Ht 5' 10\" (1.778 m)   Wt 182 lb 6.4 oz (82.7 kg)   SpO2 93%   BMI 26.17 kg/m²   General:  Middle aged male, Awake, alert and oriented.  Appears to be not in any distress, resting comfortably in chair  Mucous Membranes:  Pink , anicteric  Neck: supple, trachea midline  Chest:  Clear to auscultation bilaterally, no added sounds, on RA  Cardiovascular:  RRR S1S2 heard, no murmurs or days     doxycycline hyclate 100 MG tablet  Commonly known as: VIBRA-TABS  Take 1 tablet by mouth 2 times daily for 5 days     Probiotic Acidophilus Tabs  Take 1 tablet by mouth daily for 10 days        CHANGE how you take these medications    insulin glargine 100 UNIT/ML injection vial  Commonly known as: LANTUS  Inject 40 Units into the skin nightly  What changed: how much to take        CONTINUE taking these medications    gabapentin 600 MG tablet  Commonly known as: NEURONTIN     HumaLOG 100 UNIT/ML injection cartridge  Generic drug: insulin lispro  Inject 10 Units into the skin 3 times daily (before meals)     pantoprazole 40 MG tablet  Commonly known as: PROTONIX     sildenafil 100 MG tablet  Commonly known as: VIAGRA     venlafaxine 75 MG extended release capsule  Commonly known as: EFFEXOR XR        STOP taking these medications    blood glucose test strips strip  Commonly known as: ASCENSIA AUTODISC VI;ONE TOUCH ULTRA TEST VI     Insulin Pen Needle 32G X 4 MM Misc     lisinopril 10 MG tablet  Commonly known as: PRINIVIL;ZESTRIL           Where to Get Your Medications      These medications were sent to [id number] [address] [place] [address]  [address] [place] [address]    Phone: [phone]   · amLODIPine 5 MG tablet  · cephALEXin 500 MG capsule  · doxycycline hyclate 100 MG tablet  · HumaLOG 100 UNIT/ML injection cartridge  · insulin glargine 100 UNIT/ML injection vial  · Probiotic Acidophilus Tabs           Discharged in stable condition to home. Follow Up: Follow up with PCP in 1 week. F/u with Podiatry within a week.     Jamil Lopez PA-C 2:09 PM 4/30/2021

## 2021-04-30 NOTE — PROGRESS NOTES
Patient and wife given discharge instructions both verbally and written along with follow up appointments, blood work and prescriptions. Patient and wife expressed full understanding of discharge instructions on the significance of keeping L foot drsg clean dry and intact, to stay off feet and the follow up BMP. Patient left via wheelchair via car accompanied by wife all belongings sent.

## 2021-04-30 NOTE — PROGRESS NOTES
Dr. Marbella Leach here and changed patients drsg on Left foot. Dr. Marbella Leach gave patient instructions on care of L Foot and drsg. To keep it clean and dry. Sponge baths only and to stay off his feet, no strenuous activity of any kind. To leave drsg in place and it will be changed in 1 week at the office. Writer reinforced the above with patient.

## 2021-04-30 NOTE — FLOWSHEET NOTE
04/29/21 2034   Vital Signs   Temp 100.7 °F (38.2 °C)   Temp Source Oral   Pulse 78   Heart Rate Source Monitor   Resp 18   /69   BP Location Right upper arm   Patient Position Sitting   Level of Consciousness Alert (0)   MEWS Score 1   Oxygen Therapy   SpO2 90 %   O2 Device None (Room air)   Assessment complete- see flowsheets. Pt resting in chair at this time; Pt has been educated to hospital falls prevention policy. They are aware they will be assessed every shift, and with any condition changes, by the nursing staff on their ability to perform their ADL's without need for assistance. Pt understands that based on the number of their score they are given a base score that will assign a level of low, medium, or high risk for falls. This patient has rated a high which requires a bed / chair alarm for their safety to prevent a fall. The patient is alert and oriented, and acknowledges and understands the need for intervention but refuses the application and use of the bed / chair alarm that is required per policy. Pt agrees to use call light and wait for help to arrive to assist them to get up when they need to. Call light is within reach and all other safety measures in place. Pt denies further needs, will continue to monitor.   Ethan Pulido RN

## 2021-05-02 LAB
ANAEROBIC CULTURE: ABNORMAL
CULTURE SURGICAL: ABNORMAL
GRAM STAIN RESULT: ABNORMAL
ORGANISM: ABNORMAL
ORGANISM: ABNORMAL

## 2021-05-14 ENCOUNTER — HOSPITAL ENCOUNTER (EMERGENCY)
Age: 61
Discharge: HOME | End: 2021-05-14
Payer: COMMERCIAL

## 2021-05-14 VITALS
BODY MASS INDEX: 24.77 KG/M2 | DIASTOLIC BLOOD PRESSURE: 86 MMHG | RESPIRATION RATE: 18 BRPM | OXYGEN SATURATION: 99 % | WEIGHT: 173 LBS | SYSTOLIC BLOOD PRESSURE: 130 MMHG | TEMPERATURE: 98.7 F | HEIGHT: 70 IN | HEART RATE: 75 BPM

## 2021-05-14 DIAGNOSIS — E11.628 DIABETIC INFECTION OF LEFT FOOT (HCC): Primary | ICD-10-CM

## 2021-05-14 DIAGNOSIS — L08.9 DIABETIC INFECTION OF LEFT FOOT (HCC): Primary | ICD-10-CM

## 2021-05-14 PROCEDURE — 99283 EMERGENCY DEPT VISIT LOW MDM: CPT

## 2021-05-14 ASSESSMENT — ENCOUNTER SYMPTOMS
SHORTNESS OF BREATH: 0
BACK PAIN: 0
COUGH: 0
ABDOMINAL PAIN: 0
VOMITING: 0
SORE THROAT: 0
NAUSEA: 0
EYE PAIN: 0

## 2021-05-14 ASSESSMENT — PAIN DESCRIPTION - ORIENTATION: ORIENTATION: LEFT

## 2021-05-14 ASSESSMENT — PAIN DESCRIPTION - DESCRIPTORS: DESCRIPTORS: THROBBING

## 2021-05-14 ASSESSMENT — PAIN DESCRIPTION - LOCATION: LOCATION: FOOT

## 2021-05-14 NOTE — ED PROVIDER NOTES
Negative for polydipsia and polyuria. Genitourinary: Negative for dysuria and frequency. Musculoskeletal: Negative for back pain and neck pain. Skin: Positive for wound. Neurological: Negative for weakness, numbness and headaches. Psychiatric/Behavioral: Negative for confusion. Positives and Pertinent negatives as per HPI. Except as noted above in the ROS, all other systems were reviewed and negative.        PAST MEDICAL HISTORY     Past Medical History:   Diagnosis Date    TORI (acute kidney injury) ([facility] 09/12/2017    Bacteremia 05/05/2017    staph aureus    Diabetes mellitus ([facility]     Diabetic neuropathy ([facility] [facility]     Gout     MRSA (methicillin resistant staph aureus) culture positive 12/27/18, 9/12/17, 5/5/17,9/5/13, 1/15/14    ulcers bilateral legs    MRSA (methicillin resistant staph aureus) culture positive 01/21/2021    foot wound    Pneumonia     Pyogenic inflammation of bone ([facility] [address]          SURGICAL HISTORY     Past Surgical History:   Procedure Laterality Date    EYE SURGERY      lens implants after removal of cataracts    FOOT DEBRIDEMENT Left 4/28/2021    DEBRIDEMENT INFECTED BONE AND TISSUE LEFT FOOT performed by Mary Ribera DPM at [address] [place] [address]  12/28/2018    partial right foot amputation with graft application    TOE AMPUTATION Right 12/28/2018    PARTIAL RIGHT FOOT AMPUTATION WITH GRAFT APPLICATION performed by Mary Ribera DPM at [address] [place] [address] 1/21/2021    EGD BIOPSY performed by Rd Vu DO at [facility] Medication List as of 5/14/2021  1:16 PM      CONTINUE these medications which have NOT CHANGED    Details   insulin glargine (LANTUS) 100 UNIT/ML injection vial Inject 40 Units into the skin nightly, Disp-1 vial, R-0Normal      insulin lispro (HUMALOG) 100 UNIT/ML injection cartridge Inject 10 Units into the skin 3 times daily (before meals), Disp-5 Cartridge, R-0Normal      amLODIPine (NORVASC) 5 MG tablet Take 1 tablet by mouth daily, Disp-30 tablet, R-3Normal      pantoprazole (PROTONIX) 40 MG tablet Take 40 mg by mouth dailyHistorical Med      sildenafil (VIAGRA) 100 MG tablet Take 100 mg by mouth daily as neededHistorical Med      gabapentin (NEURONTIN) 600 MG tablet Take 600 mg by mouth 2 times daily. Historical Med      venlafaxine (EFFEXOR XR) 75 MG extended release capsule Take 75 mg by mouth dailyHistorical Med               ALLERGIES     Hydrocodone-acetaminophen, Percocet [oxycodone-acetaminophen], Pregabalin, and Vicodin [hydrocodone-acetaminophen]    FAMILYHISTORY       Family History   Problem Relation Age of Onset    Diabetes Maternal Grandfather     Heart Disease Paternal Uncle     High Blood Pressure Mother           SOCIAL HISTORY       Social History     Tobacco Use    Smoking status: Never Smoker    Smokeless tobacco: Never Used   Substance Use Topics    Alcohol use: No     Comment: FORMER DRINKER approx. quit 2005    Drug use: No       SCREENINGS             PHYSICAL EXAM    (up to 7 for level 4, 8 or more for level 5)     ED Triage Vitals [05/14/21 1009]   BP Temp Temp src Pulse Resp SpO2 Height Weight   (!) 141/86 98.7 °F (37.1 °C) -- 75 18 98 % 5' 10\" (1.778 m) 173 lb (78.5 kg)       Physical Exam  Nursing note reviewed. Constitutional:       Appearance: He is not diaphoretic. HENT:      Nose: No congestion or rhinorrhea. Eyes:      General: No scleral icterus. Conjunctiva/sclera: Conjunctivae normal.   Neck:      Musculoskeletal: Normal range of motion and neck supple. Cardiovascular:      Rate and Rhythm: Normal rate and regular rhythm. Pulses: Normal pulses. Heart sounds: Normal heart sounds. No murmur. No friction rub. No gallop. Pulmonary:      Effort: Pulmonary effort is normal. No respiratory distress. Breath sounds: Normal breath sounds. No stridor. No wheezing, rhonchi or rales.    Abdominal: General: There is no distension. Palpations: Abdomen is soft. Tenderness: There is no abdominal tenderness. There is no guarding or rebound. Musculoskeletal: Normal range of motion. General: No swelling or tenderness. Skin:     General: Skin is warm and dry. Capillary Refill: Capillary refill takes less than 2 seconds. Comments: Open wound plantar left foot with surrounding erythema and purulence. See photos   Neurological:      General: No focal deficit present. Mental Status: He is alert and oriented to person, place, and time. Sensory: No sensory deficit. Motor: No weakness. Psychiatric:         Mood and Affect: Mood normal.         Behavior: Behavior normal.                 DIAGNOSTIC RESULTS   LABS:    Labs Reviewed - No data to display    All other labs were within normal range or not returned as of this dictation. EKG: All EKG's are interpreted by the Emergency Department Physician in the absence of a cardiologist.  Please see their note for interpretation of EKG. RADIOLOGY:   Non-plain film images such as CT, Ultrasound and MRI are read by the radiologist. Plain radiographic images are visualized and preliminarily interpreted by the ED Provider with the below findings:        Interpretation per the Radiologist below, if available at the time of this note:    No orders to display     No results found. PROCEDURES   Unless otherwise noted below, none     Wound care    Date/Time: 5/14/2021 7:27 PM  Performed by: POLO Ragsdale  Authorized by: POLO Ragsdale     Consent:     Consent obtained:  Verbal    Consent given by:  Patient  Procedure details:     Indications: open wounds and skin infection      Wound exploration location: extremity      Wound age (days):  >14  Dressing:     Packing/drain action: new packing      Packing material: Gelfoam.    Dressing: Roll gauze.     Wrapped with:  Bulky dressing (Bulky dressing and elastic bandage.)  Post-procedure details:     Patient tolerance of procedure: Tolerated well, no immediate complications  Comments:      Skin was cleaned with Betadine, allowed to dry completely. Gelfoam inserted into open wound on plantar foot. Wrapped with rolled gauze then bulky dressing by myself. Elastic bandage placed by nursing staff. CRITICAL CARE TIME   N/A    CONSULTS:  IP CONSULT TO PODIATRY      EMERGENCY DEPARTMENT COURSE and DIFFERENTIAL DIAGNOSIS/MDM:   Vitals:    Vitals:    05/14/21 1009 05/14/21 1110 05/14/21 1200 05/14/21 1317   BP: (!) 141/86 (!) 144/80 136/88 130/86   Pulse: 75 70 75 75   Resp: 18      Temp: 98.7 °F (37.1 °C)      SpO2: 98% 98% 98% 99%   Weight: 173 lb (78.5 kg)      Height: 5' 10\" (1.778 m)          Patient was given the following medications:  Medications - No data to display        68-year-old male presents the emergency department for redness and swelling of left foot. Had a surgical incision and drainage on 428 with podiatrist Dr. Mateus Trejo.  has been taking antibiotics as prescribed without resolution of redness or swelling. Afebrile, nonseptic. Exam notable for erythema and purulence, no pain or proportion, crepitus or signs of necrotizing soft tissue injury. Did discuss the case with his podiatrist Dr. Mateus Trejo who saw the photos, he was at the wound overall does not have a worsening appearance compared to the surgery. Recommend patient continue oral antibiotics, but this can be cleaned with Betadine and Gelfoam packing be placed where the surgical incision was placed and that he would see him in the office as scheduled on Tuesday. Discussed with patient who is agreeable with plan. Wound care per procedure note. Surgery continue take oral antibiotics as prescribed.   Instructed to return the return for new or worsening symptoms including but not limited to worsening purulent drainage, fever, pain of left foot, numbness, weakness, any other symptoms he is

## 2021-05-14 NOTE — ED NOTES
1058- [place] sent to Dr Jaylen Disla  05/14/21 2579    1106- Dr Tj Fortune returned call     Nino Rodriguez  05/14/21 8216

## 2021-05-25 ENCOUNTER — APPOINTMENT (OUTPATIENT)
Dept: GENERAL RADIOLOGY | Age: 61
DRG: 617 | End: 2021-05-25
Payer: MEDICARE

## 2021-05-25 ENCOUNTER — HOSPITAL ENCOUNTER (INPATIENT)
Age: 61
LOS: 4 days | Discharge: HOME HEALTH | DRG: 617 | End: 2021-05-29
Attending: STUDENT IN AN ORGANIZED HEALTH CARE EDUCATION/TRAINING PROGRAM | Admitting: INTERNAL MEDICINE
Payer: MEDICARE

## 2021-05-25 DIAGNOSIS — R73.9 HYPERGLYCEMIA: ICD-10-CM

## 2021-05-25 DIAGNOSIS — M86.172 OTHER ACUTE OSTEOMYELITIS OF LEFT FOOT (HCC): Primary | ICD-10-CM

## 2021-05-25 LAB
A/G RATIO: 0.7 (ref 1.1–2.2)
ALBUMIN SERPL-MCNC: 3.4 G/DL (ref 3.4–5)
ALP BLD-CCNC: 155 U/L (ref 40–129)
ALT SERPL-CCNC: 8 U/L (ref 10–40)
ANION GAP SERPL CALCULATED.3IONS-SCNC: 11 MMOL/L (ref 3–16)
AST SERPL-CCNC: 8 U/L (ref 15–37)
BASE EXCESS VENOUS: 1.3 MMOL/L (ref -3–3)
BASOPHILS ABSOLUTE: 0.1 K/UL (ref 0–0.2)
BASOPHILS RELATIVE PERCENT: 0.8 %
BETA-HYDROXYBUTYRATE: 2.1 MMOL/L (ref 0–0.27)
BILIRUB SERPL-MCNC: 0.4 MG/DL (ref 0–1)
BILIRUBIN URINE: NEGATIVE
BLOOD, URINE: ABNORMAL
BUN BLDV-MCNC: 31 MG/DL (ref 7–20)
CALCIUM SERPL-MCNC: 9 MG/DL (ref 8.3–10.6)
CARBOXYHEMOGLOBIN: 1.2 % (ref 0–1.5)
CHLORIDE BLD-SCNC: 90 MMOL/L (ref 99–110)
CLARITY: CLEAR
CO2: 26 MMOL/L (ref 21–32)
COLOR: YELLOW
CREAT SERPL-MCNC: 1.5 MG/DL (ref 0.8–1.3)
EOSINOPHILS ABSOLUTE: 0.2 K/UL (ref 0–0.6)
EOSINOPHILS RELATIVE PERCENT: 1.4 %
EPITHELIAL CELLS, UA: NORMAL /HPF (ref 0–5)
GFR AFRICAN AMERICAN: 57
GFR NON-AFRICAN AMERICAN: 47
GLOBULIN: 4.8 G/DL
GLUCOSE BLD-MCNC: 415 MG/DL (ref 70–99)
GLUCOSE URINE: >=1000 MG/DL
HCO3 VENOUS: 27.9 MMOL/L (ref 23–29)
HCT VFR BLD CALC: 30 % (ref 40.5–52.5)
HEMOGLOBIN: 9.5 G/DL (ref 13.5–17.5)
INFLUENZA A: NOT DETECTED
INFLUENZA B: NOT DETECTED
INR BLD: 1.03 (ref 0.86–1.14)
KETONES, URINE: ABNORMAL MG/DL
LACTIC ACID, SEPSIS: 1 MMOL/L (ref 0.4–1.9)
LEUKOCYTE ESTERASE, URINE: NEGATIVE
LIPASE: 14 U/L (ref 13–60)
LYMPHOCYTES ABSOLUTE: 0.8 K/UL (ref 1–5.1)
LYMPHOCYTES RELATIVE PERCENT: 7 %
MCH RBC QN AUTO: 24.9 PG (ref 26–34)
MCHC RBC AUTO-ENTMCNC: 31.9 G/DL (ref 31–36)
MCV RBC AUTO: 78.2 FL (ref 80–100)
METHEMOGLOBIN VENOUS: 0.3 %
MICROSCOPIC EXAMINATION: YES
MONOCYTES ABSOLUTE: 0.9 K/UL (ref 0–1.3)
MONOCYTES RELATIVE PERCENT: 7.8 %
NEUTROPHILS ABSOLUTE: 9.3 K/UL (ref 1.7–7.7)
NEUTROPHILS RELATIVE PERCENT: 83 %
NITRITE, URINE: NEGATIVE
O2 CONTENT, VEN: 8 VOL %
O2 SAT, VEN: 51 %
O2 THERAPY: ABNORMAL
PCO2, VEN: 54.3 MMHG (ref 40–50)
PDW BLD-RTO: 14.1 % (ref 12.4–15.4)
PH UA: 5.5 (ref 5–8)
PH VENOUS: 7.33 (ref 7.35–7.45)
PLATELET # BLD: 395 K/UL (ref 135–450)
PMV BLD AUTO: 8.4 FL (ref 5–10.5)
PO2, VEN: 29.7 MMHG (ref 25–40)
POTASSIUM REFLEX MAGNESIUM: 4.8 MMOL/L (ref 3.5–5.1)
PROTEIN UA: 30 MG/DL
PROTHROMBIN TIME: 11.9 SEC (ref 10–13.2)
RBC # BLD: 3.83 M/UL (ref 4.2–5.9)
RBC UA: NORMAL /HPF (ref 0–4)
SARS-COV-2 RNA, RT PCR: NOT DETECTED
SODIUM BLD-SCNC: 127 MMOL/L (ref 136–145)
SPECIFIC GRAVITY UA: 1.01 (ref 1–1.03)
TCO2 CALC VENOUS: 30 MMOL/L
TOTAL PROTEIN: 8.2 G/DL (ref 6.4–8.2)
TROPONIN: 0.01 NG/ML
URINE TYPE: ABNORMAL
UROBILINOGEN, URINE: 0.2 E.U./DL
WBC # BLD: 11.2 K/UL (ref 4–11)
WBC UA: NORMAL /HPF (ref 0–5)

## 2021-05-25 PROCEDURE — 1200000000 HC SEMI PRIVATE

## 2021-05-25 PROCEDURE — 85025 COMPLETE CBC W/AUTO DIFF WBC: CPT

## 2021-05-25 PROCEDURE — 83690 ASSAY OF LIPASE: CPT

## 2021-05-25 PROCEDURE — 82010 KETONE BODYS QUAN: CPT

## 2021-05-25 PROCEDURE — 85610 PROTHROMBIN TIME: CPT

## 2021-05-25 PROCEDURE — 83605 ASSAY OF LACTIC ACID: CPT

## 2021-05-25 PROCEDURE — 96372 THER/PROPH/DIAG INJ SC/IM: CPT

## 2021-05-25 PROCEDURE — 80053 COMPREHEN METABOLIC PANEL: CPT

## 2021-05-25 PROCEDURE — 6370000000 HC RX 637 (ALT 250 FOR IP): Performed by: STUDENT IN AN ORGANIZED HEALTH CARE EDUCATION/TRAINING PROGRAM

## 2021-05-25 PROCEDURE — 83036 HEMOGLOBIN GLYCOSYLATED A1C: CPT

## 2021-05-25 PROCEDURE — 2580000003 HC RX 258: Performed by: STUDENT IN AN ORGANIZED HEALTH CARE EDUCATION/TRAINING PROGRAM

## 2021-05-25 PROCEDURE — 99285 EMERGENCY DEPT VISIT HI MDM: CPT

## 2021-05-25 PROCEDURE — 87636 SARSCOV2 & INF A&B AMP PRB: CPT

## 2021-05-25 PROCEDURE — 84484 ASSAY OF TROPONIN QUANT: CPT

## 2021-05-25 PROCEDURE — 96361 HYDRATE IV INFUSION ADD-ON: CPT

## 2021-05-25 PROCEDURE — 73630 X-RAY EXAM OF FOOT: CPT

## 2021-05-25 PROCEDURE — 81001 URINALYSIS AUTO W/SCOPE: CPT

## 2021-05-25 PROCEDURE — 6360000002 HC RX W HCPCS: Performed by: STUDENT IN AN ORGANIZED HEALTH CARE EDUCATION/TRAINING PROGRAM

## 2021-05-25 PROCEDURE — 82803 BLOOD GASES ANY COMBINATION: CPT

## 2021-05-25 PROCEDURE — 96360 HYDRATION IV INFUSION INIT: CPT

## 2021-05-25 RX ORDER — 0.9 % SODIUM CHLORIDE 0.9 %
1000 INTRAVENOUS SOLUTION INTRAVENOUS ONCE
Status: COMPLETED | OUTPATIENT
Start: 2021-05-25 | End: 2021-05-25

## 2021-05-25 RX ADMIN — SODIUM CHLORIDE 1000 ML: 9 INJECTION, SOLUTION INTRAVENOUS at 18:55

## 2021-05-25 RX ADMIN — VANCOMYCIN HYDROCHLORIDE 1750 MG: 10 INJECTION, POWDER, LYOPHILIZED, FOR SOLUTION INTRAVENOUS at 22:38

## 2021-05-25 RX ADMIN — INSULIN LISPRO 10 UNITS: 100 INJECTION, SOLUTION INTRAVENOUS; SUBCUTANEOUS at 21:08

## 2021-05-25 RX ADMIN — PIPERACILLIN SODIUM AND TAZOBACTAM SODIUM 3375 MG: 3; .375 INJECTION, POWDER, LYOPHILIZED, FOR SOLUTION INTRAVENOUS at 21:48

## 2021-05-25 ASSESSMENT — ENCOUNTER SYMPTOMS
CONSTIPATION: 0
ABDOMINAL PAIN: 0
RHINORRHEA: 0
SORE THROAT: 0
NAUSEA: 1
VOMITING: 1
BACK PAIN: 0
DIARRHEA: 0
PHOTOPHOBIA: 0
SHORTNESS OF BREATH: 0
COUGH: 0

## 2021-05-25 ASSESSMENT — PAIN SCALES - GENERAL: PAINLEVEL_OUTOF10: 8

## 2021-05-25 ASSESSMENT — PAIN DESCRIPTION - PAIN TYPE: TYPE: ACUTE PAIN

## 2021-05-25 ASSESSMENT — PAIN DESCRIPTION - LOCATION: LOCATION: ABDOMEN

## 2021-05-25 NOTE — ED NOTES
Shift handoff given to [facility]. Janice Thompson RN       Janice Thompson RN  05/25/21 6211

## 2021-05-25 NOTE — ED PROVIDER NOTES
[address] ED  EMERGENCY DEPARTMENT ENCOUNTER      Pt Name: Remi Lin  MRN: [id number]  [place] 1960  Date of evaluation: 5/25/2021  Provider: Delmer Molina DO    CHIEF COMPLAINT       Chief Complaint   Patient presents with    Hyperglycemia     read hi this am 495 for ems, bp 160/89 and nausea and vomiting ODT zofran 4mg given ems. HISTORY OF PRESENT ILLNESS   (Location/Symptom, Timing/Onset, Context/Setting, Quality, Duration, Modifying Factors, Severity)  Note limiting factors. Remi Lin is a 56 y.o. male 2 diabetes on insulin who presents to the emergency department complaining of presenting with nausea vomiting, hyperglycemia into the 500s. On my evaluation he stating began to feel nauseous and had episodes of vomiting today while at his mother's house. On arrival here he is awake alert and oriented not actively vomiting. Denies abdominal pain states that she is feels uneasy. Is currently being evaluated by podiatry for chronic left toe infection. History of diabetes, history of DKA. Denies urinary complaints, chest pain, shortness of breath, new cough, fevers. Nursing Notes were reviewed.     PAST MEDICAL HISTORY     Past Medical History:   Diagnosis Date    TORI (acute kidney injury) ([address] 09/12/2017    Bacteremia 05/05/2017    staph aureus    Diabetes mellitus ([address]     Diabetic neuropathy ([address]     Gout     MRSA (methicillin resistant staph aureus) culture positive 12/27/18, 9/12/17, 5/5/17,9/5/13, 1/15/14    ulcers bilateral legs    MRSA (methicillin resistant staph aureus) culture positive 01/21/2021    foot wound    Pneumonia     Pyogenic inflammation of bone ([address]          SURGICAL HISTORY       Past Surgical History:   Procedure Laterality Date    EYE SURGERY      lens implants after removal of cataracts    FOOT DEBRIDEMENT Left 4/28/2021    DEBRIDEMENT INFECTED BONE AND TISSUE LEFT FOOT performed by Murry Bamberger, DPM at [address] [place] [address], [facility] [address] SURGICAL HISTORY  12/28/2018    partial right foot amputation with graft application    TOE AMPUTATION Right 12/28/2018    PARTIAL RIGHT FOOT AMPUTATION WITH GRAFT APPLICATION performed by Lalita Santana DPM at [address] [place] [address] Rd 1/21/2021    EGD BIOPSY performed by Odilia Calero DO at [address]       Previous Medications    AMLODIPINE (NORVASC) 5 MG TABLET    Take 1 tablet by mouth daily    GABAPENTIN (NEURONTIN) 600 MG TABLET    Take 600 mg by mouth 2 times daily. INSULIN GLARGINE (LANTUS) 100 UNIT/ML INJECTION VIAL    Inject 40 Units into the skin nightly    INSULIN LISPRO (HUMALOG) 100 UNIT/ML INJECTION CARTRIDGE    Inject 10 Units into the skin 3 times daily (before meals)    PANTOPRAZOLE (PROTONIX) 40 MG TABLET    Take 40 mg by mouth daily    SILDENAFIL (VIAGRA) 100 MG TABLET    Take 100 mg by mouth daily as needed    VENLAFAXINE (EFFEXOR XR) 75 MG EXTENDED RELEASE CAPSULE    Take 75 mg by mouth daily       ALLERGIES     Hydrocodone-acetaminophen, Percocet [oxycodone-acetaminophen], Pregabalin, and Vicodin [hydrocodone-acetaminophen]    FAMILY HISTORY       Family History   Problem Relation Age of Onset    Diabetes Maternal Grandfather     Heart Disease Paternal Uncle     High Blood Pressure Mother           SOCIAL HISTORY       Social History     Socioeconomic History    Marital status:      Spouse name: Marcos Moe Number of children: 1    Years of education: None    Highest education level: None   Occupational History    None   Tobacco Use    Smoking status: Never Smoker    Smokeless tobacco: Never Used   Vaping Use    Vaping Use: Never used   Substance and Sexual Activity    Alcohol use: No     Comment: FORMER DRINKER approx.  quit 2005    Drug use: No    Sexual activity: Yes     Partners: Female   Other Topics Concern    None   Social History Narrative    None     Social Determinants of Health     Financial Resource Strain:     Difficulty of Paying Living Expenses:    Food Insecurity:     Worried About Running Out of Food in the Last Year:     [address] in the Last Year:    Transportation Needs:     Lack of Transportation (Medical):  Lack of Transportation (Non-Medical):    Physical Activity:     Days of Exercise per Week:     Minutes of Exercise per Session:    Stress:     Feeling of Stress :    Social Connections:     Frequency of Communication with Friends and Family:     Frequency of Social Gatherings with Friends and Family:     Attends [employer] Services:     Active Member of Clubs or Organizations:     Attends Club or Organization Meetings:     Marital Status:    Intimate Partner Violence:     Fear of Current or Ex-Partner:     Emotionally Abused:     Physically Abused:     Sexually Abused:        SCREENINGS        [place] Coma Scale  Eye Opening: Spontaneous  Best Verbal Response: Oriented  Best Motor Response: Obeys commands  [place] Coma Scale Score: 15                   REVIEW OF SYSTEMS    (2-9 systems for level 4, 10 or more for level 5)   Review of Systems   Constitutional: Negative for chills, fatigue and fever. HENT: Negative for congestion, rhinorrhea and sore throat. Eyes: Negative for photophobia and visual disturbance. Respiratory: Negative for cough and shortness of breath. Cardiovascular: Negative for chest pain and palpitations. Gastrointestinal: Positive for nausea and vomiting. Negative for abdominal pain, constipation and diarrhea. Genitourinary: Negative for decreased urine volume. Musculoskeletal: Negative for back pain, neck pain and neck stiffness. Skin: Negative for rash. Chronic foot wound left foot   Allergic/Immunologic: Positive for immunocompromised state. Neurological: Negative for weakness, numbness and headaches. Psychiatric/Behavioral: Negative for confusion.          PHYSICAL EXAM    (up to 7 for level 4, 8 or more for level 5)     ED Triage Vitals   BP Temp Temp src Pulse Resp SpO2 Height Weight   -- -- -- -- -- -- -- --       Physical Exam  Constitutional:       General: He is not in acute distress. Appearance: He is not diaphoretic. HENT:      Head: Normocephalic and atraumatic. Eyes:      Pupils: Pupils are equal, round, and reactive to light. Neck:      Trachea: No tracheal deviation. Cardiovascular:      Rate and Rhythm: Regular rhythm. Tachycardia present. Pulmonary:      Effort: Pulmonary effort is normal. No respiratory distress. Breath sounds: No stridor. No wheezing. Abdominal:      General: There is no distension. Palpations: Abdomen is soft. Tenderness: There is no abdominal tenderness. Musculoskeletal:         General: No deformity. Normal range of motion. Cervical back: Normal range of motion and neck supple. Skin:     General: Skin is warm and dry. Neurological:      Mental Status: He is alert and oriented to person, place, and time. DIAGNOSTIC RESULTS     EKG: All EKG's are interpreted by the Emergency Department Physician who either signs or Co-signs this chart in the absence of a cardiologist.      The Ekg interpreted by me shows  normal sinus rhythm with a rate of 83  Axis is   Left axis deviation  QTc is  normal  Intervals and Durations are unremarkable. ST Segments: no acute change    No significant change from prior EKG dated/5/2021          RADIOLOGY:   Non-plain film images such as CT, Ultrasound and MRI are read by the radiologist. Plain radiographic images are visualized and preliminarily interpreted by the emergency physician.     Interpretation per the Radiologist below, if available at the time of this note:    XR FOOT LEFT (MIN 3 VIEWS)   Final Result   Extensive changes of osteomyelitis involving the 1st metatarsal and 1st   proximal phalanx               LABS:  Labs Reviewed   CBC WITH AUTO DIFFERENTIAL - Abnormal; Notable for the following Narrative:     Performed at:  [facility] - [facility]  [address],  [place], [place]   Phone [phone]   LIPASE    Narrative:     Performed at:  [facility]  [address],  [place] [facility]   Phone [phone]   TROPONIN    Narrative:     Performed at:  [facility]  [address],  [place], [facility]   Phone [phone]   MICROSCOPIC URINALYSIS    Narrative:     Performed at:  [facility]  [address],  [place], [facility]   Phone [phone]   PROTIME-INR    Narrative:     Performed at:  [facility]) - [facility]  [address],  [place], [place] [facility]   Phone [phone]       All other labs were within normal range or not returned as of this dictation. EMERGENCY DEPARTMENT COURSE and DIFFERENTIAL DIAGNOSIS/MDM:   Iftikhar Norman is a 56 y.o. male who presents to the emergency department with the complaint of hyperglycemia, episodes of nausea vomiting. Diabetic, on insulin prior history of DKA. Arrives awake alert oriented complains of mild crampy pain in the abdomen generalized, nausea but no active vomiting now. Sugars were nearly 500 at home. Does have a chronic left foot wound saw podiatry today, not quite on antibiotics for this. Patient has wrapped the left foot, will plan for x-ray left foot, will evaluate wounds wrap is removed once patient is placed into a room. Is slightly tachycardic here in the upper 90s otherwise vitals are stable. Will treat with IV fluids, will obtain ketone level, VBG, basic labs. Patient's left foot dressing was removed does have purulent drainage over the first metatarsal and phalanx. X-rays concerning for osteomyelitis patient started on vancomycin, Zosyn.   Minimal leukocytosis 11.2 he is afebrile here    Renal function appears at baseline, pseudohyponatremia with blood glucose in the 400 range, will treat with 10 units subcu insulin. Plan is to mid to hospital service for management of uncontrolled hyperglycemia, osteomyelitis. Patient follows with Dr. Darlene Valenzuela of podiatry. CRITICAL CARE TIME   Total Critical Care time was 0 minutes, excluding separately reportable procedures. There was a high probability of clinically significant/life threatening deterioration in the patient's condition which required my urgent intervention. Clinical concern   Intervention     CONSULTS:  PHARMACY TO DOSE VANCOMYCIN  IP CONSULT TO HOSPITALIST  IP CONSULT TO PHARMACY  IP CONSULT TO PODIATRY    PROCEDURES:  Unless otherwise noted below, none     Procedures        FINAL IMPRESSION      1. Other acute osteomyelitis of left foot ([address]    2. Hyperglycemia          DISPOSITION/PLAN   DISPOSITION  admit      PATIENT REFERRED TO:  No follow-up provider specified. DISCHARGE MEDICATIONS:  New Prescriptions    No medications on file     Controlled Substances Monitoring:     No flowsheet data found.     (Please note that portions of this note were completed with a voice recognition program.  Efforts were made to edit the dictations but occasionally words are mis-transcribed.)    Britni Milian DO (electronically signed)  Attending Emergency Physician            Britni Milian DO  05/25/21 8569

## 2021-05-26 ENCOUNTER — ANESTHESIA EVENT (OUTPATIENT)
Dept: OPERATING ROOM | Age: 61
DRG: 617 | End: 2021-05-26
Payer: MEDICARE

## 2021-05-26 LAB
A/G RATIO: 0.7 (ref 1.1–2.2)
ALBUMIN SERPL-MCNC: 2.9 G/DL (ref 3.4–5)
ALP BLD-CCNC: 125 U/L (ref 40–129)
ALT SERPL-CCNC: <5 U/L (ref 10–40)
ANION GAP SERPL CALCULATED.3IONS-SCNC: 7 MMOL/L (ref 3–16)
AST SERPL-CCNC: 7 U/L (ref 15–37)
BASOPHILS ABSOLUTE: 0.1 K/UL (ref 0–0.2)
BASOPHILS RELATIVE PERCENT: 0.6 %
BILIRUB SERPL-MCNC: 0.3 MG/DL (ref 0–1)
BUN BLDV-MCNC: 26 MG/DL (ref 7–20)
CALCIUM SERPL-MCNC: 8.4 MG/DL (ref 8.3–10.6)
CHLORIDE BLD-SCNC: 95 MMOL/L (ref 99–110)
CO2: 28 MMOL/L (ref 21–32)
CREAT SERPL-MCNC: 1.5 MG/DL (ref 0.8–1.3)
EOSINOPHILS ABSOLUTE: 0.1 K/UL (ref 0–0.6)
EOSINOPHILS RELATIVE PERCENT: 1.3 %
ESTIMATED AVERAGE GLUCOSE: 317.8 MG/DL
GFR AFRICAN AMERICAN: 57
GFR NON-AFRICAN AMERICAN: 47
GLOBULIN: 4.1 G/DL
GLUCOSE BLD-MCNC: 101 MG/DL (ref 70–99)
GLUCOSE BLD-MCNC: 109 MG/DL (ref 70–99)
GLUCOSE BLD-MCNC: 239 MG/DL (ref 70–99)
GLUCOSE BLD-MCNC: 295 MG/DL (ref 70–99)
GLUCOSE BLD-MCNC: 331 MG/DL (ref 70–99)
GLUCOSE BLD-MCNC: 346 MG/DL (ref 70–99)
GLUCOSE BLD-MCNC: 356 MG/DL (ref 70–99)
HBA1C MFR BLD: 12.7 %
HCT VFR BLD CALC: 26.8 % (ref 40.5–52.5)
HEMOGLOBIN: 8.8 G/DL (ref 13.5–17.5)
LYMPHOCYTES ABSOLUTE: 1 K/UL (ref 1–5.1)
LYMPHOCYTES RELATIVE PERCENT: 9.3 %
MCH RBC QN AUTO: 25.5 PG (ref 26–34)
MCHC RBC AUTO-ENTMCNC: 32.7 G/DL (ref 31–36)
MCV RBC AUTO: 77.8 FL (ref 80–100)
MONOCYTES ABSOLUTE: 1 K/UL (ref 0–1.3)
MONOCYTES RELATIVE PERCENT: 9.4 %
NEUTROPHILS ABSOLUTE: 8.1 K/UL (ref 1.7–7.7)
NEUTROPHILS RELATIVE PERCENT: 79.4 %
PDW BLD-RTO: 14 % (ref 12.4–15.4)
PERFORMED ON: ABNORMAL
PLATELET # BLD: 347 K/UL (ref 135–450)
PMV BLD AUTO: 8.1 FL (ref 5–10.5)
POTASSIUM REFLEX MAGNESIUM: 3.8 MMOL/L (ref 3.5–5.1)
RBC # BLD: 3.45 M/UL (ref 4.2–5.9)
SODIUM BLD-SCNC: 130 MMOL/L (ref 136–145)
TOTAL PROTEIN: 7 G/DL (ref 6.4–8.2)
WBC # BLD: 10.3 K/UL (ref 4–11)

## 2021-05-26 PROCEDURE — 6360000002 HC RX W HCPCS: Performed by: INTERNAL MEDICINE

## 2021-05-26 PROCEDURE — 99232 SBSQ HOSP IP/OBS MODERATE 35: CPT | Performed by: NURSE PRACTITIONER

## 2021-05-26 PROCEDURE — 2580000003 HC RX 258: Performed by: INTERNAL MEDICINE

## 2021-05-26 PROCEDURE — 6370000000 HC RX 637 (ALT 250 FOR IP): Performed by: NURSE PRACTITIONER

## 2021-05-26 PROCEDURE — 6370000000 HC RX 637 (ALT 250 FOR IP): Performed by: INTERNAL MEDICINE

## 2021-05-26 PROCEDURE — 87040 BLOOD CULTURE FOR BACTERIA: CPT

## 2021-05-26 PROCEDURE — 80053 COMPREHEN METABOLIC PANEL: CPT

## 2021-05-26 PROCEDURE — 1200000000 HC SEMI PRIVATE

## 2021-05-26 PROCEDURE — 85025 COMPLETE CBC W/AUTO DIFF WBC: CPT

## 2021-05-26 RX ORDER — ACETAMINOPHEN 325 MG/1
650 TABLET ORAL EVERY 6 HOURS PRN
Status: DISCONTINUED | OUTPATIENT
Start: 2021-05-26 | End: 2021-05-29 | Stop reason: HOSPADM

## 2021-05-26 RX ORDER — INSULIN GLARGINE 100 [IU]/ML
40 INJECTION, SOLUTION SUBCUTANEOUS NIGHTLY
Status: DISCONTINUED | OUTPATIENT
Start: 2021-05-26 | End: 2021-05-29 | Stop reason: HOSPADM

## 2021-05-26 RX ORDER — SODIUM CHLORIDE 0.9 % (FLUSH) 0.9 %
5-40 SYRINGE (ML) INJECTION PRN
Status: DISCONTINUED | OUTPATIENT
Start: 2021-05-26 | End: 2021-05-29 | Stop reason: HOSPADM

## 2021-05-26 RX ORDER — PANTOPRAZOLE SODIUM 40 MG/1
40 TABLET, DELAYED RELEASE ORAL DAILY
Status: DISCONTINUED | OUTPATIENT
Start: 2021-05-26 | End: 2021-05-29 | Stop reason: HOSPADM

## 2021-05-26 RX ORDER — POTASSIUM CHLORIDE 7.45 MG/ML
10 INJECTION INTRAVENOUS PRN
Status: DISCONTINUED | OUTPATIENT
Start: 2021-05-26 | End: 2021-05-29 | Stop reason: HOSPADM

## 2021-05-26 RX ORDER — MAGNESIUM SULFATE IN WATER 40 MG/ML
2000 INJECTION, SOLUTION INTRAVENOUS PRN
Status: DISCONTINUED | OUTPATIENT
Start: 2021-05-26 | End: 2021-05-29 | Stop reason: HOSPADM

## 2021-05-26 RX ORDER — PROMETHAZINE HYDROCHLORIDE 25 MG/1
12.5 TABLET ORAL EVERY 6 HOURS PRN
Status: DISCONTINUED | OUTPATIENT
Start: 2021-05-26 | End: 2021-05-29 | Stop reason: HOSPADM

## 2021-05-26 RX ORDER — POLYETHYLENE GLYCOL 3350 17 G/17G
17 POWDER, FOR SOLUTION ORAL DAILY PRN
Status: DISCONTINUED | OUTPATIENT
Start: 2021-05-26 | End: 2021-05-29 | Stop reason: HOSPADM

## 2021-05-26 RX ORDER — SODIUM CHLORIDE 9 MG/ML
25 INJECTION, SOLUTION INTRAVENOUS PRN
Status: DISCONTINUED | OUTPATIENT
Start: 2021-05-26 | End: 2021-05-29 | Stop reason: HOSPADM

## 2021-05-26 RX ORDER — GABAPENTIN 300 MG/1
600 CAPSULE ORAL 2 TIMES DAILY
Status: DISCONTINUED | OUTPATIENT
Start: 2021-05-26 | End: 2021-05-29 | Stop reason: HOSPADM

## 2021-05-26 RX ORDER — SODIUM CHLORIDE 9 MG/ML
INJECTION, SOLUTION INTRAVENOUS CONTINUOUS
Status: DISCONTINUED | OUTPATIENT
Start: 2021-05-26 | End: 2021-05-27

## 2021-05-26 RX ORDER — POTASSIUM CHLORIDE 20 MEQ/1
40 TABLET, EXTENDED RELEASE ORAL PRN
Status: DISCONTINUED | OUTPATIENT
Start: 2021-05-26 | End: 2021-05-29 | Stop reason: HOSPADM

## 2021-05-26 RX ORDER — DEXTROSE MONOHYDRATE 50 MG/ML
100 INJECTION, SOLUTION INTRAVENOUS PRN
Status: DISCONTINUED | OUTPATIENT
Start: 2021-05-26 | End: 2021-05-29 | Stop reason: HOSPADM

## 2021-05-26 RX ORDER — DEXTROSE MONOHYDRATE 25 G/50ML
12.5 INJECTION, SOLUTION INTRAVENOUS PRN
Status: DISCONTINUED | OUTPATIENT
Start: 2021-05-26 | End: 2021-05-29 | Stop reason: HOSPADM

## 2021-05-26 RX ORDER — PEN NEEDLE, DIABETIC 29 G X1/2"
NEEDLE, DISPOSABLE MISCELLANEOUS
COMMUNITY
Start: 2021-04-29

## 2021-05-26 RX ORDER — NICOTINE POLACRILEX 4 MG
15 LOZENGE BUCCAL PRN
Status: DISCONTINUED | OUTPATIENT
Start: 2021-05-26 | End: 2021-05-29 | Stop reason: HOSPADM

## 2021-05-26 RX ORDER — TAMSULOSIN HYDROCHLORIDE 0.4 MG/1
0.4 CAPSULE ORAL DAILY
Status: DISCONTINUED | OUTPATIENT
Start: 2021-05-26 | End: 2021-05-29 | Stop reason: HOSPADM

## 2021-05-26 RX ORDER — VENLAFAXINE HYDROCHLORIDE 75 MG/1
75 CAPSULE, EXTENDED RELEASE ORAL DAILY
Status: DISCONTINUED | OUTPATIENT
Start: 2021-05-26 | End: 2021-05-29 | Stop reason: HOSPADM

## 2021-05-26 RX ORDER — ONDANSETRON 2 MG/ML
4 INJECTION INTRAMUSCULAR; INTRAVENOUS EVERY 6 HOURS PRN
Status: DISCONTINUED | OUTPATIENT
Start: 2021-05-26 | End: 2021-05-29 | Stop reason: HOSPADM

## 2021-05-26 RX ORDER — ACETAMINOPHEN 650 MG/1
650 SUPPOSITORY RECTAL EVERY 6 HOURS PRN
Status: DISCONTINUED | OUTPATIENT
Start: 2021-05-26 | End: 2021-05-29 | Stop reason: HOSPADM

## 2021-05-26 RX ORDER — FLASH GLUCOSE SCANNING READER
EACH MISCELLANEOUS
COMMUNITY
Start: 2021-05-05

## 2021-05-26 RX ORDER — FLASH GLUCOSE SENSOR
KIT MISCELLANEOUS
COMMUNITY
Start: 2021-05-05

## 2021-05-26 RX ORDER — SODIUM CHLORIDE 0.9 % (FLUSH) 0.9 %
5-40 SYRINGE (ML) INJECTION EVERY 12 HOURS SCHEDULED
Status: DISCONTINUED | OUTPATIENT
Start: 2021-05-26 | End: 2021-05-29 | Stop reason: HOSPADM

## 2021-05-26 RX ORDER — AMLODIPINE BESYLATE 5 MG/1
5 TABLET ORAL DAILY
Status: DISCONTINUED | OUTPATIENT
Start: 2021-05-26 | End: 2021-05-29 | Stop reason: HOSPADM

## 2021-05-26 RX ORDER — PEN NEEDLE, DIABETIC 32GX 5/32"
NEEDLE, DISPOSABLE MISCELLANEOUS
COMMUNITY
Start: 2021-05-05

## 2021-05-26 RX ADMIN — INSULIN LISPRO 10 UNITS: 100 INJECTION, SOLUTION INTRAVENOUS; SUBCUTANEOUS at 00:30

## 2021-05-26 RX ADMIN — SODIUM CHLORIDE: 9 INJECTION, SOLUTION INTRAVENOUS at 13:48

## 2021-05-26 RX ADMIN — TAMSULOSIN HYDROCHLORIDE 0.4 MG: 0.4 CAPSULE ORAL at 12:16

## 2021-05-26 RX ADMIN — GABAPENTIN 600 MG: 300 CAPSULE ORAL at 08:21

## 2021-05-26 RX ADMIN — VANCOMYCIN HYDROCHLORIDE 1500 MG: 10 INJECTION, POWDER, LYOPHILIZED, FOR SOLUTION INTRAVENOUS at 22:39

## 2021-05-26 RX ADMIN — GABAPENTIN 600 MG: 300 CAPSULE ORAL at 21:26

## 2021-05-26 RX ADMIN — ENOXAPARIN SODIUM 40 MG: 40 INJECTION SUBCUTANEOUS at 08:22

## 2021-05-26 RX ADMIN — SODIUM CHLORIDE: 9 INJECTION, SOLUTION INTRAVENOUS at 01:00

## 2021-05-26 RX ADMIN — CEFEPIME 2000 MG: 2 INJECTION, POWDER, FOR SOLUTION INTRAVENOUS at 04:10

## 2021-05-26 RX ADMIN — INSULIN GLARGINE 40 UNITS: 100 INJECTION, SOLUTION SUBCUTANEOUS at 01:11

## 2021-05-26 RX ADMIN — GABAPENTIN 600 MG: 300 CAPSULE ORAL at 00:55

## 2021-05-26 RX ADMIN — INSULIN LISPRO 4 UNITS: 100 INJECTION, SOLUTION INTRAVENOUS; SUBCUTANEOUS at 04:15

## 2021-05-26 RX ADMIN — VENLAFAXINE HYDROCHLORIDE 75 MG: 75 CAPSULE, EXTENDED RELEASE ORAL at 08:23

## 2021-05-26 RX ADMIN — PANTOPRAZOLE SODIUM 40 MG: 40 TABLET, DELAYED RELEASE ORAL at 08:22

## 2021-05-26 RX ADMIN — INSULIN GLARGINE 40 UNITS: 100 INJECTION, SOLUTION SUBCUTANEOUS at 21:26

## 2021-05-26 RX ADMIN — CEFEPIME 2000 MG: 2 INJECTION, POWDER, FOR SOLUTION INTRAVENOUS at 17:35

## 2021-05-26 ASSESSMENT — PAIN SCALES - GENERAL
PAINLEVEL_OUTOF10: 0

## 2021-05-26 NOTE — PROGRESS NOTES
in place at bedside. 2. Then ask patient to advance step and return each foot. Some medical conditions may render a patient from stepping backwards, use your best clinical judgement. Fail- Patient not able to complete tasks OR requires use of assistive device. Patient is MOBILITY LEVEL 3. Mobility Level- 3   Pt has previous amputations. Pt states he may have to use a cane to ambulate around.

## 2021-05-26 NOTE — CONSULTS
CONSULT    Admit Date:  5/25/2021    Subjective:  56 y.o. male who is seen for evaluation of cellulitis and osteomyelitis left foot. Patient has been followed in the office for this wound. Last week bone was solid to palpation. Yesterday was seen in the office and bone was soft and piece of bone was removed. Also found to have large amount of purulence in the wound site. Discussion for admission was had at that time but patient did not want to proceed with admission at that time. However later in the day started to feel badly and sugar levels were high. Came to the ER last night and was admitted. States feeling OK at this time. Concerned due to wife losing her insurance.        Past Medical History:        Diagnosis Date    TORI (acute kidney injury) ([facility] 09/12/2017    Bacteremia 05/05/2017    staph aureus    Diabetes mellitus ([facility]     Diabetic neuropathy ([facility] [address] [facility]     Gout     Hypertension     MRSA (methicillin resistant staph aureus) culture positive 12/27/18, 9/12/17, 5/5/17,9/5/13, 1/15/14    ulcers bilateral legs    MRSA (methicillin resistant staph aureus) culture positive 01/21/2021    foot wound    Neuropathy     Pneumonia     Pyogenic inflammation of bone ([facility]        Past Surgical History:        Procedure Laterality Date    EYE SURGERY      lens implants after removal of cataracts    FOOT DEBRIDEMENT Left 4/28/2021    DEBRIDEMENT INFECTED BONE AND TISSUE LEFT FOOT performed by Shannan Amador DPM at [address] [facility] [address]  12/28/2018    partial right foot amputation with graft application    TOE AMPUTATION Right 12/28/2018    PARTIAL RIGHT FOOT AMPUTATION WITH GRAFT APPLICATION performed by Shannan Amador DPM at [address] [facility] 1/21/2021    EGD BIOPSY performed by Rick Pugh DO at [facility] ENDOSCOPY       Current Medications:     cefepime  2,000 mg Intravenous Q12H    amLODIPine  5 mg Oral Daily    gabapentin  600 mg Oral BID    insulin glargine  40 Units Subcutaneous Nightly    pantoprazole  40 mg Oral Daily    venlafaxine  75 mg Oral Daily    sodium chloride flush  5-40 mL Intravenous 2 times per day    enoxaparin  40 mg Subcutaneous Daily    insulin lispro  0-12 Units Subcutaneous Q4H    vancomycin  1,500 mg Intravenous Q24H    tamsulosin  0.4 mg Oral Daily       Allergies:  Hydrocodone-acetaminophen, Percocet [oxycodone-acetaminophen], Pregabalin, and Vicodin [hydrocodone-acetaminophen]    Social History:    Social History     Tobacco Use    Smoking status: Never Smoker    Smokeless tobacco: Never Used   Vaping Use    Vaping Use: Never used   Substance Use Topics    Alcohol use: No     Comment: FORMER DRINKER approx.  quit 2005    Drug use: No       Family History:       Problem Relation Age of Onset    Diabetes Maternal Grandfather     Heart Disease Paternal Uncle     High Blood Pressure Mother        Review of Systems    CONSTITUTIONAL:  negative  EYES:  negative  HEENT:  negative  RESPIRATORY:  negative  CARDIOVASCULAR:  negative  GASTROINTESTINAL:  negative  GENITOURINARY:  negative  INTEGUMENT/BREAST:  positive for ulcer  MUSCULOSKELETAL:  positive for  pain  NEUROLOGICAL:  positive for numbness      Objective:   /67   Pulse 77   Temp 100.2 °F (37.9 °C) (Oral)   Resp 18   Ht 5' 10\" (1.778 m)   Wt 167 lb 4.8 oz (75.9 kg)   SpO2 93%   BMI 24.01 kg/m²     Data:  CBC:   Recent Labs     05/25/21 1847 05/26/21  0245   WBC 11.2* 10.3   HGB 9.5* 8.8*   HCT 30.0* 26.8*   MCV 78.2* 77.8*    347     BMP:   Recent Labs     05/25/21 1847 05/26/21  0245   * 130*   K 4.8 3.8   CL 90* 95*   CO2 26 28   BUN 31* 26*   CREATININE 1.5* 1.5*     LIVER PROFILE:   Recent Labs     05/25/21 1847 05/26/21  0245   AST 8* 7*   ALT 8* <5*   LIPASE 14.0  --    BILITOT 0.4 0.3   ALKPHOS 155* 125     PT/INR:   Recent Labs     05/25/21 1847 05/26/21  0245   PROT 8.2 7.0   INR 1.03  --      HgBA1c:  Lab Results   Component Value Date    LABA1C 12.7 05/25/2021     SARS-CoV-2 RNA, RT PCRNOT DETECTED     Cultures: Blood x2 - in progress      Imaging: xray left foot -   Extensive bony destruction involving the 1st metatarsal and the 1st proximal phalanx with surrounding soft tissue edema. Vascular calcifications. Impression:  Extensive changes of osteomyelitis involving the 1st metatarsal and 1st proximal phalanx       Physical Exam:    General Appearance: alert and oriented to person, place and time, well developed and well- nourished, in no acute distress  Skin: warm and dry, no rash or erythema  Head: normocephalic and atraumatic  Eyes: pupils equal, round, and reactive to light, extraocular eye movements intact, conjunctivae normal  ENT: tympanic membrane, external ear and ear canal normal bilaterally, nose without deformity, nasal mucosa and turbinates normal without polyps  Neck: supple and non-tender without mass, no thyromegaly or thyroid nodules, no cervical lymphadenopathy  Pulmonary/Chest: clear to auscultation bilaterally- no wheezes, rales or rhonchi, normal air movement, no respiratory distress  Cardiovascular: normal rate, regular rhythm, normal S1 and S2, no murmurs, rubs, clicks, or gallops, distal pulses intact, no carotid bruits  Abdomen: soft, non-tender, non-distended, normal bowel sounds, no masses or organomegaly    DP/PT palpable bilateral  Ulcer on the dorsal and plantar aspect of the left 1st ray. Wounds probe to bone. + erythema and edema noted. + drainage from wound sites. No malodor noted. Prior right 2nd digit amputation noted.         Assessment:  Patient Active Problem List   Diagnosis Code    Diabetic ketoacidosis without coma associated with type 2 diabetes mellitus ([facility] [address] E11.10    Diabetic ulcer of left foot associated with type 2 diabetes mellitus ([address] E11.621, L97.529    Nausea & vomiting R11.2    Diabetic neuropathy ([address] E11.40    DM (diabetes mellitus), secondary, uncontrolled, w/neurologic complic ([facility] Q86.63, H74.13    Gout M10.9    Hx MRSA Z22.322    Hyperkalemia E87.5    Chronic pain on chronic opioids G89.29    Chronic LE diabetic ulcers E11.622, L97.309    Mild protein-calorie malnutrition ([facility]) E44.1    Cellulitis L03.90    Acute kidney injury ([facility] N17.9    Neuropathy G62.9    Diabetic acidosis without coma ([facility]) E11.10    Acute CVA (cerebrovascular accident) ([facility] I63.9    HTN (hypertension), benign I10    DKA, type 2, not at goal [facility]) E11.10    Diabetic foot infection ([facility]) E11.628, L08.9    Sepsis without acute organ dysfunction ([facility]) A41.9    Stage 3a chronic kidney disease N18.31    Essential hypertension I10    Type 2 diabetes mellitus with hyperglycemia, with long-term current use of insulin ([facility]) E11.65, Z79.4    Acute osteomyelitis of left foot ([facility]) M86.172     Cellulitis/abscess left foot  Osteomyelitis left foot secondary to diabetes mellitus   diabetes mellitus uncontrolled with peripheral neuropathy      Plan  Patient examined. Reviewed labs and imaging. Discussed clinical and xray findings with patient and wife. At this point the osteomyelitis is rapidly advancing. This is concerning that the bone infection has spread further then what is seen on xrays. Explained to the patient that the patient may need higher level amputation beyond the 1st ray. The 2nd ray is certainly a strong potential for osteomyelitis as well. However it is a possibility that the soft tissue and/or bone infection involves more then 1st ray and he may need larger portion of his foot amputated. Explained that the goal is to save as much as his foot as possible. However it is a strong possibility with his infection and uncontrolled DM that he could lose a portion of his leg as well. He understands. Discussed risks, complications, alternatives and benefits with patient. Understands chance of nonhealing wound, infection, need for further surgery, loss of limb or life.  Questions answered. Will place NPO at midnight for partial left foot amputation. Discussed with Marlene Morton NP. Needs to control glucose levels to prevent future complications. Elevation of legs to decrease edema. Thank you for allowing me to participate in the care of your patient.          Electronically signed by Asia Anderson DPM on 5/26/2021 at 12:21 PM.

## 2021-05-26 NOTE — PROGRESS NOTES
Pt resting quietly in bed. FSBS 239, 4 units of humalog administered per order. VSS. AM labs unremarkable. Will call podiatry c/s at 0630. No other changes, will continue to monitor.

## 2021-05-26 NOTE — PROGRESS NOTES
Pt wife report that she gave him 10 units of humalog with dinner. RN educated on the need to not do that again. Wife also brought in pizza for the patient to have. Informed consent signed. Pt agreed.

## 2021-05-26 NOTE — CONSULTS
Pharmacy Note  Vancomycin Consult    Sheree Calvo is a 56 y.o. male started on Vancomycin for Osteomyelitis; consult received from Dr. Suzette Guardado to manage therapy. Also receiving the following antibiotics: Cefepime. Patient Active Problem List   Diagnosis    Diabetic ketoacidosis without coma associated with type 2 diabetes mellitus ([facility]    Diabetic ulcer of left foot associated with type 2 diabetes mellitus (HCC)    Nausea & vomiting    Diabetic neuropathy ([facility] [facility]    DM (diabetes mellitus), secondary, uncontrolled, w/neurologic complic ([facility] [facility]    Gout    Hx MRSA    Hyperkalemia    Chronic pain on chronic opioids    Chronic LE diabetic ulcers    Mild protein-calorie malnutrition (HCC)    Cellulitis    Acute kidney injury ([facility] [facility]    Neuropathy    Di[facility] acidosis without coma ([facility]    Acute CVA (cerebrovascular accident) ([facility] [facility]    HTN (hypertension), benign    DKA, type 2, not at goal [facility])    Diabetic foot infection ([facility] [address] [facility]    Sepsis without acute organ dysfunction ([facility] [address] [facility]    Stage 3a chronic kidney disease    Essential hypertension    Type 2 diabetes mellitus with hyperglycemia, with long-term current use of insulin (HCC)    Acute osteomyelitis of left foot (HCC)     Allergies:  Hydrocodone-acetaminophen, Percocet [oxycodone-acetaminophen], Pregabalin, and Vicodin [hydrocodone-acetaminophen]     Temp max: 99.2    Recent Labs     05/25/21  1847 05/26/21  0245   BUN 31* 26*   CREATININE 1.5* 1.5*   WBC 11.2* 10.3       Intake/Output Summary (Last 24 hours) at 5/26/2021 0548  Last data filed at 5/26/2021 0413  Gross per 24 hour   Intake 1375 ml   Output 325 ml   Net 1050 ml     Culture Date      Source                       Results      Ht Readings from Last 1 Encounters:   05/26/21 5' 10\" (1.778 m)        Wt Readings from Last 1 Encounters:   05/26/21 167 lb 4.8 oz (75.9 kg)       Body mass index is 24.01 kg/m². Estimated Creatinine Clearance: 53 mL/min (A) (based on SCr of 1.5 mg/dL (H)). Goal Trough Level: 15-20 mcg/mL    Assessment/Plan:  Patient received Vancomycin 1750 mg x 1 dose in ER. Start Vancomycin 1500 mg IV every 24 hours. Timing of trough level will be determined based on culture results, renal function, and clinical response. Vanco trough ordered for 2230 on 5/28/21. Thank you for the consult. Will continue to follow.    Ana Toro, [facility]

## 2021-05-26 NOTE — PROGRESS NOTES
Progress Note    Admit Date:  5/25/2021    56year old male with DM, neuropathy, gout presented with c/o hyperglycemia. Admitted for osteomyelitis left foot. Subjective:  Mr. Negra Caruso seen. Doing okay today. Objective:   Patient Vitals for the past 4 hrs:   BP Temp Temp src Pulse Resp SpO2   05/26/21 1030 128/67 100.2 °F (37.9 °C) Oral 77 18 93 %   05/26/21 0822 105/64                 Intake/Output Summary (Last 24 hours) at 5/26/2021 1052  Last data filed at 5/26/2021 0413  Gross per 24 hour   Intake 1375 ml   Output 325 ml   Net 1050 ml       Physical Exam:  Gen: No distress. Alert. Eyes: PERRL. No sclera icterus. No conjunctival injection. ENT: No discharge. Pharynx clear. Neck: Trachea midline. Resp: No accessory muscle use. No crackles. No wheezes. No rhonchi. CV: Regular rate. Regular rhythm. No murmur. No rub. + left foot edema. Capillary Refill: Brisk,< 3 seconds   Peripheral Pulses: +2 palpable, equal bilaterally   GI: Non-tender. Non-distended. Normal bowel sounds. No hernia. Skin: Warm and dry. Left foot wound, foul smelling, draining purulent drainage. Dressing intact. M/S: No cyanosis. No joint deformity. No clubbing. Neuro: Awake. Grossly nonfocal    Psych: Oriented x 3.  No anxiety or agitation    Data:  CBC:   Recent Labs     05/25/21 1847 05/26/21  0245   WBC 11.2* 10.3   HGB 9.5* 8.8*   HCT 30.0* 26.8*   MCV 78.2* 77.8*    347     BMP:   Recent Labs     05/25/21 1847 05/26/21  0245   * 130*   K 4.8 3.8   CL 90* 95*   CO2 26 28   BUN 31* 26*   CREATININE 1.5* 1.5*     LIVER PROFILE:   Recent Labs     05/25/21 1847 05/26/21  0245   AST 8* 7*   ALT 8* <5*   LIPASE 14.0  --    BILITOT 0.4 0.3   ALKPHOS 155* 125     PT/INR:   Recent Labs     05/25/21 1847   PROTIME 11.9   INR 1.03       CULTURES  COVID/influenza: not detected  Blood: pending    RADIOLOGY  XR FOOT LEFT (MIN 3 VIEWS)   Final Result   Extensive changes of osteomyelitis involving the 1st metatarsal and 1st   proximal phalanx               Assessment/Plan:  Osteomyelitis left 1st metatarsal and 1st proximal phalanx  Chronic LLE wound due to diabetes mellitus  Acute cellulitis L foot  - Follows with Dr. Tino La  - Hx of MRSA  - Xray showed extensive changes of osteo involving 1st metatarsal and 1st proximal phalanx  -  IV Vanc D#2/Cefepime D#1 (received Zosyn x 2 doses)  - Podiatry consult- plans for OR tomorrow. NPO after midnight. Hyperglycemia  DM type 2  - monitor glucose. SSI coverage. - continue Gabapentin  - HgbA1C: 12.7     Pseudohyponatremia  - Na: 127 on admission with corrected Na: 133    Microcytic Anemia   Anemia of chronic disease  - Baseline hgb ~11-12  - Hgb on admission 10.1 > 9.0  - no acute bleeding       CKD 3a  - Baseline creatinine ~1.3   - Cr on admission 1.5  - receiving IVF's.     HTN  - BP controlled  - continue Amlodipine.     GERD  - Continue PPI     Possible BPH  - c/o difficulty urinating  - started Flomax.      DVT Prophylaxis: Lovenox  Diet: DIET CARB CONTROL; Carb Control: 4 carb choices (60 gms)/meal  Code Status: Full Code    Elisa Viveros FNP-C  5/26/2021

## 2021-05-26 NOTE — ACP (ADVANCE CARE PLANNING)
Advance Care Planning   Healthcare Decision Maker:    Primary Decision Maker: Loretta Clark - [phone]    Click here to complete Healthcare Decision Makers including selection of the Healthcare Decision Maker Relationship (ie \"Primary\").

## 2021-05-26 NOTE — H&P
Hospital Medicine History & Physical      PCP: Cristi Michel MD    Date of Service: Pt seen/examined on 5/25/21 and admitted on 5/25/21 to Inpatient    Chief Complaint   Patient presents with    Hyperglycemia     read hi this am 495 for ems, bp 160/89 and nausea and vomiting ODT zofran 4mg given ems. History Of Present Illness: The patient is a 56 y.o. male with PMH below, presents with N/V, hyperglycemia, chronic L foot DM wound w/ acute infection. Pt reports that he has a chronic L foot found  Which has worsening over the last week w/ drainage and redness. This is followed by Dr. Ramo Andrew. He last saw him earlier today. Says that Dr. Ramo Andrew told him he may need further surgical intervention. He reports have N/V today. His BGT's have been running high, > 500. He has hx of DKA.       Past Medical History:        Diagnosis Date    TORI (acute kidney injury) ([facility] 09/12/2017    Bacteremia 05/05/2017    staph aureus    Diabetes mellitus ([address]     Diabetic neuropathy ([address]     Gout     MRSA (methicillin resistant staph aureus) culture positive 12/27/18, 9/12/17, 5/5/17,9/5/13, 1/15/14    ulcers bilateral legs    MRSA (methicillin resistant staph aureus) culture positive 01/21/2021    foot wound    Pneumonia     Pyogenic inflammation of bone ([facility] [address] [facility]        Past Surgical History:        Procedure Laterality Date    EYE SURGERY      lens implants after removal of cataracts    FOOT DEBRIDEMENT Left 4/28/2021    DEBRIDEMENT INFECTED BONE AND TISSUE LEFT FOOT [facility]  12/28/2018    partial right foot amputation with graft application    TOE AMPUTATION Right 12/28/2018    PARTIAL RIGHT FOOT AMPUTATION WITH GRAFT APPLICATION performed by Lalita Santana DPM at [address] [place] [address] 1/21/2021    EGD BIOPSY performed by Odilia Calero DO at [facility]       Medications Prior to Admission:    Prior to Admission medications    Medication Sig Start Date End Date Taking? Authorizing Provider   insulin glargine (LANTUS) 100 UNIT/ML injection vial Inject 40 Units into the skin nightly 4/30/21   POLO Miner   insulin lispro (HUMALOG) 100 UNIT/ML injection cartridge Inject 10 Units into the skin 3 times daily (before meals) 4/30/21   POLO Miner   amLODIPine (NORVASC) 5 MG tablet Take 1 tablet by mouth daily 4/30/21   POLO Miner   pantoprazole (PROTONIX) 40 MG tablet Take 40 mg by mouth daily    Historical Provider, MD   sildenafil (VIAGRA) 100 MG tablet Take 100 mg by mouth daily as needed 3/3/21   Historical Provider, MD   gabapentin (NEURONTIN) 600 MG tablet Take 600 mg by mouth 2 times daily. Historical Provider, MD   venlafaxine (EFFEXOR XR) 75 MG extended release capsule Take 75 mg by mouth daily    Historical Provider, MD       Allergies:  Hydrocodone-acetaminophen, Percocet [oxycodone-acetaminophen], Pregabalin, and Vicodin [hydrocodone-acetaminophen]    Social History:    TOBACCO:   reports that he has never smoked. He has never used smokeless tobacco.  ETOH:   reports no history of alcohol use. Family History:  Reviewed in detail and negative for DM, Early CAD, Cancer (except as below). Positive as follows:        Problem Relation Age of Onset    Diabetes Maternal Grandfather     Heart Disease Paternal Uncle     High Blood Pressure Mother        REVIEW OF SYSTEMS:   Pertinent positives/negatives as follows: N/V, hyperglycemia, chronic L foot DM wound w/ acute infection, and as discussed in HPI, otherwise a complete ROS performed and all other systems are negative. PHYSICAL EXAM PERFORMED:    /75   Pulse 82   Temp 99.2 °F (37.3 °C) (Oral)   Resp 16   Ht 5' 10\" (1.778 m)   Wt 175 lb (79.4 kg)   SpO2 92%   BMI 25.11 kg/m²     GEN:  A&Ox3, NAD. HEENT:  NC/AT,EOMI, semi dry MM, no erythema/exudates or visible masses. CVS:  Normal S1,S2. RRR.  Without M/G/R.   LUNG: CTA-B. No wheezes, rales or rhonchi. ABD:  Soft, ND/NT, BS+ x4. Without G/R.  EXT: 2+ pulses, no c/c.  L foot wound, draining purulent material.  Fould smelling. Brisk cap refill. PSY:  Thought process intact, affect appropriate. PUSHPA:  CN III-XII intact, moves all 4 spontaneously, sensory grossly intact. SKIN: No rash or lesions on visible skin. Chart review shows recent radiographs:  XR FOOT LEFT (2 VIEWS)    Result Date: 4/28/2021  EXAMINATION: 2 XRAY VIEWS OF THE LEFT FOOT 4/28/2021 2:41 pm COMPARISON: Left foot radiograph 04/26/2021 HISTORY: ORDERING SYSTEM PROVIDED HISTORY: postop TECHNOLOGIST PROVIDED HISTORY: Reason for exam:->postop Reason for Exam: post op Acuity: Acute Type of Exam: Subsequent/Follow-up FINDINGS: Diffuse bony demineralization. No acute fractures nor definite areas of abnormal cortical disruption. Mild hallux valgus deformity. Joints otherwise maintain anatomic alignment. Calcaneal enthesophyte at the insertion the Achilles tendon. Mild to moderate arthropathic changes of multiple tarsometatarsal joints with additional mild involvement of the 1st metatarsophalangeal joint. Persistent soft tissue swelling in the forefoot centered near the great toe. Apparent packing material within the ulceration seen previously in the plantar forefoot at the level of the 1st metatarsophalangeal joint. No soft tissue gas. 1. Apparent debridement of an ulceration seen previously in the plantar left forefoot at the level of the 1st metatarsophalangeal joint. No definite underlying findings of osteomyelitis or necrotizing fasciitis. 2. Left midfoot and forefoot osteoarthritic changes as above. 3. Bony demineralization.      XR FOOT LEFT (2 VIEWS)    Result Date: 4/26/2021  EXAMINATION: 2 XRAY VIEWS OF THE LEFT FOOT 4/26/2021 12:20 am COMPARISON: 01/23/2021 HISTORY: ORDERING SYSTEM PROVIDED HISTORY: concern for osteo TECHNOLOGIST PROVIDED HISTORY: Reason for exam:->concern for osteo Reason for Exam: wounds under 1st digit, redness in 1st digit Acuity: Acute Type of Exam: Ongoing Additional signs and symptoms: diabetic pt, pen wound under foot FINDINGS: Soft tissue swelling is identified in the great toe without soft tissue gas. There is an ulcer on the plantar surface of the foot adjacent to the great toe metatarsal-phalangeal joint. No erosions are identified. There is no fracture and joint space alignment is normal.  Arterial calcifications are again noted. Plantar soft tissue ulcer without radiographic evidence for osteomyelitis. XR FOOT LEFT (MIN 3 VIEWS)    Result Date: 5/25/2021  EXAMINATION: THREE XRAY VIEWS OF THE LEFT FOOT 5/25/2021 6:14 pm COMPARISON: None. HISTORY: ORDERING SYSTEM PROVIDED HISTORY: Chronic great toe infection TECHNOLOGIST PROVIDED HISTORY: Reason for exam:->Chronic great toe infection Reason for Exam: great toe infection FINDINGS: Extensive bony destruction involving the 1st metatarsal and the 1st proximal phalanx with surrounding soft tissue edema. Vascular calcifications. Extensive changes of osteomyelitis involving the 1st metatarsal and 1st proximal phalanx     XR CHEST PORTABLE    Result Date: 4/26/2021  EXAMINATION: ONE XRAY VIEW OF THE CHEST 4/26/2021 12:20 am COMPARISON: Chest x-ray 03/05/2021. HISTORY: ORDERING SYSTEM PROVIDED HISTORY: DKA, cough TECHNOLOGIST PROVIDED HISTORY: Reason for exam:->DKA, cough Reason for Exam: wound check Acuity: Acute Type of Exam: Initial Additional signs and symptoms: DKA, cough FINDINGS: Cardiac silhouette is upper normal limits in size. Mediastinal contours are otherwise suboptimally evaluated due to rotated projection. No focal consolidation or pleural effusion on this projection. Increased perihilar markings. Atelectasis, infectious or inflammatory airway process, and interstitial edema are in the differential.   No pneumothorax appreciated on this projection. Increased perihilar markings.   Atelectasis, infectious or inflammatory airway process, and interstitial edema are in the differential.     CBC:  Recent Labs     05/25/21 1847   WBC 11.2*   HGB 9.5*   HCT 30.0*         RENAL  Recent Labs     05/25/21 1847   *   K 4.8   CL 90*   CO2 26   BUN 31*   CREATININE 1.5*   GLUCOSE 415*     Hemoglobin a1c:  Lab Results   Component Value Date    LABA1C 11.3 04/25/2021    LABA1C 10.7 01/20/2021    LABA1C 13.6 09/26/2019       LFT'S:  Recent Labs     05/25/21 1847   AST 8*   ALT 8*   BILITOT 0.4   ALKPHOS 155*       COAG:  INR added on. CARDIAC ENZYMES:   Recent Labs     05/25/21 1847   TROPONINI 0.01     Lab Results   Component Value Date    PROBNP 28 03/05/2021       LACTIC ACID:  Recent Labs     05/25/21 1847   LACTSEPSIS 1.0       U/A:  Recent Labs     05/25/21  1900   LEUKOCYTESUR Negative   WBCUA 0-2   COLORU Yellow   RBCUA 3-4   CLARITYU Clear   SPECGRAV 1.015   BLOODU SMALL*   GLUCOSEU >=1000*     VBG:  Recent Labs     05/25/21 1847   PHVEN 7.329*   PRD1FUL 54.3*   JAO4PDX 27.9   PO2VEN 29.7   Y4NQELOB 46      PHYSICIAN CERTIFICATION  I certify that Sheree Calvo is expected to be hospitalized for 2 midnights based on the following assessment and plan:    ASSESSMENT/PLAN:  1. OM L foot. Chronic wounds L foot. IV vanco and cefepime. IVF. Pt of Dr. Eldon Marlow, discussed over phone, last saw pt earlier today in office. Probable amputation required. Requested ABX, NPO aft mn for OR if can get him in OR schedule tomorrow. 2. Hyperglycemia, 500's reported at home. 415 here. Commonly presents w/ marked hyperglycemia. DM2, hold oral Rx, A1c 11.3 last month, add Mod SSI q4h, continue home Lantus dose. He is frequently in DKA at presentation. Today has + BHB, normal AG, mildly low pH but 2/2 but appears to be resp based. 3. Pseudohyponatremia, measured 127, corrects to 135 (Skyler Vargas). 4. Chronic anemia, likely related to chronic disease. HGB 9.5, similar values in past, monitor.

## 2021-05-26 NOTE — CARE COORDINATION
Case Management Assessment  Initial Evaluation      Patient Name: Stephania Iyer  YOB: 1960  Diagnosis: Acute osteomyelitis of left foot [facility]) [M86.172]  Date / Time: 5/25/2021  5:11 PM    Admission status/Date: 05/25/2021 Inpatient   Chart Reviewed: Yes      Patient Interviewed: Yes   Family Interviewed:  Yes - pt's wife Blue Tucker whom pt stated could remain present       Hospitalization in the last 30 days:  No      Health Care Decision Maker :   Primary Decision Maker: Jono Cody - Spouse - [phone]    (CM - must 1st enter selection under Navigator - emergency contact- Health Care Decision Maker Relationship and pick relationship)   Who do you trust or have selected to make healthcare decisions for you      Met with: pt and pt's wife   Viola Dacosta conducted  (bedside/phone): meagan    Current PCP: Sarah Fox MD    Financial  Medicare and [address]  His Ringo is only good through the end of this month as his wife lost her job  Precert required for SNF : N          3 night stay required -  [facility]nam[facility]  Support Systems/Care Needs: Spouse/Significant Other  Transportation: self    Meal Preparation: wife    Housing  Living Arrangements: pt lives at home with his wife  Steps: 2-3  Intent for return to present living arrangements: Yes  Identified Issues: [id number] [facility] [address] : No Agency:(Services)  Type of Home Care Services: None  Passport/Waiver : No  :                      Phone Number:    Passport/Waiver Services: n/a          Durable Medical Equiptment   DME Provider: n/a  Equipment:   Walker___Cane_X (doesn't use)__RTS___ BSC___Shower Chair___Hospital Bed___W/C____Other________  02 at ____Liter(s)---wears(frequency)_______ HHN ___ CPAP___ BiPap___   N/A____      Home O2 Use :  No    If No for home O2---if presently on O2 during hospitalization:  No  if yes CM to follow for potential DC O2 need  Informed of need for care provider to bring portable home O2 tank on day of discharge for nursing to connect prior to leaving:   Not Indicated  Verbalized agreement/Understanding:   Not Indicated    Community Service Affiliation  Dialysis:  No    · Agency:  · Location:  · Dialysis Schedule:  · Phone:   · Fax: Other Community Services: Sees Dr. Roger Reed for Wound Care once a week    DISCHARGE PLAN: Explained Case Management role/services. Chart review completed. Spoke with pt and his wife at bedside whom he stated could remain present. He stated he is independent at home and plans on returning. He reported no C or community services. He was encouraged to speak with the [facility] as he was wondering if they would help with hearing test, etc. He stated that he is not connected with the [facility]. He denied needs or questions for CM at this time. CM will follow. Please notify CM if needs or concerns arise.

## 2021-05-27 ENCOUNTER — ANESTHESIA (OUTPATIENT)
Dept: OPERATING ROOM | Age: 61
DRG: 617 | End: 2021-05-27
Payer: MEDICARE

## 2021-05-27 ENCOUNTER — APPOINTMENT (OUTPATIENT)
Dept: GENERAL RADIOLOGY | Age: 61
DRG: 617 | End: 2021-05-27
Payer: MEDICARE

## 2021-05-27 VITALS — OXYGEN SATURATION: 97 % | SYSTOLIC BLOOD PRESSURE: 98 MMHG | DIASTOLIC BLOOD PRESSURE: 55 MMHG

## 2021-05-27 LAB
GLUCOSE BLD-MCNC: 171 MG/DL (ref 70–99)
GLUCOSE BLD-MCNC: 236 MG/DL (ref 70–99)
GLUCOSE BLD-MCNC: 239 MG/DL (ref 70–99)
GLUCOSE BLD-MCNC: 244 MG/DL (ref 70–99)
GLUCOSE BLD-MCNC: 263 MG/DL (ref 70–99)
GLUCOSE BLD-MCNC: 310 MG/DL (ref 70–99)
PERFORMED ON: ABNORMAL

## 2021-05-27 PROCEDURE — 88311 DECALCIFY TISSUE: CPT

## 2021-05-27 PROCEDURE — 3600000013 HC SURGERY LEVEL 3 ADDTL 15MIN: Performed by: PODIATRIST

## 2021-05-27 PROCEDURE — 3600000003 HC SURGERY LEVEL 3 BASE: Performed by: PODIATRIST

## 2021-05-27 PROCEDURE — 2500000003 HC RX 250 WO HCPCS: Performed by: NURSE ANESTHETIST, CERTIFIED REGISTERED

## 2021-05-27 PROCEDURE — 2580000003 HC RX 258: Performed by: PODIATRIST

## 2021-05-27 PROCEDURE — 2580000003 HC RX 258: Performed by: INTERNAL MEDICINE

## 2021-05-27 PROCEDURE — 2580000003 HC RX 258: Performed by: NURSE ANESTHETIST, CERTIFIED REGISTERED

## 2021-05-27 PROCEDURE — 73620 X-RAY EXAM OF FOOT: CPT

## 2021-05-27 PROCEDURE — 7100000011 HC PHASE II RECOVERY - ADDTL 15 MIN: Performed by: PODIATRIST

## 2021-05-27 PROCEDURE — 6360000002 HC RX W HCPCS: Performed by: INTERNAL MEDICINE

## 2021-05-27 PROCEDURE — 2500000003 HC RX 250 WO HCPCS: Performed by: PODIATRIST

## 2021-05-27 PROCEDURE — 0QBP0ZZ EXCISION OF LEFT METATARSAL, OPEN APPROACH: ICD-10-PCS | Performed by: PODIATRIST

## 2021-05-27 PROCEDURE — 2709999900 HC NON-CHARGEABLE SUPPLY: Performed by: PODIATRIST

## 2021-05-27 PROCEDURE — 3700000001 HC ADD 15 MINUTES (ANESTHESIA): Performed by: PODIATRIST

## 2021-05-27 PROCEDURE — 87077 CULTURE AEROBIC IDENTIFY: CPT

## 2021-05-27 PROCEDURE — 87076 CULTURE ANAEROBE IDENT EACH: CPT

## 2021-05-27 PROCEDURE — 99232 SBSQ HOSP IP/OBS MODERATE 35: CPT | Performed by: INTERNAL MEDICINE

## 2021-05-27 PROCEDURE — 87185 SC STD ENZYME DETCJ PER NZM: CPT

## 2021-05-27 PROCEDURE — 87070 CULTURE OTHR SPECIMN AEROBIC: CPT

## 2021-05-27 PROCEDURE — 3700000000 HC ANESTHESIA ATTENDED CARE: Performed by: PODIATRIST

## 2021-05-27 PROCEDURE — 87075 CULTR BACTERIA EXCEPT BLOOD: CPT

## 2021-05-27 PROCEDURE — 6370000000 HC RX 637 (ALT 250 FOR IP): Performed by: INTERNAL MEDICINE

## 2021-05-27 PROCEDURE — 6370000000 HC RX 637 (ALT 250 FOR IP): Performed by: NURSE PRACTITIONER

## 2021-05-27 PROCEDURE — 88305 TISSUE EXAM BY PATHOLOGIST: CPT

## 2021-05-27 PROCEDURE — 2780000010 HC IMPLANT OTHER: Performed by: PODIATRIST

## 2021-05-27 PROCEDURE — 6360000002 HC RX W HCPCS: Performed by: NURSE ANESTHETIST, CERTIFIED REGISTERED

## 2021-05-27 PROCEDURE — 1200000000 HC SEMI PRIVATE

## 2021-05-27 PROCEDURE — 0Y6N0Z9 DETACHMENT AT LEFT FOOT, PARTIAL 1ST RAY, OPEN APPROACH: ICD-10-PCS | Performed by: PODIATRIST

## 2021-05-27 PROCEDURE — 7100000010 HC PHASE II RECOVERY - FIRST 15 MIN: Performed by: PODIATRIST

## 2021-05-27 DEVICE — ALLOGRAFT HUM TISS 1 CC AMNIO TISS MEMBRN AMNIFLO CRYOPRES: Type: IMPLANTABLE DEVICE | Site: FOOT | Status: FUNCTIONAL

## 2021-05-27 RX ORDER — MEPERIDINE HYDROCHLORIDE 25 MG/ML
12.5 INJECTION INTRAMUSCULAR; INTRAVENOUS; SUBCUTANEOUS EVERY 5 MIN PRN
Status: DISCONTINUED | OUTPATIENT
Start: 2021-05-27 | End: 2021-05-27 | Stop reason: HOSPADM

## 2021-05-27 RX ORDER — TRAMADOL HYDROCHLORIDE 50 MG/1
50 TABLET ORAL EVERY 6 HOURS PRN
Status: DISCONTINUED | OUTPATIENT
Start: 2021-05-27 | End: 2021-05-29 | Stop reason: HOSPADM

## 2021-05-27 RX ORDER — MORPHINE SULFATE 10 MG/ML
2 INJECTION, SOLUTION INTRAMUSCULAR; INTRAVENOUS EVERY 5 MIN PRN
Status: DISCONTINUED | OUTPATIENT
Start: 2021-05-27 | End: 2021-05-27 | Stop reason: HOSPADM

## 2021-05-27 RX ORDER — PROPOFOL 10 MG/ML
INJECTION, EMULSION INTRAVENOUS PRN
Status: DISCONTINUED | OUTPATIENT
Start: 2021-05-27 | End: 2021-05-27 | Stop reason: SDUPTHER

## 2021-05-27 RX ORDER — LABETALOL HYDROCHLORIDE 5 MG/ML
5 INJECTION, SOLUTION INTRAVENOUS EVERY 10 MIN PRN
Status: DISCONTINUED | OUTPATIENT
Start: 2021-05-27 | End: 2021-05-27 | Stop reason: HOSPADM

## 2021-05-27 RX ORDER — MORPHINE SULFATE 10 MG/ML
1 INJECTION, SOLUTION INTRAMUSCULAR; INTRAVENOUS EVERY 5 MIN PRN
Status: DISCONTINUED | OUTPATIENT
Start: 2021-05-27 | End: 2021-05-27 | Stop reason: HOSPADM

## 2021-05-27 RX ORDER — ONDANSETRON 2 MG/ML
4 INJECTION INTRAMUSCULAR; INTRAVENOUS PRN
Status: DISCONTINUED | OUTPATIENT
Start: 2021-05-27 | End: 2021-05-27 | Stop reason: HOSPADM

## 2021-05-27 RX ORDER — HYDRALAZINE HYDROCHLORIDE 20 MG/ML
5 INJECTION INTRAMUSCULAR; INTRAVENOUS EVERY 10 MIN PRN
Status: DISCONTINUED | OUTPATIENT
Start: 2021-05-27 | End: 2021-05-27 | Stop reason: HOSPADM

## 2021-05-27 RX ORDER — MORPHINE SULFATE 2 MG/ML
2 INJECTION, SOLUTION INTRAMUSCULAR; INTRAVENOUS EVERY 4 HOURS PRN
Status: DISCONTINUED | OUTPATIENT
Start: 2021-05-27 | End: 2021-05-29 | Stop reason: HOSPADM

## 2021-05-27 RX ORDER — SODIUM CHLORIDE, SODIUM LACTATE, POTASSIUM CHLORIDE, CALCIUM CHLORIDE 600; 310; 30; 20 MG/100ML; MG/100ML; MG/100ML; MG/100ML
INJECTION, SOLUTION INTRAVENOUS CONTINUOUS PRN
Status: DISCONTINUED | OUTPATIENT
Start: 2021-05-27 | End: 2021-05-27 | Stop reason: SDUPTHER

## 2021-05-27 RX ORDER — MAGNESIUM HYDROXIDE 1200 MG/15ML
LIQUID ORAL CONTINUOUS PRN
Status: COMPLETED | OUTPATIENT
Start: 2021-05-27 | End: 2021-05-27

## 2021-05-27 RX ORDER — DIPHENHYDRAMINE HYDROCHLORIDE 50 MG/ML
12.5 INJECTION INTRAMUSCULAR; INTRAVENOUS
Status: DISCONTINUED | OUTPATIENT
Start: 2021-05-27 | End: 2021-05-27 | Stop reason: HOSPADM

## 2021-05-27 RX ORDER — PROMETHAZINE HYDROCHLORIDE 25 MG/ML
6.25 INJECTION, SOLUTION INTRAMUSCULAR; INTRAVENOUS
Status: DISCONTINUED | OUTPATIENT
Start: 2021-05-27 | End: 2021-05-27 | Stop reason: HOSPADM

## 2021-05-27 RX ORDER — LIDOCAINE HYDROCHLORIDE 20 MG/ML
INJECTION, SOLUTION INFILTRATION; PERINEURAL PRN
Status: DISCONTINUED | OUTPATIENT
Start: 2021-05-27 | End: 2021-05-27 | Stop reason: SDUPTHER

## 2021-05-27 RX ADMIN — SODIUM CHLORIDE: 9 INJECTION, SOLUTION INTRAVENOUS at 02:59

## 2021-05-27 RX ADMIN — SODIUM CHLORIDE, POTASSIUM CHLORIDE, SODIUM LACTATE AND CALCIUM CHLORIDE: 600; 310; 30; 20 INJECTION, SOLUTION INTRAVENOUS at 07:46

## 2021-05-27 RX ADMIN — INSULIN LISPRO 4 UNITS: 100 INJECTION, SOLUTION INTRAVENOUS; SUBCUTANEOUS at 10:11

## 2021-05-27 RX ADMIN — GABAPENTIN 600 MG: 300 CAPSULE ORAL at 21:22

## 2021-05-27 RX ADMIN — ENOXAPARIN SODIUM 40 MG: 40 INJECTION SUBCUTANEOUS at 10:11

## 2021-05-27 RX ADMIN — PROPOFOL 200 MG: 10 INJECTION, EMULSION INTRAVENOUS at 08:18

## 2021-05-27 RX ADMIN — ACETAMINOPHEN 650 MG: 325 TABLET ORAL at 21:21

## 2021-05-27 RX ADMIN — PROPOFOL 200 MG: 10 INJECTION, EMULSION INTRAVENOUS at 08:06

## 2021-05-27 RX ADMIN — GABAPENTIN 600 MG: 300 CAPSULE ORAL at 10:11

## 2021-05-27 RX ADMIN — TAMSULOSIN HYDROCHLORIDE 0.4 MG: 0.4 CAPSULE ORAL at 10:11

## 2021-05-27 RX ADMIN — INSULIN LISPRO 8 UNITS: 100 INJECTION, SOLUTION INTRAVENOUS; SUBCUTANEOUS at 11:49

## 2021-05-27 RX ADMIN — PROPOFOL 200 MG: 10 INJECTION, EMULSION INTRAVENOUS at 07:56

## 2021-05-27 RX ADMIN — VANCOMYCIN HYDROCHLORIDE 1500 MG: 10 INJECTION, POWDER, LYOPHILIZED, FOR SOLUTION INTRAVENOUS at 23:40

## 2021-05-27 RX ADMIN — AMLODIPINE BESYLATE 5 MG: 5 TABLET ORAL at 10:11

## 2021-05-27 RX ADMIN — CEFEPIME 2000 MG: 2 INJECTION, POWDER, FOR SOLUTION INTRAVENOUS at 05:01

## 2021-05-27 RX ADMIN — INSULIN LISPRO 2 UNITS: 100 INJECTION, SOLUTION INTRAVENOUS; SUBCUTANEOUS at 18:23

## 2021-05-27 RX ADMIN — PANTOPRAZOLE SODIUM 40 MG: 40 TABLET, DELAYED RELEASE ORAL at 10:11

## 2021-05-27 RX ADMIN — VENLAFAXINE HYDROCHLORIDE 75 MG: 75 CAPSULE, EXTENDED RELEASE ORAL at 10:11

## 2021-05-27 RX ADMIN — INSULIN GLARGINE 40 UNITS: 100 INJECTION, SOLUTION SUBCUTANEOUS at 21:25

## 2021-05-27 RX ADMIN — CEFEPIME 2000 MG: 2 INJECTION, POWDER, FOR SOLUTION INTRAVENOUS at 18:22

## 2021-05-27 RX ADMIN — LIDOCAINE HYDROCHLORIDE 50 MG: 20 INJECTION, SOLUTION INFILTRATION; PERINEURAL at 07:56

## 2021-05-27 RX ADMIN — TRAMADOL HYDROCHLORIDE 50 MG: 50 TABLET ORAL at 21:21

## 2021-05-27 ASSESSMENT — PAIN SCALES - GENERAL
PAINLEVEL_OUTOF10: 0
PAINLEVEL_OUTOF10: 7

## 2021-05-27 NOTE — PROGRESS NOTES
Pt c/o pain that was a throbbing pain in his L foot. RN discussed with Diandra Clement NP. NP ordered tramadol and morphine. RN was also called from pharmacy regarding the possible side effects. RN was advised by these two bodies to closely monitor for side effects of medication. RN will pass this along in report.

## 2021-05-27 NOTE — ANESTHESIA POSTPROCEDURE EVALUATION
Department of Anesthesiology  Postprocedure Note    Patient: Sai Purvis  MRN: [id number]  YOB: 1960  Date of evaluation: 5/27/2021  Time:  2:46 PM     Procedure Summary     Date: 05/27/21 Room / Location: [place] [address] / [facility]    Anesthesia Start: 9396 Anesthesia Stop: 5179    Procedure: PARTIAL LEFT FOOT AMPUTATION (Left Foot) Diagnosis: (OSTEOMYELITIS, DIABETIC FOOT ULCER)    Surgeons: Jake Yañez DPM Responsible Provider: Alejandro Humphries MD    Anesthesia Type: general ASA Status: 3          Anesthesia Type: general    Alfredo Phase I:      Alfredo Phase II: Alfredo Score: 9    Last vitals: Reviewed and per EMR flowsheets.        Anesthesia Post Evaluation    Comments: Postoperative Anesthesia Note    Name:    Sai Purvis  MRN:      [id number]    Patient Vitals in the past 12 hrs:  05/27/21 1244, BP:(!) 160/84, Temp:97 °F (36.1 °C), Temp src:Oral, Pulse:73, Resp:18, SpO2:96 %  05/27/21 1130, BP:(!) 149/75, Temp:97.5 °F (36.4 °C), Temp src:Oral, Pulse:67, Resp:18, SpO2:97 %  05/27/21 1028, BP:(!) 160/83, Temp:97 °F (36.1 °C), Temp src:Oral, Pulse:66, Resp:18, SpO2:95 %  05/27/21 0934, BP:(!) 152/73, Temp:97.4 °F (36.3 °C), Temp src:Oral, Pulse:63, Resp:18, SpO2:95 %  05/27/21 0917, BP:131/66, Pulse:63, Resp:18, SpO2:92 %  05/27/21 0902, BP:115/61, Pulse:63, Resp:16, SpO2:92 %  05/27/21 0856, BP:127/64, Pulse:70, Resp:16, SpO2:96 %  05/27/21 0850, BP:102/72, Temp:96.8 °F (36 °C), Temp src:Temporal, Pulse:66, Resp:14, SpO2:93 %  05/27/21 0615, SpO2:95 %     LABS:    CBC  Lab Results       Component                Value               Date/Time                  WBC                      10.3                05/26/2021 02:45 AM        HGB                      8.8 (L)             05/26/2021 02:45 AM        HCT                      26.8 (L)            05/26/2021 02:45 AM        PLT                      347                 05/26/2021 02:45 AM   RENAL  Lab Results       Component Value               Date/Time                  NA                       130 (L)             05/26/2021 02:45 AM        K                        3.8                 05/26/2021 02:45 AM        CL                       95 (L)              05/26/2021 02:45 AM        CO2                      28                  05/26/2021 02:45 AM        BUN                      26 (H)              05/26/2021 02:45 AM        CREATININE               1.5 (H)             05/26/2021 02:45 AM        GLUCOSE                  295 (H)             05/26/2021 02:45 AM   COAGS  Lab Results       Component                Value               Date/Time                  PROTIME                  11.9                05/25/2021 06:47 PM        INR                      1.03                05/25/2021 06:47 PM        APTT                     27.2                01/18/2021 05:55 PM     Intake & Output:  @78COPG@    Nausea & Vomiting:  No    Level of Consciousness:  Awake    Pain Assessment:  Adequate analgesia    Anesthesia Complications:  No apparent anesthetic complications    SUMMARY      Vital signs stable  OK to discharge from Stage I post anesthesia care.   Care transferred from Anesthesiology department on discharge from perioperative area

## 2021-05-27 NOTE — PLAN OF CARE
Problem: Falls - Risk of:  Goal: Will remain free from falls  Description: Will remain free from falls  Outcome: Ongoing  Goal: Absence of physical injury  Description: Absence of physical injury  Outcome: Ongoing     Problem: Pain:  Goal: Pain level will decrease  Description: Pain level will decrease  Outcome: Ongoing supervision

## 2021-05-27 NOTE — PROGRESS NOTES
Pt sleeping, 02 saturation 78-82% on room air. Applied 02 at 3lnc, saturation increased to 97%. Pt denies sleep apnea but stated his father has it.  Adan Pride RN

## 2021-05-27 NOTE — ANESTHESIA PRE PROCEDURE
Department of Anesthesiology  Preprocedure Note       Name:  Memo Godoy   Age:  56 y.o.  :  1960                                          MRN:  [id number]         Date:  2021      Surgeon: Shawn Parham):  Rohit Perez DPM    Procedure: Procedure(s):  PARTIAL LEFT FOOT AMPUTATION    Medications prior to admission:   Prior to Admission medications    Medication Sig Start Date End Date Taking? Authorizing Provider   Insulin Pen Needle (ULTRA-THIN II PEN NEEDLES) 29G X 12.7MM MISC Use for insulin injections QID as directed 21  Yes Historical Provider, MD   insulin glargine (LANTUS) 100 UNIT/ML injection vial Inject 40 Units into the skin nightly 21  Yes POLO Miner   insulin lispro (HUMALOG) 100 UNIT/ML injection cartridge Inject 10 Units into the skin 3 times daily (before meals) 21  Yes POLO Miner   amLODIPine (NORVASC) 5 MG tablet Take 1 tablet by mouth daily 21  Yes POLO Miner   pantoprazole (PROTONIX) 40 MG tablet Take 40 mg by mouth daily   Yes Historical Provider, MD   gabapentin (NEURONTIN) 600 MG tablet Take 600 mg by mouth 2 times daily.    Yes Historical Provider, MD   venlafaxine (EFFEXOR XR) 75 MG extended release capsule Take 75 mg by mouth daily   Yes Historical Provider, MD   Continuous Blood Gluc  (FREESTYLE ISAI 2 READER) BRITTANEY  21   Historical Provider, MD   Continuous Blood Gluc Sensor (FREESTYLE ISAI 2 SENSOR) [facility]  21   Historical Provider, MD   BD PEN NEEDLE MARY 2ND GEN 32G X 4 MM MISC  21   Historical Provider, MD   BD ULTRA-FINE PEN NEEDLES 29G X 12.7MM MISC  21   Historical Provider, MD   sildenafil (VIAGRA) 100 MG tablet Take 100 mg by mouth daily as needed 3/3/21   Historical Provider, MD       Current medications:    Current Facility-Administered Medications   Medication Dose Route Frequency Provider Last Rate Last Admin    cefepime (MAXIPIME) 2000 mg IVPB minibag  2,000 mg Intravenous Q12H Kim Crawford MD Stopped at 05/27/21 0531    amLODIPine (NORVASC) tablet 5 mg  5 mg Oral Daily Ana Holley MD        gabapentin (NEURONTIN) capsule 600 mg  600 mg Oral BID Ana Holley MD   600 mg at 05/26/21 2126    insulin glargine (LANTUS) injection vial 40 Units  40 Units Subcutaneous Nightly Ana Holley MD   40 Units at 05/26/21 2126    pantoprazole (PROTONIX) tablet 40 mg  40 mg Oral Daily Ana Holley MD   40 mg at 05/26/21 6534    venlafaxine (EFFEXOR XR) extended release capsule 75 mg  75 mg Oral Daily Ana Holley MD   75 mg at 05/26/21 0823    glucose (GLUTOSE) 40 % oral gel 15 g  15 g Oral PRN Ana Holley MD        dextrose 50 % IV solution  12.5 g Intravenous PRN Ana Holley MD        glucagon (rDNA) injection 1 mg  1 mg Intramuscular PRN Ana Holley MD        dextrose 5 % solution  100 mL/hr Intravenous PRN Ana Holley MD        0.9 % sodium chloride infusion   Intravenous Continuous Ana Holley MD   Stopped at 05/27/21 0825    sodium chloride flush 0.9 % injection 5-40 mL  5-40 mL Intravenous 2 times per day Ana Holley MD        sodium chloride flush 0.9 % injection 5-40 mL  5-40 mL Intravenous PRN Ana Holley MD        0.9 % sodium chloride infusion  25 mL Intravenous PRN Ana Holley MD        enoxaparin (LOVENOX) injection 40 mg  40 mg Subcutaneous Daily Ana Holley MD   40 mg at 05/26/21 6201    promethazine (PHENERGAN) tablet 12.5 mg  12.5 mg Oral Q6H PRN Ana Holley MD        Or    ondansetron [facility]) injection 4 mg  4 mg Intravenous Q6H PRN Ana Holley MD        polyethylene glycol [facility]) packet 17 g  17 g Oral Daily PRN Ana Holley MD        acetaminophen (TYLENOL) tablet 650 mg  650 mg Oral Q6H PRN Ana Holley MD        Or    acetaminophen (TYLENOL) suppository 650 mg  650 mg Rectal Q6H PRN Ana Holley MD        potassium chloride (KLOR-CON M) extended release tablet 40 mEq 40 mEq Oral PRN Ashanti Costa MD        Or    potassium bicarb-citric acid (EFFER-K) effervescent tablet 40 mEq  40 mEq Oral PRN Ashanti Costa MD        Or    potassium chloride 10 mEq/100 mL IVPB (Peripheral Line)  10 mEq Intravenous PRN Ashanti Costa MD        magnesium sulfate 2000 mg in 50 mL IVPB premix  2,000 mg Intravenous PRN Ashanti Costa MD        vancomycin 1500 mg in dextrose 5% 300 mL IVPB  1,500 mg Intravenous Q24H Ashanti Costa MD   Stopped at 05/26/21 2347    tamsulosin (FLOMAX) capsule 0.4 mg  0.4 mg Oral Daily Layvonne Yonas APRN - CNP   0.4 mg at 05/26/21 1216    insulin lispro (HUMALOG) injection vial 0-12 Units  0-12 Units Subcutaneous TID WC Layvonne Branham, APRN - CNP        insulin lispro (HUMALOG) injection vial 0-6 Units  0-6 Units Subcutaneous Nightly Layvonne Branham, APRN - CNP   4 Units at 05/26/21 2126       Allergies: Allergies   Allergen Reactions    Hydrocodone-Acetaminophen Itching    Percocet [Oxycodone-Acetaminophen]      States \"hallucinations,disoriented, & freaked out\" per wife    Pregabalin Itching    Vicodin [Hydrocodone-Acetaminophen]      Spaces out. Pt. States 1/15/14 has been taking some vicodin without problems.        Problem List:    Patient Active Problem List   Diagnosis Code    Diabetic ketoacidosis without coma associated with type 2 diabetes mellitus ([address] E11.10    Diabetic ulcer of left foot associated with type 2 diabetes mellitus ([address] E11.621, L97.529    Nausea & vomiting R11.2    Diabetic neuropathy ([address] E11.40    DM (diabetes mellitus), secondary, uncontrolled, w/neurologic complic ([address] E26.78, H29.89    Gout M10.9    Hx MRSA Z22.322    Hyperkalemia E87.5    Chronic pain on chronic opioids G89.29    Chronic LE diabetic ulcers E11.622, L97.309    Mild protein-calorie malnutrition (HCC) E44.1    Cellulitis L03.90    Acute kidney injury ([address] N17.9    Neuropathy G62.9    Diabetic acidosis without coma ([address] E11.10    Acute CVA (cerebrovascular accident) ([facility] I63.9    HTN (hypertension), benign I10    DKA, type 2, not at goal [facility]) E11.10    Diabetic foot infection ([facility] E11.628, L08.9    Sepsis without acute organ dysfunction ([facility] A41.9    Stage 3a chronic kidney disease N18.31    Hypertension, essential I10    Type 2 diabetes mellitus with hyperglycemia, with long-term current use of insulin ([facility]) E11.65, Z79.4    Acute osteomyelitis of left foot ([facility] M86.172    Hyperglycemia R73.9       Past Medical History:        Diagnosis Date    TORI (acute kidney injury) ([facility] 09/12/2017    Bacteremia 05/05/2017    staph aureus    Diabetes mellitus ([facility]     Diabetic neuropathy ([facility]     Gout     Hypertension     MRSA (methicillin resistant staph aureus) culture positive 12/27/18, 9/12/17, 5/5/17,9/5/13, 1/15/14    ulcers bilateral legs    MRSA (methicillin resistant staph aureus) culture positive 01/21/2021    foot wound    Neuropathy     Pneumonia     Pyogenic inflammation of bone ([facility]        Past Surgical History:        Procedure Laterality Date    EYE SURGERY      lens implants after removal of cataracts    FOOT DEBRIDEMENT Left 4/28/2021    DEBRIDEMENT INFECTED BONE AND TISSUE LEFT FOOT performed by Mila Peck DPM at [facility]  12/28/2018    partial right foot amputation with graft application    TOE AMPUTATION Right 12/28/2018    PARTIAL RIGHT FOOT AMPUTATION WITH GRAFT APPLICATION performed by Mila Peck DPM at [facility]. N/A 1/21/2021    EGD BIOPSY performed by Sonia Carbajal DO at [address] [place] [address] History:    Social History     Tobacco Use    Smoking status: Never Smoker    Smokeless tobacco: Never Used   Substance Use Topics    Alcohol use: No     Comment: FORMER DRINKER approx.  quit 2005                                Counseling given: Not Answered      Vital Signs (Current):   Vitals:    05/27/21 0215 05/27/21 0220 05/27/21 0233 05/27/21 0615   BP:   124/66    Pulse:   65    Resp:   16    Temp:   98.5 °F (36.9 °C)    TempSrc:   Oral    SpO2: (!) 78% 96% 100% 95%   Weight:       Height:                                                  BP Readings from Last 3 Encounters:   05/27/21 124/66   05/14/21 130/86   04/30/21 135/75       NPO Status:                                                                                 BMI:   Wt Readings from Last 3 Encounters:   05/26/21 167 lb 4.8 oz (75.9 kg)   05/14/21 173 lb (78.5 kg)   04/27/21 182 lb 6.4 oz (82.7 kg)     Body mass index is 24.01 kg/m².     CBC:   Lab Results   Component Value Date    WBC 10.3 05/26/2021    RBC 3.45 05/26/2021    HGB 8.8 05/26/2021    HCT 26.8 05/26/2021    MCV 77.8 05/26/2021    RDW 14.0 05/26/2021     05/26/2021       CMP:   Lab Results   Component Value Date     05/26/2021    K 3.8 05/26/2021    CL 95 05/26/2021    CO2 28 05/26/2021    BUN 26 05/26/2021    CREATININE 1.5 05/26/2021    GFRAA 57 05/26/2021    GFRAA >60 08/20/2012    AGRATIO 0.7 05/26/2021    LABGLOM 47 05/26/2021    GLUCOSE 295 05/26/2021    PROT 7.0 05/26/2021    PROT 7.3 08/20/2012    CALCIUM 8.4 05/26/2021    BILITOT 0.3 05/26/2021    ALKPHOS 125 05/26/2021    AST 7 05/26/2021    ALT <5 05/26/2021       POC Tests:   Recent Labs     05/27/21 0227   POCGLU 263*       Coags:   Lab Results   Component Value Date    PROTIME 11.9 05/25/2021    INR 1.03 05/25/2021    APTT 27.2 01/18/2021       HCG (If Applicable): No results found for: PREGTESTUR, PREGSERUM, HCG, HCGQUANT     ABGs:   Lab Results   Component Value Date    PHART 7.138 12/28/2017    PO2ART 94.6 12/28/2017    LUJ7NWD 32.6 12/28/2017    MDV0ILK 10.8 12/28/2017    BEART -17.1 12/28/2017    C5JDCXVN 95.0 12/28/2017        Type & Screen (If Applicable):  No results found for: LABABO, LABRH    Drug/Infectious Status (If Applicable):  No results found for: HIV, HEPCAB    COVID-19 Screening (If Applicable): Lab Results   Component Value Date    COVID19 NOT DETECTED 05/25/2021           Anesthesia Evaluation  Patient summary reviewed and Nursing notes reviewed no history of anesthetic complications:   Airway: Mallampati: III     Neck ROM: full   Dental:          Pulmonary:Negative Pulmonary ROS and normal exam    (+) pneumonia:                             Cardiovascular:Negative CV ROS    (+) hypertension:,                   Neuro/Psych:   Negative Neuro/Psych ROS  (+) CVA:,             GI/Hepatic/Renal: Neg GI/Hepatic/Renal ROS  (+) renal disease: ARF,      (-) hiatal hernia and GERD       Endo/Other: Negative Endo/Other ROS   (+) Diabetes, . Abdominal:           Vascular:                                        Anesthesia Plan      general     ASA 3     (I discussed with the patient the risks and benefits of PIV, general anesthesia, IV Narcotics, PACU. All questions were answered the patient agrees with the plan and wishes to proceed.  )  Induction: intravenous. Pre-Operative Diagnosis: Acute osteomyelitis of left foot ([facility] [M86.172]    56 y.o.   BMI:  Body mass index is 24.01 kg/m². Vitals:    05/27/21 0215 05/27/21 0220 05/27/21 0233 05/27/21 0615   BP:   124/66    Pulse:   65    Resp:   16    Temp:   98.5 °F (36.9 °C)    TempSrc:   Oral    SpO2: (!) 78% 96% 100% 95%   Weight:       Height:           Allergies   Allergen Reactions    Hydrocodone-Acetaminophen Itching    Percocet [Oxycodone-Acetaminophen]      States \"hallucinations,disoriented, & freaked out\" per wife    Pregabalin Itching    Vicodin [Hydrocodone-Acetaminophen]      Spaces out. Pt. States 1/15/14 has been taking some vicodin without problems. Social History     Tobacco Use    Smoking status: Never Smoker    Smokeless tobacco: Never Used   Substance Use Topics    Alcohol use: No     Comment: FORMER DRINKER approx.  quit 2005       LABS:    CBC  Lab Results   Component Value Date/Time    WBC 10.3 05/26/2021 02:45 AM    HGB 8.8 (L) 05/26/2021 02:45 AM    HCT 26.8 (L) 05/26/2021 02:45 AM     05/26/2021 02:45 AM     RENAL  Lab Results   Component Value Date/Time     (L) 05/26/2021 02:45 AM    K 3.8 05/26/2021 02:45 AM    CL 95 (L) 05/26/2021 02:45 AM    CO2 28 05/26/2021 02:45 AM    BUN 26 (H) 05/26/2021 02:45 AM    CREATININE 1.5 (H) 05/26/2021 02:45 AM    GLUCOSE 295 (H) 05/26/2021 02:45 AM     COAGS  Lab Results   Component Value Date/Time    PROTIME 11.9 05/25/2021 06:47 PM    INR 1.03 05/25/2021 06:47 PM    APTT 27.2 01/18/2021 05:55 PM         Hernandez Antony MD   5/27/2021

## 2021-05-27 NOTE — FLOWSHEET NOTE
05/26/21 2117   Vital Signs   Temp 98.8 °F (37.1 °C)   Temp Source Oral   Pulse 78   Heart Rate Source Monitor   Resp 16   /70   BP Location Left upper arm   Patient Position Up in chair   Level of Consciousness Alert (0)   MEWS Score 1   Oxygen Therapy   SpO2 96 %   O2 Device None (Room air)   Pt resting in chair. Evening meds given to pt. Pt aware of NPO after MN status for OR tomorrow. Consent in chart.  Carlos Loya, RN

## 2021-05-27 NOTE — PROGRESS NOTES
LIVER PROFILE:   Recent Labs     05/25/21 1847 05/26/21  0245   AST 8* 7*   ALT 8* <5*   LIPASE 14.0  --    BILITOT 0.4 0.3   ALKPHOS 155* 125     PT/INR:   Recent Labs     05/25/21 1847   PROTIME 11.9   INR 1.03       CULTURES  COVID/influenza: not detected  Blood: pending    RADIOLOGY  XR FOOT LEFT (2 VIEWS)   Final Result   Status post amputation of 1st ray with expected postoperative changes. XR FOOT LEFT (MIN 3 VIEWS)   Final Result   Extensive changes of osteomyelitis involving the 1st metatarsal and 1st   proximal phalanx               Assessment/Plan:    Osteomyelitis left 1st metatarsal and 1st proximal phalanx  Chronic LLE wound due to diabetes mellitus. Diabetic foot ulcer  Acute cellulitis L foot  - Follows with Dr. Betty Zhao  - Hx of MRSA  - Xray showed extensive changes of osteo involving 1st metatarsal and 1st proximal phalanx  - Continue IV antibiotics,  IV Vanc D#3/Cefepime D#2 (received Zosyn x 2 doses)  - Podiatry consulted. status post left foot partial foot amputation today. 5/27/2021    Hyperglycemia  DM type 2, insulin-dependent, uncontrolled  Diabetic neuropathy  - monitor glucose. - Continue Lantus 40 units nightly, resume Humalog 10 units 3 times daily with meals and continue SSI coverage. - continue Gabapentin  - HgbA1C: 12.7 percent     Pseudohyponatremia  - Na: 127 on admission with corrected Na: 133    Microcytic Anemia   Anemia of chronic disease  - Baseline hgb ~11-12  - Hgb on admission 10.1 > 9.0  - no acute bleeding       CKD 3a  - Baseline creatinine ~1.3   - Cr on admission 1.5  - receiving IVF's. Creatinine stable at 1.5     HTN  - BP controlled  - continue Amlodipine.     GERD  - Continue PPI     Possible BPH  - c/o difficulty urinating  - started on Flomax.      DVT Prophylaxis: Lovenox  Diet: DIET CARB CONTROL; Carb Control: 4 carb choices (60 gms)/meal  Code Status: Full Code     Kimmie Paez MD  5/27/2021

## 2021-05-27 NOTE — OP NOTE
[address]                 [address] [id number]                                OPERATIVE REPORT    PATIENT NAME: Ita Sifuentes                     :        1960  MED REC NO:   [id number]                          ROOM:       0234  ACCOUNT NO:   [de-identified]                           ADMIT DATE: 2021  PROVIDER:     Asia Anderson DPM    DATE OF PROCEDURE:  2021    PREOPERATIVE DIAGNOSES:  1.  Osteomyelitis, left foot. 2.  Diabetic foot ulceration, left foot. 3.  Diabetes mellitus, uncontrolled. POSTOPERATIVE DIAGNOSES:  1.  Osteomyelitis, left foot. 2.  Diabetic foot ulceration, left foot. 3.  Diabetes mellitus, uncontrolled. NAME OF PROCEDURE:  Partial left foot amputation. HEMOSTASIS:  Ankle tourniquet at 250 mmHg. ESTIMATED BLOOD LOSS:  Less than 100 mL. SURGEON:  Asia Anderson DPM    ANESTHESIA:  Local MAC. REPORT OF OPERATION:  The patient was brought into the operating room,  placed on the operating table in the supine position. Under mild IV  sedation, the ulcerations were identified on the dorsal and plantar  aspects of the first ray. There was purulent discharge from both  ulceration sites. The 1st ray was anesthetized with 20cc of a 1:1 mixture  of 1% lidocaine and 0.5% marcaine plain. An eschmarc was then utilized   to exsanguinate the foot and ankle tourniquet was inflated to 250mmHg. At this time, an elliptical incision was then made  circumferentially at the base of the hallux, excising the dorsal  ulceration. There was a pocket of purulence noted immediately upon  penetration of the skin. Dissection was carried down in order to expose  the underlying extensor and flexor tendons. These were then transected. The soft tissue attachments at the first metatarsophalangeal joint were  then transected. The hallux was then passed off the operative field in  toto.   The base of the hallux was noted to be soft, osteomyelitic bone. There was also noted to be obvious bone of the first metatarsal as well  with multiple fragments noted. The incision was then extended back to  the base of the first metatarsal and the soft tissues were dissected  free around the entire length of the first metatarsal.  Soft tissue  attachments at the base of the first metatarsal were then excised as  well. The soft osteomyelitic bone of the first metatarsal was excised  in total.  Portions of this bone were sent for culture and sensitivity  and the remaining portions were sent for pathological evaluation. All  bleeders were cauterized in the surgical site itself. All necrotic  tissue was sharply excised. Surgical site was then copiously irrigated  with sterile saline via the pulse lavage. Surgical site  was inspected  and no further sign of infected or necrotic tissue was noted. It did  not appear that the infection involved the second ray at this time. At  this time, the skin edges were then reapproximated with 3-0 nylon in a  simple  interrupted suture technique. We also had applied SURGI SNoW  into the wound bed in order to decrease any postoperative bleeding as  well. At this time, we also injected 2 mL of AmnioFlo into the surgical  site in order to stimulate wound  healing in a high-risk diabetic  patient. Surgical sites were then  covered with Xeroform gauze, fluffs,  Kerlix, ABD, and Coban. Tourniquet was then deflated. The patient tolerated the procedure and anesthesia well and transferred  to recovery room with vital signs stable and vascular  status intact. There was evidence for a prompt hyperemic  response to the remaining  digits of the left foot. Following a brief period of postoperative  monitoring, the patient will be transferred back to the floor under the  care of the Medical Service.         ROBIN ROSS DPM    D: 05/27/2021 9:45:13       T: 05/27/2021 12:24:08     [email or web address]  Job#: [id number] Doc#: [id number]    CC:

## 2021-05-27 NOTE — PROGRESS NOTES
Report given to [facility]. Preop RN called and report given. Pt changed into gown. Minal Gant RN giving pt chlorohexidine wipes prior to going down to OR. Pt has no jewelry or dentures.  Gayla Oneal RN

## 2021-05-27 NOTE — BRIEF OP NOTE
Brief Postoperative Note      Patient: Mahin Forte  YOB: 1960  MRN: [id number]    Date of Procedure: 5/27/2021    Pre-Op Diagnosis: OSTEOMYELITIS, DIABETIC FOOT ULCER    Post-Op Diagnosis: Same       Procedure(s):  PARTIAL LEFT FOOT AMPUTATION    Surgeon(s):  Tosha Tapia DPM    Assistant:  Surgical Assistant: Aranza Gibbs    Anesthesia: Monitor Anesthesia Care    Estimated Blood Loss (mL): less than 817     Complications: None    Specimens:   ID Type Source Tests Collected by Time Destination   1 : LEFT FIRST METATARSAL Bone Bone CULTURE, SURGICAL Tosha Tapia DPM 5/27/2021 0805    A : LEFT FIRST METATARSAL Bone Bone SURGICAL PATHOLOGY Tosha Tapia DPM 5/27/2021 2054        Implants:  Implant Name Type Inv.  Item Serial No.  Lot No. LRB No. Used Action   ALLOGRAFT HUM TISS 1 CC AMNIO TISS [employer]BR[employer] AMNIFLO CRYOPRES  ALLOGRAFT HUM TISS 1 CC AMNIO TISS [employer] AM[employer]IFLO CRYOPRES  BONE BANK ALLOGRAFTS-WD [de-identified] Left 1 Implanted   ALLOGRAFT HUM TISS 1 CC AMNIO TISS [employer]OP[employer]  ALLOGRAFT HUM TISS 1 CC AMNIO TISS [employer] AM[employer]IFLO CRYOPRES  BONE BANK [employer]OG[employer]FTS-WD [de-identified] Left 1 Implanted         Drains: * No LDAs found *    Findings: ulcer left foot with necrotic foot, purulence, soft osteomyelitic bone    Electronically signed by Tosha Tapia DPM on 5/27/2021 at 8:47 AM No cyanosis, clubbing or edema

## 2021-05-28 LAB
ABO/RH: NORMAL
ANION GAP SERPL CALCULATED.3IONS-SCNC: 4 MMOL/L (ref 3–16)
ANTIBODY SCREEN: NORMAL
BLOOD BANK DISPENSE STATUS: NORMAL
BLOOD BANK PRODUCT CODE: NORMAL
BPU ID: NORMAL
BUN BLDV-MCNC: 13 MG/DL (ref 7–20)
CALCIUM SERPL-MCNC: 8.3 MG/DL (ref 8.3–10.6)
CHLORIDE BLD-SCNC: 101 MMOL/L (ref 99–110)
CO2: 30 MMOL/L (ref 21–32)
CREAT SERPL-MCNC: 1.4 MG/DL (ref 0.8–1.3)
DESCRIPTION BLOOD BANK: NORMAL
GFR AFRICAN AMERICAN: >60
GFR NON-AFRICAN AMERICAN: 51
GLUCOSE BLD-MCNC: 158 MG/DL (ref 70–99)
GLUCOSE BLD-MCNC: 158 MG/DL (ref 70–99)
GLUCOSE BLD-MCNC: 159 MG/DL (ref 70–99)
GLUCOSE BLD-MCNC: 171 MG/DL (ref 70–99)
GLUCOSE BLD-MCNC: 204 MG/DL (ref 70–99)
GLUCOSE BLD-MCNC: 52 MG/DL (ref 70–99)
GLUCOSE BLD-MCNC: 52 MG/DL (ref 70–99)
GLUCOSE BLD-MCNC: 70 MG/DL (ref 70–99)
HCT VFR BLD CALC: 22 % (ref 40.5–52.5)
HCT VFR BLD CALC: 22.2 % (ref 40.5–52.5)
HCT VFR BLD CALC: 26.2 % (ref 40.5–52.5)
HEMOGLOBIN: 7.2 G/DL (ref 13.5–17.5)
HEMOGLOBIN: 7.2 G/DL (ref 13.5–17.5)
HEMOGLOBIN: 8.4 G/DL (ref 13.5–17.5)
MAGNESIUM: 1.8 MG/DL (ref 1.8–2.4)
MCH RBC QN AUTO: 25.3 PG (ref 26–34)
MCHC RBC AUTO-ENTMCNC: 32.2 G/DL (ref 31–36)
MCV RBC AUTO: 78.5 FL (ref 80–100)
PDW BLD-RTO: 14.1 % (ref 12.4–15.4)
PERFORMED ON: ABNORMAL
PERFORMED ON: NORMAL
PHOSPHORUS: 3 MG/DL (ref 2.5–4.9)
PLATELET # BLD: 270 K/UL (ref 135–450)
PMV BLD AUTO: 8.5 FL (ref 5–10.5)
POTASSIUM SERPL-SCNC: 3.7 MMOL/L (ref 3.5–5.1)
RBC # BLD: 2.83 M/UL (ref 4.2–5.9)
SODIUM BLD-SCNC: 135 MMOL/L (ref 136–145)
VANCOMYCIN TROUGH: 13.1 UG/ML (ref 10–20)
WBC # BLD: 7.8 K/UL (ref 4–11)

## 2021-05-28 PROCEDURE — 99232 SBSQ HOSP IP/OBS MODERATE 35: CPT | Performed by: INTERNAL MEDICINE

## 2021-05-28 PROCEDURE — 84100 ASSAY OF PHOSPHORUS: CPT

## 2021-05-28 PROCEDURE — 6360000002 HC RX W HCPCS: Performed by: INTERNAL MEDICINE

## 2021-05-28 PROCEDURE — P9016 RBC LEUKOCYTES REDUCED: HCPCS

## 2021-05-28 PROCEDURE — 80048 BASIC METABOLIC PNL TOTAL CA: CPT

## 2021-05-28 PROCEDURE — 80202 ASSAY OF VANCOMYCIN: CPT

## 2021-05-28 PROCEDURE — 86850 RBC ANTIBODY SCREEN: CPT

## 2021-05-28 PROCEDURE — 1200000000 HC SEMI PRIVATE

## 2021-05-28 PROCEDURE — 86923 COMPATIBILITY TEST ELECTRIC: CPT

## 2021-05-28 PROCEDURE — 85027 COMPLETE CBC AUTOMATED: CPT

## 2021-05-28 PROCEDURE — 6370000000 HC RX 637 (ALT 250 FOR IP): Performed by: INTERNAL MEDICINE

## 2021-05-28 PROCEDURE — 6370000000 HC RX 637 (ALT 250 FOR IP): Performed by: NURSE PRACTITIONER

## 2021-05-28 PROCEDURE — 2580000003 HC RX 258: Performed by: INTERNAL MEDICINE

## 2021-05-28 PROCEDURE — 36430 TRANSFUSION BLD/BLD COMPNT: CPT

## 2021-05-28 PROCEDURE — 85014 HEMATOCRIT: CPT

## 2021-05-28 PROCEDURE — 86901 BLOOD TYPING SEROLOGIC RH(D): CPT

## 2021-05-28 PROCEDURE — 85018 HEMOGLOBIN: CPT

## 2021-05-28 PROCEDURE — 86900 BLOOD TYPING SEROLOGIC ABO: CPT

## 2021-05-28 PROCEDURE — 36415 COLL VENOUS BLD VENIPUNCTURE: CPT

## 2021-05-28 PROCEDURE — 83735 ASSAY OF MAGNESIUM: CPT

## 2021-05-28 RX ORDER — SODIUM CHLORIDE 9 MG/ML
INJECTION, SOLUTION INTRAVENOUS PRN
Status: DISCONTINUED | OUTPATIENT
Start: 2021-05-28 | End: 2021-05-29 | Stop reason: HOSPADM

## 2021-05-28 RX ADMIN — PANTOPRAZOLE SODIUM 40 MG: 40 TABLET, DELAYED RELEASE ORAL at 10:12

## 2021-05-28 RX ADMIN — ENOXAPARIN SODIUM 40 MG: 40 INJECTION SUBCUTANEOUS at 10:12

## 2021-05-28 RX ADMIN — AMLODIPINE BESYLATE 5 MG: 5 TABLET ORAL at 10:12

## 2021-05-28 RX ADMIN — SODIUM CHLORIDE 25 ML: 9 INJECTION, SOLUTION INTRAVENOUS at 22:56

## 2021-05-28 RX ADMIN — TAMSULOSIN HYDROCHLORIDE 0.4 MG: 0.4 CAPSULE ORAL at 10:12

## 2021-05-28 RX ADMIN — SODIUM CHLORIDE 25 ML: 9 INJECTION, SOLUTION INTRAVENOUS at 17:57

## 2021-05-28 RX ADMIN — GABAPENTIN 600 MG: 300 CAPSULE ORAL at 10:12

## 2021-05-28 RX ADMIN — CEFEPIME 2000 MG: 2 INJECTION, POWDER, FOR SOLUTION INTRAVENOUS at 17:58

## 2021-05-28 RX ADMIN — INSULIN LISPRO 2 UNITS: 100 INJECTION, SOLUTION INTRAVENOUS; SUBCUTANEOUS at 08:05

## 2021-05-28 RX ADMIN — CEFEPIME 2000 MG: 2 INJECTION, POWDER, FOR SOLUTION INTRAVENOUS at 04:27

## 2021-05-28 RX ADMIN — INSULIN GLARGINE 40 UNITS: 100 INJECTION, SOLUTION SUBCUTANEOUS at 20:24

## 2021-05-28 RX ADMIN — INSULIN LISPRO 2 UNITS: 100 INJECTION, SOLUTION INTRAVENOUS; SUBCUTANEOUS at 17:51

## 2021-05-28 RX ADMIN — SODIUM CHLORIDE, PRESERVATIVE FREE 10 ML: 5 INJECTION INTRAVENOUS at 20:24

## 2021-05-28 RX ADMIN — GABAPENTIN 600 MG: 300 CAPSULE ORAL at 20:23

## 2021-05-28 RX ADMIN — SODIUM CHLORIDE, PRESERVATIVE FREE 10 ML: 5 INJECTION INTRAVENOUS at 10:13

## 2021-05-28 RX ADMIN — VANCOMYCIN HYDROCHLORIDE 1500 MG: 10 INJECTION, POWDER, LYOPHILIZED, FOR SOLUTION INTRAVENOUS at 23:00

## 2021-05-28 RX ADMIN — VENLAFAXINE HYDROCHLORIDE 75 MG: 75 CAPSULE, EXTENDED RELEASE ORAL at 10:12

## 2021-05-28 ASSESSMENT — PAIN SCALES - GENERAL
PAINLEVEL_OUTOF10: 0
PAINLEVEL_OUTOF10: 0

## 2021-05-28 NOTE — FLOWSHEET NOTE
05/28/21 0412   Vital Signs   Temp 97.4 °F (36.3 °C)   Temp Source Oral   Pulse 65   Heart Rate Source Monitor   Resp 16   /67   BP Location Left upper arm   Patient Position Semi fowlers   Level of Consciousness Alert (0)   MEWS Score 1   Oxygen Therapy   SpO2 96 %   O2 Device None (Room air)   Pt resting in bed, no acute distress.  Adan Pride, RN

## 2021-05-28 NOTE — FLOWSHEET NOTE
05/27/21 2050   Vital Signs   Temp 100.4 °F (38 °C)   Temp Source Oral   Pulse 96   Heart Rate Source Monitor   Resp 16   BP (!) 146/79   BP Location Left upper arm   Patient Position Semi fowlers   Level of Consciousness Alert (0)   MEWS Score 1   Oxygen Therapy   SpO2 92 %   O2 Device None (Room air)   Tylenol 2 po given for fever. Ultram po give for c/o right foot pain.  Mia Alford RN

## 2021-05-28 NOTE — PROGRESS NOTES
Subcutaneous TID WC    insulin lispro  0-6 Units Subcutaneous Nightly       Allergies:  Hydrocodone-acetaminophen, Percocet [oxycodone-acetaminophen], Pregabalin, and Vicodin [hydrocodone-acetaminophen]    Social History:    Social History     Tobacco Use    Smoking status: Never Smoker    Smokeless tobacco: Never Used   Vaping Use    Vaping Use: Never used   Substance Use Topics    Alcohol use: No     Comment: FORMER DRINKER approx.  quit 2005    Drug use: No       Family History:       Problem Relation Age of Onset    Diabetes Maternal Grandfather     Heart Disease Paternal Uncle     High Blood Pressure Mother        Review of Systems    CONSTITUTIONAL:  negative  EYES:  negative  HEENT:  negative  RESPIRATORY:  negative  CARDIOVASCULAR:  negative  GASTROINTESTINAL:  negative  GENITOURINARY:  negative  INTEGUMENT/BREAST:  positive for ulcer  MUSCULOSKELETAL:  positive for pain  NEUROLOGICAL:  positive for numbness      Objective:   /68   Pulse 65   Temp 97.3 °F (36.3 °C) (Oral)   Resp 16   Ht 5' 10\" (1.778 m)   Wt 167 lb 4.8 oz (75.9 kg)   SpO2 96%   BMI 24.01 kg/m²     Data:  CBC:   Recent Labs     05/25/21 1847 05/26/21 0245 05/28/21  0512   WBC 11.2* 10.3 7.8   HGB 9.5* 8.8* 7.2*   HCT 30.0* 26.8* 22.2*   MCV 78.2* 77.8* 78.5*    347 270     BMP:   Recent Labs     05/25/21 1847 05/26/21 0245 05/28/21  0512   * 130* 135*   K 4.8 3.8 3.7   CL 90* 95* 101   CO2 26 28 30   PHOS  --   --  3.0   BUN 31* 26* 13   CREATININE 1.5* 1.5* 1.4*     LIVER PROFILE:   Recent Labs     05/25/21 1847 05/26/21  0245   AST 8* 7*   ALT 8* <5*   LIPASE 14.0  --    BILITOT 0.4 0.3   ALKPHOS 155* 125     PT/INR:   Recent Labs     05/25/21 1847 05/26/21  0245   PROT 8.2 7.0   INR 1.03  --      HgBA1c:  Lab Results   Component Value Date    LABA1C 12.7 05/25/2021     SARS-CoV-2 RNA, RT PCRNOT DETECTED     Cultures: Blood x2 - NGSF  Bone - intraop - in process      Imaging: xray left foot -   Postop amputation 1st ray      Physical Exam:    General Appearance: alert and oriented to person, place and time, well developed and well- nourished, in no acute distress  Skin: warm and dry, no rash or erythema  Head: normocephalic and atraumatic  Eyes: pupils equal, round, and reactive to light, extraocular eye movements intact, conjunctivae normal  ENT: tympanic membrane, external ear and ear canal normal bilaterally, nose without deformity, nasal mucosa and turbinates normal without polyps  Neck: supple and non-tender without mass, no thyromegaly or thyroid nodules, no cervical lymphadenopathy  Pulmonary/Chest: clear to auscultation bilaterally- no wheezes, rales or rhonchi, normal air movement, no respiratory distress  Cardiovascular: normal rate, regular rhythm, normal S1 and S2, no murmurs, rubs, clicks, or gallops, distal pulses intact, no carotid bruits  Abdomen: soft, non-tender, non-distended, normal bowel sounds, no masses or organomegaly    Dressing left foot - mild strikethrough noted. No proximal erythema noted. Sutures intact left 1st ray amputation site with mild serosanguinous drainage noted. No purulence noted. Mild erythema and edema noted. No abscess noted.          Assessment:  Patient Active Problem List   Diagnosis Code    Diabetic ketoacidosis without coma associated with type 2 diabetes mellitus ([address] E11.10    Diabetic ulcer of left foot associated with type 2 diabetes mellitus ([address] E11.621, L97.529    Nausea & vomiting R11.2    Diabetic neuropathy ([address] E11.40    DM (diabetes mellitus), secondary, uncontrolled, w/neurologic complic ([address] X89.52, B05.03    Gout M10.9    Hx MRSA Z22.322    Hyperkalemia E87.5    Chronic pain on chronic opioids G89.29    Chronic LE diabetic ulcers E11.622, L97.309    Mild protein-calorie malnutrition (HCC) E44.1    Cellulitis L03.90    Acute kidney injury ([address] N17.9    Neuropathy G62.9    Diabetic acidosis without coma (HCC) E11.10    Acute CVA (cerebrovascular accident) ([facility] I63.9    HTN (hypertension), benign I10    DKA, type 2, not at goal [facility]) E11.10    Diabetic foot infection ([facility] E11.628, L08.9    Sepsis without acute organ dysfunction ([facility]) A41.9    Stage 3a chronic kidney disease N18.31    Hypertension, essential I10    Type 2 diabetes mellitus with hyperglycemia, with long-term current use of insulin ([facility]) E11.65, Z79.4    Acute osteomyelitis of left foot ([facility]) M86.172    Hyperglycemia R73.9    Pyogenic inflammation of bone ([facility]) M86.9       Cellulitis left foot - improving  Osteomyelitis left foot secondary to diabetes mellitus - S/P 1st ray amputation 5/28/21  diabetes mellitus uncontrolled with peripheral neuropathy      Plan  Patient examined. Reviewed labs and imaging. Needs to control glucose levels to prevent future complications. Elevation of legs to decrease edema. Osteomyelitic bone of the 1st ray has been resected. Awaiting culture results. Depending on results hopefully will be OK for oral antibiotics. Continue IV antibiotics. Dressing removed. Applied mepitel and well padded dry dressing. Try to keep nonWB left foot if at all possible. If must WB on left foot then apply pressure to heel only. Discussed with Dr. Chip Quintana.          Electronically signed by Saud Cline DPM on 5/28/2021 at 9:43 AM.

## 2021-05-28 NOTE — PROGRESS NOTES
PRBC's started at [phone], vital signs stable. See flow sheet. FSBS 52 patient asymptomatic, alert and oriented, 118 ml orange juice given , will recheck pr protocol.

## 2021-05-28 NOTE — FLOWSHEET NOTE
05/28/21 0031   Vital Signs   Temp 98.9 °F (37.2 °C)   Temp Source Oral   Pulse 94   Heart Rate Source Monitor   Resp 16   /61   BP Location Left upper arm   Patient Position Semi fowlers   Level of Consciousness Alert (0)   MEWS Score 1   Oxygen Therapy   SpO2 94 %   O2 Device None (Room air)   Pt resting in bed, denies pain. Resp easy/even.  Shadia Dumont RN

## 2021-05-28 NOTE — PROGRESS NOTES
Recheck FSBS 70, patient alert and oriented, no signs or symptoms of hypoglycemia noted at this time.

## 2021-05-28 NOTE — CARE COORDINATION
INTERDISCIPLINARY PLAN OF CARE CONFERENCE    Date/Time: 5/28/2021 2:23 PM  Completed by: Ron Feliciano RN, Case Management      Patient Name:  Carl Estrada  YOB: 1960  Admitting Diagnosis: Acute osteomyelitis of left foot [facility]) [I10.712]     Admit Date/Time:  5/25/2021  5:11 PM    Chart reviewed. Interdisciplinary team contacted or reviewed plan related to patient progress and discharge plans. Disciplines included Case Management, Nursing, and Dietitian. Current Status: Stable  PT/OT recommendation for discharge plan of care: N/A    Expected D/C Disposition:  Home  Confirmed plan with patient and/or family Yes confirmed with: (name) wife  Met with: patient  Discharge Plan Comments:  Reviewed chart and met with pt at bedside. Plan remains for home with wife. Discussed HHC and pt interested IF NEEDED. Requested 's service info and # for [facility] both provided. Will follow.     Home O2 in place on admit: No  Pt informed of need to bring portable home O2 tank on day of discharge for nursing to connect prior to leaving:  Not Indicated  Verbalized agreement/Understanding:  Not Indicated

## 2021-05-28 NOTE — PROGRESS NOTES
Progress Note    Admit Date:  5/25/2021    56year old male with DM insulin-dependent, neuropathy, gout, chronic diabetic left foot ulcer with osteomyelitis who presented with c/o hyperglycemia. Admitted for osteomyelitis left foot. Seen by podiatry. S/p partial left foot amputation 5/27/2021, infected first metatarsal removed. Postop day 1 today    Subjective:  Mr. Elena Brunner seen. Doing okay. Left foot in dressing. Wound to be reevaluated by podiatrist.  H&H trending down  Vital signs stable. No pain. Blood sugars much better. Afebrile      Objective:      Vitals:    05/27/21 2050 05/28/21 0031 05/28/21 0412 05/28/21 0829   BP: (!) 146/79 109/61 125/67 128/68   Pulse: 96 94 65 65   Resp: 16 16 16 16   Temp: 100.4 °F (38 °C) 98.9 °F (37.2 °C) 97.4 °F (36.3 °C) 97.3 °F (36.3 °C)   TempSrc: Oral Oral Oral Oral   SpO2: 92% 94% 96%    Weight:       Height:                Intake/Output Summary (Last 24 hours) at 5/28/2021 1030  Last data filed at 5/28/2021 0527  Gross per 24 hour   Intake 1728.35 ml   Output 3325 ml   Net -1596.65 ml       Physical Exam:  Gen: No distress. Alert. Awake and well-oriented  Eyes: PERRL. No sclera icterus. No conjunctival injection. ENT: No discharge. Pharynx clear. Neck: Trachea midline. Resp: No accessory muscle use. No crackles. No wheezes. No rhonchi. CV: Regular rate. Regular rhythm. No murmur. No rub. + left foot edema. Capillary Refill: Brisk,< 3 seconds   Peripheral Pulses: +2 palpable, equal bilaterally   GI: Non-tender. Non-distended. Normal bowel sounds. No hernia. Skin: Warm and dry. Left foot in dressing  M/S: No cyanosis. No joint deformity. No clubbing. Neuro: Awake. Grossly nonfocal    Psych: Oriented x 3.  No anxiety or agitation    Data:  CBC:   Recent Labs     05/25/21  1847 05/26/21  0245 05/28/21  0512   WBC 11.2* 10.3 7.8   HGB 9.5* 8.8* 7.2*   HCT 30.0* 26.8* 22.2*   MCV 78.2* 77.8* 78.5*    347 270     BMP:   Recent Labs 05/25/21 1847 05/26/21 0245 05/28/21  0512   * 130* 135*   K 4.8 3.8 3.7   CL 90* 95* 101   CO2 26 28 30   PHOS  --   --  3.0   BUN 31* 26* 13   CREATININE 1.5* 1.5* 1.4*     LIVER PROFILE:   Recent Labs     05/25/21 1847 05/26/21 0245   AST 8* 7*   ALT 8* <5*   LIPASE 14.0  --    BILITOT 0.4 0.3   ALKPHOS 155* 125     PT/INR:   Recent Labs     05/25/21 1847   PROTIME 11.9   INR 1.03       CULTURES  COVID/influenza: not detected  Blood: pending    RADIOLOGY  XR FOOT LEFT (2 VIEWS)   Final Result   Status post amputation of 1st ray with expected postoperative changes. XR FOOT LEFT (MIN 3 VIEWS)   Final Result   Extensive changes of osteomyelitis involving the 1st metatarsal and 1st   proximal phalanx             Tele - NSR       Assessment/Plan:    Osteomyelitis left 1st metatarsal and 1st proximal phalanx  Chronic LLE wound due to diabetes mellitus. Diabetic foot ulcer  Acute cellulitis L foot  - Follows with Dr. Roger Reed  - Hx of MRSA  - Xray showed extensive changes of osteo involving 1st metatarsal and 1st proximal phalanx  - Continue IV antibiotics,  IV Vanc D#4/Cefepime D#23(received Zosyn x 2 doses)  - Podiatry consulted. status post left foot partial foot amputation on 5/27/2021. Infected first metatarsal removed. Postop day 1. Wound reevaluated by podiatry. Plan is to continue current IV antibiotics, await wound culture results. .  Likely will be ready to be switched to oral antibiotics on discharge in the next couple of days. Hyperglycemia  DM type 2, insulin-dependent, uncontrolled  Diabetic neuropathy  - monitor glucose. - Continue Lantus 40 units nightly, resume Humalog 10 units 3 times daily with meals and continue SSI coverage.   - continue Gabapentin  - HgbA1C: 12.7 %     Pseudohyponatremia  - Na: 127 on admission with corrected Na: 133     Microcytic Anemia   Anemia of chronic disease   Acute blood loss anemia, on chronic anemia  -Postop status  - Baseline hgb ~11-12 - Hgb on admission 10.1 > 9.0 > hemoglobin down to 7.2 today. Transfuse 1 unit of PRBC. CKD 3a  - Baseline creatinine ~1.3   - Cr on admission 1.5  - receiving IVF's. Creatinine stable at 1.5, 1.4     HTN  - BP controlled  - continue Amlodipine.     GERD  - Continue PPI     Possible BPH  - c/o difficulty urinating  - started on Flomax.      DVT Prophylaxis: Lovenox  Diet: DIET CARB CONTROL; Carb Control: 4 carb choices (60 gms)/meal  Code Status: Full Code     Bev Brian MD  5/28/2021

## 2021-05-28 NOTE — PROGRESS NOTES
Consent for blood signed and on chart, patient aware of need due to hemoglobin 7.2. Patient aware of Dr. Phill Rojas order. Procedure explained to patient and his only concern was he wanted B+ blood. Patient aware of blood to be transfused is B+. No further questions or concerns at this time.

## 2021-05-29 VITALS
OXYGEN SATURATION: 92 % | HEIGHT: 70 IN | SYSTOLIC BLOOD PRESSURE: 155 MMHG | TEMPERATURE: 97.4 F | RESPIRATION RATE: 16 BRPM | BODY MASS INDEX: 23.95 KG/M2 | WEIGHT: 167.3 LBS | DIASTOLIC BLOOD PRESSURE: 79 MMHG | HEART RATE: 65 BPM

## 2021-05-29 LAB
ANION GAP SERPL CALCULATED.3IONS-SCNC: 6 MMOL/L (ref 3–16)
BUN BLDV-MCNC: 11 MG/DL (ref 7–20)
CALCIUM SERPL-MCNC: 8.7 MG/DL (ref 8.3–10.6)
CHLORIDE BLD-SCNC: 101 MMOL/L (ref 99–110)
CO2: 30 MMOL/L (ref 21–32)
CREAT SERPL-MCNC: 1.3 MG/DL (ref 0.8–1.3)
GFR AFRICAN AMERICAN: >60
GFR NON-AFRICAN AMERICAN: 56
GLUCOSE BLD-MCNC: 143 MG/DL (ref 70–99)
GLUCOSE BLD-MCNC: 229 MG/DL (ref 70–99)
GLUCOSE BLD-MCNC: 55 MG/DL (ref 70–99)
GLUCOSE BLD-MCNC: 88 MG/DL (ref 70–99)
HCT VFR BLD CALC: 26.8 % (ref 40.5–52.5)
HEMOGLOBIN: 8.7 G/DL (ref 13.5–17.5)
MAGNESIUM: 2.1 MG/DL (ref 1.8–2.4)
MCH RBC QN AUTO: 25.8 PG (ref 26–34)
MCHC RBC AUTO-ENTMCNC: 32.4 G/DL (ref 31–36)
MCV RBC AUTO: 79.8 FL (ref 80–100)
PDW BLD-RTO: 14.5 % (ref 12.4–15.4)
PERFORMED ON: ABNORMAL
PERFORMED ON: ABNORMAL
PERFORMED ON: NORMAL
PHOSPHORUS: 3.3 MG/DL (ref 2.5–4.9)
PLATELET # BLD: 305 K/UL (ref 135–450)
PMV BLD AUTO: 8.2 FL (ref 5–10.5)
POTASSIUM SERPL-SCNC: 3.8 MMOL/L (ref 3.5–5.1)
RBC # BLD: 3.36 M/UL (ref 4.2–5.9)
SODIUM BLD-SCNC: 137 MMOL/L (ref 136–145)
WBC # BLD: 6.4 K/UL (ref 4–11)

## 2021-05-29 PROCEDURE — 2580000003 HC RX 258: Performed by: INTERNAL MEDICINE

## 2021-05-29 PROCEDURE — 99239 HOSP IP/OBS DSCHRG MGMT >30: CPT | Performed by: INTERNAL MEDICINE

## 2021-05-29 PROCEDURE — 36415 COLL VENOUS BLD VENIPUNCTURE: CPT

## 2021-05-29 PROCEDURE — 85027 COMPLETE CBC AUTOMATED: CPT

## 2021-05-29 PROCEDURE — 80048 BASIC METABOLIC PNL TOTAL CA: CPT

## 2021-05-29 PROCEDURE — 6360000002 HC RX W HCPCS: Performed by: INTERNAL MEDICINE

## 2021-05-29 PROCEDURE — 83735 ASSAY OF MAGNESIUM: CPT

## 2021-05-29 PROCEDURE — 6370000000 HC RX 637 (ALT 250 FOR IP): Performed by: NURSE PRACTITIONER

## 2021-05-29 PROCEDURE — 6370000000 HC RX 637 (ALT 250 FOR IP): Performed by: INTERNAL MEDICINE

## 2021-05-29 PROCEDURE — 84100 ASSAY OF PHOSPHORUS: CPT

## 2021-05-29 RX ORDER — INSULIN LISPRO 100 [IU]/ML
5 INJECTION, SOLUTION INTRAVENOUS; SUBCUTANEOUS
Qty: 5 CARTRIDGE | Refills: 0 | COMMUNITY
Start: 2021-05-29

## 2021-05-29 RX ORDER — CEFUROXIME AXETIL 500 MG/1
500 TABLET ORAL 2 TIMES DAILY
Qty: 20 TABLET | Refills: 0 | Status: SHIPPED | OUTPATIENT
Start: 2021-05-29 | End: 2021-06-08

## 2021-05-29 RX ORDER — TAMSULOSIN HYDROCHLORIDE 0.4 MG/1
0.4 CAPSULE ORAL DAILY
Qty: 30 CAPSULE | Refills: 3 | Status: SHIPPED | OUTPATIENT
Start: 2021-05-30 | End: 2021-11-05 | Stop reason: ALTCHOICE

## 2021-05-29 RX ORDER — TRAMADOL HYDROCHLORIDE 50 MG/1
50 TABLET ORAL EVERY 6 HOURS PRN
Qty: 20 TABLET | Refills: 0 | Status: SHIPPED | OUTPATIENT
Start: 2021-05-29 | End: 2021-06-03

## 2021-05-29 RX ADMIN — ENOXAPARIN SODIUM 40 MG: 40 INJECTION SUBCUTANEOUS at 09:10

## 2021-05-29 RX ADMIN — GABAPENTIN 600 MG: 300 CAPSULE ORAL at 09:11

## 2021-05-29 RX ADMIN — TAMSULOSIN HYDROCHLORIDE 0.4 MG: 0.4 CAPSULE ORAL at 09:10

## 2021-05-29 RX ADMIN — CEFEPIME 2000 MG: 2 INJECTION, POWDER, FOR SOLUTION INTRAVENOUS at 05:21

## 2021-05-29 RX ADMIN — PANTOPRAZOLE SODIUM 40 MG: 40 TABLET, DELAYED RELEASE ORAL at 09:11

## 2021-05-29 RX ADMIN — AMLODIPINE BESYLATE 5 MG: 5 TABLET ORAL at 09:11

## 2021-05-29 RX ADMIN — SODIUM CHLORIDE, PRESERVATIVE FREE 10 ML: 5 INJECTION INTRAVENOUS at 09:11

## 2021-05-29 RX ADMIN — VENLAFAXINE HYDROCHLORIDE 75 MG: 75 CAPSULE, EXTENDED RELEASE ORAL at 09:10

## 2021-05-29 ASSESSMENT — PAIN SCALES - GENERAL: PAINLEVEL_OUTOF10: 0

## 2021-05-29 NOTE — PROGRESS NOTES
Prescriptions and discharge instructions given. Pt verbalized understanding/ denies questions/ needs at this time. Transport called to transport pt to vehicle for discharge home.

## 2021-05-29 NOTE — FLOWSHEET NOTE
05/28/21 2008   Vital Signs   Temp 97.3 °F (36.3 °C)   Temp Source Oral   Pulse 68   Heart Rate Source Monitor   Resp 16   BP (!) 114/55   BP Location Left upper arm   Level of Consciousness Alert (0)   MEWS Score 1   Oxygen Therapy   SpO2 93 %   O2 Device None (Room air)   Pt assessment complete. Pt lying in bed quietly. Lung sounds diminished. Left foot elevated on pillow. Dressing dry and intact. Pt reports Dr Yenny Rodriguez changed dressing this am.  Nightly medications given. Denies any needs at this time. Call light within reach.

## 2021-05-29 NOTE — DISCHARGE INSTR - COC
PHYSICIAN SECTION    Prognosis: Good    Condition at Discharge: Stable    Rehab Potential (if transferring to Rehab): Good    Recommended Labs or Other Treatments After Discharge:     Physician Certification: I certify the above information and transfer of Gregory Taylor  is necessary for the continuing treatment of the diagnosis listed and that he requires [address] for less 30 days.      Update Admission H&P: No change in H&P    PHYSICIAN SIGNATURE:  Electronically signed by Gerson Chen MD on 5/29/21 at 11:38 AM EDT

## 2021-05-29 NOTE — PROGRESS NOTES
Pharmacy Vancomycin Consult     Vancomycin Day: 5  Current Dosin mg IV q24h  Current indication: osteomyelitis    Temp max:  97.8    Recent Labs     21  0512 21  0519   BUN 13 11   CREATININE 1.4* 1.3   WBC 7.8 6.4       Intake/Output Summary (Last 24 hours) at 2021 0705  Last data filed at 2021 1093  Gross per 24 hour   Intake 1386.53 ml   Output 350 ml   Net 1036.53 ml     Culture Date      Source                       Results      Ht Readings from Last 1 Encounters:   21 5' 10\" (1.778 m)        Wt Readings from Last 1 Encounters:   21 167 lb 4.8 oz (75.9 kg)       Body mass index is 24.01 kg/m². Estimated Creatinine Clearance: 61 mL/min (based on SCr of 1.3 mg/dL). Trough: 13.1    Assessment/Plan:  Will increase schedule to 1250 mg q18h. Give next dose today @ .   A new vancomycin trough has been ordered for  @ 6905

## 2021-05-29 NOTE — CARE COORDINATION
DISCHARGE ORDER  Date/Time 2021 12:23 PM  Completed by: Brianna Lincoln RN, Case Management    Patient Name: Carlos Enrique Crowell      : 1960  Admitting Diagnosis: Acute osteomyelitis of left foot [facility]) [R08.638]      Admit order Date and Status: IP 2021  (verify MD's last order for status of admission)      Noted discharge order. If applicable PT/OT recommendation at Discharge: NA  DME recommendation by PT/OT:NA  Confirmed discharge plan with patient at bedside  Discharge Plan: Plans to return home today in care of spouse. He will call for transportation. [email or web address] provided on AVS. Pt declines HHC at this time. He will have / supervision for at least one week. Reviewed chart. Role of discharge planner explained and patient verbalized understanding. Discharge order is noted. Has Home O2 in place on admit:  No  Informed of need to bring portable home O2 tank on day of discharge for nursing to connect prior to leaving:   No  Verbalized agreement/Understanding:   Not Indicated  Pt is being d/c'd to home today. Pt's O2 sats are 92% on RA. Discharge timeout done with [facility]. All discharge needs and concerns addressed.

## 2021-05-29 NOTE — PROGRESS NOTES
Pt is lying in bed with their eyes closed. Respirations are easy and even. Call light within reach bed in lowest position with the wheels locked. Will continue to monitor.  Rosy Mckeon RN

## 2021-05-29 NOTE — PROGRESS NOTES
Patient is able to demonstrate the ability to move from a reclining position to an upright position within the recliner. Pt a/o. Am assessment completed see flow sheet. Pt denies any pain/ needs at this time. Call light within reach.

## 2021-05-29 NOTE — DISCHARGE SUMMARY
Name:  Rodríguez Pineda  Room:  9946/1306-44  MRN:    [id number]    Discharge Summary      This discharge summary is in conjunction with a complete physical exam done on the day of discharge. Discharging Physician: Dr. Javier Veronicak: 5/25/2021  Discharge:  5/29/2021    HPI taken from admission H&P:      The patient is a 56 y.o. male with PMH below, presents with N/V, hyperglycemia, chronic L foot DM wound w/ acute infection. Pt reports that he has a chronic L foot found  Which has worsening over the last week w/ drainage and redness. This is followed by Dr. Anthony Strickland. He last saw him earlier today. Says that Dr. Anthony Strickland told him he may need further surgical intervention. He reports have N/V today. His BGT's have been running high, > 500. He has hx of DKA. Diagnoses this Admission and Hospital Course     Osteomyelitis left 1st metatarsal and 1st proximal phalanx  Chronic LLE wound due to diabetes mellitus. Diabetic foot ulcer  Acute cellulitis L foot  - Follows with Dr. Anthony Strickland  - Hx of MRSA  - Xray showed extensive changes of osteo involving 1st metatarsal and 1st proximal phalanx  -Continue with IV antibiotics Vanc &Cefepime (received Zosyn x 2 doses). Wound cultures with strep  - Podiatry consulted. - s/p left foot partial foot amputation on 5/27/2021. Infected first metatarsal removed. - discharged with Ceftin and PRN Tramadol. Nonweightbearing left foot. Close follow-up with podiatry next week in the office. Almaz Bally Keep dressings intact.        DM type 2, insulin-dependent, uncontrolled  Diabetic neuropathy  - monitored glucose. - Continued Lantus 40 units nightly, resumed Humalog 10 units 3 times daily with meals and continued SSI coverage. - continued Gabapentin  - HgbA1C: 12.7 %  -Blood sugars running low with Humalog 10 units 3 times daily, Humalog decreased to 5 units 3 times daily with meals on discharge.   Continue Lantus 40 units nightly     Pseudohyponatremia - Resolved  - Na: 127 on admission with corrected Na: 133   - this resolved     Microcytic Anemia   Anemia of chronic disease   Acute blood loss anemia, on chronic anemia  - Postop status  - Baseline hgb ~11-12  - Hgb on admission 10.1 > 9.0 > hemoglobin down to 7.2 today. - Transfused 1 unit of PRBC  - Hgb 8.7 at d/c     CKD 3a  - Baseline creatinine ~1.3   - Cr on admission 1.5  - received IVF's. - Creatinine stable at 1.5> 1.4 > 1.3 at d/c     HTN  - BP controlled  - continued Amlodipine.     GERD  - Continued PPI      Possible BPH  - c/o difficulty urinating  - started on Flomax > continued at d/c, will need to f/u OP    Procedures (Please Review Full Report for Details)  Partial Left Foot Ambutation    Consults    Podiatry    Physical Exam at Discharge:    BP (!) 155/79   Pulse 65   Temp 97.4 °F (36.3 °C) (Oral)   Resp 16   Ht 5' 10\" (1.778 m)   Wt 167 lb 4.8 oz (75.9 kg)   SpO2 92%   BMI 24.01 kg/m²   Gen: No distress. Alert. Awake and well-oriented  Eyes: PERRL. No sclera icterus. No conjunctival injection. ENT: No discharge. Pharynx clear. Neck: Trachea midline. Resp: No accessory muscle use. No crackles. No wheezes. No rhonchi. CV: Regular rate. Regular rhythm. No murmur. No rub. + left foot edema. Capillary Refill: Brisk,< 3 seconds   Peripheral Pulses: +2 palpable, equal bilaterally   GI: Non-tender. Non-distended. Normal bowel sounds. No hernia. Skin: Warm and dry. Left foot in dressing  M/S: No cyanosis. No joint deformity. No clubbing. Neuro: Awake. Grossly nonfocal    Psych: Oriented x 3.  No anxiety or agitation    CBC:   Recent Labs     05/28/21 0512 05/28/21 1957 05/29/21 0519   WBC 7.8  --  6.4   HGB 7.2*  7.2* 8.4* 8.7*   HCT 22.0*  22.2* 26.2* 26.8*   MCV 78.5*  --  79.8*     --  305     BMP:   Recent Labs     05/28/21 0512 05/29/21  0519   * 137   K 3.7 3.8    101   CO2 30 30   PHOS 3.0 3.3   BUN 13 11   CREATININE 1.4* 1.3     CULTURES    Surgical cx: rare growth Strep agalacticae    Blood cx x2: NGTD    SARS-COV-2 - Rapid PCR: Not detected    Rapid Influenza A/B: negative     RADIOLOGY    XR FOOT LEFT (2 VIEWS) 5/27/2021   Final Result   Status post amputation of 1st ray with expected postoperative changes. XR FOOT LEFT (MIN 3 VIEWS) 5/25/2021   Final Result   Extensive changes of osteomyelitis involving the 1st metatarsal and 1st   proximal phalanx           Discharge Medications     Medication List      START taking these medications    cefUROXime 500 MG tablet  Commonly known as: CEFTIN  Take 1 tablet by mouth 2 times daily for 10 days     tamsulosin 0.4 MG capsule  Commonly known as: FLOMAX  Take 1 capsule by mouth daily  Start taking on: May 30, 2021     traMADol 50 MG tablet  Commonly known as: ULTRAM  Take 1 tablet by mouth every 6 hours as needed for Pain for up to 5 days. CHANGE how you take these medications    HumaLOG 100 UNIT/ML injection cartridge  Generic drug: insulin lispro  What changed:   · how much to take  · additional instructions        CONTINUE taking these medications    amLODIPine 5 MG tablet  Commonly known as: NORVASC  Take 1 tablet by mouth daily     * Ultra-Thin II Pen Needles 29G X 12.7MM Misc  Generic drug: Insulin Pen Needle     * BD ULTRA-FINE PEN NEEDLES 29G X 12.7MM Misc  Generic drug: Insulin Pen Needle     * BD Pen Needle Светлана 2nd Gen 32G X 4 MM Misc  Generic drug: Insulin Pen Needle     FreeStyle Isidoro 2 Vermilion Joan Webb 2 Sensor Misc     gabapentin 600 MG tablet  Commonly known as: NEURONTIN     insulin glargine 100 UNIT/ML injection vial  Commonly known as: LANTUS  Inject 40 Units into the skin nightly     pantoprazole 40 MG tablet  Commonly known as: PROTONIX     sildenafil 100 MG tablet  Commonly known as: VIAGRA     venlafaxine 75 MG extended release capsule  Commonly known as: EFFEXOR XR         * This list has 3 medication(s) that are the same as other medications prescribed for you.  Read the directions carefully, and ask your doctor or other care provider to review them with you. Where to Get Your Medications      You can get these medications from any pharmacy    Bring a paper prescription for each of these medications  · cefUROXime 500 MG tablet  · tamsulosin 0.4 MG capsule  · traMADol 50 MG tablet           Discharged in stable condition to home. D/C home with [facility]. Total time 40 minutes. > 50%  dominated by counseling and coordination of care. Discussed with podiatry. Discussed with patient's nurse    Follow Up: Follow up with PCP, podiatry in 1 week.        Coretta Bray MD   5/29/21

## 2021-05-30 LAB
BLOOD CULTURE, ROUTINE: NORMAL
CULTURE, BLOOD 2: NORMAL

## 2021-11-05 ENCOUNTER — HOSPITAL ENCOUNTER (INPATIENT)
Age: 61
LOS: 1 days | Discharge: HOME | DRG: 638 | End: 2021-11-06
Attending: STUDENT IN AN ORGANIZED HEALTH CARE EDUCATION/TRAINING PROGRAM | Admitting: INTERNAL MEDICINE
Payer: COMMERCIAL

## 2021-11-05 DIAGNOSIS — E11.10 DIABETIC KETOACIDOSIS WITHOUT COMA ASSOCIATED WITH TYPE 2 DIABETES MELLITUS (HCC): Primary | ICD-10-CM

## 2021-11-05 PROBLEM — R79.89 PSEUDOHYPONATREMIA: Status: ACTIVE | Noted: 2021-11-05

## 2021-11-05 LAB
A/G RATIO: 1.4 (ref 1.1–2.2)
ALBUMIN SERPL-MCNC: 4.2 G/DL (ref 3.4–5)
ALP BLD-CCNC: 157 U/L (ref 40–129)
ALT SERPL-CCNC: 36 U/L (ref 10–40)
ANION GAP SERPL CALCULATED.3IONS-SCNC: 11 MMOL/L (ref 3–16)
ANION GAP SERPL CALCULATED.3IONS-SCNC: 14 MMOL/L (ref 3–16)
ANION GAP SERPL CALCULATED.3IONS-SCNC: 24 MMOL/L (ref 3–16)
AST SERPL-CCNC: 49 U/L (ref 15–37)
BASE EXCESS VENOUS: -11.1 MMOL/L (ref -3–3)
BASOPHILS ABSOLUTE: 0 K/UL (ref 0–0.2)
BASOPHILS RELATIVE PERCENT: 0.4 %
BETA-HYDROXYBUTYRATE: 3.4 MMOL/L (ref 0–0.27)
BILIRUB SERPL-MCNC: 0.7 MG/DL (ref 0–1)
BILIRUBIN URINE: ABNORMAL
BLOOD, URINE: NEGATIVE
BUN BLDV-MCNC: 35 MG/DL (ref 7–20)
BUN BLDV-MCNC: 38 MG/DL (ref 7–20)
BUN BLDV-MCNC: 44 MG/DL (ref 7–20)
CALCIUM SERPL-MCNC: 8.9 MG/DL (ref 8.3–10.6)
CALCIUM SERPL-MCNC: 9.3 MG/DL (ref 8.3–10.6)
CALCIUM SERPL-MCNC: 9.6 MG/DL (ref 8.3–10.6)
CARBOXYHEMOGLOBIN: 5 % (ref 0–1.5)
CHLORIDE BLD-SCNC: 86 MMOL/L (ref 99–110)
CHLORIDE BLD-SCNC: 93 MMOL/L (ref 99–110)
CHLORIDE BLD-SCNC: 98 MMOL/L (ref 99–110)
CLARITY: CLEAR
CO2: 17 MMOL/L (ref 21–32)
CO2: 23 MMOL/L (ref 21–32)
CO2: 25 MMOL/L (ref 21–32)
COLOR: YELLOW
CREAT SERPL-MCNC: 1.7 MG/DL (ref 0.8–1.3)
CREAT SERPL-MCNC: 1.7 MG/DL (ref 0.8–1.3)
CREAT SERPL-MCNC: 2.4 MG/DL (ref 0.8–1.3)
EKG ATRIAL RATE: 88 BPM
EKG DIAGNOSIS: NORMAL
EKG P AXIS: 34 DEGREES
EKG P-R INTERVAL: 206 MS
EKG Q-T INTERVAL: 370 MS
EKG QRS DURATION: 104 MS
EKG QTC CALCULATION (BAZETT): 447 MS
EKG R AXIS: -77 DEGREES
EKG T AXIS: 39 DEGREES
EKG VENTRICULAR RATE: 88 BPM
EOSINOPHILS ABSOLUTE: 0 K/UL (ref 0–0.6)
EOSINOPHILS RELATIVE PERCENT: 0.1 %
GFR AFRICAN AMERICAN: 33
GFR AFRICAN AMERICAN: 50
GFR AFRICAN AMERICAN: 50
GFR NON-AFRICAN AMERICAN: 27
GFR NON-AFRICAN AMERICAN: 41
GFR NON-AFRICAN AMERICAN: 41
GLUCOSE BLD-MCNC: 103 MG/DL (ref 70–99)
GLUCOSE BLD-MCNC: 153 MG/DL (ref 70–99)
GLUCOSE BLD-MCNC: 199 MG/DL (ref 70–99)
GLUCOSE BLD-MCNC: 215 MG/DL (ref 70–99)
GLUCOSE BLD-MCNC: 222 MG/DL (ref 70–99)
GLUCOSE BLD-MCNC: 233 MG/DL (ref 70–99)
GLUCOSE BLD-MCNC: 237 MG/DL (ref 70–99)
GLUCOSE BLD-MCNC: 305 MG/DL (ref 70–99)
GLUCOSE BLD-MCNC: 307 MG/DL (ref 70–99)
GLUCOSE BLD-MCNC: 335 MG/DL (ref 70–99)
GLUCOSE BLD-MCNC: 478 MG/DL (ref 70–99)
GLUCOSE BLD-MCNC: 631 MG/DL (ref 70–99)
GLUCOSE BLD-MCNC: >600 MG/DL (ref 70–99)
GLUCOSE URINE: >=1000 MG/DL
HCO3 VENOUS: 18 MMOL/L (ref 23–29)
HCT VFR BLD CALC: 35.1 % (ref 40.5–52.5)
HEMOGLOBIN: 11 G/DL (ref 13.5–17.5)
INFLUENZA A: NOT DETECTED
INFLUENZA B: NOT DETECTED
KETONES, URINE: 15 MG/DL
LACTIC ACID, SEPSIS: 2.1 MMOL/L (ref 0.4–1.9)
LACTIC ACID, SEPSIS: 2.9 MMOL/L (ref 0.4–1.9)
LEUKOCYTE ESTERASE, URINE: NEGATIVE
LIPASE: 13 U/L (ref 13–60)
LYMPHOCYTES ABSOLUTE: 0.6 K/UL (ref 1–5.1)
LYMPHOCYTES RELATIVE PERCENT: 6.2 %
MAGNESIUM: 2.3 MG/DL (ref 1.8–2.4)
MAGNESIUM: 2.4 MG/DL (ref 1.8–2.4)
MCH RBC QN AUTO: 25.9 PG (ref 26–34)
MCHC RBC AUTO-ENTMCNC: 31.3 G/DL (ref 31–36)
MCV RBC AUTO: 82.7 FL (ref 80–100)
METHEMOGLOBIN VENOUS: 0.3 %
MICROSCOPIC EXAMINATION: ABNORMAL
MONOCYTES ABSOLUTE: 0.8 K/UL (ref 0–1.3)
MONOCYTES RELATIVE PERCENT: 7.3 %
NEUTROPHILS ABSOLUTE: 8.8 K/UL (ref 1.7–7.7)
NEUTROPHILS RELATIVE PERCENT: 86 %
NITRITE, URINE: NEGATIVE
O2 CONTENT, VEN: 8 VOL %
O2 SAT, VEN: 30 %
O2 THERAPY: ABNORMAL
PCO2, VEN: 54.3 MMHG (ref 40–50)
PDW BLD-RTO: 13.2 % (ref 12.4–15.4)
PERFORMED ON: ABNORMAL
PH UA: 5.5 (ref 5–8)
PH VENOUS: 7.14 (ref 7.35–7.45)
PHOSPHORUS: 3.1 MG/DL (ref 2.5–4.9)
PHOSPHORUS: 4.2 MG/DL (ref 2.5–4.9)
PLATELET # BLD: 280 K/UL (ref 135–450)
PMV BLD AUTO: 10 FL (ref 5–10.5)
PO2, VEN: 24.5 MMHG (ref 25–40)
POTASSIUM REFLEX MAGNESIUM: 4.7 MMOL/L (ref 3.5–5.1)
POTASSIUM SERPL-SCNC: 4.1 MMOL/L (ref 3.5–5.1)
POTASSIUM SERPL-SCNC: 5 MMOL/L (ref 3.5–5.1)
PROTEIN UA: NEGATIVE MG/DL
RBC # BLD: 4.24 M/UL (ref 4.2–5.9)
SARS-COV-2 RNA, RT PCR: NOT DETECTED
SODIUM BLD-SCNC: 127 MMOL/L (ref 136–145)
SODIUM BLD-SCNC: 132 MMOL/L (ref 136–145)
SODIUM BLD-SCNC: 132 MMOL/L (ref 136–145)
SPECIFIC GRAVITY UA: 1.02 (ref 1–1.03)
TCO2 CALC VENOUS: 20 MMOL/L
TOTAL PROTEIN: 7.3 G/DL (ref 6.4–8.2)
TROPONIN: 0.02 NG/ML
TROPONIN: <0.01 NG/ML
URINE REFLEX TO CULTURE: ABNORMAL
URINE TYPE: ABNORMAL
UROBILINOGEN, URINE: 0.2 E.U./DL
WBC # BLD: 10.3 K/UL (ref 4–11)

## 2021-11-05 PROCEDURE — 82803 BLOOD GASES ANY COMBINATION: CPT

## 2021-11-05 PROCEDURE — 84484 ASSAY OF TROPONIN QUANT: CPT

## 2021-11-05 PROCEDURE — 80053 COMPREHEN METABOLIC PANEL: CPT

## 2021-11-05 PROCEDURE — 83605 ASSAY OF LACTIC ACID: CPT

## 2021-11-05 PROCEDURE — 96374 THER/PROPH/DIAG INJ IV PUSH: CPT

## 2021-11-05 PROCEDURE — 6370000000 HC RX 637 (ALT 250 FOR IP): Performed by: STUDENT IN AN ORGANIZED HEALTH CARE EDUCATION/TRAINING PROGRAM

## 2021-11-05 PROCEDURE — 6370000000 HC RX 637 (ALT 250 FOR IP): Performed by: INTERNAL MEDICINE

## 2021-11-05 PROCEDURE — 2580000003 HC RX 258: Performed by: STUDENT IN AN ORGANIZED HEALTH CARE EDUCATION/TRAINING PROGRAM

## 2021-11-05 PROCEDURE — 83036 HEMOGLOBIN GLYCOSYLATED A1C: CPT

## 2021-11-05 PROCEDURE — 84100 ASSAY OF PHOSPHORUS: CPT

## 2021-11-05 PROCEDURE — 87636 SARSCOV2 & INF A&B AMP PRB: CPT

## 2021-11-05 PROCEDURE — 2000000000 HC ICU R&B

## 2021-11-05 PROCEDURE — 85025 COMPLETE CBC W/AUTO DIFF WBC: CPT

## 2021-11-05 PROCEDURE — 83690 ASSAY OF LIPASE: CPT

## 2021-11-05 PROCEDURE — 99223 1ST HOSP IP/OBS HIGH 75: CPT | Performed by: INTERNAL MEDICINE

## 2021-11-05 PROCEDURE — 2580000003 HC RX 258: Performed by: INTERNAL MEDICINE

## 2021-11-05 PROCEDURE — 93005 ELECTROCARDIOGRAM TRACING: CPT | Performed by: STUDENT IN AN ORGANIZED HEALTH CARE EDUCATION/TRAINING PROGRAM

## 2021-11-05 PROCEDURE — 6360000002 HC RX W HCPCS: Performed by: INTERNAL MEDICINE

## 2021-11-05 PROCEDURE — 83735 ASSAY OF MAGNESIUM: CPT

## 2021-11-05 PROCEDURE — 82010 KETONE BODYS QUAN: CPT

## 2021-11-05 PROCEDURE — 93010 ELECTROCARDIOGRAM REPORT: CPT | Performed by: INTERNAL MEDICINE

## 2021-11-05 PROCEDURE — 81003 URINALYSIS AUTO W/O SCOPE: CPT

## 2021-11-05 PROCEDURE — 99283 EMERGENCY DEPT VISIT LOW MDM: CPT

## 2021-11-05 PROCEDURE — 36415 COLL VENOUS BLD VENIPUNCTURE: CPT

## 2021-11-05 RX ORDER — DEXTROSE MONOHYDRATE 25 G/50ML
12.5 INJECTION, SOLUTION INTRAVENOUS PRN
Status: DISCONTINUED | OUTPATIENT
Start: 2021-11-05 | End: 2021-11-06 | Stop reason: HOSPADM

## 2021-11-05 RX ORDER — SODIUM CHLORIDE, SODIUM LACTATE, POTASSIUM CHLORIDE, AND CALCIUM CHLORIDE .6; .31; .03; .02 G/100ML; G/100ML; G/100ML; G/100ML
1000 INJECTION, SOLUTION INTRAVENOUS ONCE
Status: COMPLETED | OUTPATIENT
Start: 2021-11-05 | End: 2021-11-05

## 2021-11-05 RX ORDER — POLYETHYLENE GLYCOL 3350 17 G/17G
17 POWDER, FOR SOLUTION ORAL DAILY PRN
Status: DISCONTINUED | OUTPATIENT
Start: 2021-11-05 | End: 2021-11-06 | Stop reason: HOSPADM

## 2021-11-05 RX ORDER — DEXTROSE MONOHYDRATE 25 G/50ML
12.5 INJECTION, SOLUTION INTRAVENOUS PRN
Status: DISCONTINUED | OUTPATIENT
Start: 2021-11-05 | End: 2021-11-06 | Stop reason: SDUPTHER

## 2021-11-05 RX ORDER — NICOTINE POLACRILEX 4 MG
15 LOZENGE BUCCAL PRN
Status: DISCONTINUED | OUTPATIENT
Start: 2021-11-05 | End: 2021-11-06 | Stop reason: SDUPTHER

## 2021-11-05 RX ORDER — AMLODIPINE BESYLATE 5 MG/1
5 TABLET ORAL DAILY
Status: DISCONTINUED | OUTPATIENT
Start: 2021-11-05 | End: 2021-11-06 | Stop reason: HOSPADM

## 2021-11-05 RX ORDER — VENLAFAXINE HYDROCHLORIDE 75 MG/1
75 CAPSULE, EXTENDED RELEASE ORAL DAILY
Status: DISCONTINUED | OUTPATIENT
Start: 2021-11-05 | End: 2021-11-06 | Stop reason: HOSPADM

## 2021-11-05 RX ORDER — SODIUM CHLORIDE 9 MG/ML
INJECTION, SOLUTION INTRAVENOUS CONTINUOUS
Status: DISCONTINUED | OUTPATIENT
Start: 2021-11-05 | End: 2021-11-06 | Stop reason: HOSPADM

## 2021-11-05 RX ORDER — PANTOPRAZOLE SODIUM 40 MG/1
40 TABLET, DELAYED RELEASE ORAL DAILY
Status: DISCONTINUED | OUTPATIENT
Start: 2021-11-05 | End: 2021-11-06 | Stop reason: HOSPADM

## 2021-11-05 RX ORDER — MAGNESIUM SULFATE 1 G/100ML
1000 INJECTION INTRAVENOUS PRN
Status: DISCONTINUED | OUTPATIENT
Start: 2021-11-05 | End: 2021-11-06 | Stop reason: HOSPADM

## 2021-11-05 RX ORDER — POTASSIUM CHLORIDE 7.45 MG/ML
10 INJECTION INTRAVENOUS PRN
Status: DISCONTINUED | OUTPATIENT
Start: 2021-11-05 | End: 2021-11-06 | Stop reason: HOSPADM

## 2021-11-05 RX ORDER — GABAPENTIN 300 MG/1
600 CAPSULE ORAL 2 TIMES DAILY
Status: DISCONTINUED | OUTPATIENT
Start: 2021-11-05 | End: 2021-11-06 | Stop reason: HOSPADM

## 2021-11-05 RX ORDER — LISINOPRIL 10 MG/1
10 TABLET ORAL DAILY
COMMUNITY

## 2021-11-05 RX ORDER — DEXTROSE MONOHYDRATE 50 MG/ML
100 INJECTION, SOLUTION INTRAVENOUS PRN
Status: DISCONTINUED | OUTPATIENT
Start: 2021-11-05 | End: 2021-11-06 | Stop reason: HOSPADM

## 2021-11-05 RX ORDER — DEXTROSE AND SODIUM CHLORIDE 5; .45 G/100ML; G/100ML
INJECTION, SOLUTION INTRAVENOUS CONTINUOUS PRN
Status: DISCONTINUED | OUTPATIENT
Start: 2021-11-05 | End: 2021-11-06

## 2021-11-05 RX ADMIN — VENLAFAXINE HYDROCHLORIDE 75 MG: 75 CAPSULE, EXTENDED RELEASE ORAL at 14:44

## 2021-11-05 RX ADMIN — GABAPENTIN 600 MG: 300 CAPSULE ORAL at 20:05

## 2021-11-05 RX ADMIN — PANTOPRAZOLE SODIUM 40 MG: 40 TABLET, DELAYED RELEASE ORAL at 14:44

## 2021-11-05 RX ADMIN — ENOXAPARIN SODIUM 30 MG: 30 INJECTION SUBCUTANEOUS at 14:44

## 2021-11-05 RX ADMIN — Medication 10 MEQ: at 21:41

## 2021-11-05 RX ADMIN — DEXTROSE AND SODIUM CHLORIDE: 5; 450 INJECTION, SOLUTION INTRAVENOUS at 18:31

## 2021-11-05 RX ADMIN — Medication 10 MEQ: at 17:50

## 2021-11-05 RX ADMIN — Medication 10 MEQ: at 23:13

## 2021-11-05 RX ADMIN — SODIUM CHLORIDE: 9 INJECTION, SOLUTION INTRAVENOUS at 14:53

## 2021-11-05 RX ADMIN — SODIUM CHLORIDE 6.9 UNITS/HR: 9 INJECTION, SOLUTION INTRAVENOUS at 16:55

## 2021-11-05 RX ADMIN — Medication 10 MEQ: at 20:36

## 2021-11-05 RX ADMIN — Medication 10 MEQ: at 19:02

## 2021-11-05 RX ADMIN — GABAPENTIN 600 MG: 300 CAPSULE ORAL at 14:44

## 2021-11-05 ASSESSMENT — PAIN SCALES - GENERAL
PAINLEVEL_OUTOF10: 0
PAINLEVEL_OUTOF10: 8
PAINLEVEL_OUTOF10: 0

## 2021-11-05 ASSESSMENT — PAIN DESCRIPTION - LOCATION: LOCATION: ABDOMEN

## 2021-11-05 NOTE — H&P
History and Physical      Chief Complaint   Patient presents with    Hyperglycemia     \"high\" on home meter, wife gave him 15 humalog at 0 and 15 more units humalog at [address].  Fatigue     bp 76/51    Cough     Mild Cough past few days - Has had COVID vaccines. Denies fever. HISTORY OF PRESENT ILLNESS:     Patient is a 47-year-old white male with a history of diabetes mellitus. He came to the emergency room because of hyperglycemia. His home machine read high. His wife given 15 units of Humalog and 50 more units an hour later. Patient is feeling tired. Had a cough. He has been fully vaccinated against COVID-19. Denied any fever. Patient has a history of DKA. Work-up in the emergency room showed DKA. He is admitted the ICU and started on DKA protocol. The time of his admission his metabolic CO2 was 17. His anion gap was 24. His blood sugar was 631. His troponin was slightly elevated. Beta hydroxybutyrate was 3.4. Patient also has a history of osteomyelitis of his foot. Is a history of anemia of chronic disease. He has chronic kidney disease stage III 8, hypertension, BPH and GERD. He has had a partial left foot amputation in the past.      Patient is allergic to hydrocodone-acetaminophen, percocet [oxycodone-acetaminophen], pregabalin, and vicodin [hydrocodone-acetaminophen].     Past Medical History:   Diagnosis Date    TORI (acute kidney injury) ([facility] [address] 09/12/2017    Bacteremia 05/05/2017    staph aureus    Diabetes mellitus ([facility] [address] [facility]     Diabetic neuropathy ([facility] [address] [facility]     Gout     Hypertension     MRSA (methicillin resistant staph aureus) culture positive 12/27/18, 9/12/17, 5/5/17,9/5/13, 1/15/14    ulcers bilateral legs    MRSA (methicillin resistant staph aureus) culture positive 01/21/2021    foot wound    Neuropathy     Pneumonia     Pyogenic inflammation of bone ([address]        Past Surgical History:   Procedure Laterality Date    EYE SURGERY      lens implants after removal of cataracts    FOOT DEBRIDEMENT Left 4/28/2021    DEBRIDEMENT INFECTED BONE AND TISSUE LEFT FOOT performed by Jasmina Galloway DPM at [address]  12/28/2018    partial right foot amputation with graft application    TOE AMPUTATION Right 12/28/2018    PARTIAL RIGHT FOOT AMPUTATION WITH GRAFT APPLICATION performed by Jasmina Galloway DPM at [address] Left 5/27/2021    PARTIAL LEFT FOOT AMPUTATION performed by Jasmina Galloway DPM at [address] [facility] 1/21/2021    EGD BIOPSY performed by Paige Olivares,  at [address]       Scheduled Meds:   amLODIPine  5 mg Oral Daily    gabapentin  600 mg Oral BID    pantoprazole  40 mg Oral Daily    venlafaxine  75 mg Oral Daily    enoxaparin  30 mg SubCUTAneous Daily       Continuous Infusions:   insulin 0.5 Units (11/05/21 1340)    dextrose      sodium chloride 150 mL/hr at 11/05/21 1453    dextrose 5 % and 0.45 % NaCl         PRN Meds:  glucose, dextrose, glucagon (rDNA), dextrose, dextrose, potassium chloride, magnesium sulfate, sodium phosphate IVPB **OR** sodium phosphate IVPB **OR** sodium phosphate IVPB, polyethylene glycol, dextrose 5 % and 0.45 % NaCl       reports that he has never smoked. He has never used smokeless tobacco.    Family History   Problem Relation Age of Onset    Diabetes Maternal Grandfather     Heart Disease Paternal Uncle     High Blood Pressure Mother        Social History     Socioeconomic History    Marital status:      Spouse name: Sharif Amor Number of children: 1    Years of education: Not on file    Highest education level: Not on file   Occupational History    Not on file   Tobacco Use    Smoking status: Never Smoker    Smokeless tobacco: Never Used   Vaping Use    Vaping Use: Never used   Substance and Sexual Activity    Alcohol use: No     Comment: FORMER DRINKER approx.  quit 2005    Drug use: No    Sexual activity: Yes     Partners: Detail Level: Simple Female   Other Topics Concern    Not on file   Social History Narrative    Not on file     Social Determinants of Health     Financial Resource Strain:     Difficulty of Paying Living Expenses:    Food Insecurity:     Worried About Running Out of Food in the Last Year:     [address] in the Last Year:    Transportation Needs:     Lack of Transportation (Medical):  Lack of Transportation (Non-Medical):    Physical Activity:     Days of Exercise per Week:     Minutes of Exercise per Session:    Stress:     Feeling of Stress :    Social Connections:     Frequency of Communication with Friends and Family:     Frequency of Social Gatherings with Friends and Family:     Attends [employer] Services:     Active Member of Clubs or Organizations:     Attends Club or Organization Meetings:     Marital Status:    Intimate Partner Violence:     Fear of Current or Ex-Partner:     Emotionally Abused:     Physically Abused:     Sexually Abused:      REVIEW OF SYSTEMS:   Constitutional: Negative for fever + fatigue  HENT: Negative for sore throat   Eyes: Negative for redness   Respiratory: Negative for dyspnea, cough   Cardiovascular: Negative for chest pain   Gastrointestinal: + for vomiting, -diarrhea   Genitourinary: Negative for hematuria   Musculoskeletal: Negative for arthralgias   Skin: Negative for rash   Neurological: Negative for syncope   Hematological: Negative for adenopathy   Psychiatric/Behavorial: Negative for anxiety    VS:   /74   Pulse 81   Temp 97.4 °F (36.3 °C) (Oral)   Resp 19   Ht 5' 10\" (1.778 m)   Wt 153 lb (69.4 kg)   SpO2 98%   BMI 21.95 kg/m²     Gen: No distress. Alert. Somewhat chronically ill-appearing  Eyes: PERRL. No sclera icterus. No conjunctival injection. ENT: No discharge. Pharynx clear. Neck: Trachea midline. Normal thyroid. Resp: No accessory muscle use. No crackles. No wheezes. No rhonchi. No dullness on percussion. CV: Regular rate.  Regular rhythm. No murmur or rub. No edema. GI: Non-tender. Non-distended. No masses. No organomegaly. Normal bowel sounds. No hernia. Skin: Warm and dry. No nodule on exposed extremities. No rash on exposed extremities. Lymph: No cervical LAD. No supraclavicular LAD. M/S: No cyanosis. No joint deformity. No clubbing. Neuro: Awake. Reflexes 2+ symmetric bilaterally. Moves all 4 extremities, non focal  Psych: Oriented x 3. No anxiety or agitation. CBC:   Recent Labs     11/05/21  1009   WBC 10.3   HGB 11.0*   HCT 35.1*   MCV 82.7        BMP:   Recent Labs     11/05/21  1009   *   K 4.7   CL 86*   CO2 17*   BUN 44*   CREATININE 2.4*     LIVER PROFILE:   Recent Labs     11/05/21  1009   AST 49*   ALT 36   LIPASE 13.0   BILITOT 0.7   ALKPHOS 157*     PT/INR: No results for input(s): PROTIME, INR in the last 72 hours. APTT: No results for input(s): APTT in the last 72 hours. UA:  Recent Labs     11/05/21  1204   COLORU Yellow   PHUR 5.5   CLARITYU Clear   SPECGRAV 1.020   LEUKOCYTESUR Negative   UROBILINOGEN 0.2   BILIRUBINUR SMALL*   BLOODU Negative   GLUCOSEU >=1000*       No orders to display        ASSESSMENT:    Principal Problem:    DKA, type 2, not at goal [facility])  Active Problems:    Diabetic neuropathy ([facility]    Acute kidney injury ([facility]    HTN (hypertension), benign    Stage 3a chronic kidney disease ([facility]    Pseudohyponatremia  Resolved Problems:    * No resolved hospital problems. *      PLAN:    #DKA in type 2 diabetes. Came with a very high blood sugar. Work-up showed DKA. Admit to the ICU. DKA protocol. IV insulin. IV fluids. Monitor electrolytes. #Pseudohyponatremia due to hyperglycemia. #Acute kidney injury due to dehydration. Should improve with IV fluids. #Stage III a chronic kidney disease. Monitor creatinine. #Benign essential hypertension. Continue with Norvasc. Hold lisinopril. #Depression. Continue with Effexor.     #Lovenox 30 mg subcu daily for DVT prophylaxis. IMPORTANT: Please note that some portions of this note may have been created using Dragon voice recognition software. Some \"sound-alike\" and totally wrong word substitutions may have taken place due to known inherent limitations of any such software, including this voice recognition software. In spite of efforts to eliminate such errors, some may not have been corrected. So please read the note with this in mind and recognize such mistakes and understand the correct version using the  context. If there are still uncertainties in the mind of the medical provider reading this note about any aspect of the note, the provider can feel free to contact me. Thanks.            [facility], MD 11/5/2021 4:35 PM Detail Level: Detailed

## 2021-11-05 NOTE — ED PROVIDER NOTES
[facility] ICU      CHIEF COMPLAINT  Hyperglycemia (\"high\" on home meter, wife gave him 15 humalog at 0720 and 15 more units humalog at 0820.  ), Fatigue (bp 76/51), and Cough (Mild Cough past few days - Has had COVID vaccines. Denies fever. )       HISTORY OF PRESENT ILLNESS  Meri Chaudhari is a 56 y.o. male  who presents to the ED complaining of fatigue, nausea, elevated blood glucose. Patient has a history of diabetes and has had DKA multiple times in the past.  They checked his glucose this morning and the meter read \"high\" so wife gave him 15 units of Humalog, rechecked 1 hour later and found it to still be \"high\" and give another 15 units, then presenting here. He denies any recent fevers, chills, vomiting, diarrhea, headache, chest pain, shortness of breath. No other complaints, modifying factors or associated symptoms. I have reviewed the following from the nursing documentation.     Past Medical History:   Diagnosis Date    TORI (acute kidney injury) ([facility] 09/12/2017    Bacteremia 05/05/2017    staph aureus    Diabetes mellitus ([address]     Diabetic neuropathy ([address]     Gout     Hypertension     MRSA (methicillin resistant staph aureus) culture positive 12/27/18, 9/12/17, 5/5/17,9/5/13, 1/15/14    ulcers bilateral legs    MRSA (methicillin resistant staph aureus) culture positive 01/21/2021    foot wound    Neuropathy     Pneumonia     Pyogenic inflammation of bone ([facility]      Past Surgical History:   Procedure Laterality Date    EYE SURGERY      lens implants after removal of cataracts    FOOT DEBRIDEMENT Left 4/28/2021    DEBRIDEMENT INFECTED BONE AND TISSUE LEFT FOOT performed by Hermenia Sandhoff, DPM at [address] [facility]  12/28/2018    partial right foot amputation with graft application    TOE AMPUTATION Right 12/28/2018    PARTIAL RIGHT FOOT AMPUTATION WITH GRAFT APPLICATION performed by Hermenia Sandhoff, DPM at [address] [facility] Left 5/27/2021    PARTIAL LEFT FOOT AMPUTATION performed by Reyes Mcfarland DPM at [address] N/A 1/21/2021    EGD BIOPSY performed by Pratima Roldan DO at [address] History   Problem Relation Age of Onset    Diabetes Maternal Grandfather     Heart Disease Paternal Uncle     High Blood Pressure Mother      Social History     Socioeconomic History    Marital status:      Spouse name: Lolis Archibald Number of children: 1    Years of education: Not on file    Highest education level: Not on file   Occupational History    Not on file   Tobacco Use    Smoking status: Never Smoker    Smokeless tobacco: Never Used   Vaping Use    Vaping Use: Never used   Substance and Sexual Activity    Alcohol use: No     Comment: FORMER DRINKER approx. quit 2005    Drug use: No    Sexual activity: Yes     Partners: Female   Other Topics Concern    Not on file   Social History Narrative    Not on file     Social Determinants of Health     Financial Resource Strain:     Difficulty of Paying Living Expenses:    Food Insecurity:     Worried About Running Out of Food in the Last Year:     [address] in the Last Year:    Transportation Needs:     Lack of Transportation (Medical):      Lack of Transportation (Non-Medical):    Physical Activity:     Days of Exercise per Week:     Minutes of Exercise per Session:    Stress:     Feeling of Stress :    Social Connections:     Frequency of Communication with Friends and Family:     Frequency of Social Gatherings with Friends and Family:     Attends [employer] Services:     Active Member of Clubs or Organizations:     Attends Club or Organization Meetings:     Marital Status:    Intimate Partner Violence:     Fear of Current or Ex-Partner:     Emotionally Abused:     Physically Abused:     Sexually Abused:      Current Facility-Administered Medications   Medication Dose Route Frequency Provider Last Rate Last Admin    insulin regular (HUMULIN R;NOVOLIN R) 100 Units in sodium chloride 0.9 % 100 mL infusion  0.5 Units/hr IntraVENous Continuous William Copeland MD 0.5 mL/hr at 11/05/21 1340 0.5 Units at 11/05/21 1340    glucose (GLUTOSE) 40 % oral gel 15 g  15 g Oral PRN William Copeland MD        dextrose 50 % IV solution  12.5 g IntraVENous PRN William Copeland MD        glucagon (rDNA) injection 1 mg  1 mg IntraMUSCular PRN William Copeland MD        dextrose 5 % solution  100 mL/hr IntraVENous PRIAN Copeland MD        amLODIPine (NORVASC) tablet 5 mg  5 mg Oral Daily William Copeland MD        gabapentin (NEURONTIN) capsule 600 mg  600 mg Oral BID William Copeland MD   600 mg at 11/05/21 1444    pantoprazole (PROTONIX) tablet 40 mg  40 mg Oral Daily William Copeland MD   40 mg at 11/05/21 1444    venlafaxine (EFFEXOR XR) extended release capsule 75 mg  75 mg Oral Daily William Copeland MD   75 mg at 11/05/21 1444    0.9 % sodium chloride infusion   IntraVENous Continuous William Copeland  mL/hr at 11/05/21 1453 New Bag at 11/05/21 1453    dextrose 50 % IV solution  12.5 g IntraVENous PRIAN Copeland MD        potassium chloride 10 mEq/100 mL IVPB (Peripheral Line)  10 mEq IntraVENous PRIAN Copeland MD        magnesium sulfate 1000 mg in dextrose 5% 100 mL IVPB  1,000 mg IntraVENous PRIAN Copeland MD        sodium phosphate 10 mmol in dextrose 5 % 250 mL IVPB  10 mmol IntraVENous PRIAN Copeland MD        Or    sodium phosphate 15 mmol in dextrose 5 % 250 mL IVPB  15 mmol IntraVENous PRIAN Copeland MD        Or    sodium phosphate 20 mmol in dextrose 5 % 500 mL IVPB  20 mmol IntraVENous PRIAN Copeland MD        polyethylene glycol (GLYCOLAX) packet 17 g  17 g Oral Daily PRIAN Copeland MD        dextrose 5 % and 0.45 % sodium chloride infusion   IntraVENous Continuous PRN Irma Olivera MD        enoxaparin (LOVENOX) injection 30 mg  30 mg SubCUTAneous Daily Irma Olivera MD   30 mg at 11/05/21 1446     Allergies   Allergen Reactions    Hydrocodone-Acetaminophen Itching    Percocet [Oxycodone-Acetaminophen]      States \"hallucinations,disoriented, & freaked out\" per wife    Pregabalin Itching    Vicodin [Hydrocodone-Acetaminophen]      Spaces out. Pt. States 1/15/14 has been taking some vicodin without problems. REVIEW OF SYSTEMS  10 systems reviewed, pertinent positives per HPI otherwise noted to be negative. PHYSICAL EXAM  /74   Pulse 71   Temp 97.4 °F (36.3 °C) (Oral)   Resp 10   Ht 5' 10\" (1.778 m)   Wt 153 lb (69.4 kg)   SpO2 97%   BMI 21.95 kg/m²    GENERAL APPEARANCE: Awake and alert. Cooperative. no distress. HENT: Normocephalic. Atraumatic. Mucous membranes are dry  NECK: Supple. EYES: PERRL. EOM's grossly intact. HEART/CHEST: RRR. No murmurs. LUNGS: Respirations unlabored. CTAB. Good air exchange. Speaking comfortably in full sentences. ABDOMEN: No tenderness. Soft. Non-distended. No masses. No organomegaly. No guarding or rebound. MUSCULOSKELETAL: No extremity edema. Compartments soft. No deformity. No tenderness in the extremities. All extremities neurovascularly intact. SKIN: Warm and dry. No acute rashes. NEUROLOGICAL: Alert and oriented. CN's 2-12 intact. No gross facial drooping. PSYCHIATRIC: Normal mood and affect. LABS  I have reviewed all labs for this visit.    Results for orders placed or performed during the hospital encounter of 11/05/21   COVID-19 & Influenza Combo   Result Value Ref Range    SARS-CoV-2 RNA, RT PCR NOT DETECTED NOT DETECTED    INFLUENZA A NOT DETECTED NOT DETECTED    INFLUENZA B NOT DETECTED NOT DETECTED   CBC Auto Differential   Result Value Ref Range    WBC 10.3 4.0 - 11.0 K/uL    RBC 4.24 4.20 - 5.90 M/uL    Hemoglobin 11.0 (L) 13.5 - 17.5 g/dL    Hematocrit 35.1 (L) 40.5 - 52.5 %    MCV 82.7 80.0 - 100.0 fL    MCH 25.9 (L) 26.0 - 34.0 pg    MCHC 31.3 31.0 - 36.0 g/dL    RDW 13.2 12.4 - 15.4 %    Platelets 366 361 - 708 K/uL    MPV 10.0 5.0 - 10.5 fL    Neutrophils % 86.0 %    Lymphocytes % 6.2 %    Monocytes % 7.3 %    Eosinophils % 0.1 %    Basophils % 0.4 %    Neutrophils Absolute 8.8 (H) 1.7 - 7.7 K/uL    Lymphocytes Absolute 0.6 (L) 1.0 - 5.1 K/uL    Monocytes Absolute 0.8 0.0 - 1.3 K/uL    Eosinophils Absolute 0.0 0.0 - 0.6 K/uL    Basophils Absolute 0.0 0.0 - 0.2 K/uL   Comprehensive Metabolic Panel w/ Reflex to MG   Result Value Ref Range    Sodium 127 (L) 136 - 145 mmol/L    Potassium reflex Magnesium 4.7 3.5 - 5.1 mmol/L    Chloride 86 (L) 99 - 110 mmol/L    CO2 17 (L) 21 - 32 mmol/L    Anion Gap 24 (H) 3 - 16    Glucose 631 (HH) 70 - 99 mg/dL    BUN 44 (H) 7 - 20 mg/dL    CREATININE 2.4 (H) 0.8 - 1.3 mg/dL    GFR Non-African American 27 (A) >60    GFR  33 (A) >60    Calcium 9.3 8.3 - 10.6 mg/dL    Total Protein 7.3 6.4 - 8.2 g/dL    Albumin 4.2 3.4 - 5.0 g/dL    Albumin/Globulin Ratio 1.4 1.1 - 2.2    Total Bilirubin 0.7 0.0 - 1.0 mg/dL    Alkaline Phosphatase 157 (H) 40 - 129 U/L    ALT 36 10 - 40 U/L    AST 49 (H) 15 - 37 U/L   Lipase   Result Value Ref Range    Lipase 13.0 13.0 - 60.0 U/L   Troponin   Result Value Ref Range    Troponin 0.02 (H) <0.01 ng/mL   Urinalysis Reflex to Culture    Specimen: Urine, clean catch   Result Value Ref Range    Color, UA Yellow Straw/Yellow    Clarity, UA Clear Clear    Glucose, Ur >=1000 (A) Negative mg/dL    Bilirubin Urine SMALL (A) Negative    Ketones, Urine 15 (A) Negative mg/dL    Specific Gravity, UA 1.020 1.005 - 1.030    Blood, Urine Negative Negative    pH, UA 5.5 5.0 - 8.0    Protein, UA Negative Negative mg/dL    Urobilinogen, Urine 0.2 <2.0 E.U./dL    Nitrite, Urine Negative Negative    Leukocyte Esterase, Urine Negative Negative    Microscopic Examination Not Indicated     Urine Type NotGiven Urine Reflex to Culture Not Indicated    Lactate, Sepsis   Result Value Ref Range    Lactic Acid, Sepsis 2.9 (H) 0.4 - 1.9 mmol/L   Lactate, Sepsis   Result Value Ref Range    Lactic Acid, Sepsis 2.1 (H) 0.4 - 1.9 mmol/L   Blood Gas, Venous   Result Value Ref Range    pH, David 7.138 (LL) 7.350 - 7.450    pCO2, David 54.3 (H) 40.0 - 50.0 mmHg    pO2, David 24.5 (L) 25 - 40 mmHg    HCO3, Venous 18.0 (L) 23.0 - 29.0 mmol/L    Base Excess, David -11.1 (L) -3.0 - 3.0 mmol/L    O2 Sat, David 30 Not Established %    Carboxyhemoglobin 5.0 (H) 0.0 - 1.5 %    MetHgb, David 0.3 <1.5 %    TC02 (Calc), David 20 Not Established mmol/L    O2 Content, David 8 Not Established VOL %    O2 Therapy Unknown    Beta-Hydroxybutyrate   Result Value Ref Range    Beta-Hydroxybutyrate 3.40 (H) 0.00 - 0.27 mmol/L   POCT Glucose   Result Value Ref Range    POC Glucose >600 (AA) 70 - 99 mg/dl    Performed on ACCU-CHEK    POCT Glucose   Result Value Ref Range    POC Glucose 478 (H) 70 - 99 mg/dl    Performed on ACCU-CHEK    POCT Glucose   Result Value Ref Range    POC Glucose 335 (H) 70 - 99 mg/dl    Performed on ACCU-CHEK    EKG 12 Lead   Result Value Ref Range    Ventricular Rate 88 BPM    Atrial Rate 88 BPM    P-R Interval 206 ms    QRS Duration 104 ms    Q-T Interval 370 ms    QTc Calculation (Bazett) 447 ms    P Axis 34 degrees    R Axis -77 degrees    T Axis 39 degrees    Diagnosis       Normal sinus rhythmPulmonary disease patternIncomplete right bundle branch blockLeft anterior fascicular blockAbnormal ECGNo previous ECGs available       ECG  The Ekg interpreted by me shows sinus rhythm with a rate of 88 bpm.  Left axis deviation. Incomplete right bundle branch block. No acute injury pattern. , , QTc 447. ED COURSE/MDM  Patient seen and evaluated. Old records reviewed. Labs and imaging reviewed and results discussed with patient. Presenting with elevated glucose and fatigue, feels like previous DKA.   Obtain lab work showing that he is indeed in DKA. He is given 2 L of LR for both fluid resuscitation and hypotension. Potassium is within normal limits and is given 10 units of regular insulin. Discussed with hospitalist and intensivist who agree with admission. They agree with initiating insulin drip in the ED, which is ordered. Critical care time 34 minutes exclusive from separate billable procedures that were performed. The following was considered in the determination of critical care but not limited to the level of medical decision making, intensive cardiac and/or respiratory monitoring, frequent vital sign monitoring, evaluation of laboratory studies, evaluation of radiographic studies, oxygen monitoring, and constant monitoring and speaking to family at bedside.     During the patient's ED course, the patient was given:  Medications   insulin regular (HUMULIN R;NOVOLIN R) 100 Units in sodium chloride 0.9 % 100 mL infusion (0.5 Units IntraVENous New Bag 11/5/21 1340)   glucose (GLUTOSE) 40 % oral gel 15 g (has no administration in time range)   dextrose 50 % IV solution (has no administration in time range)   glucagon (rDNA) injection 1 mg (has no administration in time range)   dextrose 5 % solution (has no administration in time range)   amLODIPine (NORVASC) tablet 5 mg (5 mg Oral Not Given 11/5/21 1434)   gabapentin (NEURONTIN) capsule 600 mg (600 mg Oral Given 11/5/21 1444)   pantoprazole (PROTONIX) tablet 40 mg (40 mg Oral Given 11/5/21 1444)   venlafaxine (EFFEXOR XR) extended release capsule 75 mg (75 mg Oral Given 11/5/21 1444)   0.9 % sodium chloride infusion ( IntraVENous New Bag 11/5/21 1453)   dextrose 50 % IV solution (has no administration in time range)   potassium chloride 10 mEq/100 mL IVPB (Peripheral Line) (has no administration in time range)   magnesium sulfate 1000 mg in dextrose 5% 100 mL IVPB (has no administration in time range)   sodium phosphate 10 mmol in dextrose 5 % 250 mL IVPB (has no administration in time range)     Or   sodium phosphate 15 mmol in dextrose 5 % 250 mL IVPB (has no administration in time range)     Or   sodium phosphate 20 mmol in dextrose 5 % 500 mL IVPB (has no administration in time range)   polyethylene glycol (GLYCOLAX) packet 17 g (has no administration in time range)   dextrose 5 % and 0.45 % sodium chloride infusion (has no administration in time range)   enoxaparin (LOVENOX) injection 30 mg (30 mg SubCUTAneous Given 11/5/21 1444)   lactated ringers bolus (0 mLs IntraVENous Stopped 11/5/21 1220)   lactated ringers bolus (0 mLs IntraVENous Stopped 11/5/21 1347)   insulin regular (HUMULIN R;NOVOLIN R) injection 10 Units (10 Units IntraVENous Given 11/5/21 1222)        CLINICAL IMPRESSION  1. Diabetic ketoacidosis without coma associated with type 2 diabetes mellitus (HCC)        Blood pressure 113/74, pulse 71, temperature 97.4 °F (36.3 °C), temperature source Oral, resp. rate 10, height 5' 10\" (1.778 m), weight 153 lb (69.4 kg), SpO2 97 %. Elena Shaw was admitted in fair condition. Patient was given scripts for the following medications. I counseled patient how to take these medications. Current Discharge Medication List          Follow-up with:  No follow-up provider specified. DISCLAIMER: This chart was created using Dragon dictation software. Efforts were made by me to ensure accuracy, however some errors may be present due to limitations of this technology and occasionally words are not transcribed correctly.        Scarlett Kruse, DO  11/05/21 1607       Scarlett Kruse, DO  11/05/21 1602

## 2021-11-05 NOTE — ED NOTES
Call placed to [facility] Brunner- Dr. Quinton Lemons to return call     Teofilo Guerrero RN  11/05/21 1223    Dr. Quinton Lemons returned call to Dr. Roger Jacob RN  11/05/21 1239

## 2021-11-05 NOTE — PROGRESS NOTES
Previous glucose was 335, current glucose is 307, change of 28. Goal is decrease of 50-75 per hour, will increase the insulin rate by 50% per protocol for a new rate of 10.35u/hr.

## 2021-11-05 NOTE — PLAN OF CARE
Admit to ICU    DKA  On insulin gtt  TORI on CKD  Elevated tropinin- trend    Kamla Chinchilla, APRN - CNP

## 2021-11-05 NOTE — CONSULTS
Patient is being seen at the request of Dr. Duncan Leija  for a consultation for DKA    HISTORY OF PRESENT ILLNESS: This is a 78-year-old male with a history of poorly controlled diabetes and DKA who presented to the emergency department on 11/5/2021 with severe nausea and fatigue, associated with a fingerstick blood sugar reading of \"high\". His spouse given 15 units of Humalog and then rechecked his blood sugar 1 hour later. His reading was still \"high\". He received another 15 units and presented to the emergency department. He is feeling better with an insulin infusion.     PAST MEDICAL HISTORY:  Past Medical History:   Diagnosis Date    TORI (acute kidney injury) ([address] 09/12/2017    Bacteremia 05/05/2017    staph aureus    Diabetes mellitus ([address]     Diabetic neuropathy ([address]     Gout     Hypertension     MRSA (methicillin resistant staph aureus) culture positive 12/27/18, 9/12/17, 5/5/17,9/5/13, 1/15/14    ulcers bilateral legs    MRSA (methicillin resistant staph aureus) culture positive 01/21/2021    foot wound    Neuropathy     Pneumonia     Pyogenic inflammation of bone ([address]      PAST SURGICAL HISTORY:  Past Surgical History:   Procedure Laterality Date    EYE SURGERY      lens implants after removal of cataracts    FOOT DEBRIDEMENT Left 4/28/2021    DEBRIDEMENT INFECTED BONE AND TISSUE LEFT FOOT performed by Janet Montalvo DPM at [facility]  12/28/2018    partial right foot amputation with graft application    TOE AMPUTATION Right 12/28/2018    PARTIAL RIGHT FOOT AMPUTATION WITH GRAFT APPLICATION performed by Janet Montalvo DPM at [facility] 112 Left 5/27/2021    PARTIAL LEFT FOOT AMPUTATION performed by Janet Montalvo DPM at [facility] 26 N/A 1/21/2021    EGD BIOPSY performed by Yovany Savage DO at [facility] ENDOSCOPY       FAMILY HISTORY:  family history includes Diabetes in his maternal grandfather; Heart Disease in his paternal uncle; High Blood Pressure in his mother. SOCIAL HISTORY:   reports that he has never smoked. He has never used smokeless tobacco.    Scheduled Meds:   amLODIPine  5 mg Oral Daily    gabapentin  600 mg Oral BID    pantoprazole  40 mg Oral Daily    venlafaxine  75 mg Oral Daily    enoxaparin  30 mg SubCUTAneous Daily     Continuous Infusions:   dextrose      sodium chloride 150 mL/hr at 11/05/21 1453    dextrose 5 % and 0.45 % NaCl      insulin 6.9 Units/hr (11/05/21 1655)     PRN Meds:  glucose, dextrose, glucagon (rDNA), dextrose, dextrose, potassium chloride, magnesium sulfate, sodium phosphate IVPB **OR** sodium phosphate IVPB **OR** sodium phosphate IVPB, polyethylene glycol, dextrose 5 % and 0.45 % NaCl    ALLERGIES:  Patient is allergic to hydrocodone-acetaminophen, percocet [oxycodone-acetaminophen], pregabalin, and vicodin [hydrocodone-acetaminophen]. REVIEW OF SYSTEMS:  Constitutional: Negative for fever  HENT: Negative for sore throat  Eyes: Negative for redness   Respiratory: No shortness of breath  Cardiovascular: Negative for chest pain  Gastrointestinal: + Nausea  Genitourinary: Negative for hematuria   Musculoskeletal: Negative for arthralgias   Skin: Negative for rash  Neurological: Negative for syncope  Hematological: Negative for adenopathy  Psychiatric/Behavorial: Negative for anxiety    PHYSICAL EXAM:  Blood pressure 127/74, pulse 81, temperature 97.4 °F (36.3 °C), temperature source Oral, resp. rate 19, height 5' 10\" (1.778 m), weight 153 lb (69.4 kg), SpO2 98 %.' on RA  Gen: No distress. Eyes: PERRL. No sclera icterus. No conjunctival injection. ENT: No discharge. Pharynx clear. Neck: Trachea midline. No obvious mass. Resp: No accessory muscle use. No crackles. No wheezes. No rhonchi. No dullness on percussion. CV: Regular rate. Regular rhythm. No murmur or rub. No edema. Peripheral pulses are 2+. Capillary refill is less than 3 seconds. GI:  tender. Non-distended. No hernia. Skin: Warm and dry. No nodule on exposed extremities. Lymph: No cervical LAD. No supraclavicular LAD. M/S: No cyanosis. No joint deformity. No clubbing. Neuro: Awake. Alert. Moves all four extremities. Psych: Oriented x 3. No anxiety. LABS:  CBC:   Recent Labs     11/05/21  1009   WBC 10.3   HGB 11.0*   HCT 35.1*   MCV 82.7        BMP:   Recent Labs     11/05/21  1009 11/05/21  1640   * 132*   K 4.7 5.0   CL 86* 93*   CO2 17* 25   PHOS  --  4.2   BUN 44* 38*   CREATININE 2.4* 1.7*     LIVER PROFILE:   Recent Labs     11/05/21  1009   AST 49*   ALT 36   LIPASE 13.0   BILITOT 0.7   ALKPHOS 157*     PT/INR: No results for input(s): PROTIME, INR in the last 72 hours. APTT: No results for input(s): APTT in the last 72 hours. UA:  Recent Labs     11/05/21  1204   COLORU Yellow   PHUR 5.5   CLARITYU Clear   SPECGRAV 1.020   LEUKOCYTESUR Negative   UROBILINOGEN 0.2   BILIRUBINUR SMALL*   BLOODU Negative   GLUCOSEU >=1000*     No results for input(s): PHART, [id number], PO2ART in the last 72 hours.   ECG was reviewed by me and shows normal sinus rhythm @ 88 without acute ischemic ST segment elevation     ASSESSMENT:  · Diabetic ketoacidosis   · Type 2 diabetes mellitus   · Pseudohyponatremia  · Acute kidney injury on chronic stage III kidney disease  · Nausea/emesis    PLAN:  IVF resuscitation with normal saline (change to D51/2 per protocol), electrolytes repletion, glucose monitoring and insulin infusion    Cardiac monitor and pulse oximtery   EKG reassuring, trend troponins  Prophylaxis: DVT - lovenox; Mupirocin

## 2021-11-05 NOTE — PROGRESS NOTES
Patient admitted to ICU for DKA. A&O x4, spouse at bedside. Denies any pain or discomfort at this time. Telemetry monitor hooked up to patient. SR on monitor. Breathing pattern appears even, easy and unlabored with shallow breaths. Lung sounds are clear. Admitted to ICU on 11/5/. Patient on room air. BUE and BLE strength is WNL, able to transfer with x1 SBA. Abdomen appears soft and flat, non-tender. Bowels active x4. IV access is 20G LAC and 22G RFA. Regular insulin and 0.9% NS currently infusing (see MAR). CHG bath provided for patient. Patient is able to demonstrate the ability to move from a reclining position to an upright position within the recliner. 4 Eyes Skin Assessment     The patient is being assess for   Admission    I agree that 2 RN's have performed a thorough Head to Toe Skin Assessment on the patient. ALL assessment sites listed below have been assessed. Areas assessed for pressure by both nurses:   [x]   Head, Face, and Ears   [x]   Shoulders, Back, and Chest, Abdomen  [x]   Arms, Elbows, and Hands   [x]   Coccyx, Sacrum, and Ischium  [x]   Legs, Feet, and Heels        Skin Assessed Under all Medical Devices by both nurses:  Wearing personal clothes, BP cuff, no other devices noted. Pt has some scattered abrasions to lower extremities that are scabbed over. Missing 1st digit to R foot and 2nd digit to L foot. Scattered bruising noted. Otherwise, unremarkable. All Mepilex Borders were peeled back and area peeked at by both nurses: N/A             **SHARE this note so that the co-signing nurse is able to place an eSignature**    Co-signer eSignature: {Esignature:[id number]}    Does the Patient have Skin Breakdown related to pressure?   No          Slade Prevention initiated:  No   Wound Care Orders initiated:  NA      [facility] nurse consulted for Pressure Injury (Stage 3,4, Unstageable, DTI, NWPT, Complex wounds)and New or Established Ostomies:  NA Primary Nurse eSignature: Electronically signed by Jessie Freitas RN on 11/5/21 at 6:18 PM EDT

## 2021-11-05 NOTE — PROGRESS NOTES
Blood pressure (!) 103/59, pulse 73, temperature 98.2 °F (36.8 °C), resp. rate 19, height 5' 10\" (1.778 m), weight 153 lb (69.4 kg), SpO2 97 %. Shift assessment completed. Pt A&O, VSS on RA. Able to make needs known. - insulin drip titrated accordingly. IVF infusing. K replacement infusing. x1 IV has infiltrated in L AC. Removed / dressed. SR, soft blood pressures. Abd soft, non tender. Pt remains NPO. Turns self in bed. Denies need to void at this time. No further needs at this time.  Electronically signed by Alyce Moon RN on 11/5/2021 at 7:37 PM

## 2021-11-06 VITALS
WEIGHT: 153 LBS | TEMPERATURE: 97.8 F | SYSTOLIC BLOOD PRESSURE: 124 MMHG | DIASTOLIC BLOOD PRESSURE: 80 MMHG | HEART RATE: 66 BPM | RESPIRATION RATE: 9 BRPM | HEIGHT: 70 IN | BODY MASS INDEX: 21.9 KG/M2 | OXYGEN SATURATION: 98 %

## 2021-11-06 LAB
ANION GAP SERPL CALCULATED.3IONS-SCNC: 7 MMOL/L (ref 3–16)
ANION GAP SERPL CALCULATED.3IONS-SCNC: 8 MMOL/L (ref 3–16)
ANION GAP SERPL CALCULATED.3IONS-SCNC: 9 MMOL/L (ref 3–16)
BASOPHILS ABSOLUTE: 0 K/UL (ref 0–0.2)
BASOPHILS RELATIVE PERCENT: 0.5 %
BUN BLDV-MCNC: 24 MG/DL (ref 7–20)
BUN BLDV-MCNC: 28 MG/DL (ref 7–20)
BUN BLDV-MCNC: 32 MG/DL (ref 7–20)
CALCIUM SERPL-MCNC: 8.7 MG/DL (ref 8.3–10.6)
CALCIUM SERPL-MCNC: 8.7 MG/DL (ref 8.3–10.6)
CALCIUM SERPL-MCNC: 9 MG/DL (ref 8.3–10.6)
CHLORIDE BLD-SCNC: 101 MMOL/L (ref 99–110)
CO2: 24 MMOL/L (ref 21–32)
CO2: 25 MMOL/L (ref 21–32)
CO2: 26 MMOL/L (ref 21–32)
CREAT SERPL-MCNC: 1.4 MG/DL (ref 0.8–1.3)
CREAT SERPL-MCNC: 1.5 MG/DL (ref 0.8–1.3)
CREAT SERPL-MCNC: 1.6 MG/DL (ref 0.8–1.3)
EKG ATRIAL RATE: 61 BPM
EKG DIAGNOSIS: NORMAL
EKG P AXIS: 23 DEGREES
EKG P-R INTERVAL: 202 MS
EKG Q-T INTERVAL: 426 MS
EKG QRS DURATION: 100 MS
EKG QTC CALCULATION (BAZETT): 428 MS
EKG R AXIS: -56 DEGREES
EKG T AXIS: -8 DEGREES
EKG VENTRICULAR RATE: 61 BPM
EOSINOPHILS ABSOLUTE: 0.1 K/UL (ref 0–0.6)
EOSINOPHILS RELATIVE PERCENT: 0.9 %
ESTIMATED AVERAGE GLUCOSE: 375.2 MG/DL
GFR AFRICAN AMERICAN: 53
GFR AFRICAN AMERICAN: 57
GFR AFRICAN AMERICAN: >60
GFR NON-AFRICAN AMERICAN: 44
GFR NON-AFRICAN AMERICAN: 47
GFR NON-AFRICAN AMERICAN: 51
GLUCOSE BLD-MCNC: 118 MG/DL (ref 70–99)
GLUCOSE BLD-MCNC: 160 MG/DL (ref 70–99)
GLUCOSE BLD-MCNC: 169 MG/DL (ref 70–99)
GLUCOSE BLD-MCNC: 169 MG/DL (ref 70–99)
GLUCOSE BLD-MCNC: 171 MG/DL (ref 70–99)
GLUCOSE BLD-MCNC: 174 MG/DL (ref 70–99)
GLUCOSE BLD-MCNC: 176 MG/DL (ref 70–99)
GLUCOSE BLD-MCNC: 199 MG/DL (ref 70–99)
GLUCOSE BLD-MCNC: 238 MG/DL (ref 70–99)
GLUCOSE BLD-MCNC: 90 MG/DL (ref 70–99)
GLUCOSE BLD-MCNC: 90 MG/DL (ref 70–99)
GLUCOSE BLD-MCNC: 98 MG/DL (ref 70–99)
HBA1C MFR BLD: 14.7 %
HCT VFR BLD CALC: 28.7 % (ref 40.5–52.5)
HEMOGLOBIN: 9.5 G/DL (ref 13.5–17.5)
LYMPHOCYTES ABSOLUTE: 2 K/UL (ref 1–5.1)
LYMPHOCYTES RELATIVE PERCENT: 25.7 %
MAGNESIUM: 2 MG/DL (ref 1.8–2.4)
MAGNESIUM: 2.1 MG/DL (ref 1.8–2.4)
MAGNESIUM: 2.1 MG/DL (ref 1.8–2.4)
MAGNESIUM: 2.2 MG/DL (ref 1.8–2.4)
MCH RBC QN AUTO: 26.6 PG (ref 26–34)
MCHC RBC AUTO-ENTMCNC: 33.2 G/DL (ref 31–36)
MCV RBC AUTO: 80.1 FL (ref 80–100)
MONOCYTES ABSOLUTE: 0.6 K/UL (ref 0–1.3)
MONOCYTES RELATIVE PERCENT: 8 %
NEUTROPHILS ABSOLUTE: 5 K/UL (ref 1.7–7.7)
NEUTROPHILS RELATIVE PERCENT: 64.9 %
PDW BLD-RTO: 13.3 % (ref 12.4–15.4)
PERFORMED ON: ABNORMAL
PERFORMED ON: NORMAL
PERFORMED ON: NORMAL
PHOSPHORUS: 2.7 MG/DL (ref 2.5–4.9)
PHOSPHORUS: 2.7 MG/DL (ref 2.5–4.9)
PHOSPHORUS: 3.1 MG/DL (ref 2.5–4.9)
PHOSPHORUS: 3.7 MG/DL (ref 2.5–4.9)
PLATELET # BLD: 240 K/UL (ref 135–450)
PMV BLD AUTO: 9.4 FL (ref 5–10.5)
POTASSIUM SERPL-SCNC: 4.2 MMOL/L (ref 3.5–5.1)
POTASSIUM SERPL-SCNC: 4.3 MMOL/L (ref 3.5–5.1)
POTASSIUM SERPL-SCNC: 4.5 MMOL/L (ref 3.5–5.1)
RBC # BLD: 3.58 M/UL (ref 4.2–5.9)
SODIUM BLD-SCNC: 134 MMOL/L (ref 136–145)
WBC # BLD: 7.6 K/UL (ref 4–11)

## 2021-11-06 PROCEDURE — 94761 N-INVAS EAR/PLS OXIMETRY MLT: CPT

## 2021-11-06 PROCEDURE — 6360000002 HC RX W HCPCS: Performed by: INTERNAL MEDICINE

## 2021-11-06 PROCEDURE — 93005 ELECTROCARDIOGRAM TRACING: CPT | Performed by: INTERNAL MEDICINE

## 2021-11-06 PROCEDURE — 36415 COLL VENOUS BLD VENIPUNCTURE: CPT

## 2021-11-06 PROCEDURE — 2500000003 HC RX 250 WO HCPCS: Performed by: INTERNAL MEDICINE

## 2021-11-06 PROCEDURE — 93010 ELECTROCARDIOGRAM REPORT: CPT | Performed by: INTERNAL MEDICINE

## 2021-11-06 PROCEDURE — 84100 ASSAY OF PHOSPHORUS: CPT

## 2021-11-06 PROCEDURE — 2580000003 HC RX 258: Performed by: INTERNAL MEDICINE

## 2021-11-06 PROCEDURE — 85025 COMPLETE CBC W/AUTO DIFF WBC: CPT

## 2021-11-06 PROCEDURE — 99239 HOSP IP/OBS DSCHRG MGMT >30: CPT | Performed by: INTERNAL MEDICINE

## 2021-11-06 PROCEDURE — 80048 BASIC METABOLIC PNL TOTAL CA: CPT

## 2021-11-06 PROCEDURE — 83735 ASSAY OF MAGNESIUM: CPT

## 2021-11-06 PROCEDURE — 99232 SBSQ HOSP IP/OBS MODERATE 35: CPT | Performed by: INTERNAL MEDICINE

## 2021-11-06 PROCEDURE — 6370000000 HC RX 637 (ALT 250 FOR IP): Performed by: INTERNAL MEDICINE

## 2021-11-06 RX ORDER — INSULIN GLARGINE 100 [IU]/ML
25 INJECTION, SOLUTION SUBCUTANEOUS NIGHTLY
Status: DISCONTINUED | OUTPATIENT
Start: 2021-11-06 | End: 2021-11-06 | Stop reason: HOSPADM

## 2021-11-06 RX ORDER — NICOTINE POLACRILEX 4 MG
15 LOZENGE BUCCAL PRN
Status: DISCONTINUED | OUTPATIENT
Start: 2021-11-06 | End: 2021-11-06 | Stop reason: HOSPADM

## 2021-11-06 RX ORDER — DEXTROSE MONOHYDRATE 25 G/50ML
12.5 INJECTION, SOLUTION INTRAVENOUS PRN
Status: DISCONTINUED | OUTPATIENT
Start: 2021-11-06 | End: 2021-11-06 | Stop reason: HOSPADM

## 2021-11-06 RX ORDER — DEXTROSE MONOHYDRATE 50 MG/ML
100 INJECTION, SOLUTION INTRAVENOUS PRN
Status: DISCONTINUED | OUTPATIENT
Start: 2021-11-06 | End: 2021-11-06 | Stop reason: HOSPADM

## 2021-11-06 RX ADMIN — Medication 10 MEQ: at 06:41

## 2021-11-06 RX ADMIN — GABAPENTIN 600 MG: 300 CAPSULE ORAL at 08:19

## 2021-11-06 RX ADMIN — AMLODIPINE BESYLATE 5 MG: 5 TABLET ORAL at 08:19

## 2021-11-06 RX ADMIN — SODIUM PHOSPHATE, MONOBASIC, MONOHYDRATE 10 MMOL: 276; 142 INJECTION, SOLUTION INTRAVENOUS at 01:19

## 2021-11-06 RX ADMIN — INSULIN LISPRO 2 UNITS: 100 INJECTION, SOLUTION INTRAVENOUS; SUBCUTANEOUS at 08:26

## 2021-11-06 RX ADMIN — VENLAFAXINE HYDROCHLORIDE 75 MG: 75 CAPSULE, EXTENDED RELEASE ORAL at 08:18

## 2021-11-06 RX ADMIN — DEXTROSE AND SODIUM CHLORIDE: 5; 450 INJECTION, SOLUTION INTRAVENOUS at 01:13

## 2021-11-06 RX ADMIN — Medication 10 MEQ: at 05:09

## 2021-11-06 RX ADMIN — INSULIN LISPRO 2 UNITS: 100 INJECTION, SOLUTION INTRAVENOUS; SUBCUTANEOUS at 11:32

## 2021-11-06 RX ADMIN — INSULIN GLARGINE 25 UNITS: 100 INJECTION, SOLUTION SUBCUTANEOUS at 05:02

## 2021-11-06 RX ADMIN — Medication 10 MEQ: at 02:16

## 2021-11-06 RX ADMIN — Medication 10 MEQ: at 03:22

## 2021-11-06 RX ADMIN — ENOXAPARIN SODIUM 30 MG: 30 INJECTION SUBCUTANEOUS at 08:18

## 2021-11-06 RX ADMIN — Medication 10 MEQ: at 00:42

## 2021-11-06 RX ADMIN — PANTOPRAZOLE SODIUM 40 MG: 40 TABLET, DELAYED RELEASE ORAL at 06:42

## 2021-11-06 ASSESSMENT — PAIN SCALES - GENERAL
PAINLEVEL_OUTOF10: 0

## 2021-11-06 NOTE — PROGRESS NOTES
Gap closed x3. Lantus given then infusion stopped 2 hours later. IVF changed back to NS per Dr. Bruce Moody. Glucose checks changed to AC/HS with SS coverage.

## 2021-11-06 NOTE — DISCHARGE SUMMARY
Name:  Adam Smith  Room:  4333/4470-12  MRN:    [id number]    Discharge Summary      This discharge summary is in conjunction with a complete physical exam done on the day of discharge. Discharging Physician: Keron Miller MD      Admit: 11/5/2021  Discharge:   11/6/2021     Diagnoses this Admission    Principal Problem:    DKA, type 2, not at goal [facility])  Active Problems:    Diabetic neuropathy [facility]    Ac[facility] ([facility]    HTN (hypertension), benign    Stage 3a chronic kidney disease ([facility]    Pseudohyponatremia  Resolved Problems:    * No resolved hospital problems. *          Procedures (Please Review Full Report for Details)  none    Consults    IP CONSULT TO HOSPITALIST  IP CONSULT TO PULMONOLOGY  IP CONSULT TO CRITICAL CARE      HPI:       Patient is a 69-year-old white male with a history of diabetes mellitus. He came to the emergency room because of hyperglycemia. His home machine read high. His wife given 15 units of Humalog and 50 more units an hour later. Patient is feeling tired. Had a cough. He has been fully vaccinated against COVID-19. Denied any fever. Patient has a history of DKA. Work-up in the emergency room showed DKA. He is admitted the ICU and started on DKA protocol. The time of his admission his metabolic CO2 was 17. His anion gap was 24. His blood sugar was 631. His troponin was slightly elevated. Beta hydroxybutyrate was 3.4.     Patient also has a history of osteomyelitis of his foot. Is a history of anemia of chronic disease. He has chronic kidney disease stage III 8, hypertension, BPH and GERD. He has had a partial left foot amputation in the past.    Physical Exam at Discharge:  /77   Pulse 62   Temp 97.8 °F (36.6 °C) (Axillary)   Resp 18   Ht 5' 10\" (1.778 m)   Wt 153 lb (69.4 kg)   SpO2 97%   BMI 21.95 kg/m²   Eyes: PERRL. No sclera icterus. No conjunctival injection. ENT: No discharge. Pharynx clear. Neck: Trachea midline. Normal thyroid. Resp: No accessory muscle use. No crackles. No wheezes. No rhonchi. No dullness on percussion. CV: Regular rate. Regular rhythm. No murmur or rub. No edema. GI: Non-tender. Non-distended. No masses. No organomegaly. Normal bowel sounds. No hernia. Skin: Warm and dry. No nodule on exposed extremities. No rash on exposed extremities. Lymph: No cervical LAD. No supraclavicular LAD. M/S: No cyanosis. No joint deformity. No clubbing. Neuro: Awake. Reflexes 2+ symmetric bilaterally. Moves all 4 extremities, non focal  Psych: Oriented x 3. No anxiety or agitation.          Hospital Course  #DKA in type 2 diabetes. Came with a very high blood sugar. Work-up showed DKA. Admit to the ICU. DKA protocol. IV insulin. IV fluids. Monitor electrolytes. DKA resolved. Tolerating diet. Started on home regimen.     #Pseudohyponatremia due to hyperglycemia. Resolved.      #Acute kidney injury due to dehydration. Should improve with IV fluids. Creatinine back to baseline.      #Stage III a chronic kidney disease. Monitored creatinine.     #Benign essential hypertension. Continue with Norvasc. Hold lisinopril. Resume Lisinopril at discharge.      #Depression. Continue with Effexor.     #Lovenox 30 mg subcu daily for DVT prophylaxis.       CBC:   Recent Labs     11/05/21  1009 11/06/21  0352   WBC 10.3 7.6   HGB 11.0* 9.5*   HCT 35.1* 28.7*   MCV 82.7 80.1    240     BMP:   Recent Labs     11/06/21  0009 11/06/21  0009 11/06/21  0352 11/06/21  0719 11/06/21  1155   *  --  134* 134*  --    K 4.2  --  4.5 4.3  --      --  101 101  --    CO2 25  --  26 24  --    PHOS 2.7   < > 3.7 3.1 2.7   BUN 32*  --  28* 24*  --    CREATININE 1.6*  --  1.5* 1.4*  --     < > = values in this interval not displayed.      LIVER PROFILE:   Recent Labs     11/05/21  1009   AST 49*   ALT 36   LIPASE 13.0   BILITOT 0.7   ALKPHOS 157*     PT/INR: No results for input(s): PROTIME, INR in the last 72 hours.  APTT: No results for input(s): APTT in the last 72 hours. UA:  Recent Labs     11/05/21  1204   COLORU Yellow   PHUR 5.5   CLARITYU Clear   SPECGRAV 1.020   LEUKOCYTESUR Negative   UROBILINOGEN 0.2   BILIRUBINUR SMALL*   BLOODU Negative   GLUCOSEU >=1000*           No orders to display          Discharge Medications     Medication List      CHANGE how you take these medications    insulin glargine 100 UNIT/ML injection vial  Commonly known as: LANTUS  Inject 40 Units into the skin nightly  What changed: how much to take        CONTINUE taking these medications    amLODIPine 5 MG tablet  Commonly known as: NORVASC  Take 1 tablet by mouth daily     * Ultra-Thin II Pen Needles 29G X 12.7MM Misc  Generic drug: Insulin Pen Needle     * BD ULTRA-FINE PEN NEEDLES 29G X 12.7MM Misc  Generic drug: Insulin Pen Needle     * BD Pen Needle Светлана 2nd Gen 32G X 4 MM Misc  Generic drug: Insulin Pen Needle     FreeStyle Isidoro 2 Tichnor Pitney Tracey 2 Sensor Misc     gabapentin 600 MG tablet  Commonly known as: NEURONTIN     HumaLOG 100 UNIT/ML injection cartridge  Generic drug: insulin lispro     lisinopril 10 MG tablet  Commonly known as: PRINIVIL;ZESTRIL     pantoprazole 40 MG tablet  Commonly known as: PROTONIX     sildenafil 100 MG tablet  Commonly known as: VIAGRA     venlafaxine 75 MG extended release capsule  Commonly known as: EFFEXOR XR         * This list has 3 medication(s) that are the same as other medications prescribed for you. Read the directions carefully, and ask your doctor or other care provider to review them with you. STOP taking these medications    tamsulosin 0.4 MG capsule  Commonly known as: FLOMAX              Discharge Condition/Location: Stable    Follow Up: Follow up with PCP.     More than 30 mts spent    [facility], MD 11/6/2021 1:20 PM

## 2021-11-06 NOTE — PROGRESS NOTES
Reassessment completed, see flowsheets, unchanged from prior. VSS. All ICU Lines and monitoring remain in place. Bed locked in lowest position. Call light within reach. Spouse is currently at bedside and is aware pt has NNO from rounds this AM however Dr. Aggie Olivier does recommend pt to have a sleep study performed.

## 2021-11-06 NOTE — PROGRESS NOTES
Pulmonary & Critical Care Medicine ICU Progress Note    CC: DKA    Events of Last 24 hours: Insulin infusion stopped this morning, Lantus given  Nursing suspects OSEI    Vascular lines: IV: Peripheral    MV: None     / / /   No results for input(s): PHART, [id number], PO2ART in the last 72 hours. IV:   dextrose      dextrose      sodium chloride 100 mL/hr at 21 0531       Vitals:  Blood pressure 116/72, pulse 61, temperature 97.8 °F (36.6 °C), temperature source Axillary, resp. rate 12, height 5' 10\" (1.778 m), weight 153 lb (69.4 kg), SpO2 98 %. on room air  Temp  Av.8 °F (36.6 °C)  Min: 97.4 °F (36.3 °C)  Max: 98.2 °F (36.8 °C)    Intake/Output Summary (Last 24 hours) at 2021 0729  Last data filed at 2021 9102  Gross per 24 hour   Intake 2522.72 ml   Output 1750 ml   Net 772.72 ml     PE:  Constitutional:  No acute distress. HENT:  Oropharynx is clear and moist.   Neck: No tracheal deviation present. Cardiovascular: Normal heart sounds. No lower extremity edema. Pulmonary/Chest: No wheezes. No rhonchi. No rales. No decreased breath sounds. No accessory muscle usage or stridor. Musculoskeletal: No cyanosis. No clubbing. Skin: Skin is warm and dry. Psychiatric: Normal mood and affect.   Neurologic: speech fluent, alert and oriented, strength symmetric       Scheduled Meds:   insulin glargine  25 Units SubCUTAneous Nightly    insulin lispro  0-12 Units SubCUTAneous TID     insulin lispro  0-6 Units SubCUTAneous Nightly    amLODIPine  5 mg Oral Daily    gabapentin  600 mg Oral BID    pantoprazole  40 mg Oral Daily    venlafaxine  75 mg Oral Daily    enoxaparin  30 mg SubCUTAneous Daily       Data:  CBC:   Recent Labs     21  1009 21  0352   WBC 10.3 7.6   HGB 11.0* 9.5*   HCT 35.1* 28.7*   MCV 82.7 80.1    240     BMP:   Recent Labs     21  1957 21  0009 21  0352   * 134* 134*   K 4.1 4.2 4.5   CL 98* 101 101   CO2 23 25 26

## 2021-11-06 NOTE — PROGRESS NOTES
Shift assessment, completed, see flow sheet. Pt is alert and oriented x4. Following commands. VSS: SR 71, /81, SpO2 97%, remains on RA. Respirations are easy, even, and unlabored. Bilateral lung sounds are clear. Pt cleared to be on regular diet starting this AM following completion of DKA protocol, tolerating well. PIV RFA and R hand both remain patent with sites and dressings C/D/I with 0.9% NS infusing at 100ml/hr. Call light within reach. Bed in lowest position. Bed alarm on. Will continue to monitor.

## 2021-11-06 NOTE — PROGRESS NOTES
Went over all discharge instructions with pt and spouse at bedside. Pt and spouse denied any further questions. Ensured pt had all personal belongings bagged up and ready to take with him, pt verbalized he has everything now that he came in with. No new prescriptions were given at time of discharge. 2 PIV's removed prior to discharge, scattered bruising noted, no bleeding or complications present. Pt denied for staff to arrange f/u appt with PCP at this time, spouse states she will arrange appointment within the next week and ensure he gets to and from appointment.

## 2021-11-06 NOTE — PROGRESS NOTES
Blood pressure 103/72, pulse 65, temperature 97.8 °F (36.6 °C), temperature source Axillary, resp. rate 14, height 5' 10\" (1.778 m), weight 153 lb (69.4 kg), SpO2 100 %. Reassessment completed. Pt remains A&O, VSS on RA. BS down to 90, drip titration according to protocol. x1 gap at 11, awaiting lab results for 2 <12. D5..45% continues. K replaced per protocol. 30meq given this shift. Voided 800 cc in urinal. Turns self. No further needs at this time.  Electronically signed by Constanza Luong RN on 11/6/2021 at 12:15 AM

## 2021-11-08 ENCOUNTER — TELEPHONE (OUTPATIENT)
Dept: PULMONOLOGY | Age: 61
End: 2021-11-08

## 2021-11-08 NOTE — TELEPHONE ENCOUNTER
Inpatient Notes  Received: 2 days ago  MD Paty Jarvis MA  Please offer appt with Sakina Russell, would be new evaluation OSEI

## 2022-05-05 NOTE — CONSULTS
[address] [place] [address] [address] Continence Nurse  Consult Note       NAME:  Solange Knight  MEDICAL RECORD NUMBER:  [id number]  AGE: 56 y.o. GENDER: male  : 1960  TODAY'S DATE:  3/8/2021    Subjective: The one on my foot has been there about 6 months and the wounds on the back of my leg have been there for years. Reason for WOCN Evaluation and Assessment: L Foot Plantar - diabetic ulcer; L Lower Leg Posterior - healing venous ulcers scattered      Solange Knigth is a 56 y.o. male referred by:   [] Physician  [x] Nursing  [] Other:     Wound Identification:  Wound Type: venous and diabetic  Contributing Factors: venous stasis, diabetes and chronic pressure    Wound History: 56 y.o. male with gout, dm2, who presented to [facility] with dizziness and recent falls. Pt initially presented to [facility] for ongoing falls(4-5 times over the past 24 hrs) and loss of balance. No LOC and no head trauma. He fell around 10pm and noted weakness/legs giving out. He denies room spinning. No focal weakness, no speech issues, no facial weakness. He does note being chronically lightheaded since discharge from [place] [address].    -of note, pt was recently at [place] [address] where he was treated for dka, gib, and possible meningitis(ID was consulted, mri was neg)  Since then, hasn't 'felt right' and only new meds were acei/protonix,  -he stopped taking metformin at home(per pcp instruction and inc'd insulin regimen)  -he does note his mouth is dry  -No n/v/diarrhea/f/chills/cough  He was transferred to [facility] for neuro workup. Current Wound Care Treatment: L Foot Plantar - alginate ag;  L Lower Leg Posterior - wound gel    Patient Goal of Care:  [x] Wound Healing  [] Odor Control  [] Palliative Care  [] Pain Control   [] Other:         PAST MEDICAL HISTORY        Diagnosis Date    TORI (acute kidney injury) ([facility] 2017    Bacteremia 2017    staph aureus    Diabetes mellitus ([facility]     Diabetic neuropathy (HCC)     Gout     MRSA (methicillin resistant staph aureus) culture positive 12/27/18, 9/12/17, 5/5/17,9/5/13, 1/15/14    ulcers bilateral legs    MRSA (methicillin resistant staph aureus) culture positive 01/21/2021    foot wound    Pneumonia     Pyogenic inflammation of bone ([facility]        PAST SURGICAL HISTORY    Past Surgical History:   Procedure Laterality Date    EYE SURGERY      lens implants after removal of cataracts    OTHER SURGICAL HISTORY  12/28/2018    partial right foot amputation with graft application    TOE AMPUTATION Right 12/28/2018    PARTIAL RIGHT FOOT AMPUTATION WITH GRAFT APPLICATION performed by Nichole Sanders DPM at [address] [facility] N/A 1/21/2021    EGD BIOPSY performed by Pallavi Sharpe DO at [address] HISTORY    Family History   Problem Relation Age of Onset    Diabetes Maternal Grandfather     Heart Disease Paternal Uncle     High Blood Pressure Mother        SOCIAL HISTORY    Social History     Tobacco Use    Smoking status: Never Smoker    Smokeless tobacco: Never Used   Substance Use Topics    Alcohol use: No     Comment: FORMER DRINKER approx. quit 2005    Drug use: No       ALLERGIES    Allergies   Allergen Reactions    Hydrocodone-Acetaminophen Itching    Percocet [Oxycodone-Acetaminophen]      States \"hallucinations,disoriented, & freaked out\" per wife    Pregabalin Itching    Vicodin [Hydrocodone-Acetaminophen]      Spaces out. Pt. States 1/15/14 has been taking some vicodin without problems. MEDICATIONS    No current facility-administered medications on file prior to encounter.       Current Outpatient Medications on File Prior to Encounter   Medication Sig Dispense Refill    pantoprazole (PROTONIX) 40 MG tablet Take 40 mg by mouth daily      sildenafil (VIAGRA) 100 MG tablet Take 100 mg by mouth daily as needed      insulin glargine (LANTUS) 100 UNIT/ML injection vial Inject 24 Units into the skin nightly (Patient taking differently: Inject 35 Units into the skin nightly ) 1 vial 3    insulin lispro (HUMALOG) 100 UNIT/ML injection cartridge Inject 8 Units into the skin 3 times daily (before meals) (Patient taking differently: Inject 10 Units into the skin 3 times daily (before meals) ) 5 Cartridge 3    lisinopril (PRINIVIL;ZESTRIL) 10 MG tablet Take 1.5 tablets by mouth daily 30 tablet 3    gabapentin (NEURONTIN) 600 MG tablet Take 600 mg by mouth 2 times daily.  venlafaxine (EFFEXOR XR) 75 MG extended release capsule Take 75 mg by mouth daily      Insulin Pen Needle 32G X 4 MM MISC Use 4 daily      blood glucose test strips (ASCENSIA AUTODISC VI;ONE TOUCH ULTRA TEST VI) strip Test TID and as directed         Objective: A/O; sitting up in bed; L Foot wrapped  Pt states he sees Dr. Sandi Lawrence at the [facility] and has not been there in 6 months.  Appears last appointment with Dr. Sandi Lawrence was 7/26/2019 for follow up after a Partial R Foot Amputation performed 12/28/2018     /86   Pulse 75   Temp 97.5 °F (36.4 °C) (Oral)   Resp 16   Wt 166 lb 3.6 oz (75.4 kg)   SpO2 94%   BMI 23.85 kg/m²     LABS:  WBC:    Lab Results   Component Value Date    WBC 6.1 03/05/2021     H/H:    Lab Results   Component Value Date    HGB 12.3 03/05/2021    HCT 38.7 03/05/2021     PTT:    Lab Results   Component Value Date    APTT 27.2 01/18/2021   [APTT}  PT/INR:    Lab Results   Component Value Date    PROTIME 12.2 01/18/2021    INR 1.05 01/18/2021     HgBA1c:    Lab Results   Component Value Date    LABA1C 10.7 01/20/2021       Assessment: L Foot Plantar - diabetic ulcer with red wound bed; callous surrounding the wound  L Lower Leg Posterior - scattered scabs; minnie wound is pink   Slade Risk Score: Slade Scale Score: 20    Patient Active Problem List   Diagnosis Code    Diabetic ketoacidosis without coma associated with type 2 diabetes mellitus (HCC) E11.10    Nausea & vomiting R11.2    Diabetic neuropathy (HCC) E11.40  DM (diabetes mellitus), secondary, uncontrolled, w/neurologic complic ([facility] F07.42, U09.33    Gout M10.9    Hx MRSA Z22.322    Hyperkalemia E87.5    Chronic pain on chronic opioids G89.29    Chronic LE diabetic ulcers E11.622, L97.309    Mild protein-calorie malnutrition (HCC) E44.1    Cellulitis L03.90    TORI (acute kidney injury) ([facility] N17.9    Neuropathy G62.9    Diabetic acidosis without coma (HCC) E11.10    Acute CVA (cerebrovascular accident) [facility]) I63.9    Fall W19. XXXA    HTN (hypertension), benign I10       Measurements:  Wound 01/31/19 Leg Inner; Lower Venous Ulcer, healing over 4 years  (Active)   Wound Image   03/08/21 1220   Wound Etiology Venous 03/08/21 1220   Dressing Status New drainage noted;New dressing applied 03/08/21 1220   Wound Cleansed Cleansed with saline 03/08/21 1220   Dressing/Treatment Hydrating gel;Dry dressing;Roll gauze; Ace wrap 03/08/21 1220   Dressing Change Due 03/08/21 03/08/21 1220   Wound Length (cm) 4 cm 03/08/21 1220   Wound Width (cm) 3 cm 03/08/21 1220   Wound Depth (cm) 0 cm 03/08/21 1220   Wound Surface Area (cm^2) 12 cm^2 03/08/21 1220   Change in Wound Size % (l*w) 0 03/08/21 1220   Wound Volume (cm^3) 0 cm^3 03/08/21 1220   Wound Assessment Dry;Pink/red 03/08/21 1220   Drainage Amount Scant 03/08/21 1220   Drainage Description Serosanguinous 03/08/21 1220   Odor None 03/08/21 1220   Alura-wound Assessment Dry/flaky 03/08/21 1220   Margins Attached edges; Defined edges 03/08/21 1220   Number of days: 767    L Lower Leg Posterior:         Wound 09/26/19 Foot Left;Plantar healing veneous wound (Active)   Wound Image   03/08/21 1220   Wound Etiology Diabetic 03/08/21 1220   Dressing Status Intact; New drainage noted;New dressing applied 03/08/21 1220   Wound Cleansed Cleansed with saline 03/08/21 1220   Dressing/Treatment Alginate with Ag;Dry dressing;Roll gauze; Ace wrap 03/08/21 1220   Dressing Change Due 03/08/21 03/08/21 1220   Wound Length (cm) 1 cm 03/08/21 1220   Wound Width (cm) 1 cm 03/08/21 1220   Wound Depth (cm) 0.5 cm 03/08/21 1220   Wound Surface Area (cm^2) 1 cm^2 03/08/21 1220   Change in Wound Size % (l*w) 80.16 03/08/21 1220   Wound Volume (cm^3) 0.5 cm^3 03/08/21 1220   Wound Healing % 0 03/08/21 1220   Wound Assessment Pink/red 03/08/21 1220   Drainage Amount Scant 03/08/21 1220   Drainage Description Serosanguinous 03/08/21 1220   Odor None 03/08/21 1220   Laura-wound Assessment Dry/flaky; Hyperkeratosis (callous) 03/08/21 1220   Margins Defined edges 03/08/21 1220   Number of days: 529       L Foot Plantar:      Response to treatment:  Well tolerated by patient. Pain Assessment:  Severity:  0 / 10  Quality of pain: N/A  Wound Pain Timing/Severity: none  Premedicated: No    Plan   Plan of Care: Wound 01/31/19 Leg Inner; Lower Venous Ulcer, healing over 4 years -Dressing/Treatment: Hydrating gel, Dry dressing, Roll gauze, Ace wrap  Wound 09/26/19 Foot Left;Plantar healing veneous wound-Dressing/Treatment: Alginate with Ag, Dry dressing, Roll gauze, Ace wrap   Recommendation: L Foot Plantar - clean with NS; pat dry; apply alginate ag to wound bed; cover with 4x4  L Lower Leg Posterior - clean with NS; pat dry; apply Wound Gel to scattered scabs; cover with 4x4  Wrap L Foot and Lower Leg with roll gauze and ace wrap; change dressings daily  Call Wound Care for deterioration [phone]    Specialty Bed Required : N/A   [] Low Air Loss   [] Pressure Redistribution  [] Fluid Immersion  [] Bariatric  [] Total Pressure Relief  [] Other:     Current Diet: DIET GENERAL;  Dietician consult:  No    Discharge Plan:  Placement for patient upon discharge: home with support    Patient appropriate for Outpatient [address] [facility] [address]: Yes    Referrals:  [x]   [] [address] [employer] [facility]  [] Supplies  [] Other    Patient/Caregiver Teaching:  Level of patient/caregiver understanding able to:   [] Indicates understanding       [x] Needs reinforcement  [] Unsuccessful [] Verbal Understanding  [] Demonstrated understanding       [] No evidence of learning  [] Refused teaching         [] N/A       Electronically signed by Claire Husain RN, CWOCN on 3/8/2021 at 12:30 PM Never

## 2022-08-13 ENCOUNTER — HOSPITAL ENCOUNTER (EMERGENCY)
Age: 62
Discharge: HOME | End: 2022-08-13
Attending: EMERGENCY MEDICINE
Payer: COMMERCIAL

## 2022-08-13 ENCOUNTER — APPOINTMENT (OUTPATIENT)
Dept: GENERAL RADIOLOGY | Age: 62
End: 2022-08-13
Payer: COMMERCIAL

## 2022-08-13 VITALS
TEMPERATURE: 98.1 F | HEART RATE: 64 BPM | SYSTOLIC BLOOD PRESSURE: 136 MMHG | OXYGEN SATURATION: 96 % | DIASTOLIC BLOOD PRESSURE: 75 MMHG | RESPIRATION RATE: 14 BRPM

## 2022-08-13 DIAGNOSIS — U07.1 COVID-19: ICD-10-CM

## 2022-08-13 DIAGNOSIS — N17.9 AKI (ACUTE KIDNEY INJURY) (HCC): ICD-10-CM

## 2022-08-13 DIAGNOSIS — E11.65 HYPERGLYCEMIA DUE TO DIABETES MELLITUS (HCC): ICD-10-CM

## 2022-08-13 DIAGNOSIS — R42 LIGHTHEADEDNESS: ICD-10-CM

## 2022-08-13 DIAGNOSIS — E86.0 DEHYDRATION: Primary | ICD-10-CM

## 2022-08-13 LAB
A/G RATIO: 1 (ref 1.1–2.2)
ALBUMIN SERPL-MCNC: 3.8 G/DL (ref 3.4–5)
ALP BLD-CCNC: 131 U/L (ref 40–129)
ALT SERPL-CCNC: 6 U/L (ref 10–40)
ANION GAP SERPL CALCULATED.3IONS-SCNC: 9 MMOL/L (ref 3–16)
AST SERPL-CCNC: 14 U/L (ref 15–37)
BASOPHILS ABSOLUTE: 0 K/UL (ref 0–0.2)
BASOPHILS RELATIVE PERCENT: 0.4 %
BILIRUB SERPL-MCNC: 0.5 MG/DL (ref 0–1)
BUN BLDV-MCNC: 29 MG/DL (ref 7–20)
CALCIUM SERPL-MCNC: 8.7 MG/DL (ref 8.3–10.6)
CHLORIDE BLD-SCNC: 97 MMOL/L (ref 99–110)
CO2: 27 MMOL/L (ref 21–32)
CREAT SERPL-MCNC: 2 MG/DL (ref 0.8–1.3)
EKG ATRIAL RATE: 77 BPM
EKG DIAGNOSIS: NORMAL
EKG P AXIS: 12 DEGREES
EKG P-R INTERVAL: 206 MS
EKG Q-T INTERVAL: 372 MS
EKG QRS DURATION: 98 MS
EKG QTC CALCULATION (BAZETT): 420 MS
EKG R AXIS: -50 DEGREES
EKG T AXIS: -4 DEGREES
EKG VENTRICULAR RATE: 77 BPM
EOSINOPHILS ABSOLUTE: 0.1 K/UL (ref 0–0.6)
EOSINOPHILS RELATIVE PERCENT: 1.4 %
GFR AFRICAN AMERICAN: 41
GFR NON-AFRICAN AMERICAN: 34
GLUCOSE BLD-MCNC: 247 MG/DL (ref 70–99)
HCT VFR BLD CALC: 27.4 % (ref 40.5–52.5)
HEMOGLOBIN: 9.2 G/DL (ref 13.5–17.5)
INFLUENZA A: NOT DETECTED
INFLUENZA B: NOT DETECTED
LYMPHOCYTES ABSOLUTE: 1.2 K/UL (ref 1–5.1)
LYMPHOCYTES RELATIVE PERCENT: 13.9 %
MCH RBC QN AUTO: 25.9 PG (ref 26–34)
MCHC RBC AUTO-ENTMCNC: 33.7 G/DL (ref 31–36)
MCV RBC AUTO: 76.8 FL (ref 80–100)
MONOCYTES ABSOLUTE: 0.9 K/UL (ref 0–1.3)
MONOCYTES RELATIVE PERCENT: 9.9 %
NEUTROPHILS ABSOLUTE: 6.6 K/UL (ref 1.7–7.7)
NEUTROPHILS RELATIVE PERCENT: 74.4 %
PDW BLD-RTO: 13.9 % (ref 12.4–15.4)
PLATELET # BLD: 290 K/UL (ref 135–450)
PMV BLD AUTO: 8.4 FL (ref 5–10.5)
POTASSIUM REFLEX MAGNESIUM: 4.1 MMOL/L (ref 3.5–5.1)
RBC # BLD: 3.57 M/UL (ref 4.2–5.9)
SARS-COV-2 RNA, RT PCR: DETECTED
SODIUM BLD-SCNC: 133 MMOL/L (ref 136–145)
TOTAL PROTEIN: 7.6 G/DL (ref 6.4–8.2)
WBC # BLD: 8.9 K/UL (ref 4–11)

## 2022-08-13 PROCEDURE — 93010 ELECTROCARDIOGRAM REPORT: CPT | Performed by: INTERNAL MEDICINE

## 2022-08-13 PROCEDURE — 93005 ELECTROCARDIOGRAM TRACING: CPT | Performed by: EMERGENCY MEDICINE

## 2022-08-13 PROCEDURE — 80053 COMPREHEN METABOLIC PANEL: CPT

## 2022-08-13 PROCEDURE — 36415 COLL VENOUS BLD VENIPUNCTURE: CPT

## 2022-08-13 PROCEDURE — 87636 SARSCOV2 & INF A&B AMP PRB: CPT

## 2022-08-13 PROCEDURE — 99285 EMERGENCY DEPT VISIT HI MDM: CPT

## 2022-08-13 PROCEDURE — 71045 X-RAY EXAM CHEST 1 VIEW: CPT

## 2022-08-13 PROCEDURE — 85025 COMPLETE CBC W/AUTO DIFF WBC: CPT

## 2022-08-13 PROCEDURE — 2580000003 HC RX 258: Performed by: EMERGENCY MEDICINE

## 2022-08-13 RX ORDER — 0.9 % SODIUM CHLORIDE 0.9 %
1000 INTRAVENOUS SOLUTION INTRAVENOUS ONCE
Status: COMPLETED | OUTPATIENT
Start: 2022-08-13 | End: 2022-08-13

## 2022-08-13 RX ADMIN — SODIUM CHLORIDE 1000 ML: 9 INJECTION, SOLUTION INTRAVENOUS at 10:51

## 2022-08-13 NOTE — DISCHARGE INSTRUCTIONS
Your kidney function appears to have increased today compared to your more recent values. Today it was noted to be 2.0, and previously had been between 1.4-1.7. Please have this rechecked in the next week and follow-up closely with your primary care provider about those results. Increase your water intake and decrease your soda intake.   Please watch your diet closely including decreasing sugary foods and snacks such as ice cream.

## 2022-08-13 NOTE — ED PROVIDER NOTES
culture positive 01/21/2021    foot wound    Neuropathy     Pneumonia     Pyogenic inflammation of bone ([address]      Past Surgical History:   Procedure Laterality Date    EYE SURGERY      lens implants after removal of cataracts    FOOT DEBRIDEMENT Left 4/28/2021    DEBRIDEMENT INFECTED BONE AND TISSUE LEFT FOOT performed by Meghan Benoit DPM at [place] [address]  12/28/2018    partial right foot amputation with graft application    TOE AMPUTATION Right 12/28/2018    PARTIAL RIGHT FOOT AMPUTATION WITH GRAFT APPLICATION performed by Meghan Benoit DPM at [address] [place] [address] Left 5/27/2021    PARTIAL LEFT FOOT AMPUTATION performed by Meghan Benoit DPM at [facility] N/A 1/21/2021    EGD BIOPSY performed by Hunter Rizzo DO at [facility] ENDOSCOPY     Family History   Problem Relation Age of Onset    Diabetes Maternal Grandfather     Heart Disease Paternal Uncle     High Blood Pressure Mother      Social History     Socioeconomic History    Marital status:      Spouse name: Rashard Couch    Number of children: 1    Years of education: Not on file    Highest education level: Not on file   Occupational History    Not on file   Tobacco Use    Smoking status: Never    Smokeless tobacco: Never   Vaping Use    Vaping Use: Never used   Substance and Sexual Activity    Alcohol use: No     Comment: FORMER DRINKER approx. quit 2005    Drug use: No    Sexual activity: Yes     Partners: Female   Other Topics Concern    Not on file   Social History Narrative    Not on file     Social Determinants of Health     Financial Resource Strain: Not on file   Food Insecurity: Not on file   Transportation Needs: Not on file   Physical Activity: Not on file   Stress: Not on file   Social Connections: Not on file   Intimate Partner Violence: Not on file   Housing Stability: Not on file     No current facility-administered medications for this encounter.      Current Outpatient Medications Medication Sig Dispense Refill    lisinopril (PRINIVIL;ZESTRIL) 10 MG tablet Take 10 mg by mouth daily      insulin lispro (HUMALOG) 100 UNIT/ML injection cartridge Inject 5 Units into the skin 3 times daily (before meals) Dose decreased 5 Cartridge 0    Continuous Blood Gluc  (FREESTYLE ISAI 2 READER) BRITTANEY       Continuous Blood Gluc Sensor (FREESTYLE ISAI 2 SENSOR) [facility]       BD PEN NEEDLE MARY 2ND GEN 32G X 4 MM MISC       Insulin Pen Needle (ULTRA-THIN II PEN NEEDLES) 29G X 12.7MM MISC Use for insulin injections QID as directed      BD ULTRA-FINE PEN NEEDLES 29G X 12.7MM MISC       insulin glargine (LANTUS) 100 UNIT/ML injection vial Inject 40 Units into the skin nightly (Patient taking differently: Inject 25 Units into the skin nightly ) 1 vial 0    amLODIPine (NORVASC) 5 MG tablet Take 1 tablet by mouth daily 30 tablet 3    pantoprazole (PROTONIX) 40 MG tablet Take 40 mg by mouth daily      sildenafil (VIAGRA) 100 MG tablet Take 100 mg by mouth daily as needed      gabapentin (NEURONTIN) 600 MG tablet Take 600 mg by mouth 2 times daily. venlafaxine (EFFEXOR XR) 75 MG extended release capsule Take 75 mg by mouth daily       Allergies   Allergen Reactions    Hydrocodone-Acetaminophen Itching    Percocet [Oxycodone-Acetaminophen]      States \"hallucinations,disoriented, & freaked out\" per wife    Pregabalin Itching    Vicodin [Hydrocodone-Acetaminophen]      Spaces out. Pt. States 1/15/14 has been taking some vicodin without problems. REVIEW OF SYSTEMS  10 systems reviewed, pertinent positives per HPI otherwise noted to be negative. PHYSICAL EXAM  /75   Pulse 64   Temp 98.1 °F (36.7 °C) (Oral)   Resp 14   SpO2 96%    GENERAL APPEARANCE: Awake and alert. Cooperative. No acute distress. HENT: Normocephalic. Atraumatic. Mucous membranes are moist.  No drooling or stridor. NECK: Supple. EYES: PERRL. EOM's grossly intact. HEART/CHEST: RRR. No murmurs.   2+ radial and DP pulses bilaterally. LUNGS: Respirations unlabored. CTAB. Good air exchange. Speaking comfortably in full sentences. ABDOMEN: No tenderness. Soft. Non-distended. No masses. No organomegaly. No guarding or rebound. MUSCULOSKELETAL: No extremity edema. Compartments soft. No deformity. No tenderness in the extremities. All extremities neurovascularly intact. SKIN: Warm and dry. No acute rashes. There is a 3 x 6 cm shallow ulcerative wound noted to the medial left lower leg above the ankle with no purulent drainage. Granulation tissue noted to the base of the ulcer, without any exposed bone, no area of necrosis no significant surrounding erythema. NEUROLOGICAL: Alert and oriented. CN's 2-12 intact. No gross facial drooping. No slurred speech or expressive aphasia. Strength 5/5, sensation intact in the bilateral upper and lower extremities. No truncal ataxia. Frannie Hart PSYCHIATRIC: Normal mood and affect. LABS  I have reviewed all labs for this visit.    Results for orders placed or performed during the hospital encounter of 08/13/22   COVID-19 & Influenza Combo    Specimen: Nasopharyngeal Swab   Result Value Ref Range    SARS-CoV-2 RNA, RT PCR DETECTED (A) NOT DETECTED    INFLUENZA A NOT DETECTED NOT DETECTED    INFLUENZA B NOT DETECTED NOT DETECTED   CBC with Auto Differential   Result Value Ref Range    WBC 8.9 4.0 - 11.0 K/uL    RBC 3.57 (L) 4.20 - 5.90 M/uL    Hemoglobin 9.2 (L) 13.5 - 17.5 g/dL    Hematocrit 27.4 (L) 40.5 - 52.5 %    MCV 76.8 (L) 80.0 - 100.0 fL    MCH 25.9 (L) 26.0 - 34.0 pg    MCHC 33.7 31.0 - 36.0 g/dL    RDW 13.9 12.4 - 15.4 %    Platelets 134 206 - 576 K/uL    MPV 8.4 5.0 - 10.5 fL    Neutrophils % 74.4 %    Lymphocytes % 13.9 %    Monocytes % 9.9 %    Eosinophils % 1.4 %    Basophils % 0.4 %    Neutrophils Absolute 6.6 1.7 - 7.7 K/uL    Lymphocytes Absolute 1.2 1.0 - 5.1 K/uL    Monocytes Absolute 0.9 0.0 - 1.3 K/uL    Eosinophils Absolute 0.1 0.0 - 0.6 K/uL    Basophils Absolute 0.0 0.0 - COURSE/MDM  Patient seen and evaluated. Old records reviewed. Labs and imaging reviewed and results discussed with patient. Patient presenting for evaluation of feeling lightheaded and generally weak especially in the legs. He is COVID-positive although believes that he had COVID about 3 weeks ago after his wife returned from a cruise and she had tested positive, at which point the patient felt ill and had a cough. He did not tested himself at that point for COVID. Unclear if this is continuation of that episode or new COVID at this point. Although he does have significant comorbidities, given unclear time of onset of his symptoms I did not feel that acute treatment for his COVID with antivirals or other modalities was indicated as he very well may have had COVID for greater than 5 days at this point. Patient is a diabetic and is noted to be hyperglycemic and after discussing this, it appears that patient has fairly poor dietary habits, having eaten 2 cinnamon rolls for breakfast this morning and although he has switched to diet soda, still eats foods such as ice cream and sweets. We had a lengthy discussion regarding his diabetes, his poorly healing wounds of the left lower extremity including on the leg and toes, is already status post amputation of the left great toe by Dr. Dewey Soulier and follows with him closely. I suspect that this may be contributory also to his renal dysfunction which is elevated today at 2.0, with baseline somewhere between 1.4 and 1.7. He appears dehydrated and is feeling better after IV hydration. We discussed continuation of oral hydration, strict dietary modification with limitation of carbs and sugars especially. Patient will follow-up very closely with his PCP but at this time I feel that is reasonable to discharge him home with these changes, as well as a repeat BMP within the next several days and then follow-up at that point with his PCP.   Certainly if his renal function continues to worsen, he may benefit from reevaluation of the ER versus also referral to nephrology. At this time though again I felt that is reasonable to discharge him home with those above changes in his diet and plan for oral hydration, and close follow-up. All questions were answered at time of discharge. I estimate there is LOW risk for ACUTE CORONARY SYNDROME, INTRACRANIAL HEMORRHAGE, MALIGNANT DYSRHYTHMIA or HYPERTENSION, PULMONARY EMBOLISM, SEPSIS, SUBARACHNOID HEMORRHAGE, SUBDURAL HEMATOMA, STROKE, or THORACIC AORTIC DISSECTION, thus I consider the discharge disposition reasonable. Kaitlyn Wills and I have discussed the diagnosis and risks, and we agree with discharging home to follow-up with their primary doctor. We also discussed returning to the Emergency Department immediately if new or worsening symptoms occur. We have discussed the symptoms which are most concerning (e.g., bloody sputum, fever, worsening pain or shortness of breath, vomiting, weakness) that necessitate immediate return. I, Dr. Mounika Mukherjee MD, am the primary clinician of record. Is this patient to be included in the SEP-1 Core Measure? No   Exclusion criteria - the patient is NOT to be included for SEP-1 Core Measure due to:  Viral etiology found or highly suspected (including COVID-19) without concomitant bacterial infection     During the patient's ED course, the patient was given:  Medications   0.9 % sodium chloride bolus (0 mLs IntraVENous Stopped 8/13/22 1234)        CLINICAL IMPRESSION  1. Dehydration    2. Lightheadedness    3. COVID-19    4. TORI (acute kidney injury) ([address]    5. Hyperglycemia due to diabetes mellitus (HCC)        Blood pressure 136/75, pulse 64, temperature 98.1 °F (36.7 °C), temperature source Oral, resp. rate 14, SpO2 96 %. Trupti Mcnair was discharged to home in stable condition. Patient was given scripts for the following medications.  I counseled patient how to

## 2022-08-26 RX ORDER — ASPIRIN 81 MG/1
81 TABLET ORAL DAILY
COMMUNITY

## 2022-08-26 NOTE — PROGRESS NOTES
surgery. 14. If you have a Living Will and Durable Power of  for Healthcare, please bring in a copy. 13. Notify your Surgeon if you develop any illness between now and surgery  time, cough, cold, fever, sore throat, nausea, vomiting, etc.  Please notify your surgeon if you experience dizziness, shortness of breath or blurred vision between now & the time of your surgery   16. DO NOT shave your operative site 96 hours prior to surgery. For face & neck surgery, men may use an electric razor 48 hours prior to surgery. 17. Shower with _x__Antibacterial soap (x_chlorhexidine for total joint  Pt's) shower two times before surgery.(the morning of and the night before. 18. To provide excellent care visitors will be limited to one in the room at any given time.   Please call pre admission testing if you any further questions [phone] or [phone]

## 2022-09-02 ENCOUNTER — ANESTHESIA EVENT (OUTPATIENT)
Dept: OPERATING ROOM | Age: 62
End: 2022-09-02
Payer: COMMERCIAL

## 2022-09-02 ENCOUNTER — APPOINTMENT (OUTPATIENT)
Dept: GENERAL RADIOLOGY | Age: 62
End: 2022-09-02
Attending: PODIATRIST
Payer: COMMERCIAL

## 2022-09-02 ENCOUNTER — ANESTHESIA (OUTPATIENT)
Dept: OPERATING ROOM | Age: 62
End: 2022-09-02
Payer: COMMERCIAL

## 2022-09-02 ENCOUNTER — HOSPITAL ENCOUNTER (OUTPATIENT)
Age: 62
Setting detail: OUTPATIENT SURGERY
Discharge: HOME | End: 2022-09-02
Attending: PODIATRIST | Admitting: PODIATRIST
Payer: COMMERCIAL

## 2022-09-02 VITALS
TEMPERATURE: 98 F | BODY MASS INDEX: 25.91 KG/M2 | DIASTOLIC BLOOD PRESSURE: 91 MMHG | WEIGHT: 181 LBS | HEART RATE: 64 BPM | OXYGEN SATURATION: 96 % | HEIGHT: 70 IN | RESPIRATION RATE: 16 BRPM | SYSTOLIC BLOOD PRESSURE: 147 MMHG

## 2022-09-02 DIAGNOSIS — M86.172 OSTEOMYELITIS OF ANKLE OR FOOT, LEFT, ACUTE (HCC): ICD-10-CM

## 2022-09-02 LAB
GLUCOSE BLD-MCNC: 119 MG/DL (ref 70–99)
GLUCOSE BLD-MCNC: 172 MG/DL (ref 70–99)
PERFORMED ON: ABNORMAL
PERFORMED ON: ABNORMAL

## 2022-09-02 PROCEDURE — 3700000000 HC ANESTHESIA ATTENDED CARE: Performed by: PODIATRIST

## 2022-09-02 PROCEDURE — 2580000003 HC RX 258: Performed by: NURSE ANESTHETIST, CERTIFIED REGISTERED

## 2022-09-02 PROCEDURE — 73620 X-RAY EXAM OF FOOT: CPT

## 2022-09-02 PROCEDURE — 3700000001 HC ADD 15 MINUTES (ANESTHESIA): Performed by: PODIATRIST

## 2022-09-02 PROCEDURE — 2580000003 HC RX 258: Performed by: PODIATRIST

## 2022-09-02 PROCEDURE — 88311 DECALCIFY TISSUE: CPT

## 2022-09-02 PROCEDURE — 6360000002 HC RX W HCPCS: Performed by: NURSE ANESTHETIST, CERTIFIED REGISTERED

## 2022-09-02 PROCEDURE — 3600000013 HC SURGERY LEVEL 3 ADDTL 15MIN: Performed by: PODIATRIST

## 2022-09-02 PROCEDURE — 2500000003 HC RX 250 WO HCPCS: Performed by: NURSE ANESTHETIST, CERTIFIED REGISTERED

## 2022-09-02 PROCEDURE — 3600000003 HC SURGERY LEVEL 3 BASE: Performed by: PODIATRIST

## 2022-09-02 PROCEDURE — 7100000011 HC PHASE II RECOVERY - ADDTL 15 MIN: Performed by: PODIATRIST

## 2022-09-02 PROCEDURE — 88305 TISSUE EXAM BY PATHOLOGIST: CPT

## 2022-09-02 PROCEDURE — 7100000010 HC PHASE II RECOVERY - FIRST 15 MIN: Performed by: PODIATRIST

## 2022-09-02 PROCEDURE — 2580000003 HC RX 258: Performed by: ANESTHESIOLOGY

## 2022-09-02 PROCEDURE — 6360000002 HC RX W HCPCS: Performed by: PODIATRIST

## 2022-09-02 PROCEDURE — 2500000003 HC RX 250 WO HCPCS: Performed by: PODIATRIST

## 2022-09-02 PROCEDURE — 2709999900 HC NON-CHARGEABLE SUPPLY: Performed by: PODIATRIST

## 2022-09-02 PROCEDURE — C1713 ANCHOR/SCREW BN/BN,TIS/BN: HCPCS | Performed by: PODIATRIST

## 2022-09-02 PROCEDURE — 6360000002 HC RX W HCPCS: Performed by: ANESTHESIOLOGY

## 2022-09-02 DEVICE — GRAFT SFT TISS 2 CC FLOWABLE PLCNTA MTRX VIAFLOW: Type: IMPLANTABLE DEVICE | Site: FOOT | Status: FUNCTIONAL

## 2022-09-02 RX ORDER — SODIUM CHLORIDE 0.9 % (FLUSH) 0.9 %
5-40 SYRINGE (ML) INJECTION PRN
Status: DISCONTINUED | OUTPATIENT
Start: 2022-09-02 | End: 2022-09-02 | Stop reason: HOSPADM

## 2022-09-02 RX ORDER — SODIUM CHLORIDE 9 MG/ML
INJECTION, SOLUTION INTRAVENOUS PRN
Status: DISCONTINUED | OUTPATIENT
Start: 2022-09-02 | End: 2022-09-02 | Stop reason: HOSPADM

## 2022-09-02 RX ORDER — LIDOCAINE HYDROCHLORIDE 10 MG/ML
0.3 INJECTION, SOLUTION EPIDURAL; INFILTRATION; INTRACAUDAL; PERINEURAL
Status: DISCONTINUED | OUTPATIENT
Start: 2022-09-02 | End: 2022-09-02 | Stop reason: HOSPADM

## 2022-09-02 RX ORDER — SODIUM CHLORIDE 0.9 % (FLUSH) 0.9 %
5-40 SYRINGE (ML) INJECTION EVERY 12 HOURS SCHEDULED
Status: DISCONTINUED | OUTPATIENT
Start: 2022-09-02 | End: 2022-09-02 | Stop reason: HOSPADM

## 2022-09-02 RX ORDER — ONDANSETRON 2 MG/ML
4 INJECTION INTRAMUSCULAR; INTRAVENOUS
Status: COMPLETED | OUTPATIENT
Start: 2022-09-02 | End: 2022-09-02

## 2022-09-02 RX ORDER — SODIUM CHLORIDE, SODIUM LACTATE, POTASSIUM CHLORIDE, CALCIUM CHLORIDE 600; 310; 30; 20 MG/100ML; MG/100ML; MG/100ML; MG/100ML
INJECTION, SOLUTION INTRAVENOUS CONTINUOUS PRN
Status: DISCONTINUED | OUTPATIENT
Start: 2022-09-02 | End: 2022-09-02 | Stop reason: SDUPTHER

## 2022-09-02 RX ORDER — SODIUM CHLORIDE, SODIUM LACTATE, POTASSIUM CHLORIDE, CALCIUM CHLORIDE 600; 310; 30; 20 MG/100ML; MG/100ML; MG/100ML; MG/100ML
INJECTION, SOLUTION INTRAVENOUS CONTINUOUS
Status: DISCONTINUED | OUTPATIENT
Start: 2022-09-02 | End: 2022-09-02 | Stop reason: HOSPADM

## 2022-09-02 RX ORDER — SODIUM CHLORIDE 9 MG/ML
25 INJECTION, SOLUTION INTRAVENOUS PRN
Status: DISCONTINUED | OUTPATIENT
Start: 2022-09-02 | End: 2022-09-02 | Stop reason: HOSPADM

## 2022-09-02 RX ORDER — LIDOCAINE HYDROCHLORIDE 20 MG/ML
INJECTION, SOLUTION INFILTRATION; PERINEURAL PRN
Status: DISCONTINUED | OUTPATIENT
Start: 2022-09-02 | End: 2022-09-02 | Stop reason: SDUPTHER

## 2022-09-02 RX ORDER — LABETALOL HYDROCHLORIDE 5 MG/ML
10 INJECTION, SOLUTION INTRAVENOUS
Status: DISCONTINUED | OUTPATIENT
Start: 2022-09-02 | End: 2022-09-02 | Stop reason: HOSPADM

## 2022-09-02 RX ORDER — DIPHENHYDRAMINE HYDROCHLORIDE 50 MG/ML
12.5 INJECTION INTRAMUSCULAR; INTRAVENOUS
Status: DISCONTINUED | OUTPATIENT
Start: 2022-09-02 | End: 2022-09-02 | Stop reason: HOSPADM

## 2022-09-02 RX ORDER — PROPOFOL 10 MG/ML
INJECTION, EMULSION INTRAVENOUS PRN
Status: DISCONTINUED | OUTPATIENT
Start: 2022-09-02 | End: 2022-09-02 | Stop reason: SDUPTHER

## 2022-09-02 RX ADMIN — ONDANSETRON HYDROCHLORIDE 4 MG: 2 INJECTION, SOLUTION INTRAMUSCULAR; INTRAVENOUS at 09:15

## 2022-09-02 RX ADMIN — PROPOFOL 200 MG: 10 INJECTION, EMULSION INTRAVENOUS at 07:36

## 2022-09-02 RX ADMIN — CEFAZOLIN 2000 MG: 2 INJECTION, POWDER, FOR SOLUTION INTRAMUSCULAR; INTRAVENOUS at 07:32

## 2022-09-02 RX ADMIN — PROPOFOL 200 MG: 10 INJECTION, EMULSION INTRAVENOUS at 08:00

## 2022-09-02 RX ADMIN — PROPOFOL 200 MG: 10 INJECTION, EMULSION INTRAVENOUS at 07:45

## 2022-09-02 RX ADMIN — SODIUM CHLORIDE, POTASSIUM CHLORIDE, SODIUM LACTATE AND CALCIUM CHLORIDE: 600; 310; 30; 20 INJECTION, SOLUTION INTRAVENOUS at 07:29

## 2022-09-02 RX ADMIN — LIDOCAINE HYDROCHLORIDE 50 MG: 20 INJECTION, SOLUTION INFILTRATION; PERINEURAL at 07:36

## 2022-09-02 RX ADMIN — SODIUM CHLORIDE, POTASSIUM CHLORIDE, SODIUM LACTATE AND CALCIUM CHLORIDE: 600; 310; 30; 20 INJECTION, SOLUTION INTRAVENOUS at 07:26

## 2022-09-02 ASSESSMENT — PAIN - FUNCTIONAL ASSESSMENT: PAIN_FUNCTIONAL_ASSESSMENT: 0-10

## 2022-09-02 NOTE — H&P
HISTORY & PHYSICAL    Admit Date:  9/2/2022    Subjective:  59 y.o. male who is seen for evaluation of ulcers left foot. Past Medical History:        Diagnosis Date    TORI (acute kidney injury) ([facility] 09/12/2017    Bacteremia 05/05/2017    staph aureus    Diabetes mellitus ([facility]     Diabetic neuropathy ([facility]     Gout     Hypertension     MRSA (methicillin resistant staph aureus) culture positive 12/27/18, 9/12/17, 5/5/17,9/5/13, 1/15/14    ulcers bilateral legs    MRSA (methicillin resistant staph aureus) culture positive 01/21/2021    foot wound    Neuropathy     Pneumonia     Pyogenic inflammation of bone ([facility]        Past Surgical History:        Procedure Laterality Date    EYE SURGERY      lens implants after removal of cataracts    FOOT DEBRIDEMENT Left 4/28/2021    DEBRIDEMENT INFECTED BONE AND TISSUE LEFT FOOT performed by Jass Dowd DPM at [facility]  12/28/2018    partial right foot amputation with graft application    TOE AMPUTATION Right 12/28/2018    PARTIAL RIGHT FOOT AMPUTATION WITH GRAFT APPLICATION performed by Jass Dowd DPM at [address] [place] [address] Left 5/27/2021    PARTIAL LEFT FOOT AMPUTATION performed by Jass Dowd DPM at [address] [place] [address] N/A 1/21/2021    EGD BIOPSY performed by Nettie Galaviz DO at [facility] ENDOSCOPY       Current Medications:     ceFAZolin  2,000 mg IntraVENous Once    sodium chloride flush  5-40 mL IntraVENous 2 times per day       Allergies:  Hydrocodone-acetaminophen, Percocet [oxycodone-acetaminophen], Pregabalin, and Vicodin [hydrocodone-acetaminophen]    Social History:    Social History     Tobacco Use    Smoking status: Never    Smokeless tobacco: Never   Vaping Use    Vaping Use: Never used   Substance Use Topics    Alcohol use: No     Comment: FORMER DRINKER approx.  quit 2005    Drug use: No       Family History:       Problem Relation Age of Onset    Diabetes Maternal Grandfather     Heart Disease Paternal Uncle     High Blood Pressure Mother        Review of Systems    CONSTITUTIONAL:  negative  EYES:  negative  HEENT:  negative  RESPIRATORY:  negative  CARDIOVASCULAR:  negative  GASTROINTESTINAL:  negative  GENITOURINARY:  negative  INTEGUMENT/BREAST:  positive for ulcers  MUSCULOSKELETAL:  negative  NEUROLOGICAL:  positive for numbness      Objective:   Ht 5' 10\" (1.778 m)   Wt 153 lb (69.4 kg)   BMI 21.95 kg/m²     Data:  CBC: No results for input(s): WBC, HGB, HCT, MCV, PLT in the last 72 hours. BMP: No results for input(s): NA, K, CL, CO2, PHOS, BUN, CREATININE, CA in the last 72 hours. LIVER PROFILE: No results for input(s): AST, ALT, LIPASE, BILIDIR, BILITOT, ALKPHOS in the last 72 hours. Invalid input(s): AMYLASE,  ALB  PT/INR: No results for input(s): PROT, INR in the last 72 hours. HgBA1c:  Lab Results   Component Value Date    LABA1C 14.7 11/05/2021       Cultures:     Imaging:     Physical Exam:    General Appearance: alert and oriented to person, place and time, well developed and well- nourished, in no acute distress  Head: normocephalic and atraumatic  Eyes: pupils equal, round, and reactive to light, extraocular eye movements intact, conjunctivae normal  ENT: tympanic membrane, external ear and ear canal normal bilaterally, nose without deformity, nasal mucosa and turbinates normal without polyps  Neck: supple and non-tender without mass, no thyromegaly or thyroid nodules, no cervical lymphadenopathy  Pulmonary/Chest: clear to auscultation bilaterally- no wheezes, rales or rhonchi, normal air movement, no respiratory distress  Cardiovascular: normal rate, regular rhythm, normal S1 and S2, no murmurs, rubs, clicks, or gallops, distal pulses intact, no carotid bruits  Abdomen: soft, non-tender, non-distended, normal bowel sounds, no masses or organomegaly    DP/PT palpable left  Ulcers on the tip of left 2nd and 3rd digits. Probes to bone. + periwound erythema and edema noted. Contraction of lesser digits noted. Prior partial 1st ray amputation. Assessment:  Patient Active Problem List   Diagnosis Code    Diabetic ketoacidosis without coma associated with type 2 diabetes mellitus ([facility] E11.10    Diabetic ulcer of left foot associated with type 2 diabetes mellitus ([facility] E11.621, L97.529    Nausea & vomiting R11.2    Diabetic neuropathy ([facility] E11.40    DM (diabetes mellitus), secondary, uncontrolled, w/neurologic complic ([facility]) E99.28, S49.10    Gout M10.9    Hx MRSA Z22.322    Hyperkalemia E87.5    Chronic pain on chronic opioids G89.29    Chronic LE diabetic ulcers E11.622, L97.309    Mild protein-calorie malnutrition ([facility]) E44.1    Cellulitis L03.90    Acute kidney injury ([facility] N17.9    Neuropathy G62.9    Diabetic acidosis without coma ([facility]) E11.10    Acute CVA (cerebrovascular accident) ([facility] I63.9    HTN (hypertension), benign I10    DKA, type 2, not at goal [facility]) E11.10    Diabetic foot infection ([facility] E11.628, L08.9    Sepsis without acute organ dysfunction ([facility]) A41.9    Stage 3a chronic kidney disease ([facility] N18.31    Hypertension, essential I10    Type 2 diabetes mellitus with hyperglycemia, with long-term current use of insulin ([facility]) E11.65, Z79.4    Acute osteomyelitis of left foot ([facility] M86.172    Hyperglycemia R73.9    Pyogenic inflammation of bone ([facility]) M86.9    Anemia D64.9    Pseudohyponatremia R79.89     diabetic foot ulcers left secondary to peripheral neuropathy   Hammertoes left  Osteomyelitis left foot secondary to diabetes mellitus   diabetes mellitus uncontrolled        Plan  Patient examined. Discussed risks, complications, alternatives and benefits with patient. Understands chance of nonhealing wound, infection, need for further surgery, loss of limb or life. Questions answered. Plan for partial left foot amputation (digits 2-5 at a minimum).         Electronically signed by Vicky Bryant DPM on 9/2/2022 at 6:36 AM.

## 2022-09-02 NOTE — ANESTHESIA POSTPROCEDURE EVALUATION
Department of Anesthesiology  Postprocedure Note    Patient: Cecily Blood  MRN: [id number]  YOB: 1960  Date of evaluation: 9/2/2022      Procedure Summary     Date: 09/02/22 Room / Location: [place] [address] / [facility]    Anesthesia Start: 6456 Anesthesia Stop: 0845    Procedure: PARTIAL LEFT FOOT AMPUTATION, ULCER DEBRIDEMENT LOWER LEG (Left: Foot) Diagnosis:       Osteomyelitis of ankle or foot, left, acute ([facility]      (Osteomyelitis of ankle or foot, left, acute [facility]) [R93.054])    Surgeons: Vicky Bryant DPM Responsible Provider: Ben Navarro MD    Anesthesia Type: MAC ASA Status: 3          Anesthesia Type: No value filed. Alfredo Phase I: Alrfedo Score: 10    Alfredo Phase II: Alfredo Score: 9      Anesthesia Post Evaluation    Patient location during evaluation: PACU  Patient participation: complete - patient participated  Level of consciousness: awake and alert  Airway patency: patent  Nausea & Vomiting: no nausea and no vomiting  Complications: no  Cardiovascular status: blood pressure returned to baseline  Respiratory status: acceptable  Hydration status: euvolemic  Comments: VSS on transfer to phase 2 recovery. No anesthetic complications.

## 2022-09-02 NOTE — ANESTHESIA PRE PROCEDURE
Department of Anesthesiology  Preprocedure Note       Name:  Salvatore Race   Age:  59 y.o.  :  1960                                          MRN:  [id number]         Date:  2022      Surgeon: Carlos Lloyd):  Magdalena Oliver DPM    Procedure: Procedure(s):  PARTIAL LEFT FOOT AMPUTATION, ULCER DEBRIDEMENT LOWER LEG    Medications prior to admission:   Prior to Admission medications    Medication Sig Start Date End Date Taking? Authorizing Provider   aspirin 81 MG EC tablet Take 81 mg by mouth daily   Yes Historical Provider, MD   lisinopril (PRINIVIL;ZESTRIL) 10 MG tablet Take 10 mg by mouth daily    Historical Provider, MD   insulin lispro (HUMALOG) 100 UNIT/ML injection cartridge Inject 5 Units into the skin 3 times daily (before meals) Dose decreased 21   Cassy Nino MD   Continuous Blood Gluc  (FREESTYLE ISAI 2 READER) BRITTANEY  21   Historical Provider, MD   Continuous Blood Gluc Sensor (FREESTYLE ISAI 2 SENSOR) [facility] UCLAC  21   Historical Provider, MD   BD PEN NEEDLE MARY 2ND GEN 32G X 4 MM MISC  21   Historical Provider, MD   Insulin Pen Needle (ULTRA-THIN II PEN NEEDLES) 29G X 12.7MM MISC Use for insulin injections QID as directed 21   Historical Provider, MD   BD ULTRA-FINE PEN NEEDLES 29G X 12.7MM MISC  21   Historical Provider, MD   insulin glargine (LANTUS) 100 UNIT/ML injection vial Inject 40 Units into the skin nightly  Patient taking differently: Inject 25 Units into the skin nightly 21   POLO Tong   amLODIPine (NORVASC) 5 MG tablet Take 1 tablet by mouth daily 21   POLO Tong   sildenafil (VIAGRA) 100 MG tablet Take 100 mg by mouth daily as needed 3/3/21   Historical Provider, MD   gabapentin (NEURONTIN) 600 MG tablet Take 600 mg by mouth 2 times daily.     Historical Provider, MD   venlafaxine (EFFEXOR XR) 75 MG extended release capsule Take 75 mg by mouth daily    Historical Provider, MD       Current medications:    Current Facility-Administered Medications   Medication Dose Route Frequency Provider Last Rate Last Admin    ceFAZolin (ANCEF) 2,000 mg in sodium chloride 0.9 % 50 mL IVPB (mini-bag)  2,000 mg IntraVENous Once Dallis Overcast, DPM        lidocaine PF 1 % injection 0.3 mL  0.3 mL IntraDERmal Once PRN Frederick Collins MD        lactated ringers infusion   IntraVENous Continuous Frederick Collins MD        sodium chloride flush 0.9 % injection 5-40 mL  5-40 mL IntraVENous 2 times per day Frederick Collins MD        sodium chloride flush 0.9 % injection 5-40 mL  5-40 mL IntraVENous PRN Frederick Collins MD        0.9 % sodium chloride infusion   IntraVENous PRN Frederick Collins MD           Allergies: Allergies   Allergen Reactions    Hydrocodone-Acetaminophen Itching    Percocet [Oxycodone-Acetaminophen]      States \"hallucinations,disoriented, & freaked out\" per wife    Pregabalin Itching    Vicodin [Hydrocodone-Acetaminophen]      Spaces out. Pt. States 1/15/14 has been taking some vicodin without problems.        Problem List:    Patient Active Problem List   Diagnosis Code    Diabetic ketoacidosis without coma associated with type 2 diabetes mellitus ([address] E11.10    Diabetic ulcer of left foot associated with type 2 diabetes mellitus ([address] E11.621, L97.529    Nausea & vomiting R11.2    Diabetic neuropathy ([address] E11.40    DM (diabetes mellitus), secondary, uncontrolled, w/neurologic complic ([address] G31.50, C32.59    Gout M10.9    Hx MRSA Z22.322    Hyperkalemia E87.5    Chronic pain on chronic opioids G89.29    Chronic LE diabetic ulcers E11.622, L97.309    Mild protein-calorie malnutrition (HCC) E44.1    Cellulitis L03.90    Acute kidney injury ([address] N17.9    Neuropathy G62.9    Diabetic acidosis without coma (HCC) E11.10    Acute CVA (cerebrovascular accident) ([address] I63.9    HTN (hypertension), benign I10    DKA, type 2, not at goal [facility]) E11.10    Diabetic foot infection (HCC) E11.628, L08.9    Sepsis without acute organ dysfunction (HCC) A41.9    Stage 3a chronic kidney disease (HCC) N18.31    Hypertension, essential I10    Type 2 diabetes mellitus with hyperglycemia, with long-term current use of insulin (HCC) E11.65, Z79.4    Acute osteomyelitis of left foot ([facility] M86.172    Hyperglycemia R73.9    Pyogenic inflammation of bone ([facility]) M86.9    Anemia D64.9    Pseudohyponatremia R79.89       Past Medical History:        Diagnosis Date    TORI (acute kidney injury) ([facility] 09/12/2017    Bacteremia 05/05/2017    staph aureus    Diabetes mellitus ([facility]     Diabetic neuropathy ([facility]     Gout     Hypertension     MRSA (methicillin resistant staph aureus) culture positive 12/27/18, 9/12/17, 5/5/17,9/5/13, 1/15/14    ulcers bilateral legs    MRSA (methicillin resistant staph aureus) culture positive 01/21/2021    foot wound    Neuropathy     Pneumonia     Pyogenic inflammation of bone ([facility]        Past Surgical History:        Procedure Laterality Date    EYE SURGERY      lens implants after removal of cataracts    FOOT DEBRIDEMENT Left 4/28/2021    DEBRIDEMENT INFECTED BONE AND TISSUE LEFT FOOT [facility]  12/28/2018    partial right foot amputation with graft application    TOE AMPUTATION Right 12/28/2018    PARTIAL RIGHT FOOT AMPUTATION WITH GRAFT APPLICATION performed by Delmer Peña DPM at [address] [facility] Left 5/27/2021    PARTIAL LEFT FOOT AMPUTATION performed by Delmer Peña DPM at [address] N/A 1/21/2021    EGD BIOPSY performed by Donna Nolan DO at [facility] History:    Social History     Tobacco Use    Smoking status: Never    Smokeless tobacco: Never   Substance Use Topics    Alcohol use: No     Comment: FORMER DRINKER approx.  quit 2005                                Counseling given: Not Answered      Vital Signs (Current):   Vitals:    08/26/22 1539 09/02/22 0642   BP:  (!) 173/88   Pulse:  66   Resp:  16   Temp:  98.4 °F (36.9 °C)   TempSrc:  Oral   SpO2:  100%   Weight: 153 lb (69.4 kg) 181 lb (82.1 kg)   Height: 5' 10\" (1.778 m) 5' 10\" (1.778 m)                                              BP Readings from Last 3 Encounters:   09/02/22 (!) 173/88   08/13/22 136/75   11/06/21 124/80       NPO Status:                                                                                 BMI:   Wt Readings from Last 3 Encounters:   09/02/22 181 lb (82.1 kg)   11/05/21 153 lb (69.4 kg)   05/26/21 167 lb 4.8 oz (75.9 kg)     Body mass index is 25.97 kg/m². CBC:   Lab Results   Component Value Date/Time    WBC 8.9 08/13/2022 10:24 AM    RBC 3.57 08/13/2022 10:24 AM    HGB 9.2 08/13/2022 10:24 AM    HCT 27.4 08/13/2022 10:24 AM    MCV 76.8 08/13/2022 10:24 AM    RDW 13.9 08/13/2022 10:24 AM     08/13/2022 10:24 AM       CMP:   Lab Results   Component Value Date/Time     08/13/2022 10:24 AM    K 4.1 08/13/2022 10:24 AM    CL 97 08/13/2022 10:24 AM    CO2 27 08/13/2022 10:24 AM    BUN 29 08/13/2022 10:24 AM    CREATININE 2.0 08/13/2022 10:24 AM    GFRAA 41 08/13/2022 10:24 AM    GFRAA >60 08/20/2012 05:40 PM    AGRATIO 1.0 08/13/2022 10:24 AM    LABGLOM 34 08/13/2022 10:24 AM    GLUCOSE 247 08/13/2022 10:24 AM    PROT 7.6 08/13/2022 10:24 AM    PROT 7.3 08/20/2012 05:40 PM    CALCIUM 8.7 08/13/2022 10:24 AM    BILITOT 0.5 08/13/2022 10:24 AM    ALKPHOS 131 08/13/2022 10:24 AM    AST 14 08/13/2022 10:24 AM    ALT 6 08/13/2022 10:24 AM       POC Tests: No results for input(s): POCGLU, POCNA, POCK, POCCL, POCBUN, POCHEMO, POCHCT in the last 72 hours.     Coags:   Lab Results   Component Value Date/Time    PROTIME 11.9 05/25/2021 06:47 PM    INR 1.03 05/25/2021 06:47 PM    APTT 27.2 01/18/2021 05:55 PM       HCG (If Applicable): No results found for: PREGTESTUR, PREGSERUM, HCG, HCGQUANT     ABGs:   Lab Results   Component Value Date/Time    PHART 7.138 12/28/2017 08:45 PM    PO2ART 94.6 12/28/2017 08:45 PM    NEO8NIN 32.6 12/28/2017 08:45 PM    VLP6GTD 10.8 12/28/2017 08:45 PM    BEART -17.1 12/28/2017 08:45 PM    N8TFEZFQ 95.0 12/28/2017 08:45 PM        Type & Screen (If Applicable):  No results found for: LABABO, LABRH    Drug/Infectious Status (If Applicable):  No results found for: HIV, HEPCAB    COVID-19 Screening (If Applicable):   Lab Results   Component Value Date/Time    COVID19 DETECTED 08/13/2022 11:16 AM           Anesthesia Evaluation   no history of anesthetic complications:   Airway: Mallampati: II  TM distance: >3 FB   Neck ROM: limited  Mouth opening: > = 3 FB   Dental: normal exam         Pulmonary:   (+) pneumonia: resolved,                             Cardiovascular:    (+) hypertension:,                   Neuro/Psych:   (+) CVA:, neuromuscular disease (peripheral neuropathy):,             GI/Hepatic/Renal:   (+) renal disease: CRI,           Endo/Other:    (+) DiabetesType II DM, using insulin, . Abdominal:             Vascular: negative vascular ROS. Other Findings: Full beard          Anesthesia Plan      MAC     ASA 3     (Risks, benefits and alternatives of MAC anesthesia discussed with pt. Questions answered. Willing to proceed.)  Induction: intravenous. Anesthetic plan and risks discussed with patient.                         Tino Barnhart MD   9/2/2022

## 2022-09-02 NOTE — BRIEF OP NOTE
Brief Postoperative Note      Patient: Colton Patino  YOB: 1960  MRN: [id number]    Date of Procedure: 9/2/2022    Pre-Op Diagnosis: Osteomyelitis of ankle or foot, left, acute ([facility] [M86.172]    Post-Op Diagnosis: Same       Procedure(s):  PARTIAL LEFT FOOT AMPUTATION, ULCER DEBRIDEMENT LOWER LEG    Surgeon(s):  Hermelinda Taylor DPM    Assistant:  Surgical Assistant: Brady Kebede    Anesthesia: Monitor Anesthesia Care    Estimated Blood Loss (mL): 065     Complications: None    Specimens:   ID Type Source Tests Collected by Time Destination   A : PARTIAL LEFT FOOT AMPUTATION Tissue Tissue SURGICAL PATHOLOGY Hermelinda Taylor DPM 9/2/2022 0751        Implants:  Implant Name Type Inv.  Item Serial No.  Lot No. LRB No. Used Action   GRAFT SFT TISS 2 CC FLOWABLE PLCNTA MTRX VIAFLOW - [id number]  GRAFT SFT TISS 2 CC FLOWABLE PLCNTA MTRX VIAFLOW [id number] [facility]  Left 1 Implanted         Drains: * No LDAs found *    Findings: ulcers left foot and lower leg    Electronically signed by Hermelinda Taylor DPM on 9/2/2022 at 8:43 AM

## 2022-09-02 NOTE — DISCHARGE INSTRUCTIONS
PODIATRY POST-OPERATIVE  PATIENT INSTRUCTIONS      FOLLOW-UP:  Please make an appointment with your physician in main office next week. Call your physician immediately if you have any fevers greater than 102.5, drainage from you wound that is not clear or looks infected, persistent bleeding, increasing abdominal pain, problems urinating, or persistent nausea/vomiting. WOUND CARE INSTRUCTIONS:  Keep the dressing clean and dry. Do not get the dressing wet. OK to keep dressing intact until seen in the office next week. DIET:  You may eat any foods that you can tolerate. It is a good idea to eat a high fiber diet and take in plenty of fluids to prevent constipation. If you do become constipated you may want to take a mild laxative or take ducolax tablets on a daily basis until your bowel habits are regular. Constipation can be very uncomfortable, along with straining, after recent surgery. ACTIVITY: Minimal walking. When walking apply more pressure on the left heel. Elevate left leg. ICING: apply ice 15 min/hr, 3-6 times/day for 2 days. QUESTIONS:  Please feel free to call your physician or the hospital  if you have any questions, and they will be glad to assist you. ANESTHESIA DISCHARGE INSTRUCTIONS    You are under the influence of drugs- do not drink alcohol, drive a car, operate machinery(such as power tools, kitchen appliances, etc), sign legal documents, or make any important decisions for 24 hours (or while on pain medications). Children should not ride bikes or [place] or play on gym sets  for 24 hours after surgery. A responsible adult should be with you for 24 hours. Rest at home today- increase activity as tolerated. Progress slowly to a regular diet unless your physician has instructed you otherwise. Drink plenty of water.     CALL YOUR DOCTOR IF YOU:  Have moderate to severe nausea or vomiting AND are unable to hold down fluids or prescribed medications. Have bright red bloody drainage from your dressing that won't stop oozing. Do not get relief with your pain medication    NORMAL (POSSIBLE) SIDE EFFECTS FROM ANESTHESIA:     Confusion, temporary memory loss, delayed reaction times in the first 24 hours  Lightheadedness, dizziness, difficulty focusing, blurred vision  Nausea/vomiting can happen  Shivering, feeling cold, sore throat, cough and muscle aches should stop within 24-48 hours  Trouble urinating - call your surgeon if it has been more than 8 hrs  Bruising or soreness at the IV site - call if it remains red, firm or there is drainage             FEMALES OF CHILDBEARING AGE WHO ARE TAKING BIRTH CONTROL PILLS:  You may have received a medication during your procedure that interferes with the   actions of birth control pills (Bridion or Emend). Use some other kind of birth control in addition to your pills, like a condom, for 1 month after your procedure to prevent unwanted pregnancy. The following instructions are to be followed if you have a known history or diagnosis of sleep apnea: For all sleep apnea patients:  ? Sleep on your side or sitting up in a chair whenever possible, especially the first 24 hours after surgery. ? Use only medicines prescribed by your doctor. ? Do not drink alcohol. ? If you have a dental device to assist you while at rest, use it at all times for the first 24 hours. For patients using CPAP machines:  ? Use your CPAP machine during all periods of sleep as usual.  ? Use your CPAP machine during all periods of daytime rest while on pain medicines. ** Follow up with your primary care doctor for continued care. IF YOU DO NOT TAKE ALL OF YOUR NARCOTIC PAIN MEDICATION, please dispose of them responsibly. There are drop off boxes in the Emergency Departments 24/7 at both [facility] and [place].  If these locations are not convenient, other options for discarding them can be found at:  [email or web address]    Hospital or office staff may NOT accept any medications to drop off in the cabinet for you.

## 2022-09-02 NOTE — PROGRESS NOTES
Discharge instructions reviewed with patient and family member. Patient and family verbalized understanding. All home medications have been reviewed, questions answered and patient voiced understanding. Given discharge instructions, and appointment times.

## 2022-09-05 NOTE — OP NOTE
[address]                 [address] [address]                                OPERATIVE REPORT    PATIENT NAME: Cindi Lyon                     :        1960  MED REC NO:   [id number]                          ROOM:  ACCOUNT NO:   [de-identified]                           ADMIT DATE: 2022  PROVIDER:     Jakob Shah DPM    DATE OF PROCEDURE:  2022    PREOPERATIVE DIAGNOSES:  1. Ulceration, left lower leg. 2.  Diabetic foot ulceration, left foot. 3.  Osteomyelitis, left foot. 4.  Diabetes mellitus. POSTOPERATIVE DIAGNOSES:  1. Ulceration, left lower leg. 2.  Diabetic foot ulceration, left foot. 3.  Osteomyelitis, left foot. 4.  Diabetes mellitus. PROCEDURES PERFORMED:  1. Ulcer debridement, left lower leg. 2.  Partial left foot amputation. SURGEON:  Jakob Shah DPM    ANESTHESIA:  Local MAC. HEMOSTASIS:  Ankle tourniquet at 250 mmHg. ESTIMATED BLOOD LOSS:  Less than 300 mL. REPORT OF OPERATION:  The patient was brought in the operating room and  placed on the operating table in supine position. Under mild IV  sedation, the left ankle as well as digits 2, 3, 4, and 5 of the left  foot were anesthetized with a total of 20 mL of 1:1 mixture of 1%  lidocaine plain and 0.5% Marcaine plain. Foot was then scrubbed,  prepped, and draped in the usual sterile manner. Esmarch was then  utilized to exsanguinate the left foot. The ankle tourniquet was then  inflated to 250 mmHg. Attention was then directed to the ulceration of the left lower  extremity where the scalpel was utilized to excise all fibrotic,  necrotic, nonviable and viable tissues, excising this portion of the  skin and subcutaneous tissue. Post debridement, this ulceration  measured approximately 4 x 2 cm. Thus, 8 cm2 were excised with the  scalpel of skin and subcutaneous tissue.     Attention was then directed to the left second, third, fourth, and fifth  digits where elliptical incisions were then made just distal to the base  of the digits. The dissection was carried down in order to transect the  extensor and flexor tendons. A linear longitudinal incision was then  made at the base of digits 2, 3, 4, and 5 extending proximally to the  level of the metatarsophalangeal joint of the corresponding digit. Dissection was carried down in order to expose the soft tissues at the  second, third, fourth, and fifth metatarsophalangeal joints. These were  then transected. The digits were then passed off the operative field in  total.  This was done at digits 2, 3, 4, and 5. All bleeders were  cauterized and the surgical sites. Surgisnow was utilized in the wound  bed as well to help facilitate coagulation. It should be noted that the  bones of digits 2 and 3 distally were noted to be soft and discolored  consistent with his preoperative diagnoses of osteomyelitis and bone  infection. He did have mild erythema surrounding the old surfaces of  the digits 2 and 3 consistent with preoperative diagnosis of cellulitis  or skin infection. The metatarsal heads 2, 3, 4 and 5 did appear  healthy at this time. There was no purulence or necrotic tissue noted  in the surgical sites themselves. Surgical sites of the digital  amputation as well as the ulceration were then copiously irrigated with  sterile saline via the pulse lavage. All bleeders were cauterized as  necessary. Skin edges were then reapproximated on digits 2, 3, 4, and 5  with 3-0 nylon in a simple interrupted suture technique. At this time,  we did inject 2 mL of Viaflow into the digital amputations 2, 3, 4, and  5. We split total of 2 mL total of this injection. This was done in  order to stimulate wound healing in a high risk diabetic patient. Surgical sites as well as the ulcerations were then covered with  Xeroform gauze, fluffs, Kerlix, ABD, and Coban.   Tourniquet was then  deflated. The patient tolerated the procedure and anesthesia well and transferred  to the recovery room with vital signs stable and vascular status intact. Following a brief period of postoperative monitoring, the patient will  be transferred home with the following written and verbal instructions:  1. Keep dressing clean, dry, and intact. 2.  Wear the postop shoe when ambulating. 3.  Contact Dr. Samantha Dudley for all postop care or if any problems  occur.         ROBIN ROSS DPM    D: 09/04/2022 10:58:20       T: 09/04/2022 16:04:11     [email or web address]  Job#: [id number]     Doc#: [id number]    CC:

## 2022-10-02 ENCOUNTER — HOSPITAL ENCOUNTER (EMERGENCY)
Age: 62
Discharge: HOME | End: 2022-10-02
Payer: COMMERCIAL

## 2022-10-02 ENCOUNTER — APPOINTMENT (OUTPATIENT)
Dept: GENERAL RADIOLOGY | Age: 62
End: 2022-10-02
Payer: COMMERCIAL

## 2022-10-02 ENCOUNTER — APPOINTMENT (OUTPATIENT)
Dept: CT IMAGING | Age: 62
End: 2022-10-02
Payer: COMMERCIAL

## 2022-10-02 VITALS
DIASTOLIC BLOOD PRESSURE: 73 MMHG | HEIGHT: 70 IN | TEMPERATURE: 98.2 F | RESPIRATION RATE: 16 BRPM | WEIGHT: 150 LBS | OXYGEN SATURATION: 99 % | SYSTOLIC BLOOD PRESSURE: 104 MMHG | HEART RATE: 81 BPM | BODY MASS INDEX: 21.47 KG/M2

## 2022-10-02 DIAGNOSIS — M25.551 RIGHT HIP PAIN: Primary | ICD-10-CM

## 2022-10-02 PROCEDURE — 73502 X-RAY EXAM HIP UNI 2-3 VIEWS: CPT

## 2022-10-02 PROCEDURE — 72131 CT LUMBAR SPINE W/O DYE: CPT

## 2022-10-02 PROCEDURE — 99284 EMERGENCY DEPT VISIT MOD MDM: CPT

## 2022-10-02 RX ORDER — ACETAMINOPHEN 500 MG
1000 TABLET ORAL EVERY 8 HOURS PRN
Qty: 120 TABLET | Refills: 0 | Status: SHIPPED | OUTPATIENT
Start: 2022-10-02

## 2022-10-02 RX ORDER — CYCLOBENZAPRINE HCL 10 MG
10 TABLET ORAL 3 TIMES DAILY PRN
Qty: 21 TABLET | Refills: 0 | Status: SHIPPED | OUTPATIENT
Start: 2022-10-02 | End: 2022-10-12

## 2022-10-02 ASSESSMENT — PAIN SCALES - GENERAL: PAINLEVEL_OUTOF10: 8

## 2022-10-02 ASSESSMENT — PAIN DESCRIPTION - ORIENTATION: ORIENTATION: RIGHT

## 2022-10-02 ASSESSMENT — PAIN DESCRIPTION - PAIN TYPE: TYPE: ACUTE PAIN

## 2022-10-02 ASSESSMENT — ENCOUNTER SYMPTOMS
EYE PAIN: 0
SHORTNESS OF BREATH: 0
RHINORRHEA: 0
NAUSEA: 0
VOMITING: 0
BLOOD IN STOOL: 0
COUGH: 0
BACK PAIN: 0
SORE THROAT: 0
DIARRHEA: 0
ABDOMINAL PAIN: 0

## 2022-10-02 ASSESSMENT — PAIN DESCRIPTION - LOCATION: LOCATION: HIP

## 2022-10-02 ASSESSMENT — PAIN - FUNCTIONAL ASSESSMENT: PAIN_FUNCTIONAL_ASSESSMENT: 0-10

## 2022-10-02 NOTE — ED NOTES
Patient discharged in stable condition in wheelchair with all documented belongings accompanied by family. This nurse reviewed discharge instructions, pt verbalized understanding.        Nicol Guzman RN  10/02/22 1921

## 2022-10-02 NOTE — ED PROVIDER NOTES
[address] ED  EMERGENCY DEPARTMENT ENCOUNTER        Pt Name: Darylene Beach  MRN: [id number]  Armstrongfmaryann 1960  Date of evaluation: 10/2/2022  Provider: NAYELY Dye - CNP  PCP: Mervat Adams MD  Note Started: 4:49 PM EDT      MICAH. I have evaluated this patient. My supervising physician was available for consultation. Triage CHIEF COMPLAINT       Chief Complaint   Patient presents with    Hip Pain     Reports dealing with hip pain. Reports today was lifting air conditioner and hurt hip. Right hip. Denies sensation loss or numbness          HISTORY OF PRESENT ILLNESS   (Location/Symptom, Timing/Onset, Context/Setting, Quality, Duration, Modifying Factors, Severity)  Note limiting factors. Chief Complaint: Right hip pain and low back pain. Darylene Beach is a 59 y.o. male who presents to the emergency department symptoms of right hip pain and low back pain. Patient was reported lifting an air conditioner and states that it seems that his neck has back pain and right hip pain worse. He states that for the last couple weeks has been having pain in the right hip. He states that he denies any sensation loss. No numbness. States that pain is worsened with movement and motion of that hip. He denies any other acute complaints states allegedly well. Nursing Notes were all reviewed and agreed with or any disagreements were addressed in the HPI. REVIEW OF SYSTEMS    (2-9 systems for level 4, 10 or more for level 5)     Review of Systems   Constitutional:  Negative for chills, diaphoresis and fever. HENT:  Negative for congestion, ear pain, rhinorrhea and sore throat. Eyes:  Negative for pain and visual disturbance. Respiratory:  Negative for cough and shortness of breath. Cardiovascular:  Negative for chest pain and leg swelling. Gastrointestinal:  Negative for abdominal pain, blood in stool, diarrhea, nausea and vomiting.    Genitourinary:  Negative for difficulty urinating, dysuria, flank pain and frequency. Musculoskeletal:  Negative for back pain and neck pain. Right hip pain and low back pain   Skin:  Negative for rash and wound. Neurological:  Negative for dizziness and light-headedness.      PAST MEDICAL HISTORY     Past Medical History:   Diagnosis Date    TORI (acute kidney injury) ([facility] 09/12/2017    Bacteremia 05/05/2017    staph aureus    Diabetes mellitus ([facility]     Diabetic neuropathy ([facility]     Gout     Hypertension     MRSA (methicillin resistant staph aureus) culture positive 12/27/18, 9/12/17, 5/5/17,9/5/13, 1/15/14    ulcers bilateral legs    MRSA (methicillin resistant staph aureus) culture positive 01/21/2021    foot wound    Neuropathy     Pneumonia     Pyogenic inflammation of bone ([facility]        SURGICAL HISTORY     Past Surgical History:   Procedure Laterality Date    EYE SURGERY      lens implants after removal of cataracts    FOOT DEBRIDEMENT Left 4/28/2021    DEBRIDEMENT INFECTED BONE AND TISSUE LEFT FOOT performed by Tadeo Arthur DPM at [address]  12/28/2018    partial right foot amputation with graft application    TOE AMPUTATION Right 12/28/2018    PARTIAL RIGHT FOOT AMPUTATION WITH GRAFT APPLICATION performed by Tadeo Arthur DPM at [address] Left 5/27/2021    PARTIAL LEFT FOOT AMPUTATION performed by Tadeo Arthur DPM at [address] Left 9/2/2022    PARTIAL LEFT FOOT AMPUTATION, ULCER DEBRIDEMENT LOWER LEG performed by Tadeo Arthur DPM at [place] 1/21/2021    EGD BIOPSY performed by Grant Carl DO at [facility] [address]       Previous Medications    AMLODIPINE (NORVASC) 5 MG TABLET    Take 1 tablet by mouth daily    ASPIRIN 81 MG EC TABLET    Take 81 mg by mouth daily    BD PEN NEEDLE MARY 2ND GEN 32G X 4 MM MISC        BD ULTRA-FINE PEN NEEDLES 29G X 12.7MM MISC        CONTINUOUS BLOOD GLUC  (FREESTYLE ISAI 2 READER) BRITTANEY        CONTINUOUS BLOOD GLUC SENSOR (FREESTYLE ISAI 2 SENSOR) MISC        GABAPENTIN (NEURONTIN) 600 MG TABLET    Take 600 mg by mouth 2 times daily. INSULIN GLARGINE (LANTUS) 100 UNIT/ML INJECTION VIAL    Inject 40 Units into the skin nightly    INSULIN LISPRO (HUMALOG) 100 UNIT/ML INJECTION CARTRIDGE    Inject 5 Units into the skin 3 times daily (before meals) Dose decreased    INSULIN PEN NEEDLE (ULTRA-THIN II PEN NEEDLES) 29G X 12.7MM MISC    Use for insulin injections QID as directed    LISINOPRIL (PRINIVIL;ZESTRIL) 10 MG TABLET    Take 10 mg by mouth daily    SILDENAFIL (VIAGRA) 100 MG TABLET    Take 100 mg by mouth daily as needed    VENLAFAXINE (EFFEXOR XR) 75 MG EXTENDED RELEASE CAPSULE    Take 75 mg by mouth daily       ALLERGIES     Hydrocodone-acetaminophen, Percocet [oxycodone-acetaminophen], Pregabalin, and Vicodin [hydrocodone-acetaminophen]    FAMILYHISTORY       Family History   Problem Relation Age of Onset    Diabetes Maternal Grandfather     Heart Disease Paternal Uncle     High Blood Pressure Mother         SOCIAL HISTORY       Social History     Socioeconomic History    Marital status:      Spouse name: Megan Speaker    Number of children: 1    Years of education: None    Highest education level: None   Tobacco Use    Smoking status: Never    Smokeless tobacco: Never   Vaping Use    Vaping Use: Never used   Substance and Sexual Activity    Alcohol use: No     Comment: FORMER DRINKER approx.  quit 2005    Drug use: No    Sexual activity: Yes     Partners: Female       SCREENINGS    Lacey Coma Scale  Eye Opening: Spontaneous  Best Verbal Response: Oriented  Best Motor Response: Obeys commands  Lissie Coma Scale Score: 15        PHYSICAL EXAM    (up to 7 for level 4, 8 or more for level 5)     ED Triage Vitals [10/02/22 1625]   BP Temp Temp Source Heart Rate Resp SpO2 Height Weight   123/72 98.2 °F (36.8 °C) Skin 69 -- -- 5' 10\" (1.778 m) 150 lb (68 kg) Physical Exam  Vitals and nursing note reviewed. Constitutional:       Appearance: Normal appearance. He is not toxic-appearing or diaphoretic. HENT:      Head: Normocephalic and atraumatic. Nose: Nose normal.   Eyes:      General:         Right eye: No discharge. Left eye: No discharge. Cardiovascular:      Rate and Rhythm: Normal rate and regular rhythm. Pulses: Normal pulses. Heart sounds: No murmur heard. Pulmonary:      Effort: Pulmonary effort is normal. No respiratory distress. Breath sounds: No wheezing or rhonchi. Abdominal:      Palpations: Abdomen is soft. Tenderness: There is no abdominal tenderness. There is no guarding or rebound. Musculoskeletal:         General: Normal range of motion. Cervical back: Normal, normal range of motion and neck supple. Thoracic back: Normal.      Lumbar back: Tenderness present. Negative right straight leg raise test and negative left straight leg raise test.      Right hip: Tenderness present. No bony tenderness. Normal strength. Left hip: Normal.      Comments: 2+ dorsalis pedis and posterior tibial pulses noted sensation intact. Full active passive range of motion of the hip, knee, ankle   Skin:     General: Skin is warm and dry. Neurological:      General: No focal deficit present. Mental Status: He is alert and oriented to person, place, and time. Psychiatric:         Mood and Affect: Mood normal.         Behavior: Behavior normal.       DIAGNOSTIC RESULTS   LABS:    Labs Reviewed - No data to display    When ordered, only abnormal lab results are displayed. All other labs were within normal range or not returned as of this dictation. EKG: When ordered, EKG's are interpreted by the Emergency Department Physician in the absence of a cardiologist.  Please see their note for interpretation of EKG.       RADIOLOGY:   Non-plain film images such as CT, Ultrasound and MRI are read by the radiologistStacy Diaz Sa radiographic images are visualized andpreliminarily interpreted by the  ED Provider with the below findings:        Interpretation perthe Radiologist below, if available at the time of this note:    XR HIP RIGHT (2-3 VIEWS)   Preliminary Result   Degenerative changes in both hips, left slightly worse than right. No acute   osseous abnormality. CT LUMBAR SPINE WO CONTRAST   Final Result   1. No evidence of acute osseous abnormality of the lumbar spine. 2.  Mild degenerative changes of the lumbar spine and multilevel neural   foraminal narrowing, moderate to severe bilaterally at the L4-L5 level. No results found. PROCEDURES   Unless otherwise noted below, none     Procedures    CRITICAL CARE TIME   N/A    CONSULTS:  None      EMERGENCY DEPARTMENT COURSE and DIFFERENTIAL DIAGNOSIS/MDM:   Vitals:    Vitals:    10/02/22 1625   BP: 123/72   Pulse: 69   Temp: 98.2 °F (36.8 °C)   TempSrc: Skin   Weight: 150 lb (68 kg)   Height: 5' 10\" (1.778 m)       Patient was given thefollowing medications:  Medications - No data to display      Is this patient to be included in the SEP-1 Core Measure due to severe sepsis or septic shock? No   Exclusion criteria - the patient is NOT to be included for SEP-1 Core Measure due to: Infection is not suspected    Patient is well-appearing here in the emergency department. We will go and plan on discharge. Degenerative changes noted in his L4-L5 region of his lumbar spine as well as degenerative changes noted in both hips. Patient agrees with treatment plan and discharge. No acute complaints noted at this time. No sign of cauda equina syndrome. No numbness or tingling. No sign of of further acute etiology. No recent back surgery. Procedure. No unexplained weight loss. Patient has no sign of numbness or tingling in his groin region. No weakness in his lower extremities. No other acute complaints.   He was advised to follow-up with his family doctor next 2 to 3 days for repeat evaluation. Also advised return back to the ED for any further acute concerns. I am the Primary Clinician of Record. FINAL IMPRESSION      1. Right hip pain          DISPOSITION/PLAN   DISPOSITION Decision To Discharge 10/02/2022 06:22:10 PM      PATIENT REFERREDTO:  No follow-up provider specified.     DISCHARGE MEDICATIONS:  New Prescriptions    ACETAMINOPHEN (TYLENOL) 500 MG TABLET    Take 2 tablets by mouth every 8 hours as needed for Pain    CYCLOBENZAPRINE (FLEXERIL) 10 MG TABLET    Take 1 tablet by mouth 3 times daily as needed for Muscle spasms       DISCONTINUED MEDICATIONS:  Discontinued Medications    No medications on file              (Please note that portions ofthis note were completed with a voice recognition program.  Efforts were made to edit the dictations but occasionally words are mis-transcribed.)    NAYELY Sanz CNP (electronically signed)             NAYELY Sanz CNP  10/04/22 9801

## 2023-01-07 ENCOUNTER — HOSPITAL ENCOUNTER (OUTPATIENT)
Age: 63
Setting detail: OBSERVATION
Discharge: HOME | End: 2023-01-08
Attending: EMERGENCY MEDICINE | Admitting: INTERNAL MEDICINE
Payer: COMMERCIAL

## 2023-01-07 ENCOUNTER — APPOINTMENT (OUTPATIENT)
Dept: CT IMAGING | Age: 63
End: 2023-01-07
Payer: COMMERCIAL

## 2023-01-07 ENCOUNTER — APPOINTMENT (OUTPATIENT)
Dept: GENERAL RADIOLOGY | Age: 63
End: 2023-01-07
Payer: COMMERCIAL

## 2023-01-07 DIAGNOSIS — R73.9 HYPERGLYCEMIA: Primary | ICD-10-CM

## 2023-01-07 DIAGNOSIS — R41.0 DELIRIUM: ICD-10-CM

## 2023-01-07 DIAGNOSIS — N17.9 AKI (ACUTE KIDNEY INJURY) (HCC): ICD-10-CM

## 2023-01-07 PROBLEM — G93.40 ACUTE ENCEPHALOPATHY: Status: ACTIVE | Noted: 2023-01-07

## 2023-01-07 LAB
A/G RATIO: 1.2 (ref 1.1–2.2)
ALBUMIN SERPL-MCNC: 4.6 G/DL (ref 3.4–5)
ALP BLD-CCNC: 194 U/L (ref 40–129)
ALT SERPL-CCNC: 9 U/L (ref 10–40)
ANION GAP SERPL CALCULATED.3IONS-SCNC: 11 MMOL/L (ref 3–16)
ANION GAP SERPL CALCULATED.3IONS-SCNC: 8 MMOL/L (ref 3–16)
AST SERPL-CCNC: 15 U/L (ref 15–37)
BACTERIA: ABNORMAL /HPF
BASOPHILS ABSOLUTE: 0 K/UL (ref 0–0.2)
BASOPHILS RELATIVE PERCENT: 0.6 %
BILIRUB SERPL-MCNC: 0.6 MG/DL (ref 0–1)
BILIRUBIN URINE: NEGATIVE
BLOOD, URINE: ABNORMAL
BUN BLDV-MCNC: 34 MG/DL (ref 7–20)
BUN BLDV-MCNC: 37 MG/DL (ref 7–20)
CALCIUM SERPL-MCNC: 7.8 MG/DL (ref 8.3–10.6)
CALCIUM SERPL-MCNC: 9.4 MG/DL (ref 8.3–10.6)
CHLORIDE BLD-SCNC: 103 MMOL/L (ref 99–110)
CHLORIDE BLD-SCNC: 98 MMOL/L (ref 99–110)
CLARITY: CLEAR
CO2: 22 MMOL/L (ref 21–32)
CO2: 24 MMOL/L (ref 21–32)
COLOR: YELLOW
CREAT SERPL-MCNC: 1.7 MG/DL (ref 0.8–1.3)
CREAT SERPL-MCNC: 1.9 MG/DL (ref 0.8–1.3)
EKG ATRIAL RATE: 71 BPM
EKG DIAGNOSIS: NORMAL
EKG P AXIS: 2 DEGREES
EKG P-R INTERVAL: 204 MS
EKG Q-T INTERVAL: 394 MS
EKG QRS DURATION: 100 MS
EKG QTC CALCULATION (BAZETT): 428 MS
EKG R AXIS: -60 DEGREES
EKG T AXIS: 13 DEGREES
EKG VENTRICULAR RATE: 71 BPM
EOSINOPHILS ABSOLUTE: 0.1 K/UL (ref 0–0.6)
EOSINOPHILS RELATIVE PERCENT: 0.6 %
EPITHELIAL CELLS, UA: ABNORMAL /HPF (ref 0–5)
GFR SERPL CREATININE-BSD FRML MDRD: 39 ML/MIN/{1.73_M2}
GFR SERPL CREATININE-BSD FRML MDRD: 44 ML/MIN/{1.73_M2}
GLUCOSE BLD-MCNC: 107 MG/DL
GLUCOSE BLD-MCNC: 107 MG/DL (ref 70–99)
GLUCOSE BLD-MCNC: 116 MG/DL (ref 70–99)
GLUCOSE BLD-MCNC: 116 MG/DL (ref 70–99)
GLUCOSE BLD-MCNC: 120 MG/DL (ref 70–99)
GLUCOSE BLD-MCNC: 131 MG/DL (ref 70–99)
GLUCOSE BLD-MCNC: 135 MG/DL
GLUCOSE BLD-MCNC: 135 MG/DL (ref 70–99)
GLUCOSE BLD-MCNC: 139 MG/DL (ref 70–99)
GLUCOSE BLD-MCNC: 147 MG/DL (ref 70–99)
GLUCOSE BLD-MCNC: 73 MG/DL (ref 70–99)
GLUCOSE BLD-MCNC: 78 MG/DL
GLUCOSE URINE: >=1000 MG/DL
HCT VFR BLD CALC: 30.2 % (ref 40.5–52.5)
HEMOGLOBIN: 9.7 G/DL (ref 13.5–17.5)
HYALINE CASTS: ABNORMAL /LPF (ref 0–2)
INFLUENZA A: NOT DETECTED
INFLUENZA B: NOT DETECTED
KETONES, URINE: ABNORMAL MG/DL
LEUKOCYTE ESTERASE, URINE: NEGATIVE
LYMPHOCYTES ABSOLUTE: 0.9 K/UL (ref 1–5.1)
LYMPHOCYTES RELATIVE PERCENT: 11.2 %
MAGNESIUM: 2.2 MG/DL (ref 1.8–2.4)
MCH RBC QN AUTO: 24 PG (ref 26–34)
MCHC RBC AUTO-ENTMCNC: 32.1 G/DL (ref 31–36)
MCV RBC AUTO: 74.5 FL (ref 80–100)
MICROSCOPIC EXAMINATION: YES
MONOCYTES ABSOLUTE: 0.6 K/UL (ref 0–1.3)
MONOCYTES RELATIVE PERCENT: 7.8 %
MUCUS: ABNORMAL /LPF
NEUTROPHILS ABSOLUTE: 6.4 K/UL (ref 1.7–7.7)
NEUTROPHILS RELATIVE PERCENT: 79.8 %
NITRITE, URINE: NEGATIVE
PDW BLD-RTO: 15.4 % (ref 12.4–15.4)
PERFORMED ON: ABNORMAL
PERFORMED ON: NORMAL
PH UA: 5 (ref 5–8)
PLATELET # BLD: 403 K/UL (ref 135–450)
PMV BLD AUTO: 8.7 FL (ref 5–10.5)
POTASSIUM REFLEX MAGNESIUM: 3.4 MMOL/L (ref 3.5–5.1)
POTASSIUM REFLEX MAGNESIUM: 3.6 MMOL/L (ref 3.5–5.1)
PROTEIN UA: 100 MG/DL
RBC # BLD: 4.06 M/UL (ref 4.2–5.9)
RBC UA: ABNORMAL /HPF (ref 0–4)
SARS-COV-2 RNA, RT PCR: NOT DETECTED
SODIUM BLD-SCNC: 133 MMOL/L (ref 136–145)
SODIUM BLD-SCNC: 133 MMOL/L (ref 136–145)
SPECIFIC GRAVITY UA: 1.01 (ref 1–1.03)
TOTAL PROTEIN: 8.6 G/DL (ref 6.4–8.2)
TROPONIN: 0.03 NG/ML
TROPONIN: <0.01 NG/ML
URINE REFLEX TO CULTURE: ABNORMAL
URINE TYPE: ABNORMAL
UROBILINOGEN, URINE: 0.2 E.U./DL
WBC # BLD: 8 K/UL (ref 4–11)
WBC UA: ABNORMAL /HPF (ref 0–5)

## 2023-01-07 PROCEDURE — 96361 HYDRATE IV INFUSION ADD-ON: CPT

## 2023-01-07 PROCEDURE — 96374 THER/PROPH/DIAG INJ IV PUSH: CPT

## 2023-01-07 PROCEDURE — 6360000002 HC RX W HCPCS: Performed by: INTERNAL MEDICINE

## 2023-01-07 PROCEDURE — 36415 COLL VENOUS BLD VENIPUNCTURE: CPT

## 2023-01-07 PROCEDURE — 99285 EMERGENCY DEPT VISIT HI MDM: CPT

## 2023-01-07 PROCEDURE — 83036 HEMOGLOBIN GLYCOSYLATED A1C: CPT

## 2023-01-07 PROCEDURE — 83735 ASSAY OF MAGNESIUM: CPT

## 2023-01-07 PROCEDURE — 2580000003 HC RX 258: Performed by: INTERNAL MEDICINE

## 2023-01-07 PROCEDURE — G0378 HOSPITAL OBSERVATION PER HR: HCPCS

## 2023-01-07 PROCEDURE — 6370000000 HC RX 637 (ALT 250 FOR IP): Performed by: EMERGENCY MEDICINE

## 2023-01-07 PROCEDURE — 2580000003 HC RX 258: Performed by: EMERGENCY MEDICINE

## 2023-01-07 PROCEDURE — 93005 ELECTROCARDIOGRAM TRACING: CPT | Performed by: EMERGENCY MEDICINE

## 2023-01-07 PROCEDURE — 80053 COMPREHEN METABOLIC PANEL: CPT

## 2023-01-07 PROCEDURE — 70450 CT HEAD/BRAIN W/O DYE: CPT

## 2023-01-07 PROCEDURE — 87636 SARSCOV2 & INF A&B AMP PRB: CPT

## 2023-01-07 PROCEDURE — 6360000002 HC RX W HCPCS: Performed by: EMERGENCY MEDICINE

## 2023-01-07 PROCEDURE — 96372 THER/PROPH/DIAG INJ SC/IM: CPT

## 2023-01-07 PROCEDURE — 85025 COMPLETE CBC W/AUTO DIFF WBC: CPT

## 2023-01-07 PROCEDURE — 81001 URINALYSIS AUTO W/SCOPE: CPT

## 2023-01-07 PROCEDURE — 71045 X-RAY EXAM CHEST 1 VIEW: CPT

## 2023-01-07 PROCEDURE — 93010 ELECTROCARDIOGRAM REPORT: CPT | Performed by: INTERNAL MEDICINE

## 2023-01-07 PROCEDURE — 84484 ASSAY OF TROPONIN QUANT: CPT

## 2023-01-07 PROCEDURE — 6370000000 HC RX 637 (ALT 250 FOR IP): Performed by: INTERNAL MEDICINE

## 2023-01-07 RX ORDER — AMLODIPINE BESYLATE 5 MG/1
5 TABLET ORAL DAILY
Status: DISCONTINUED | OUTPATIENT
Start: 2023-01-08 | End: 2023-01-08 | Stop reason: HOSPADM

## 2023-01-07 RX ORDER — ONDANSETRON 2 MG/ML
4 INJECTION INTRAMUSCULAR; INTRAVENOUS EVERY 6 HOURS PRN
Status: DISCONTINUED | OUTPATIENT
Start: 2023-01-07 | End: 2023-01-08 | Stop reason: HOSPADM

## 2023-01-07 RX ORDER — SODIUM CHLORIDE 0.9 % (FLUSH) 0.9 %
5-40 SYRINGE (ML) INJECTION EVERY 12 HOURS SCHEDULED
Status: DISCONTINUED | OUTPATIENT
Start: 2023-01-07 | End: 2023-01-08 | Stop reason: HOSPADM

## 2023-01-07 RX ORDER — INSULIN LISPRO 100 [IU]/ML
0-4 INJECTION, SOLUTION INTRAVENOUS; SUBCUTANEOUS
Status: DISCONTINUED | OUTPATIENT
Start: 2023-01-07 | End: 2023-01-08 | Stop reason: HOSPADM

## 2023-01-07 RX ORDER — ENOXAPARIN SODIUM 100 MG/ML
40 INJECTION SUBCUTANEOUS DAILY
Status: DISCONTINUED | OUTPATIENT
Start: 2023-01-07 | End: 2023-01-08 | Stop reason: HOSPADM

## 2023-01-07 RX ORDER — LORAZEPAM 2 MG/ML
1 INJECTION INTRAMUSCULAR ONCE
Status: COMPLETED | OUTPATIENT
Start: 2023-01-07 | End: 2023-01-07

## 2023-01-07 RX ORDER — POTASSIUM CHLORIDE 20 MEQ/1
40 TABLET, EXTENDED RELEASE ORAL PRN
Status: DISCONTINUED | OUTPATIENT
Start: 2023-01-07 | End: 2023-01-08 | Stop reason: HOSPADM

## 2023-01-07 RX ORDER — INSULIN GLARGINE 100 [IU]/ML
25 INJECTION, SOLUTION SUBCUTANEOUS NIGHTLY
Status: DISCONTINUED | OUTPATIENT
Start: 2023-01-07 | End: 2023-01-08 | Stop reason: HOSPADM

## 2023-01-07 RX ORDER — INSULIN LISPRO 100 [IU]/ML
0-4 INJECTION, SOLUTION INTRAVENOUS; SUBCUTANEOUS NIGHTLY
Status: DISCONTINUED | OUTPATIENT
Start: 2023-01-07 | End: 2023-01-08 | Stop reason: HOSPADM

## 2023-01-07 RX ORDER — SODIUM CHLORIDE 9 MG/ML
INJECTION, SOLUTION INTRAVENOUS PRN
Status: DISCONTINUED | OUTPATIENT
Start: 2023-01-07 | End: 2023-01-08 | Stop reason: HOSPADM

## 2023-01-07 RX ORDER — POLYETHYLENE GLYCOL 3350 17 G/17G
17 POWDER, FOR SOLUTION ORAL DAILY PRN
Status: DISCONTINUED | OUTPATIENT
Start: 2023-01-07 | End: 2023-01-08 | Stop reason: HOSPADM

## 2023-01-07 RX ORDER — 0.9 % SODIUM CHLORIDE 0.9 %
1000 INTRAVENOUS SOLUTION INTRAVENOUS ONCE
Status: COMPLETED | OUTPATIENT
Start: 2023-01-07 | End: 2023-01-07

## 2023-01-07 RX ORDER — MAGNESIUM SULFATE 1 G/100ML
1000 INJECTION INTRAVENOUS PRN
Status: DISCONTINUED | OUTPATIENT
Start: 2023-01-07 | End: 2023-01-08 | Stop reason: HOSPADM

## 2023-01-07 RX ORDER — POTASSIUM CHLORIDE 7.45 MG/ML
10 INJECTION INTRAVENOUS PRN
Status: DISCONTINUED | OUTPATIENT
Start: 2023-01-07 | End: 2023-01-08 | Stop reason: HOSPADM

## 2023-01-07 RX ORDER — ACETAMINOPHEN 325 MG/1
650 TABLET ORAL EVERY 6 HOURS PRN
Status: DISCONTINUED | OUTPATIENT
Start: 2023-01-07 | End: 2023-01-08 | Stop reason: HOSPADM

## 2023-01-07 RX ORDER — ACETAMINOPHEN 650 MG/1
650 SUPPOSITORY RECTAL EVERY 6 HOURS PRN
Status: DISCONTINUED | OUTPATIENT
Start: 2023-01-07 | End: 2023-01-08 | Stop reason: HOSPADM

## 2023-01-07 RX ORDER — SODIUM CHLORIDE 0.9 % (FLUSH) 0.9 %
5-40 SYRINGE (ML) INJECTION PRN
Status: DISCONTINUED | OUTPATIENT
Start: 2023-01-07 | End: 2023-01-08 | Stop reason: HOSPADM

## 2023-01-07 RX ORDER — ASPIRIN 81 MG/1
81 TABLET ORAL DAILY
Status: DISCONTINUED | OUTPATIENT
Start: 2023-01-07 | End: 2023-01-08 | Stop reason: HOSPADM

## 2023-01-07 RX ORDER — DEXTROSE MONOHYDRATE 100 MG/ML
INJECTION, SOLUTION INTRAVENOUS CONTINUOUS PRN
Status: DISCONTINUED | OUTPATIENT
Start: 2023-01-07 | End: 2023-01-08 | Stop reason: HOSPADM

## 2023-01-07 RX ORDER — ACETAMINOPHEN 325 MG/1
650 TABLET ORAL ONCE
Status: COMPLETED | OUTPATIENT
Start: 2023-01-07 | End: 2023-01-07

## 2023-01-07 RX ORDER — SODIUM CHLORIDE 9 MG/ML
INJECTION, SOLUTION INTRAVENOUS CONTINUOUS
Status: DISCONTINUED | OUTPATIENT
Start: 2023-01-07 | End: 2023-01-08 | Stop reason: HOSPADM

## 2023-01-07 RX ORDER — ONDANSETRON 4 MG/1
4 TABLET, ORALLY DISINTEGRATING ORAL EVERY 8 HOURS PRN
Status: DISCONTINUED | OUTPATIENT
Start: 2023-01-07 | End: 2023-01-08 | Stop reason: HOSPADM

## 2023-01-07 RX ADMIN — SODIUM CHLORIDE 1000 ML: 9 INJECTION, SOLUTION INTRAVENOUS at 10:00

## 2023-01-07 RX ADMIN — ENOXAPARIN SODIUM 40 MG: 100 INJECTION SUBCUTANEOUS at 18:31

## 2023-01-07 RX ADMIN — LORAZEPAM 1 MG: 2 INJECTION INTRAMUSCULAR; INTRAVENOUS at 14:22

## 2023-01-07 RX ADMIN — INSULIN GLARGINE 25 UNITS: 100 INJECTION, SOLUTION SUBCUTANEOUS at 21:56

## 2023-01-07 RX ADMIN — ACETAMINOPHEN 650 MG: 325 TABLET ORAL at 09:57

## 2023-01-07 RX ADMIN — ASPIRIN 81 MG: 81 TABLET, COATED ORAL at 18:31

## 2023-01-07 RX ADMIN — SODIUM CHLORIDE: 9 INJECTION, SOLUTION INTRAVENOUS at 18:50

## 2023-01-07 RX ADMIN — SODIUM CHLORIDE, PRESERVATIVE FREE 10 ML: 5 INJECTION INTRAVENOUS at 21:55

## 2023-01-07 ASSESSMENT — PAIN - FUNCTIONAL ASSESSMENT: PAIN_FUNCTIONAL_ASSESSMENT: NONE - DENIES PAIN

## 2023-01-07 ASSESSMENT — PAIN SCALES - GENERAL: PAINLEVEL_OUTOF10: 4

## 2023-01-07 ASSESSMENT — PAIN DESCRIPTION - DESCRIPTORS: DESCRIPTORS: ACHING

## 2023-01-07 ASSESSMENT — PAIN DESCRIPTION - LOCATION: LOCATION: HEAD

## 2023-01-07 ASSESSMENT — LIFESTYLE VARIABLES
HOW OFTEN DO YOU HAVE A DRINK CONTAINING ALCOHOL: NEVER
HOW MANY STANDARD DRINKS CONTAINING ALCOHOL DO YOU HAVE ON A TYPICAL DAY: PATIENT DOES NOT DRINK

## 2023-01-07 ASSESSMENT — PAIN DESCRIPTION - ORIENTATION: ORIENTATION: RIGHT

## 2023-01-07 NOTE — FLOWSHEET NOTE
01/07/23 1800   Vital Signs   Temp 98 °F (36.7 °C)   Temp Source Oral   Heart Rate 60   Heart Rate Source Monitor   Resp 16   /69   MAP (Calculated) 88   BP Location Left upper arm   BP Method Automatic   Level of Consciousness 0   MEWS Score 1   Oxygen Therapy   SpO2 96 %   Pulse Oximetry Type Intermittent   O2 Device None (Room air)   Patient admitted to PCU. Tele confirmed with [facility]. VSS. Orders released. Chlorhexidine bath provided, patient placed in gown.

## 2023-01-07 NOTE — ED PROVIDER NOTES
[address] ED      CHIEF COMPLAINT  Blood Sugar Problem (EMS reports FSBS read high last night 15 units given @0300 yild425 @ 0615 15 units given again EMS reports fsbs 136 however pt seemed confused and was incontinent of stool. )       HISTORY OF PRESENT ILLNESS  Dario Pearson is a 57 y.o. male  who presents to the ED complaining of not feeling right and found to have significant hyperglycemia. Patient reportedly had elevated blood sugars last night with his Accu-Chek meter reading \"high\". History is initially obtained just from patient as well as EMS report is no family currently at bedside during initial assessment. They gave him 15 units now x2 and EMS fingerstick blood sugar was noted to be down to 136. Patient was on the ground with liquid brown stool noted there. Patient states that he has been having some diarrhea and also did vomit 1 point overnight which is nonbloody nonbilious. He denies any abdominal pain. He denies any actual fall. He denies any current feeling of any focal weakness or generalized weakness but does state that at times he does feel generally weak. He denies any headache. He states that he was running low on one of his diabetic medications but that his wife helps to manage his medications and is not currently out of any of his meds fully and has been compliant with his medications. He states that he tries to be good with his diabetes although does admit to drinking Tom delight yesterday. No other complaints, modifying factors or associated symptoms. I have reviewed the following from the nursing documentation.     Past Medical History:   Diagnosis Date    TORI (acute kidney injury) ([facility] 09/12/2017    Bacteremia 05/05/2017    staph aureus    Diabetes mellitus ([facility]     Diabetic [facility] ([facility]     Gout     Hypertension     MRSA (methicillin resistant staph aureus) culture positive 12/27/18, 9/12/17, 5/5/17,9/5/13, 1/15/14    ulcers bilateral legs    MRSA (methicillin resistant staph aureus) culture positive 01/21/2021    foot wound    Neuropathy     Pneumonia     Pyogenic inflammation of bone ([address]      Past Surgical History:   Procedure Laterality Date    EYE SURGERY      lens implants after removal of cataracts    FOOT DEBRIDEMENT Left 4/28/2021    DEBRIDEMENT INFECTED BONE AND TISSUE LEFT FOOT performed by Audra Winkler DPM at [address] [facility]  12/28/2018    partial right foot amputation with graft application    TOE AMPUTATION Right 12/28/2018    PARTIAL RIGHT FOOT AMPUTATION WITH GRAFT APPLICATION performed by Audra Winkler DPM at [address] [facility] Left 5/27/2021    PARTIAL LEFT FOOT AMPUTATION [facility]ler DPM at [facility] Left 9/2/2022    PARTIAL LEFT FOOT AMPUTATION, ULCER DEBRIDEMENT LOWER LEG performed by Audra Winkler DPM at [address] [facility] N/A 1/21/2021    EGD BIOPSY performed by Jayne Patel DO at [facility] ENDOSCOPY     Family History   Problem Relation Age of Onset    Diabetes Maternal Grandfather     Heart Disease Paternal Uncle     High Blood Pressure Mother      Social History     Socioeconomic History    Marital status:      Spouse name: Racquel Mcknight    Number of children: 1    Years of education: Not on file    Highest education level: Not on file   Occupational History    Not on file   Tobacco Use    Smoking status: Never    Smokeless tobacco: Never   Vaping Use    Vaping Use: Never used   Substance and Sexual Activity    Alcohol use: No     Comment: FORMER DRINKER approx.  quit 2005    Drug use: No    Sexual activity: Yes     Partners: Female   Other Topics Concern    Not on file   Social History Narrative    Not on file     Social Determinants of Health     Financial Resource Strain: Not on file   Food Insecurity: Not on file   Transportation Needs: Not on file   Physical Activity: Not on file   Stress: Not on file   Social Connections: Not on file   Intimate Partner Violence: Not on file   Housing Stability: Not on file     No current facility-administered medications for this encounter. Current Outpatient Medications   Medication Sig Dispense Refill    acetaminophen (TYLENOL) 500 MG tablet Take 2 tablets by mouth every 8 hours as needed for Pain 120 tablet 0    aspirin 81 MG EC tablet Take 81 mg by mouth daily      lisinopril (PRINIVIL;ZESTRIL) 10 MG tablet Take 10 mg by mouth daily      insulin lispro (HUMALOG) 100 UNIT/ML injection cartridge Inject 5 Units into the skin 3 times daily (before meals) Dose decreased 5 Cartridge 0    Continuous Blood Gluc  (FREESTYLE ISAI 2 READER) BRITTANEY       Continuous Blood Gluc Sensor (FREESTYLE ISAI 2 SENSOR) [facility]       BD PEN NEEDLE MARY 2ND GEN 32G X 4 MM MISC       Insulin Pen Needle (ULTRA-THIN II PEN NEEDLES) 29G X 12.7MM MISC Use for insulin injections QID as directed      BD ULTRA-FINE PEN NEEDLES 29G X 12.7MM MISC       insulin glargine (LANTUS) 100 UNIT/ML injection vial Inject 40 Units into the skin nightly (Patient taking differently: Inject 25 Units into the skin nightly) 1 vial 0    amLODIPine (NORVASC) 5 MG tablet Take 1 tablet by mouth daily 30 tablet 3    sildenafil (VIAGRA) 100 MG tablet Take 100 mg by mouth daily as needed      gabapentin (NEURONTIN) 600 MG tablet Take 600 mg by mouth 2 times daily. venlafaxine (EFFEXOR XR) 75 MG extended release capsule Take 75 mg by mouth daily       Allergies   Allergen Reactions    Hydrocodone-Acetaminophen Itching    Percocet [Oxycodone-Acetaminophen]      States \"hallucinations,disoriented, & freaked out\" per wife    Pregabalin Itching    Vicodin [Hydrocodone-Acetaminophen]      Spaces out. Pt. States 1/15/14 has been taking some vicodin without problems. REVIEW OF SYSTEMS  10 systems reviewed, pertinent positives per HPI otherwise noted to be negative.     PHYSICAL EXAM  BP (!) 136/59   Pulse 73   Temp 98.1 °F (36.7 °C) (Oral)   Resp 16   Ht 5' 10\" (1.778 m)   Wt 155 lb (70.3 kg)   SpO2 99%   BMI 22.24 kg/m²    GENERAL APPEARANCE: Awake and alert. Cooperative. No acute distress. HENT: Normocephalic. Atraumatic. Mucous membranes are tacky. No drooling or stridor. NECK: Supple. EYES: PERRL. EOM's grossly intact. HEART/CHEST: RRR. No murmurs. LUNGS: Respirations unlabored. CTAB. No appreciable wheezes rales or rhonchi. Good air exchange. Speaking comfortably in full sentences. ABDOMEN: No tenderness. Soft. Non-distended. No masses. No organomegaly. No guarding or rebound. MUSCULOSKELETAL: No extremity edema. Compartments soft. Patient with toes amputated from the left lower extremity with all incisions that are well-healed. There is a scabbed ulcer noted to the left lower extremity without any surrounding erythema or any purulent drainage. No tenderness in the extremities. All extremities neurovascularly intact. SKIN: Warm and dry. No acute rashes. NEUROLOGICAL: Alert and oriented x3. CN's 2-12 intact. No gross facial drooping. No truncal ataxia. No gross focal deficits. Patient is able to sit up without assistance. PSYCHIATRIC: Normal mood and affect. LABS  I have reviewed all labs for this visit.    Results for orders placed or performed during the hospital encounter of 01/07/23   COVID-19 & Influenza Combo    Specimen: Nasopharyngeal Swab   Result Value Ref Range    SARS-CoV-2 RNA, RT PCR NOT DETECTED NOT DETECTED    INFLUENZA A NOT DETECTED NOT DETECTED    INFLUENZA B NOT DETECTED NOT DETECTED   CBC with Auto Differential   Result Value Ref Range    WBC 8.0 4.0 - 11.0 K/uL    RBC 4.06 (L) 4.20 - 5.90 M/uL    Hemoglobin 9.7 (L) 13.5 - 17.5 g/dL    Hematocrit 30.2 (L) 40.5 - 52.5 %    MCV 74.5 (L) 80.0 - 100.0 fL    MCH 24.0 (L) 26.0 - 34.0 pg    MCHC 32.1 31.0 - 36.0 g/dL    RDW 15.4 12.4 - 15.4 %    Platelets 340 549 - 127 K/uL    MPV 8.7 5.0 - 10.5 fL    Neutrophils % 79.8 %    Lymphocytes % 11.2 %    Monocytes % 7.8 % Eosinophils % 0.6 %    Basophils % 0.6 %    Neutrophils Absolute 6.4 1.7 - 7.7 K/uL    Lymphocytes Absolute 0.9 (L) 1.0 - 5.1 K/uL    Monocytes Absolute 0.6 0.0 - 1.3 K/uL    Eosinophils Absolute 0.1 0.0 - 0.6 K/uL    Basophils Absolute 0.0 0.0 - 0.2 K/uL   CMP w/ Reflex to MG   Result Value Ref Range    Sodium 133 (L) 136 - 145 mmol/L    Potassium reflex Magnesium 3.4 (L) 3.5 - 5.1 mmol/L    Chloride 98 (L) 99 - 110 mmol/L    CO2 24 21 - 32 mmol/L    Anion Gap 11 3 - 16    Glucose 116 (H) 70 - 99 mg/dL    BUN 37 (H) 7 - 20 mg/dL    Creatinine 1.9 (H) 0.8 - 1.3 mg/dL    Est, Glom Filt Rate 39 (A) >60    Calcium 9.4 8.3 - 10.6 mg/dL    Total Protein 8.6 (H) 6.4 - 8.2 g/dL    Albumin 4.6 3.4 - 5.0 g/dL    Albumin/Globulin Ratio 1.2 1.1 - 2.2    Total Bilirubin 0.6 0.0 - 1.0 mg/dL    Alkaline Phosphatase 194 (H) 40 - 129 U/L    ALT 9 (L) 10 - 40 U/L    AST 15 15 - 37 U/L   Troponin   Result Value Ref Range    Troponin 0.03 (H) <0.01 ng/mL   Urinalysis with Reflex to Culture    Specimen: Urine   Result Value Ref Range    Color, UA Yellow Straw/Yellow    Clarity, UA Clear Clear    Glucose, Ur >=1000 (A) Negative mg/dL    Bilirubin Urine Negative Negative    Ketones, Urine TRACE (A) Negative mg/dL    Specific Gravity, UA 1.010 1.005 - 1.030    Blood, Urine SMALL (A) Negative    pH, UA 5.0 5.0 - 8.0    Protein,  (A) Negative mg/dL    Urobilinogen, Urine 0.2 <2.0 E.U./dL    Nitrite, Urine Negative Negative    Leukocyte Esterase, Urine Negative Negative    Microscopic Examination YES     Urine Type NotGiven     Urine Reflex to Culture Not Indicated    Magnesium   Result Value Ref Range    Magnesium 2.20 1.80 - 2.40 mg/dL   Microscopic Urinalysis   Result Value Ref Range    Hyaline Casts, UA 0-2 0 - 2 /LPF    Mucus, UA Rare (A) None Seen /LPF    WBC, UA 3-5 0 - 5 /HPF    RBC, UA 3-4 0 - 4 /HPF    Epithelial Cells, UA 0-1 0 - 5 /HPF    Bacteria, UA 1+ (A) None Seen /HPF   Troponin   Result Value Ref Range    Troponin <0.01 <0.01 ng/mL   Basic Metabolic Panel w/ Reflex to MG   Result Value Ref Range    Sodium 133 (L) 136 - 145 mmol/L    Potassium reflex Magnesium 3.6 3.5 - 5.1 mmol/L    Chloride 103 99 - 110 mmol/L    CO2 22 21 - 32 mmol/L    Anion Gap 8 3 - 16    Glucose 120 (H) 70 - 99 mg/dL    BUN 34 (H) 7 - 20 mg/dL    Creatinine 1.7 (H) 0.8 - 1.3 mg/dL    Est, Glom Filt Rate 44 (A) >60    Calcium 7.8 (L) 8.3 - 10.6 mg/dL   POCT glucose   Result Value Ref Range    Glucose 107 mg/dL   POCT Glucose   Result Value Ref Range    POC Glucose 107 (H) 70 - 99 mg/dl    Performed on ACCU-CHEK    POCT glucose   Result Value Ref Range    Glucose 78 mg/dL   POCT Glucose   Result Value Ref Range    POC Glucose 73 70 - 99 mg/dl    Performed on ACCU-CHEK    POCT Glucose   Result Value Ref Range    POC Glucose 139 (H) 70 - 99 mg/dl    Performed on ACCU-CHEK    POCT Glucose   Result Value Ref Range    POC Glucose 147 (H) 70 - 99 mg/dl    Performed on ACCU-CHEK    EKG 12 Lead   Result Value Ref Range    Ventricular Rate 71 BPM    Atrial Rate 71 BPM    P-R Interval 204 ms    QRS Duration 100 ms    Q-T Interval 394 ms    QTc Calculation (Bazett) 428 ms    P Axis 2 degrees    R Axis -60 degrees    T Axis 13 degrees    Diagnosis       Normal sinus rhythmPulmonary disease patternIncomplete right bundle branch blockLeft anterior fascicular blockAbnormal ECGWhen compared with ECG of 13-AUG-2022 10:25,Nonspecific T wave abnormality now evident in Lateral leads       ECG  The Ekg interpreted by me shows  normal sinus rhythm with a rate of 71  Axis is   Left axis deviation  QTc is  within an acceptable range  Intervals and Durations are unremarkable. ST Segments: nonspecific changes. Incomplete RBBB, left anterior fascicular block.   No significant change from prior EKG dated 8/13/22    RADIOLOGY  CT HEAD WO CONTRAST    Result Date: 1/7/2023  EXAMINATION: CT OF THE HEAD WITHOUT CONTRAST  1/7/2023 1:56 pm TECHNIQUE: CT of the head was performed without the administration of intravenous contrast. Automated exposure control, iterative reconstruction, and/or weight based adjustment of the mA/kV was utilized to reduce the radiation dose to as low as reasonably achievable. COMPARISON: None. HISTORY: ORDERING SYSTEM PROVIDED HISTORY: delirium TECHNOLOGIST PROVIDED HISTORY: Has a \"code stroke\" or \"stroke alert\" been called? ->No Reason for exam:->delirium Decision Support Exception - unselect if not a suspected or confirmed emergency medical condition->Emergency Medical Condition (MA) Reason for Exam: Delirium FINDINGS: BRAIN/VENTRICLES: There is no acute intracranial hemorrhage, mass effect or midline shift. No abnormal extra-axial fluid collection. The gray-white differentiation is maintained without evidence of an acute infarct. There is no evidence of hydrocephalus. Mild prominence of the ventricles and sulci due to global parenchymal volume loss. ORBITS: The visualized portion of the orbits demonstrate no acute abnormality. SINUSES: The visualized paranasal sinuses and mastoid air cells demonstrate no acute abnormality. SOFT TISSUES/SKULL:  No acute abnormality of the visualized skull or soft tissues. No acute intracranial abnormality. XR CHEST PORTABLE    Result Date: 1/7/2023  EXAMINATION: ONE XRAY VIEW OF THE CHEST 1/7/2023 9:01 am COMPARISON: 08/13/2022 HISTORY: ORDERING SYSTEM PROVIDED HISTORY: hyperglycemia, dizziness TECHNOLOGIST PROVIDED HISTORY: Reason for exam:->hyperglycemia, dizziness Reason for Exam: hyperglycemia, dizziness FINDINGS: Cardiomegaly, diffuse bronchovascular marking prominence worst in the hilar/perihilar regions. Findings are consistent with central and perihilar bronchitis/pneumonitis and/or congestive heart failure. No pulmonary consolidations, peripheral airspace infiltrates, detectable pleural effusion, pneumothorax or mediastinal widening.      Findings consistent with central bronchitis and/or congestive heart failure. No detectable pleural effusions or large areas of pulmonary consolidation. Otherwise, radiographically nonacute portable chest.        ED COURSE/MDM  Patient presenting for evaluation of hyperglycemia. Certainly concern for possible medication noncompliance but his wife helps him manage his medications quite well. She actually administered insulin x2 and he is becoming more normoglycemic but when his blood sugars are very labile especially in the extremes either high or low she states that he tends to get somewhat agitated at times and delirious and this seems to be how he is currently and remains so. He did have a fall at least 1-2 times overnight and was on the ground when EMS got there but wife states that she does not believe that he had his head. Regardless of still concern for possible intracranial injury from hit his head on an unwitnessed fall therefore did obtain a CT scan which showed no signs of hemorrhage. Chest x-ray was obtained and clear. He does not have any acute ischemic changes on EKG. Initial troponin 0.03. Given his TORI, as well as the hyperglycemia that was present prior to arrival, I felt that his troponin was less likely to be secondary to ACS and more likely a type II elevation of his troponin. For that reason I did trend his troponin and on repeat is less than 0.01. Much of his presentation especially very similar to his previous presentation in November 2021. I felt that inpatient admission for stabilization of his blood sugars, further hydration and recheck of his renal function, as well as recheck of his mental state and delirium that appears to be present that is likely metabolic in nature, would be most appropriate. COVID and flu are both obtained and noted to be negative. We will continue to monitor his blood sugars closely. Lowest blood sugar here at this time has been 78.   Since then his blood sugars have improved and we will treat any hyper or hypoglycemia accordingly. I have consulted the hospitalist team at this time for possible admission and spoke with Dr. Woody Alford who is in agreement of plan for admission and will assume care for the patient. I did update the patient's wife regarding plan for admission and she is very much in agreement of that as well. Of note, I did give the patient a single dose of Ativan when we are attempting to obtain his head CT as he kept trying to sit up and would not stay still to obtain imaging. I have personally reviewed Xray and Ultrasound images and radiology confirms the interpretation:  Chest x-ray was clear. Sepsis:  Is this patient to be included in the SEP-1 Core Measure due to severe sepsis or septic shock? No   Exclusion criteria - the patient is NOT to be included for SEP-1 Core Measure due to: Infection is not suspected       Old records reviewed: Discharge summary reviewed from hospitalization at the beginning of November 2021 when patient had similar presentation of hyperglycemia and was noted to be in DKA. He had TORI and slightly elevated troponin at that point, similar to current presentation although at this time no anion gap to suggest DKA. Labs and imaging reviewed and results discussed with patient and wife. Patient was reassessed as noted above . Plan of care discussed with patient. Patient's wife in agreement with plan for admission      I, Dr. Shaun Tate MD, am the primary clinician of record. During the patient's ED course, the patient was given:  Medications   acetaminophen (TYLENOL) tablet 650 mg (650 mg Oral Given 1/7/23 0957)   0.9 % sodium chloride bolus (0 mLs IntraVENous Stopped 1/7/23 1101)   LORazepam (ATIVAN) injection 1 mg (1 mg IntraVENous Given 1/7/23 1422)        CLINICAL IMPRESSION  1. Hyperglycemia    2. Delirium    3. TORI (acute kidney injury) ([facility]        Blood pressure (!) 136/59, pulse 73, temperature 98.1 °F (36.7 °C), temperature source Oral, resp.  rate 16, height 5' 10\" (1.778 m), weight 155 lb (70.3 kg), SpO2 99 %. Ekaterina Blake was admitted in stable condition. DISCLAIMER: This chart was created using Dragon dictation software. Efforts were made by me to ensure accuracy, however some errors may be present due to limitations of this technology and occasionally words are not transcribed correctly.         Kiran Crowell MD  01/07/23 1537

## 2023-01-07 NOTE — PLAN OF CARE
Acute metabolic encephalopathy. Delirium. Labile blood sugars. 75-year-old male, diabetic with labile blood sugars. Blood sugars were elevated at home patient took extra dose of insulin. .  Blood sugars in the 100s in the squad and in the ED. Trula Blew Patient getting delirious in the ED, per patient's wife he gets like this when his blood sugars are fluctuating. Started on IV fluids. .    CT head negative, COVID-negative, flu test negative. Admit for overnight observation, hydrate and monitor blood sugars closely, resume Lantus 25 units nightly and cover with low sliding scale insulin.  .    Troponin initial troponin was 0.0 3 repeat was 101, recheck in a.m.,

## 2023-01-07 NOTE — ED NOTES
Tried to get pt to ambulate, pt refused and starting flinging his arms around yelling, can you just leave me alone. Pt seems very drowsy. Pt wife states he didn't hit his head, but he did fall several times last night and she had to carry him to the bathroom. Also states the only time he gets like this is when his sugar is very low. Blood sugar 149. Doc aware. Head CT ordered.       Genet Penaloza RN  01/07/23 5111

## 2023-01-07 NOTE — PROGRESS NOTES
4 Eyes Skin Assessment     The patient is being assess for   Admission    I agree that 2 RN's have performed a thorough Head to Toe Skin Assessment on the patient. ALL assessment sites listed below have been assessed. Areas assessed for pressure by both nurses:   [x]   Head, Face, and Ears   [x]   Shoulders, Back, and Chest, Abdomen  [x]   Arms, Elbows, and Hands   [x]   Coccyx, Sacrum, and Ischium  [x]   Legs, Feet, and Heels  R shin Abrasion, L ankle wound, pictures documented. NO toes to L foot. Missing single toe on R foot. Scattered abrasions. Skin Assessed Under all Medical Devices by both nurses:  No medical devices at time of admission               All Mepilex Borders were peeled back and area peeked at by both nurses:  No: na  Please list where Mepilex Borders are located:                     **SHARE this note so that the co-signing nurse is able to place an eSignature**    Co-signer eSignature: Electronically signed by Chantelle Birch RN on 1/7/23 at 6:20 PM EST    Does the Patient have Skin Breakdown related to pressure?   Yes LDA WOUND CARE was Initiated documentation include the Laura-wound, Wound Assessment, Measurements, Dressing Treatment, Drainage, and Color\",       Slade Prevention initiated:  Yes   Wound Care Orders initiated:  Yes      [id number]  nurse consulted for Pressure Injury (Stage 3,4, Unstageable, DTI, NWPT, Complex wounds)and New or Established Ostomies:  Yes      Primary Nurse eSignature: Electronically signed by Emi Mathis RN on 1/7/23 at 6:09 PM EST

## 2023-01-08 VITALS
BODY MASS INDEX: 23.24 KG/M2 | OXYGEN SATURATION: 94 % | WEIGHT: 162.31 LBS | DIASTOLIC BLOOD PRESSURE: 86 MMHG | HEART RATE: 65 BPM | TEMPERATURE: 98 F | SYSTOLIC BLOOD PRESSURE: 141 MMHG | RESPIRATION RATE: 19 BRPM | HEIGHT: 70 IN

## 2023-01-08 PROBLEM — N18.32 STAGE 3B CHRONIC KIDNEY DISEASE (CKD) (HCC): Status: ACTIVE | Noted: 2023-01-08

## 2023-01-08 LAB
ANION GAP SERPL CALCULATED.3IONS-SCNC: 6 MMOL/L (ref 3–16)
BUN BLDV-MCNC: 29 MG/DL (ref 7–20)
CALCIUM SERPL-MCNC: 8.2 MG/DL (ref 8.3–10.6)
CHLORIDE BLD-SCNC: 103 MMOL/L (ref 99–110)
CO2: 24 MMOL/L (ref 21–32)
CREAT SERPL-MCNC: 1.8 MG/DL (ref 0.8–1.3)
ESTIMATED AVERAGE GLUCOSE: 369.5 MG/DL
GFR SERPL CREATININE-BSD FRML MDRD: 41 ML/MIN/{1.73_M2}
GLUCOSE BLD-MCNC: 203 MG/DL (ref 70–99)
GLUCOSE BLD-MCNC: 206 MG/DL (ref 70–99)
GLUCOSE BLD-MCNC: 220 MG/DL (ref 70–99)
GLUCOSE BLD-MCNC: 235 MG/DL (ref 70–99)
HBA1C MFR BLD: 14.5 %
PERFORMED ON: ABNORMAL
POTASSIUM REFLEX MAGNESIUM: 3.7 MMOL/L (ref 3.5–5.1)
SODIUM BLD-SCNC: 133 MMOL/L (ref 136–145)
TROPONIN: <0.01 NG/ML

## 2023-01-08 PROCEDURE — 99222 1ST HOSP IP/OBS MODERATE 55: CPT | Performed by: INTERNAL MEDICINE

## 2023-01-08 PROCEDURE — 2580000003 HC RX 258: Performed by: INTERNAL MEDICINE

## 2023-01-08 PROCEDURE — 36415 COLL VENOUS BLD VENIPUNCTURE: CPT

## 2023-01-08 PROCEDURE — G0378 HOSPITAL OBSERVATION PER HR: HCPCS

## 2023-01-08 PROCEDURE — 6360000002 HC RX W HCPCS: Performed by: INTERNAL MEDICINE

## 2023-01-08 PROCEDURE — 96372 THER/PROPH/DIAG INJ SC/IM: CPT

## 2023-01-08 PROCEDURE — 96361 HYDRATE IV INFUSION ADD-ON: CPT

## 2023-01-08 PROCEDURE — 6370000000 HC RX 637 (ALT 250 FOR IP): Performed by: INTERNAL MEDICINE

## 2023-01-08 PROCEDURE — 84484 ASSAY OF TROPONIN QUANT: CPT

## 2023-01-08 PROCEDURE — 80048 BASIC METABOLIC PNL TOTAL CA: CPT

## 2023-01-08 RX ORDER — INSULIN LISPRO 100 [IU]/ML
10 INJECTION, SOLUTION INTRAVENOUS; SUBCUTANEOUS
Qty: 5 ADJUSTABLE DOSE PRE-FILLED PEN SYRINGE | Refills: 2 | Status: SHIPPED | OUTPATIENT
Start: 2023-01-08

## 2023-01-08 RX ADMIN — ASPIRIN 81 MG: 81 TABLET, COATED ORAL at 09:53

## 2023-01-08 RX ADMIN — AMLODIPINE BESYLATE 5 MG: 5 TABLET ORAL at 09:53

## 2023-01-08 RX ADMIN — INSULIN LISPRO 1 UNITS: 100 INJECTION, SOLUTION INTRAVENOUS; SUBCUTANEOUS at 11:34

## 2023-01-08 RX ADMIN — INSULIN LISPRO 1 UNITS: 100 INJECTION, SOLUTION INTRAVENOUS; SUBCUTANEOUS at 09:55

## 2023-01-08 RX ADMIN — ENOXAPARIN SODIUM 40 MG: 100 INJECTION SUBCUTANEOUS at 09:54

## 2023-01-08 RX ADMIN — SODIUM CHLORIDE, PRESERVATIVE FREE 10 ML: 5 INJECTION INTRAVENOUS at 09:54

## 2023-01-08 ASSESSMENT — PAIN SCALES - GENERAL: PAINLEVEL_OUTOF10: 0

## 2023-01-08 NOTE — PROGRESS NOTES
End of shift report given to [facility]. Pt in stable condition, care transferred at this time.  Electronically signed by Karen Quintanilla RN on 1/8/2023 at 7:22 AM

## 2023-01-08 NOTE — H&P
Combined History and Physical and Discharge Summary    Chief Complaint   Patient presents with    Blood Sugar Problem     EMS reports FSBS read high last night 15 units given @0300 [id number] @ 0615 15 units given again EMS reports fsbs 136 however pt seemed confused and was incontinent of stool. HISTORY OF PRESENT ILLNESS:     The patient is 58-year-old white male who is not a very good historian. He has a history of diabetes with diabetic neuropathy, hypertension, gout, CKD stage IIIb who came to the emergency room with hypoglycemia. I talked his wife on the phone. Apparently wife the patient sleeps a lot at home even at baseline. He is disabled. In the emergency room he was found to have hyperglycemia. He was given insulin and his sugar came down. He had 1 episode of emesis and some diarrhea. He is about resolved. He had no focal weakness. He was apparently mildly confused. Head CT was done that was negative. This morning is awake and alert. He is oriented to time place and person. He is feeling better. His sugars have come down. No fever or chills. No cough. No chest pain. He ate his breakfast.    Patient is allergic to hydrocodone-acetaminophen, percocet [oxycodone-acetaminophen], pregabalin, and vicodin [hydrocodone-acetaminophen].     Past Medical History:   Diagnosis Date    TORI (acute kidney injury) ([address] 09/12/2017    Bacteremia 05/05/2017    staph aureus    Diabetes mellitus ([address]     Diabetic neuropathy ([address]     Gout     Hypertension     MRSA (methicillin resistant staph aureus) culture positive 12/27/18, 9/12/17, 5/5/17,9/5/13, 1/15/14    ulcers bilateral legs    MRSA (methicillin resistant staph aureus) culture positive 01/21/2021    foot wound    Neuropathy     Pneumonia     Pyogenic inflammation of bone ([address]     Stage 3b chronic kidney disease (CKD) ([address] 1/8/2023       Past Surgical History:   Procedure Laterality Date    EYE SURGERY      lens implants after removal of cataracts    FOOT DEBRIDEMENT Left 4/28/2021    DEBRIDEMENT INFECTED BONE AND TISSUE LEFT FOOT performed by Reyes Mcfarland DPM at [facility] 22  12/28/2018    partial right foot amputation with graft application    TOE AMPUTATION Right 12/28/2018    PARTIAL RIGHT FOOT AMPUTATION WITH GRAFT APPLICATION performed by Reyes Mcfarland DPM at [address] Left 5/27/2021    PARTIAL LEFT FOOT AMPUTATION performed by Reyes Mcfarland DPM at [address] Left 9/2/2022    PARTIAL LEFT FOOT AMPUTATION, ULCER DEBRIDEMENT LOWER LEG performed by Reyes Mcfarland DPM at [facility] 1/21/2021    EGD BIOPSY performed by Pratima Roldan DO at [address]       Scheduled Meds:   amLODIPine  5 mg Oral Daily    aspirin  81 mg Oral Daily    sodium chloride flush  5-40 mL IntraVENous 2 times per day    enoxaparin  40 mg SubCUTAneous Daily    insulin glargine  25 Units SubCUTAneous Nightly    insulin lispro  0-4 Units SubCUTAneous TID WC    insulin lispro  0-4 Units SubCUTAneous Nightly       Continuous Infusions:   dextrose      sodium chloride 75 mL/hr at 01/07/23 1850    sodium chloride Stopped (01/08/23 1134)       PRN Meds:  glucose, dextrose bolus **OR** dextrose bolus, glucagon (rDNA), dextrose, sodium chloride flush, sodium chloride, potassium chloride **OR** potassium alternative oral replacement **OR** potassium chloride, magnesium sulfate, ondansetron **OR** ondansetron, polyethylene glycol, acetaminophen **OR** acetaminophen       reports that he has never smoked.  He has never used smokeless tobacco.    Family History   Problem Relation Age of Onset    Diabetes Maternal Grandfather     Heart Disease Paternal Uncle     High Blood Pressure Mother        Social History     Socioeconomic History    Marital status:      Spouse name: Jacqueline Marshall    Number of children: 1    Years of education: None    Highest education level: None   Tobacco Use    Smoking status: Never    Smokeless tobacco: Never   Vaping Use    Vaping Use: Never used   Substance and Sexual Activity    Alcohol use: No     Comment: FORMER DRINKER approx. quit 2005    Drug use: No    Sexual activity: Yes     Partners: Female     REVIEW OF SYSTEMS:   Constitutional: Negative for fever   HENT: Negative for sore throat   Eyes: Negative for redness   Respiratory: Negative for dyspnea, cough   Cardiovascular: Negative for chest pain   Gastrointestinal: Negative for vomiting, diarrhea   Genitourinary: Negative for hematuria   Musculoskeletal: Negative for arthralgias   Skin: Negative for rash   Neurological: See HPI  Hematological: Negative for adenopathy   Psychiatric/Behavorial: Negative for anxiety    VS:   BP (!) 141/86   Pulse 65   Temp 98 °F (36.7 °C) (Oral)   Resp 19   Ht 5' 10\" (1.778 m)   Wt 162 lb 5 oz (73.6 kg)   SpO2 94%   BMI 23.29 kg/m²     Gen: No distress. Alert. Eyes: PERRL. No sclera icterus. No conjunctival injection. ENT: No discharge. Pharynx clear. Neck: Trachea midline. Normal thyroid. Resp: No accessory muscle use. No crackles. No wheezes. No rhonchi. No dullness on percussion. CV: Regular rate. Regular rhythm. No murmur or rub. No edema. GI: Non-tender. Non-distended. No masses. No organomegaly. Normal bowel sounds. No hernia. Skin: Warm and dry. No nodule on exposed extremities. No rash on exposed extremities. Lymph: No cervical LAD. No supraclavicular LAD. M/S: No cyanosis. No joint deformity. No clubbing. Neuro: Awake. Reflexes 2+ symmetric bilaterally. Moves all 4 extremities, non focal  Psych: Oriented x 3. No anxiety or agitation.      CBC:   Recent Labs     01/07/23  0847   WBC 8.0   HGB 9.7*   HCT 30.2*   MCV 74.5*        BMP:   Recent Labs     01/07/23  0847 01/07/23  1130 01/08/23  0447   * 133* 133*   K 3.4* 3.6 3.7   CL 98* 103 103   CO2 24 22 24   BUN 37* 34* 29*   CREATININE 1.9* 1.7* 1.8*     LIVER PROFILE:   Recent Labs 01/07/23  0847   AST 15   ALT 9*   BILITOT 0.6   ALKPHOS 194*     PT/INR: No results for input(s): PROTIME, INR in the last 72 hours. APTT: No results for input(s): APTT in the last 72 hours. UA:  Recent Labs     01/07/23  1038   COLORU Yellow   PHUR 5.0   WBCUA 3-5   RBCUA 3-4   MUCUS Rare*   BACTERIA 1+*   CLARITYU Clear   SPECGRAV 1.010   LEUKOCYTESUR Negative   UROBILINOGEN 0.2   BILIRUBINUR Negative   BLOODU SMALL*   GLUCOSEU >=1000*      Latest Reference Range & Units 1/7/23 08:47 1/7/23 11:30 1/8/23 04:47   Troponin <0.01 ng/mL 0.03 (H) <0.01 <0.01   (H): Data is abnormally high      CT HEAD WO CONTRAST   Final Result   No acute intracranial abnormality. XR CHEST PORTABLE   Final Result   Findings consistent with central bronchitis and/or congestive heart failure. No detectable pleural effusions or large areas of pulmonary consolidation. Otherwise, radiographically nonacute portable chest.              ASSESSMENT:    Principal Problem:    Acute encephalopathy  Active Problems:    Stage 3b chronic kidney disease (CKD) (HCC)    Diabetic neuropathy (HCC)    Gout    Chronic pain on chronic opioids    HTN (hypertension), benign    Type 2 diabetes mellitus with hyperglycemia, with long-term current use of insulin (HCC)    Hyperglycemia    Anemia  Resolved Problems:    * No resolved hospital problems. *      PLAN:    #Acute encephalopathy possibly related to hyperglycemia. This has resolved. His mentation is back to normal.  His wife does say he sleeps a lot at home and that is his baseline. #Patient does not have an TORI. He has chronic stage IIIb kidney disease. His creatinine is close to baseline. #Diabetes mellitus type 2 uncontrolled. Continue Lantus 35 units a day. Add Humalog 10 units before meal.  I recommend that he start continues glucose monitoring at home. #Hypertension. Stable. Continue medication. He takes Norvasc. #Neuropathy from diabetes. Gabapentin.     Patient is back to his baseline. I discussed with his wife. She is agreeable to taking him back home.   Discharged in a stable condition      Jacquelin Cooks, MD 1/8/2023 11:47 AM

## 2023-01-08 NOTE — PROGRESS NOTES
Patient educated on discharge instructions as well as new medications use, dosage, administration and possible side effects. Patient verified knowledge. IV removed without difficulty and dry dressing in place. Telemetry monitor removed and returned to [facility]. Pt left facility in stable condition to Home with all of their personal belongings.      Eugene Hart RN

## 2023-01-08 NOTE — PROGRESS NOTES
Shift assessment complete. Call light and bedside table within reach. Telesitter in place Bed alarm on.

## 2023-01-08 NOTE — CARE COORDINATION
CM reviewed chart. Met with patient at bedside and spoke to spouse via phone to discuss discharge needs and plan. Patient lives in home with spouse. IPTA and +. No issues obtaining diabetic medications or supplies. Plans return home at discharge. No needs identified for discharge intervention at this time. MD and bedside RN  if needs arise please consult case management for discharge intervention. CM not following at this time.      Mekhi Mckinney, RN This consult was cancelled-Dr. Schreiber was informed by Dr. Meneses.

## 2023-01-08 NOTE — FLOWSHEET NOTE
01/08/23 0945   Vital Signs   Temp 98 °F (36.7 °C)   Temp Source Oral   Heart Rate 65   Heart Rate Source Monitor   Resp 19   BP (!) 141/86   MAP (Calculated) 104   BP Location Right upper arm   Patient Position Sitting   Level of Consciousness 0   MEWS Score 1   Pain Assessment   Pain Assessment 0-10   Pain Level 0   Oxygen Therapy   SpO2 94 %   O2 Device None (Room air)   O2 Flow Rate (L/min) 0 L/min     Pt assessment complete; see flow sheets. Mentation improving from yesterday. O/a x4 but still a little restless. Unsteady on feet. Bed alarm still on. Vitals completed. No s/s of distress.  Meds given per Mar. Sitting edge of bed eating breakfast.    Daniel Jasso, RN

## 2023-01-08 NOTE — PROGRESS NOTES
Admission completed with wife. Pt able to follow commands but is increasingly restless and pulling off stuff. Bed alarm placed for safety. Pt keeps getting up despite alarm and reorientation. Tele sitter placed for safety. Bedside report and transfer of care given to Trini Bills [facility]. Pt currently resting in bed with the call light within reach. Pt denies any other care needs at this time. Pt stable at this time.     Eugene Hart RN

## 2023-02-03 ENCOUNTER — HOSPITAL ENCOUNTER (INPATIENT)
Age: 63
LOS: 5 days | Discharge: HOME | DRG: 617 | End: 2023-02-09
Attending: EMERGENCY MEDICINE | Admitting: INTERNAL MEDICINE
Payer: COMMERCIAL

## 2023-02-03 ENCOUNTER — APPOINTMENT (OUTPATIENT)
Dept: GENERAL RADIOLOGY | Age: 63
DRG: 617 | End: 2023-02-03
Payer: COMMERCIAL

## 2023-02-03 DIAGNOSIS — M86.172 OTHER ACUTE OSTEOMYELITIS OF LEFT FOOT (HCC): Primary | ICD-10-CM

## 2023-02-03 DIAGNOSIS — E11.69 DIABETIC FOOT ULCER WITH OSTEOMYELITIS (HCC): ICD-10-CM

## 2023-02-03 DIAGNOSIS — E11.621 DIABETIC FOOT ULCER WITH OSTEOMYELITIS (HCC): ICD-10-CM

## 2023-02-03 DIAGNOSIS — L02.91 ABSCESS: ICD-10-CM

## 2023-02-03 DIAGNOSIS — D64.9 ANEMIA, UNSPECIFIED TYPE: ICD-10-CM

## 2023-02-03 DIAGNOSIS — E08.621 DIABETIC ULCER OF LEFT MIDFOOT ASSOCIATED WITH DIABETES MELLITUS DUE TO UNDERLYING CONDITION, WITH NECROSIS OF BONE (HCC): ICD-10-CM

## 2023-02-03 DIAGNOSIS — L97.509 DIABETIC FOOT ULCER WITH OSTEOMYELITIS (HCC): ICD-10-CM

## 2023-02-03 DIAGNOSIS — M86.9 DIABETIC FOOT ULCER WITH OSTEOMYELITIS (HCC): ICD-10-CM

## 2023-02-03 DIAGNOSIS — L97.424 DIABETIC ULCER OF LEFT MIDFOOT ASSOCIATED WITH DIABETES MELLITUS DUE TO UNDERLYING CONDITION, WITH NECROSIS OF BONE (HCC): ICD-10-CM

## 2023-02-03 LAB
A/G RATIO: 0.7 (ref 1.1–2.2)
ALBUMIN SERPL-MCNC: 3 G/DL (ref 3.4–5)
ALP BLD-CCNC: 107 U/L (ref 40–129)
ALT SERPL-CCNC: 7 U/L (ref 10–40)
ANION GAP SERPL CALCULATED.3IONS-SCNC: 9 MMOL/L (ref 3–16)
AST SERPL-CCNC: 14 U/L (ref 15–37)
BILIRUB SERPL-MCNC: 0.4 MG/DL (ref 0–1)
BUN BLDV-MCNC: 24 MG/DL (ref 7–20)
C-REACTIVE PROTEIN: 135.5 MG/L (ref 0–5.1)
CALCIUM SERPL-MCNC: 7.8 MG/DL (ref 8.3–10.6)
CHLORIDE BLD-SCNC: 93 MMOL/L (ref 99–110)
CO2: 27 MMOL/L (ref 21–32)
CREAT SERPL-MCNC: 1.9 MG/DL (ref 0.8–1.3)
GFR SERPL CREATININE-BSD FRML MDRD: 39 ML/MIN/{1.73_M2}
GLUCOSE BLD-MCNC: 282 MG/DL (ref 70–99)
INFLUENZA A: NOT DETECTED
INFLUENZA B: NOT DETECTED
LACTIC ACID, SEPSIS: 0.9 MMOL/L (ref 0.4–1.9)
POTASSIUM REFLEX MAGNESIUM: 4.5 MMOL/L (ref 3.5–5.1)
SARS-COV-2 RNA, RT PCR: NOT DETECTED
SEDIMENTATION RATE, ERYTHROCYTE: 102 MM/HR (ref 0–20)
SODIUM BLD-SCNC: 129 MMOL/L (ref 136–145)
TOTAL PROTEIN: 7.1 G/DL (ref 6.4–8.2)

## 2023-02-03 PROCEDURE — 96367 TX/PROPH/DG ADDL SEQ IV INF: CPT

## 2023-02-03 PROCEDURE — 73630 X-RAY EXAM OF FOOT: CPT

## 2023-02-03 PROCEDURE — 2580000003 HC RX 258: Performed by: NURSE PRACTITIONER

## 2023-02-03 PROCEDURE — 87636 SARSCOV2 & INF A&B AMP PRB: CPT

## 2023-02-03 PROCEDURE — 99285 EMERGENCY DEPT VISIT HI MDM: CPT

## 2023-02-03 PROCEDURE — 96365 THER/PROPH/DIAG IV INF INIT: CPT

## 2023-02-03 PROCEDURE — 85025 COMPLETE CBC W/AUTO DIFF WBC: CPT

## 2023-02-03 PROCEDURE — 36415 COLL VENOUS BLD VENIPUNCTURE: CPT

## 2023-02-03 PROCEDURE — 83605 ASSAY OF LACTIC ACID: CPT

## 2023-02-03 PROCEDURE — 86140 C-REACTIVE PROTEIN: CPT

## 2023-02-03 PROCEDURE — 6360000002 HC RX W HCPCS: Performed by: NURSE PRACTITIONER

## 2023-02-03 PROCEDURE — 84145 PROCALCITONIN (PCT): CPT

## 2023-02-03 PROCEDURE — 85652 RBC SED RATE AUTOMATED: CPT

## 2023-02-03 PROCEDURE — 87040 BLOOD CULTURE FOR BACTERIA: CPT

## 2023-02-03 PROCEDURE — 80053 COMPREHEN METABOLIC PANEL: CPT

## 2023-02-03 RX ORDER — TAMSULOSIN HYDROCHLORIDE 0.4 MG/1
0.4 CAPSULE ORAL DAILY
COMMUNITY
Start: 2023-01-16

## 2023-02-03 RX ORDER — 0.9 % SODIUM CHLORIDE 0.9 %
30 INTRAVENOUS SOLUTION INTRAVENOUS ONCE
Status: COMPLETED | OUTPATIENT
Start: 2023-02-03 | End: 2023-02-04

## 2023-02-03 RX ADMIN — SODIUM CHLORIDE 2190 ML: 9 INJECTION, SOLUTION INTRAVENOUS at 23:24

## 2023-02-03 RX ADMIN — CEFEPIME 2000 MG: 2 INJECTION, POWDER, FOR SOLUTION INTRAVENOUS at 23:26

## 2023-02-03 ASSESSMENT — ENCOUNTER SYMPTOMS
FACIAL SWELLING: 0
SORE THROAT: 0
ABDOMINAL PAIN: 0
RHINORRHEA: 0
SHORTNESS OF BREATH: 0
COLOR CHANGE: 0

## 2023-02-03 ASSESSMENT — PAIN - FUNCTIONAL ASSESSMENT: PAIN_FUNCTIONAL_ASSESSMENT: 0-10

## 2023-02-03 ASSESSMENT — PAIN DESCRIPTION - ORIENTATION: ORIENTATION: LEFT

## 2023-02-03 ASSESSMENT — PAIN DESCRIPTION - LOCATION: LOCATION: FOOT

## 2023-02-03 ASSESSMENT — PAIN DESCRIPTION - PAIN TYPE: TYPE: ACUTE PAIN

## 2023-02-03 ASSESSMENT — PAIN SCALES - GENERAL: PAINLEVEL_OUTOF10: 4

## 2023-02-04 PROBLEM — M86.9 OSTEOMYELITIS OF LEFT FOOT, UNSPECIFIED TYPE (HCC): Status: ACTIVE | Noted: 2023-02-04

## 2023-02-04 LAB
A/G RATIO: 0.9 (ref 1.1–2.2)
ALBUMIN SERPL-MCNC: 2.9 G/DL (ref 3.4–5)
ALP BLD-CCNC: 89 U/L (ref 40–129)
ALT SERPL-CCNC: 6 U/L (ref 10–40)
ANION GAP SERPL CALCULATED.3IONS-SCNC: 6 MMOL/L (ref 3–16)
AST SERPL-CCNC: 11 U/L (ref 15–37)
BANDED NEUTROPHILS RELATIVE PERCENT: 6 % (ref 0–7)
BASOPHILS ABSOLUTE: 0 K/UL (ref 0–0.2)
BASOPHILS ABSOLUTE: 0 K/UL (ref 0–0.2)
BASOPHILS RELATIVE PERCENT: 0 %
BASOPHILS RELATIVE PERCENT: 0.7 %
BILIRUB SERPL-MCNC: 0.3 MG/DL (ref 0–1)
BILIRUBIN URINE: NEGATIVE
BLOOD, URINE: ABNORMAL
BUN BLDV-MCNC: 24 MG/DL (ref 7–20)
CALCIUM SERPL-MCNC: 7.6 MG/DL (ref 8.3–10.6)
CHLORIDE BLD-SCNC: 96 MMOL/L (ref 99–110)
CLARITY: CLEAR
CO2: 28 MMOL/L (ref 21–32)
COLOR: YELLOW
CREAT SERPL-MCNC: 1.9 MG/DL (ref 0.8–1.3)
EOSINOPHILS ABSOLUTE: 0 K/UL (ref 0–0.6)
EOSINOPHILS ABSOLUTE: 0.1 K/UL (ref 0–0.6)
EOSINOPHILS RELATIVE PERCENT: 0.5 %
EOSINOPHILS RELATIVE PERCENT: 1 %
EPITHELIAL CELLS, UA: NORMAL /HPF (ref 0–5)
GFR SERPL CREATININE-BSD FRML MDRD: 39 ML/MIN/{1.73_M2}
GLUCOSE BLD-MCNC: 230 MG/DL (ref 70–99)
GLUCOSE BLD-MCNC: 259 MG/DL (ref 70–99)
GLUCOSE BLD-MCNC: 326 MG/DL (ref 70–99)
GLUCOSE BLD-MCNC: 376 MG/DL (ref 70–99)
GLUCOSE BLD-MCNC: 391 MG/DL (ref 70–99)
GLUCOSE BLD-MCNC: 410 MG/DL (ref 70–99)
GLUCOSE URINE: >=1000 MG/DL
HCT VFR BLD CALC: 23.4 % (ref 40.5–52.5)
HCT VFR BLD CALC: 26.8 % (ref 40.5–52.5)
HEMOGLOBIN: 7.4 G/DL (ref 13.5–17.5)
HEMOGLOBIN: 8.3 G/DL (ref 13.5–17.5)
HYPOCHROMIA: ABNORMAL
INR BLD: 1.19 (ref 0.87–1.14)
KETONES, URINE: ABNORMAL MG/DL
LEUKOCYTE ESTERASE, URINE: NEGATIVE
LYMPHOCYTES ABSOLUTE: 0.5 K/UL (ref 1–5.1)
LYMPHOCYTES ABSOLUTE: 0.6 K/UL (ref 1–5.1)
LYMPHOCYTES RELATIVE PERCENT: 6 %
LYMPHOCYTES RELATIVE PERCENT: 9.6 %
MCH RBC QN AUTO: 23.9 PG (ref 26–34)
MCH RBC QN AUTO: 24.1 PG (ref 26–34)
MCHC RBC AUTO-ENTMCNC: 31 G/DL (ref 31–36)
MCHC RBC AUTO-ENTMCNC: 31.7 G/DL (ref 31–36)
MCV RBC AUTO: 75.9 FL (ref 80–100)
MCV RBC AUTO: 77 FL (ref 80–100)
MICROSCOPIC EXAMINATION: YES
MONOCYTES ABSOLUTE: 0.8 K/UL (ref 0–1.3)
MONOCYTES ABSOLUTE: 1 K/UL (ref 0–1.3)
MONOCYTES RELATIVE PERCENT: 10 %
MONOCYTES RELATIVE PERCENT: 15.7 %
NEUTROPHILS ABSOLUTE: 4.7 K/UL (ref 1.7–7.7)
NEUTROPHILS ABSOLUTE: 6.2 K/UL (ref 1.7–7.7)
NEUTROPHILS RELATIVE PERCENT: 73.5 %
NEUTROPHILS RELATIVE PERCENT: 77 %
NITRITE, URINE: NEGATIVE
PDW BLD-RTO: 15.3 % (ref 12.4–15.4)
PDW BLD-RTO: 15.8 % (ref 12.4–15.4)
PERFORMED ON: ABNORMAL
PH UA: 5.5 (ref 5–8)
PLATELET # BLD: 290 K/UL (ref 135–450)
PLATELET # BLD: 329 K/UL (ref 135–450)
PLATELET SLIDE REVIEW: ADEQUATE
PMV BLD AUTO: 8.3 FL (ref 5–10.5)
PMV BLD AUTO: 8.5 FL (ref 5–10.5)
POTASSIUM REFLEX MAGNESIUM: 4.4 MMOL/L (ref 3.5–5.1)
PROCALCITONIN: 0.72 NG/ML (ref 0–0.15)
PROTEIN UA: 100 MG/DL
PROTHROMBIN TIME: 14.9 SEC (ref 11.7–14.5)
RBC # BLD: 3.09 M/UL (ref 4.2–5.9)
RBC # BLD: 3.48 M/UL (ref 4.2–5.9)
RBC UA: NORMAL /HPF (ref 0–4)
SLIDE REVIEW: ABNORMAL
SODIUM BLD-SCNC: 130 MMOL/L (ref 136–145)
SPECIFIC GRAVITY UA: 1.02 (ref 1–1.03)
TOTAL PROTEIN: 6.3 G/DL (ref 6.4–8.2)
URINE REFLEX TO CULTURE: ABNORMAL
URINE TYPE: ABNORMAL
UROBILINOGEN, URINE: 0.2 E.U./DL
VANCOMYCIN RANDOM: 16 UG/ML
WBC # BLD: 6.3 K/UL (ref 4–11)
WBC # BLD: 7.5 K/UL (ref 4–11)
WBC UA: NORMAL /HPF (ref 0–5)

## 2023-02-04 PROCEDURE — 6370000000 HC RX 637 (ALT 250 FOR IP): Performed by: INTERNAL MEDICINE

## 2023-02-04 PROCEDURE — 6360000002 HC RX W HCPCS: Performed by: INTERNAL MEDICINE

## 2023-02-04 PROCEDURE — 2580000003 HC RX 258: Performed by: INTERNAL MEDICINE

## 2023-02-04 PROCEDURE — 80053 COMPREHEN METABOLIC PANEL: CPT

## 2023-02-04 PROCEDURE — 83036 HEMOGLOBIN GLYCOSYLATED A1C: CPT

## 2023-02-04 PROCEDURE — 81001 URINALYSIS AUTO W/SCOPE: CPT

## 2023-02-04 PROCEDURE — 2580000003 HC RX 258: Performed by: NURSE PRACTITIONER

## 2023-02-04 PROCEDURE — 85610 PROTHROMBIN TIME: CPT

## 2023-02-04 PROCEDURE — 36415 COLL VENOUS BLD VENIPUNCTURE: CPT

## 2023-02-04 PROCEDURE — 85025 COMPLETE CBC W/AUTO DIFF WBC: CPT

## 2023-02-04 PROCEDURE — 6360000002 HC RX W HCPCS: Performed by: NURSE PRACTITIONER

## 2023-02-04 PROCEDURE — 80202 ASSAY OF VANCOMYCIN: CPT

## 2023-02-04 PROCEDURE — 1200000000 HC SEMI PRIVATE

## 2023-02-04 RX ORDER — SODIUM CHLORIDE 9 MG/ML
INJECTION, SOLUTION INTRAVENOUS CONTINUOUS
Status: DISCONTINUED | OUTPATIENT
Start: 2023-02-04 | End: 2023-02-04

## 2023-02-04 RX ORDER — VENLAFAXINE HYDROCHLORIDE 75 MG/1
75 CAPSULE, EXTENDED RELEASE ORAL DAILY
Status: DISCONTINUED | OUTPATIENT
Start: 2023-02-04 | End: 2023-02-09 | Stop reason: HOSPADM

## 2023-02-04 RX ORDER — INSULIN GLARGINE 100 [IU]/ML
25 INJECTION, SOLUTION SUBCUTANEOUS NIGHTLY
Status: DISCONTINUED | OUTPATIENT
Start: 2023-02-04 | End: 2023-02-05

## 2023-02-04 RX ORDER — ENOXAPARIN SODIUM 100 MG/ML
40 INJECTION SUBCUTANEOUS DAILY
Status: DISCONTINUED | OUTPATIENT
Start: 2023-02-04 | End: 2023-02-09 | Stop reason: HOSPADM

## 2023-02-04 RX ORDER — GABAPENTIN 300 MG/1
600 CAPSULE ORAL 2 TIMES DAILY
Status: DISCONTINUED | OUTPATIENT
Start: 2023-02-04 | End: 2023-02-09 | Stop reason: HOSPADM

## 2023-02-04 RX ORDER — INSULIN LISPRO 100 [IU]/ML
0-4 INJECTION, SOLUTION INTRAVENOUS; SUBCUTANEOUS NIGHTLY
Status: DISCONTINUED | OUTPATIENT
Start: 2023-02-04 | End: 2023-02-09 | Stop reason: HOSPADM

## 2023-02-04 RX ORDER — ONDANSETRON 4 MG/1
4 TABLET, ORALLY DISINTEGRATING ORAL EVERY 8 HOURS PRN
Status: DISCONTINUED | OUTPATIENT
Start: 2023-02-04 | End: 2023-02-09 | Stop reason: HOSPADM

## 2023-02-04 RX ORDER — ASPIRIN 81 MG/1
81 TABLET ORAL DAILY
Status: DISCONTINUED | OUTPATIENT
Start: 2023-02-04 | End: 2023-02-09 | Stop reason: HOSPADM

## 2023-02-04 RX ORDER — SODIUM CHLORIDE 0.9 % (FLUSH) 0.9 %
5-40 SYRINGE (ML) INJECTION EVERY 12 HOURS SCHEDULED
Status: DISCONTINUED | OUTPATIENT
Start: 2023-02-04 | End: 2023-02-09 | Stop reason: HOSPADM

## 2023-02-04 RX ORDER — ACETAMINOPHEN 325 MG/1
650 TABLET ORAL EVERY 6 HOURS PRN
Status: DISCONTINUED | OUTPATIENT
Start: 2023-02-04 | End: 2023-02-09 | Stop reason: HOSPADM

## 2023-02-04 RX ORDER — SODIUM CHLORIDE 9 MG/ML
INJECTION, SOLUTION INTRAVENOUS PRN
Status: DISCONTINUED | OUTPATIENT
Start: 2023-02-04 | End: 2023-02-09 | Stop reason: HOSPADM

## 2023-02-04 RX ORDER — TAMSULOSIN HYDROCHLORIDE 0.4 MG/1
0.4 CAPSULE ORAL DAILY
Status: DISCONTINUED | OUTPATIENT
Start: 2023-02-04 | End: 2023-02-09 | Stop reason: HOSPADM

## 2023-02-04 RX ORDER — AMLODIPINE BESYLATE 5 MG/1
5 TABLET ORAL DAILY
Status: DISCONTINUED | OUTPATIENT
Start: 2023-02-04 | End: 2023-02-09 | Stop reason: HOSPADM

## 2023-02-04 RX ORDER — LABETALOL 100 MG/1
50 TABLET, FILM COATED ORAL EVERY 6 HOURS PRN
Status: DISCONTINUED | OUTPATIENT
Start: 2023-02-04 | End: 2023-02-09 | Stop reason: HOSPADM

## 2023-02-04 RX ORDER — ACETAMINOPHEN 650 MG/1
650 SUPPOSITORY RECTAL EVERY 6 HOURS PRN
Status: DISCONTINUED | OUTPATIENT
Start: 2023-02-04 | End: 2023-02-09 | Stop reason: HOSPADM

## 2023-02-04 RX ORDER — LISINOPRIL 10 MG/1
10 TABLET ORAL DAILY
Status: DISCONTINUED | OUTPATIENT
Start: 2023-02-04 | End: 2023-02-09 | Stop reason: HOSPADM

## 2023-02-04 RX ORDER — INSULIN LISPRO 100 [IU]/ML
0-8 INJECTION, SOLUTION INTRAVENOUS; SUBCUTANEOUS
Status: DISCONTINUED | OUTPATIENT
Start: 2023-02-04 | End: 2023-02-09 | Stop reason: HOSPADM

## 2023-02-04 RX ORDER — ONDANSETRON 2 MG/ML
4 INJECTION INTRAMUSCULAR; INTRAVENOUS EVERY 6 HOURS PRN
Status: DISCONTINUED | OUTPATIENT
Start: 2023-02-04 | End: 2023-02-09 | Stop reason: HOSPADM

## 2023-02-04 RX ORDER — SODIUM CHLORIDE 0.9 % (FLUSH) 0.9 %
5-40 SYRINGE (ML) INJECTION PRN
Status: DISCONTINUED | OUTPATIENT
Start: 2023-02-04 | End: 2023-02-09 | Stop reason: HOSPADM

## 2023-02-04 RX ORDER — POLYETHYLENE GLYCOL 3350 17 G/17G
17 POWDER, FOR SOLUTION ORAL DAILY PRN
Status: DISCONTINUED | OUTPATIENT
Start: 2023-02-04 | End: 2023-02-09 | Stop reason: HOSPADM

## 2023-02-04 RX ORDER — INSULIN GLARGINE 100 [IU]/ML
17 INJECTION, SOLUTION SUBCUTANEOUS NIGHTLY
Status: DISCONTINUED | OUTPATIENT
Start: 2023-02-04 | End: 2023-02-04

## 2023-02-04 RX ORDER — INSULIN LISPRO 100 [IU]/ML
0-8 INJECTION, SOLUTION INTRAVENOUS; SUBCUTANEOUS EVERY 4 HOURS
Status: DISCONTINUED | OUTPATIENT
Start: 2023-02-04 | End: 2023-02-04

## 2023-02-04 RX ORDER — POTASSIUM CHLORIDE 20 MEQ/1
40 TABLET, EXTENDED RELEASE ORAL PRN
Status: DISCONTINUED | OUTPATIENT
Start: 2023-02-04 | End: 2023-02-09 | Stop reason: HOSPADM

## 2023-02-04 RX ORDER — POTASSIUM CHLORIDE 7.45 MG/ML
10 INJECTION INTRAVENOUS PRN
Status: DISCONTINUED | OUTPATIENT
Start: 2023-02-04 | End: 2023-02-09 | Stop reason: HOSPADM

## 2023-02-04 RX ORDER — MAGNESIUM SULFATE 1 G/100ML
1000 INJECTION INTRAVENOUS PRN
Status: DISCONTINUED | OUTPATIENT
Start: 2023-02-04 | End: 2023-02-09 | Stop reason: HOSPADM

## 2023-02-04 RX ORDER — DEXTROSE MONOHYDRATE 100 MG/ML
INJECTION, SOLUTION INTRAVENOUS CONTINUOUS PRN
Status: DISCONTINUED | OUTPATIENT
Start: 2023-02-04 | End: 2023-02-09 | Stop reason: HOSPADM

## 2023-02-04 RX ORDER — INSULIN LISPRO 100 [IU]/ML
8 INJECTION, SOLUTION INTRAVENOUS; SUBCUTANEOUS
Status: DISCONTINUED | OUTPATIENT
Start: 2023-02-04 | End: 2023-02-08

## 2023-02-04 RX ADMIN — INSULIN LISPRO 8 UNITS: 100 INJECTION, SOLUTION INTRAVENOUS; SUBCUTANEOUS at 16:49

## 2023-02-04 RX ADMIN — AMLODIPINE BESYLATE 5 MG: 5 TABLET ORAL at 03:00

## 2023-02-04 RX ADMIN — INSULIN LISPRO 8 UNITS: 100 INJECTION, SOLUTION INTRAVENOUS; SUBCUTANEOUS at 03:27

## 2023-02-04 RX ADMIN — TAMSULOSIN HYDROCHLORIDE 0.4 MG: 0.4 CAPSULE ORAL at 09:10

## 2023-02-04 RX ADMIN — INSULIN LISPRO 8 UNITS: 100 INJECTION, SOLUTION INTRAVENOUS; SUBCUTANEOUS at 13:41

## 2023-02-04 RX ADMIN — GABAPENTIN 600 MG: 300 CAPSULE ORAL at 20:22

## 2023-02-04 RX ADMIN — VANCOMYCIN HYDROCHLORIDE 1750 MG: 10 INJECTION, POWDER, LYOPHILIZED, FOR SOLUTION INTRAVENOUS at 00:02

## 2023-02-04 RX ADMIN — VANCOMYCIN HYDROCHLORIDE 1000 MG: 1 INJECTION, POWDER, LYOPHILIZED, FOR SOLUTION INTRAVENOUS at 17:35

## 2023-02-04 RX ADMIN — GABAPENTIN 600 MG: 300 CAPSULE ORAL at 03:00

## 2023-02-04 RX ADMIN — INSULIN LISPRO 8 UNITS: 100 INJECTION, SOLUTION INTRAVENOUS; SUBCUTANEOUS at 13:42

## 2023-02-04 RX ADMIN — SODIUM CHLORIDE: 9 INJECTION, SOLUTION INTRAVENOUS at 03:24

## 2023-02-04 RX ADMIN — CEFEPIME 2000 MG: 2 INJECTION, POWDER, FOR SOLUTION INTRAVENOUS at 13:39

## 2023-02-04 RX ADMIN — VENLAFAXINE HYDROCHLORIDE 75 MG: 75 CAPSULE, EXTENDED RELEASE ORAL at 09:10

## 2023-02-04 RX ADMIN — INSULIN LISPRO 8 UNITS: 100 INJECTION, SOLUTION INTRAVENOUS; SUBCUTANEOUS at 16:50

## 2023-02-04 RX ADMIN — INSULIN GLARGINE 25 UNITS: 100 INJECTION, SOLUTION SUBCUTANEOUS at 20:23

## 2023-02-04 RX ADMIN — GABAPENTIN 600 MG: 300 CAPSULE ORAL at 09:10

## 2023-02-04 ASSESSMENT — ENCOUNTER SYMPTOMS
SHORTNESS OF BREATH: 0
RHINORRHEA: 0
SORE THROAT: 0
COLOR CHANGE: 0
FACIAL SWELLING: 0
ABDOMINAL PAIN: 0

## 2023-02-04 ASSESSMENT — PAIN SCALES - GENERAL
PAINLEVEL_OUTOF10: 3
PAINLEVEL_OUTOF10: 0

## 2023-02-04 ASSESSMENT — PAIN DESCRIPTION - ORIENTATION: ORIENTATION: LEFT

## 2023-02-04 ASSESSMENT — PAIN DESCRIPTION - DESCRIPTORS: DESCRIPTORS: ACHING

## 2023-02-04 ASSESSMENT — PAIN DESCRIPTION - PAIN TYPE: TYPE: ACUTE PAIN

## 2023-02-04 ASSESSMENT — PAIN DESCRIPTION - LOCATION: LOCATION: FOOT

## 2023-02-04 ASSESSMENT — LIFESTYLE VARIABLES: HOW OFTEN DO YOU HAVE A DRINK CONTAINING ALCOHOL: NEVER

## 2023-02-04 NOTE — FLOWSHEET NOTE
02/04/23 0900   Vital Signs   Temp 98.3 °F (36.8 °C)   Temp Source Oral   Heart Rate 74   Heart Rate Source Monitor   Resp 18   /64   MAP (Calculated) 85   Level of Consciousness 0   MEWS Score 1   Pain Assessment   Pain Assessment None - Denies Pain   Care Plan - Pain Goals   Verbalizes/displays adequate comfort level or baseline comfort level Encourage patient to monitor pain and request assistance;Assess pain using appropriate pain scale; Administer analgesics based on type and severity of pain and evaluate response;Implement non-pharmacological measures as appropriate and evaluate response;Consider cultural and social influences on pain and pain management;Notify Licensed Independent Practitioner if interventions unsuccessful or patient reports new pain   Opioid-Induced Sedation   POSS Score 1   Oxygen Therapy   SpO2 93 %   O2 Device None (Room air)     VSS. Denies complains of. Drsg LLE CDI. LLE elevated. Skin pale warm and dry. Lovenox and ASA held due to H&H 7.4 and 23.4 and due to being NPO. To see Dr. Judyth Romberg today. Will continue to monitor. Bed in low position. Call bell and bedside table within reach. Bed alarm on.

## 2023-02-04 NOTE — PROGRESS NOTES
Patient admitted to room ___211 from ER. Patient oriented to room, call light, bed rails, phone, lights and bathroom. Patient instructed about the schedule of the day including: vital sign frequency, lab draws, possible tests, frequency of MD and staff rounds, daily weights, I &O's and prescribed diet. Bed alarm on, Bed locked, in lowest position, side rails up 2/4, call light within reach. Recliner Assessment:     Patient is able to demonstrate the ability to move from a reclining position to an upright position within the recliner. Bedside Mobility Assessment Tool (BMAT):     Assessment Level 1- Sit and Shake    1. From a semi-reclined position, ask patient to sit up and rotate to a seated position at the side of the bed. Can use the bedrail. 2. Ask patient to reach out and grab your hand and shake making sure patient reaches across his/her midline. Pass- Patient is able to come to a seated position, maintain core strength. Maintains seated balance while reaching across midline. Move on to Assessment Level 2. Assessment Level 2- Stretch and Point   1. With patient in seated position at the side of the bed, have patient place both feet on the floor (or stool) with knees no higher than hips. 2. Ask patient to stretch one leg and straighten the knee, then bend the ankle/flex and point the toes. If appropriate, repeat with the other leg. Pass- Patient is able to demonstrate appropriate quad strength on intended weight bearing limb(s). Move onto Assessment Level 3. Assessment Level 3- Stand   1. Ask patient to elevate off the bed or chair (seated to standing) using an assistive device (cane, bedrail). 2. Patient should be able to raise buttocks off be and hold for a count of five. May repeat once. Pass- Patient maintains standing stability for at least 5 seconds, proceed to assessment level 4. Assessment Level 4- Walk   1. Ask patient to march in place at bedside.     2. Then ask patient to advance step and return each foot. Some medical conditions may render a patient from stepping backwards, use your best clinical judgement. Pass- Patient demonstrates balance while shifting weight and ability to step, takes independent steps, does not use assistive device patient is MOBILITY LEVEL 4. Mobility Level- 4        4 Eyes Skin Assessment     The patient is being assess for   Admission    I agree that 2 RN's have performed a thorough Head to Toe Skin Assessment on the patient. ALL assessment sites listed below have been assessed. Areas assessed for pressure by both nurses:   [x]   Head, Face, and Ears   [x]   Shoulders, Back, and Chest, Abdomen  [x]   Arms, Elbows, and Hands   [x]   Coccyx, Sacrum, and Ischium  [x]   Legs, Feet, and Heels        Skin Assessed Under all Medical Devices by both nurses:  N/a               All Mepilex Borders were peeled back and area peeked at by both nurses:  No: n/a  Please list where Mepilex Borders are located:  n/a             **SHARE this note so that the co-signing nurse is able to place an eSignature**    Co-signer eSignature: Electronically signed by Kathy Ayala RN on 2/4/23 at 4:04 AM EST    Does the Patient have Skin Breakdown related to pressure?   Yes LDA WOUND CARE was Initiated documentation include the Laura-wound, Wound Assessment, Measurements, Dressing Treatment, Drainage, and Color\",                                Slade Prevention initiated:  Yes   Wound Care Orders initiated:  Yes      [address]  nurse consulted for Pressure Injury (Stage 3,4, Unstageable, DTI, NWPT, Complex wounds)and New or Established Ostomies:  Yes      Primary Nurse eSignature: Electronically signed by Kaylan Harris RN on 2/4/23 at 3:55 AM EST

## 2023-02-04 NOTE — ACP (ADVANCE CARE PLANNING)
Advance Care Planning     General Advance Care Planning (ACP) Conversation    Date of Conversation: 2/3/2023  Conducted with:  Healthcare Decision Maker: Next of Kin by law (only applies in absence of above) (name) spouseShannon Decision Maker:    Primary Decision Maker: Norberto Diaz - Spouse - [phone]    Supplemental (Other) Decision Maker: Diego Rainey - Child - [phone]  Click here to complete Healthcare Decision Makers including selection of the Healthcare Decision Maker Relationship (ie \"Primary\"). Today we documented Decision Maker(s) consistent with Legal Next of Kin hierarchy.     Content/Action Overview:  Has NO ACP documents/care preferences - information provided, considering goals and options  Reviewed DNR/DNI and patient elects Full Code (Attempt Resuscitation)    Referral to Pastoral Care for more info on ACP dox    Length of Voluntary ACP Conversation in minutes:  <16 minutes (Non-Billable)    Kaylee Bains RN

## 2023-02-04 NOTE — PROGRESS NOTES
Bedside Report given and care transferred to [facility], pt resting comfortably, no needs at this time.

## 2023-02-04 NOTE — PROGRESS NOTES
Pharmacy Vancomycin Consult     Vancomycin Day: 1  Current Dosinmg q24h  Current indication: Bone and joint infection    Recent Labs     23  2213 23  0526   BUN 24* 24*   CREATININE 1.9* 1.9*   WBC 7.5 6.3       Intake/Output Summary (Last 24 hours) at 2023 1258  Last data filed at 2023 0342  Gross per 24 hour   Intake --   Output 800 ml   Net -800 ml     Culture Date      Source                       Results      Ht Readings from Last 1 Encounters:   23 5' 10\" (1.778 m)        Wt Readings from Last 1 Encounters:   23 161 lb 11.2 oz (73.3 kg)       Body mass index is 23.2 kg/m². Estimated Creatinine Clearance: 41 mL/min (A) (based on SCr of 1.9 mg/dL (H)).     Trough: 16    Assessment/Plan:  Continue current vancomycin regimen [id number] q24h starting today  @ 1300  Predicted AUC of 516 and trough of 16.2  Pharmacy will continue to monitor renal function and reorder a level if necessary

## 2023-02-04 NOTE — ED NOTES
Pt provided with blanket and pillow. Pt and family state no other needs at this time.       Owen Vieira RN  02/03/23 [id number]

## 2023-02-04 NOTE — ED NOTES
Pt provided with orange juice states no other needs at this time.       Mario Mart RN  02/04/23 0030

## 2023-02-04 NOTE — PLAN OF CARE
Problem: Discharge Planning  Goal: Discharge to home or other facility with appropriate resources  Outcome: Progressing  Flowsheets  Taken 2/4/2023 0342 by Neo Fernandez RN  Discharge to home or other facility with appropriate resources: Identify barriers to discharge with patient and caregiver  Taken 2/4/2023 0337 by Neo Fernandez RN  Discharge to home or other facility with appropriate resources: Identify barriers to discharge with patient and caregiver     Problem: Pain  Goal: Verbalizes/displays adequate comfort level or baseline comfort level  2/4/2023 1054 by Jones Duran RN  Outcome: Progressing  Flowsheets (Taken 2/4/2023 0900)  Verbalizes/displays adequate comfort level or baseline comfort level:   Encourage patient to monitor pain and request assistance   Assess pain using appropriate pain scale   Administer analgesics based on type and severity of pain and evaluate response   Implement non-pharmacological measures as appropriate and evaluate response   Consider cultural and social influences on pain and pain management   Notify Licensed Independent Practitioner if interventions unsuccessful or patient reports new pain  2/4/2023 0403 by Neo Fernandez RN  Outcome: Progressing  Flowsheets  Taken 2/4/2023 0332  Verbalizes/displays adequate comfort level or baseline comfort level:   Encourage patient to monitor pain and request assistance   Assess pain using appropriate pain scale   Administer analgesics based on type and severity of pain and evaluate response   Implement non-pharmacological measures as appropriate and evaluate response   Consider cultural and social influences on pain and pain management   Notify Licensed Independent Practitioner if interventions unsuccessful or patient reports new pain  Taken 2/4/2023 0300  Verbalizes/displays adequate comfort level or baseline comfort level:   Encourage patient to monitor pain and request assistance   Assess pain using appropriate pain scale Administer analgesics based on type and severity of pain and evaluate response   Implement non-pharmacological measures as appropriate and evaluate response   Consider cultural and social influences on pain and pain management   Notify Licensed Independent Practitioner if interventions unsuccessful or patient reports new pain     Problem: Chronic Conditions and Co-morbidities  Goal: Patient's chronic conditions and co-morbidity symptoms are monitored and maintained or improved  Outcome: Progressing  Flowsheets (Taken 2/4/2023 0342 by Marinda Simmonds, RN)  Care Plan - Patient's Chronic Conditions and Co-Morbidity Symptoms are Monitored and Maintained or Improved:   Monitor and assess patient's chronic conditions and comorbid symptoms for stability, deterioration, or improvement   Collaborate with multidisciplinary team to address chronic and comorbid conditions and prevent exacerbation or deterioration   Update acute care plan with appropriate goals if chronic or comorbid symptoms are exacerbated and prevent overall improvement and discharge     Problem: Wound:  Goal: Will show signs of wound healing; wound closure and no evidence of infection  Outcome: Progressing

## 2023-02-04 NOTE — ED PROVIDER NOTES
[address] ED  EMERGENCY DEPARTMENT ENCOUNTER      I am the Primary Clinician of Record    Note started: 10:21 PM EST 2/3/23     I have seen and evaluated this patient with my supervising physician Anita Madrigal MD.        Pt Name: Chuckie Almaraz  MRN: [id number]  Paulgfmaryann 1960  Dateof evaluation: 2/3/2023  Provider: Murtaza Howard APRN - CNP  PCP: Jessica Castillo MD  ED Attending: No att. providers found      [address] [place] [address]       Chief Complaint   Patient presents with    Wound Check     Wound on bottom of left foot x 2 weeks. . Pt reports general tiredness and a prior fever. HISTORY OF PRESENTILLNESS   (Location/Symptom, Timing/Onset, Context/Setting, Quality, Duration, Modifying Factors, Severity)  Note limiting factors. Chuckie Almaraz is a 57 y.o. male for concerns for a wound infection to the bottom the left foot. Onset was 2 weeks. Context includes patient has a history of diabetic neuropathy and has had amputations to his left foot. Patient reports that he has not been feeling well for the past 2 weeks. Patient reports that he has been fatigued and having fevers. Patient has had an ulcer to the bottom of his left foot that is not healing. He does follow with the wound clinic. He was not able to get an appointment for 3 weeks. . Alleviating factors include nothing. Aggravating factors include nothing. Pain is 4/10. Nothing has been used for pain today. Nursing Notes were all reviewed and agreed with or any disagreements were addressed  in the HPI. REVIEW OF SYSTEMS       Review of Systems   Constitutional:  Negative for activity change, appetite change and fever. HENT:  Negative for congestion, facial swelling, rhinorrhea and sore throat. Eyes:  Negative for visual disturbance. Respiratory:  Negative for shortness of breath. Cardiovascular:  Negative for chest pain. Gastrointestinal:  Negative for abdominal pain.    Genitourinary: Negative for difficulty urinating. Musculoskeletal:  Negative for arthralgias and myalgias. Skin:  Positive for wound. Negative for color change and rash. Neurological:  Negative for dizziness and light-headedness. Psychiatric/Behavioral:  Negative for agitation. All other systems reviewed and are negative. Positives and Pertinent negatives as per HPI. Except as noted above in the ROS, all other systems were reviewed and negative.        PAST MEDICAL HISTORY     Past Medical History:   Diagnosis Date    TORI (acute kidney injury) ([facility] 09/12/2017    Bacteremia 05/05/2017    staph aureus    Diabetes mellitus ([facility]     Diabetic neuropathy ([facility]     Gout     Hypertension     MRSA (methicillin resistant staph aureus) culture positive 12/27/18, 9/12/17, 5/5/17,9/5/13, 1/15/14    ulcers bilateral legs    MRSA (methicillin resistant staph aureus) culture positive 01/21/2021    foot wound    Neuropathy     Pneumonia     Pyogenic inflammation of bone ([facility]     Stage 3b chronic kidney disease (CKD) ([facility] [facility] 1/8/2023         SURGICAL HISTORY       Past Surgical History:   Procedure Laterality Date    EYE SURGERY      lens implants after removal of cataracts    FOOT DEBRIDEMENT Left 4/28/2021    DEBRIDEMENT INFECTED BONE AND TISSUE LEFT FOOT performed by Emmie Adhikari DPM at [address]  12/28/2018    partial right foot amputation with graft application    TOE AMPUTATION Right 12/28/2018    PARTIAL RIGHT FOOT AMPUTATION WITH GRAFT APPLICATION performed by Emmie Adhikari DPM at [address] Left 5/27/2021    PARTIAL LEFT FOOT AMPUTATION performed by Emmie Adhikari DPM at [address] [facility] Left 9/2/2022    PARTIAL LEFT FOOT AMPUTATION, ULCER DEBRIDEMENT LOWER LEG performed by Emmie Adhikari DPM at [address] N/A 1/21/2021    EGD BIOPSY performed by Rhett Armijo DO at [address]       Previous Medications ACETAMINOPHEN (TYLENOL) 500 MG TABLET    Take 2 tablets by mouth every 8 hours as needed for Pain    AMLODIPINE (NORVASC) 5 MG TABLET    Take 1 tablet by mouth daily    ASPIRIN 81 MG EC TABLET    Take 81 mg by mouth daily    BD PEN NEEDLE MARY 2ND GEN 32G X 4 MM MISC        BD ULTRA-FINE PEN NEEDLES 29G X 12.7MM MISC        CONTINUOUS BLOOD GLUC  (FREESTYLE ISAI 2 READER) BRITTANEY        CONTINUOUS BLOOD GLUC SENSOR (FREESTYLE ISAI 2 SENSOR) MISC        GABAPENTIN (NEURONTIN) 600 MG TABLET    Take 600 mg by mouth 2 times daily. INSULIN GLARGINE (LANTUS) 100 UNIT/ML INJECTION VIAL    Inject 40 Units into the skin nightly    INSULIN LISPRO, 1 UNIT DIAL, (HUMALOG KWIKPEN) 100 UNIT/ML SOPN    Inject 10 Units into the skin 3 times daily (before meals)    INSULIN PEN NEEDLE (ULTRA-THIN II PEN NEEDLES) 29G X 12.7MM MISC    Use for insulin injections QID as directed    LISINOPRIL (PRINIVIL;ZESTRIL) 10 MG TABLET    Take 10 mg by mouth daily    SILDENAFIL (VIAGRA) 100 MG TABLET    Take 100 mg by mouth daily as needed    TAMSULOSIN (FLOMAX) 0.4 MG CAPSULE    Take 0.4 mg by mouth daily    VENLAFAXINE (EFFEXOR XR) 75 MG EXTENDED RELEASE CAPSULE    Take 75 mg by mouth daily Needs to be re prescribed         ALLERGIES     Hydrocodone-acetaminophen, Percocet [oxycodone-acetaminophen], Pregabalin, and Vicodin [hydrocodone-acetaminophen]      FAMILY HISTORY       Family History   Problem Relation Age of Onset    Diabetes Maternal Grandfather     Heart Disease Paternal Uncle     High Blood Pressure Mother           SOCIAL HISTORY       Social History     Socioeconomic History    Marital status:      Spouse name: Vicente Grimes    Number of children: 1    Years of education: None    Highest education level: None   Tobacco Use    Smoking status: Never    Smokeless tobacco: Never   Vaping Use    Vaping Use: Never used   Substance and Sexual Activity    Alcohol use: No     Comment: FORMER DRINKER approx. quit 2005    Drug use:  No Sexual activity: Yes     Partners: Female         SCREENINGS    [place] Coma Scale  Eye Opening: Spontaneous  Best Verbal Response: Oriented  Best Motor Response: Obeys commands  [place] Coma Scale Score: 15      CIWA Assessment  BP: (!) 154/74  Heart Rate: 81          PHYSICAL EXAM     ED Triage Vitals   BP Temp Temp src Heart Rate Resp SpO2 Height Weight   02/03/23 2147 02/03/23 2147 -- 02/03/23 2147 02/03/23 2147 02/03/23 2147 02/03/23 2145 02/03/23 2145   (!) 166/81 99.6 °F (37.6 °C)  83 19 97 % 5' 10\" (1.778 m) 170 lb (77.1 kg)       Physical Exam  Constitutional:       Appearance: Normal appearance. He is well-developed. HENT:      Head: Normocephalic and atraumatic. Cardiovascular:      Rate and Rhythm: Normal rate. Pulmonary:      Effort: Pulmonary effort is normal. No respiratory distress. Abdominal:      General: There is no distension. Palpations: Abdomen is soft. Musculoskeletal:         General: Swelling, tenderness and signs of injury present. Normal range of motion. Cervical back: Normal range of motion. Comments: Patient has full range of motion to his left foot however he is missing all 5 of his toes. Sensation and pulse intact to left foot. There is a open sore noted to the plantar surface and oozing and erythema noted to the stump. Skin:     General: Skin is warm and dry. Neurological:      Mental Status: He is alert and oriented to person, place, and time.        DIAGNOSTIC RESULTS   LABS:    Labs Reviewed   CBC WITH AUTO DIFFERENTIAL - Abnormal; Notable for the following components:       Result Value    RBC 3.48 (*)     Hemoglobin 8.3 (*)     Hematocrit 26.8 (*)     MCV 77.0 (*)     MCH 23.9 (*)     RDW 15.8 (*)     Lymphocytes Absolute 0.5 (*)     Hypochromia Occasional (*)     All other components within normal limits   COMPREHENSIVE METABOLIC PANEL W/ REFLEX TO MG FOR LOW K - Abnormal; Notable for the following components:    Sodium 129 (*)     Chloride 93 (*)     Glucose 282 (*)     BUN 24 (*)     Creatinine 1.9 (*)     Est, Glom Filt Rate 39 (*)     Calcium 7.8 (*)     Albumin 3.0 (*)     Albumin/Globulin Ratio 0.7 (*)     ALT 7 (*)     AST 14 (*)     All other components within normal limits   SEDIMENTATION RATE - Abnormal; Notable for the following components:    Sed Rate 102 (*)     All other components within normal limits   C-REACTIVE PROTEIN - Abnormal; Notable for the following components:    .5 (*)     All other components within normal limits   COVID-19 & INFLUENZA COMBO   CULTURE, BLOOD 2   CULTURE, BLOOD 1   LACTATE, SEPSIS   PROCALCITONIN   LACTATE, SEPSIS   URINALYSIS WITH REFLEX TO CULTURE   LACTATE, SEPSIS         All other labs were within normal range or not returned as of this dictation. EKG: All EKG's are interpreted by the Emergency Department Physician who either signs or Co-signs this chart in the absence of a cardiologist.  Please see their note for interpretation of EKG. RADIOLOGY:   Non plain film images ans such as CT, ultrasound, and MRI are read by the radiologist. Plain radiographic images are visualized and preliminarily interpreted by the ED Provider with the below findings:    Left foot x-ray interpreted by radiologist for  FINDINGS:   New osteopenia and probable erosive change of the 2nd and 3rd metatarsal   heads subjacent to the to the wound. The no acute fracture or dislocation. Moderate degenerative change. Interpretation per the Radiologist below, if available at the time of this note:    XR FOOT LEFT (MIN 3 VIEWS)   Final Result   Suspected osteomyelitis of the heads of the 2nd and 3rd metatarsals. No results found. No results found. No results found.       PROCEDURES   Unless otherwise noted below, none     Procedures     CRITICAL CARE TIME       CRITICAL CARE NOTE:  There was a high probability of clinically significant life-threatening deterioration of the patient's condition requiring my urgent intervention. Total critical care time is 35 minutes. This includes vital sign monitoring, pulse oximetry monitoring, telemetry monitoring, clinical response to the IV medications, reviewing the nursing notes, consultation time, dictation/documentation time, and interpretation of the labwork. EMERGENCYDEPARTMENT COURSE/MDM:     Vitals:    Vitals:    02/03/23 2145 02/03/23 2147 02/03/23 2307 02/04/23 0002   BP:  (!) 166/81 (!) 157/79 (!) 154/74   Pulse:  83  81   Resp:  19     Temp:  99.6 °F (37.6 °C)     SpO2:  97% 95% 95%   Weight: 170 lb (77.1 kg)      Height: 5' 10\" (1.778 m)                      Patient was seen and evaluated by myself and Dr. Janie Wayne. Patient here for concerns for infection to his left foot. Patient reports he has a history of diabetes and has an ulcer that is nonhealing. Patient reports that he attempted to follow-up with the wound clinic but could not get an appointment for 3 weeks. He reports over the last 2 weeks he has not felt well he has been feverish achy decreased activity and p.o. Patient is currently not on any antibiotics. His tetanus is up-to-date. He reports that his left foot stump has been oozing and has a nonhealing wound to the plantar surface. On exam he is awake and alert hemodynamically stable nontoxic in appearance. Patient is neurovascularly intact to his left foot but does have erythema and oozing noted to the stump. Lab values have been reviewed and interpreted. Sepsis protocol was initiated he was given IV fluids and IV antibiotics. Lab values were fairly reassuring however his sed rate and ESR were elevated. X-ray was concerning for osteomyelitis. Consult was placed to the hospitalist for admission. Hospitalist has accepted. Patient's care was transferred to the inpatient unit.       Patient was given the following medications:  Medications   vancomycin (VANCOCIN) 1,750 mg in sodium chloride 0.9 % 500 mL IVPB (1,750 mg IntraVENous New Bag 2/4/23 0002)   cefepime (MAXIPIME) 2,000 mg in sodium chloride 0.9 % 50 mL IVPB (mini-bag) (0 mg IntraVENous Stopped 2/4/23 0002)   0.9 % sodium chloride bolus (2,190 mLs IntraVENous New Bag 2/3/23 7494)            CONSULTS:  None      Discussion with other professionals - none    Social determinants - none    Records Reviewed - none    History from - patient    Limitations to history- none    Chronic conditions: has a past medical history of TORI (acute kidney injury) ([facility] [facility] (09/12/2017), Bacteremia (05/05/2017), Diabetes mellitus ([address] Diabetic neuropathy ([address] Gout, Hypertension, MRSA (methicillin resistant staph aureus) culture positive (12/27/18, 9/12/17, 5/5/17,9/5/13, 1/15/14), MRSA (methicillin resistant staph aureus) culture positive (01/21/2021), Neuropathy, Pneumonia, Pyogenic inflammation of bone ([address] and Stage 3b chronic kidney disease (CKD) ([address] (1/8/2023). Is this patient to be included in the SEP-1 Core Measure due to severe sepsis or septic shock? No   Exclusion criteria - the patient is NOT to be included for SEP-1 Core Measure due to:  2+ SIRS criteria are not met         The patient tolerated their visit well. I have evaluated this patient. My supervising physician was available for consultation. The patient and / or the family were informed of the results of any tests, a time was given to answer questions, a plan was proposed and they agreed with plan. FINAL IMPRESSION      1. Other acute osteomyelitis of left foot ([address]    2. Diabetic ulcer of left midfoot associated with diabetes mellitus due to underlying condition, with necrosis of bone ([address]    3. Anemia, unspecified type          DISPOSITION/PLAN   DISPOSITION Decision To Admit 02/04/2023 12:14:22 AM      PATIENT REFERRED TO:  No follow-up provider specified.     DISCHARGE MEDICATIONS:  New Prescriptions    No medications on file       DISCONTINUED MEDICATIONS:  Discontinued Medications No medications on file              (Please note that portions of this note were completed with a voice recognition program.  Efforts were made to edit the dictations but occasionally words are mis-transcribed.)    Patient was seen during the time of the [place] pandemic. N95 and appropriate PPE was worn during the visit.       NAYELY Vaughn CNP (electronically signed)        NAYELY Vaughn CNP  02/04/23 NAYELY Mclaughlin CNP  02/04/23 NAYELY Mclaughlin CNP  02/04/23 486

## 2023-02-04 NOTE — CONSULTS
.  Pharmacy Note  Vancomycin Consult    Jose Mccarty is a 57 y.o. male, diabetic, started on Vancomycin for osteomyelitis; consult received from Dr. Rina Olmstead to manage therapy. Also receiving the following antibiotics: cefepime. Allergies:  Hydrocodone-acetaminophen, Percocet [oxycodone-acetaminophen], Pregabalin, and Vicodin [hydrocodone-acetaminophen]     Tmax: 99.6    Recent Labs     02/03/23  2213   CREATININE 1.9*       Recent Labs     02/03/23  2213   WBC 7.5       Estimated Creatinine Clearance: 41 mL/min (A) (based on SCr of 1.9 mg/dL (H)). No intake or output data in the 24 hours ending 02/04/23 0240    Wt Readings from Last 1 Encounters:   02/04/23 161 lb 11.2 oz (73.3 kg)         Body mass index is 23.2 kg/m². Culture Date      Source                       Results      Loading dose (critically ill or in ICU, require dialysis or renal replacement therapy): Vancomycin 25 mg/kg IVPB x 1 (maximum 2500 mg). Maintenance dose: 15 mg/kg (maximum: 2000 mg/dose and 4500 mg/day) starting at the next dosing interval determined by renal function  Pulse dose: fluctuating renal function, TORI, ESRD   Goal Vancomycin trough: 10-15 mcg/mL or 15-20 mcg/mL   Goal Vancomycin AUC: 400-600     Assessment/Plan:  Will initiate Vancomycin with a one time loading dose of 1750 mg x1, followed by 1000 mg IV every 24 hours. Calculated . Vancomycin level ordered prior to next dose to allow for Bayesian plotting to be done to assess current dosing. Thank you for the consult.

## 2023-02-04 NOTE — H&P
Hospital Medicine History & Physical      PCP: Frankie Sandoval MD    Date of Service: Pt seen/examined on 2/4/23 and admitted on 2/4/23 to Inpatient    Chief Complaint   Patient presents with    Wound Check     Wound on bottom of left foot x 2 weeks. . Pt reports general tiredness and a prior fever. History Of Present Illness: The patient is a 57 y.o. male with PMH below, presents with fever, L foot wound. Pt reports he has not felt well for the last 2 weeks. He has been having fevers to an unknown degree. 99.6 at arrival here. He has felt increasing fatigue. He says he has a wound to the bottom of his L foot that is not healing. He has noted increasing drainage from the wound. He has Hx of DM foot wounds and has had amputations. He was found to have a L DM foot infection w/ OM. Pt is a relatively poor historian.     Past Medical History:        Diagnosis Date    TORI (acute kidney injury) ([facility] 09/12/2017    Bacteremia 05/05/2017    staph aureus    Diabetes mellitus ([facility]     Diabetic neuropathy ([address]     Gout     Hypertension     MRSA (methicillin resistant staph aureus) culture positive 12/27/18, 9/12/17, 5/5/17,9/5/13, 1/15/14    ulcers bilateral legs    MRSA (methicillin resistant staph aureus) culture positive 01/21/2021    foot wound    Neuropathy     Pneumonia     Pyogenic inflammation of bone ([address]     Stage 3b chronic kidney disease (CKD) ([facility] 1/8/2023       Past Surgical History:        Procedure Laterality Date    EYE SURGERY      lens implants after removal of cataracts    FOOT DEBRIDEMENT Left 4/28/2021    DEBRIDEMENT INFECTED BONE AND TISSUE LEFT FOOT performed by Alexander Montes De Oca DPM at [facility]  12/28/2018    partial right foot amputation with graft application    TOE AMPUTATION Right 12/28/2018    PARTIAL RIGHT FOOT AMPUTATION WITH GRAFT APPLICATION performed by Alexander Montes De Oca DPM at [address] [place] [address] Left 5/27/2021    PARTIAL LEFT FOOT AMPUTATION performed by Wing Valentina DPM at [address] Left 9/2/2022    PARTIAL LEFT FOOT AMPUTATION, ULCER DEBRIDEMENT LOWER LEG performed by Wing Valentina DPM at [address] 1/21/2021    EGD BIOPSY performed by Alexandra Rm,  at [address] [facility]       Medications Prior to Admission:    Prior to Admission medications    Medication Sig Start Date End Date Taking?  Authorizing Provider   tamsulosin (FLOMAX) 0.4 MG capsule Take 0.4 mg by mouth daily 1/16/23  Yes Historical Provider, MD   insulin lispro, 1 Unit Dial, (HUMALOG KWIKPEN) 100 UNIT/ML SOPN Inject 10 Units into the skin 3 times daily (before meals) 1/8/23   Shea Holley MD   acetaminophen (TYLENOL) 500 MG tablet Take 2 tablets by mouth every 8 hours as needed for Pain 10/2/22   NAYELY Aguirre - CNP   aspirin 81 MG EC tablet Take 81 mg by mouth daily    Historical Provider, MD   lisinopril (PRINIVIL;ZESTRIL) 10 MG tablet Take 10 mg by mouth daily    Historical Provider, MD   Continuous Blood Gluc  (FREESTYLE ISAI 2 READER) BRITTANEY  5/5/21   Historical Provider, MD   Continuous Blood Gluc Sensor (FREESTYLE ISAI 2 SENSOR) [facility]  5/5/21   Historical Provider, MD   BD PEN NEEDLE MARY 2ND GEN 32G X 4 MM MISC  5/5/21   Historical Provider, MD   Insulin Pen Needle (ULTRA-THIN II PEN NEEDLES) 29G X 12.7MM MISC Use for insulin injections QID as directed 4/29/21   Historical Provider, MD   BD ULTRA-FINE PEN NEEDLES 29G X 12.7MM MISC  4/29/21   Historical Provider, MD   insulin glargine (LANTUS) 100 UNIT/ML injection vial Inject 40 Units into the skin nightly  Patient taking differently: Inject 34 Units into the skin nightly 4/30/21   POLO Frye   amLODIPine (NORVASC) 5 MG tablet Take 1 tablet by mouth daily 4/30/21   POLO Frye   sildenafil (VIAGRA) 100 MG tablet Take 100 mg by mouth daily as needed 3/3/21   Historical Provider, MD   gabapentin (NEURONTIN) 600 MG tablet Take 600 mg by mouth 2 times daily. Historical Provider, MD   venlafaxine (EFFEXOR XR) 75 MG extended release capsule Take 75 mg by mouth daily Needs to be re prescribed    Historical Provider, MD       Allergies:  Hydrocodone-acetaminophen, Percocet [oxycodone-acetaminophen], Pregabalin, and Vicodin [hydrocodone-acetaminophen]    Social History:    TOBACCO:   reports that he has never smoked. He has never used smokeless tobacco.  ETOH:   reports no history of alcohol use. Family History:  Reviewed in detail and negative for DM, Early CAD, Cancer (except as below). Positive as follows:        Problem Relation Age of Onset    Diabetes Maternal Grandfather     Heart Disease Paternal Uncle     High Blood Pressure Mother        REVIEW OF SYSTEMS:   Pertinent positives/negatives as follows: L foot DM foot wound/infection w/ OM., and as discussed in HPI, otherwise a complete ROS performed and all other systems are negative. PHYSICAL EXAM PERFORMED:  BP (!) 154/74   Pulse 81   Temp 99.6 °F (37.6 °C)   Resp 19   Ht 5' 10\" (1.778 m)   Wt 170 lb (77.1 kg)   SpO2 95%   BMI 24.39 kg/m²   GEN:  A&Ox3, NAD. HEENT:  NC/AT,EOMI, MMM, no erythema/exudates or visible masses. CVS:  Normal S1,S2. RRR. Without M/G/R.   LUNG:   CTA-B. No wheezes, rales or rhonchi. ABD:  Soft, ND/NT, BS+ x4. Without G/R.  EXT: 2+ pulses, no c/c/e. Wound noted to distal/medial plantar surface of L foot. All 5 toes are absent from this foot. There is erythema noted to distal foot. Please see pics below - courtesy of KOJO Brito. Decreased sensation to feet. Brisk cap refill. PSY:  Thought process intact, affect appropriate. PUSHPA:  CN III-XII grossly intact. Moves all 4 spontaneously. Sensory grossly intact except for DM neuropathy. SKIN: No rash or lesions on visible skin except as noted above.               Chart review shows recent radiographs:  XR FOOT LEFT (MIN 3 VIEWS)    Result Date: 2/3/2023  EXAMINATION: THREE XRAY VIEWS OF THE LEFT FOOT 2/3/2023 10:35 pm COMPARISON: 09/02/2022 HISTORY: ORDERING SYSTEM PROVIDED HISTORY: infection TECHNOLOGIST PROVIDED HISTORY: Reason for exam:->infection Reason for Exam: wound infection FINDINGS: New osteopenia and probable erosive change of the 2nd and 3rd metatarsal heads subjacent to the to the wound. The no acute fracture or dislocation. Moderate degenerative change. Suspected osteomyelitis of the heads of the 2nd and 3rd metatarsals. CT HEAD WO CONTRAST    Result Date: 1/7/2023  EXAMINATION: CT OF THE HEAD WITHOUT CONTRAST  1/7/2023 1:56 pm TECHNIQUE: CT of the head was performed without the administration of intravenous contrast. Automated exposure control, iterative reconstruction, and/or weight based adjustment of the mA/kV was utilized to reduce the radiation dose to as low as reasonably achievable. COMPARISON: None. HISTORY: ORDERING SYSTEM PROVIDED HISTORY: delirium TECHNOLOGIST PROVIDED HISTORY: Has a \"code stroke\" or \"stroke alert\" been called? ->No Reason for exam:->delirium Decision Support Exception - unselect if not a suspected or confirmed emergency medical condition->Emergency Medical Condition (MA) Reason for Exam: Delirium FINDINGS: BRAIN/VENTRICLES: There is no acute intracranial hemorrhage, mass effect or midline shift. No abnormal extra-axial fluid collection. The gray-white differentiation is maintained without evidence of an acute infarct. There is no evidence of hydrocephalus. Mild prominence of the ventricles and sulci due to global parenchymal volume loss. ORBITS: The visualized portion of the orbits demonstrate no acute abnormality. SINUSES: The visualized paranasal sinuses and mastoid air cells demonstrate no acute abnormality. SOFT TISSUES/SKULL:  No acute abnormality of the visualized skull or soft tissues. No acute intracranial abnormality.      XR CHEST PORTABLE    Result Date: 1/7/2023  EXAMINATION: ONE XRAY VIEW OF THE CHEST 1/7/2023 9:01 am COMPARISON: 08/13/2022 HISTORY: ORDERING SYSTEM PROVIDED HISTORY: hyperglycemia, dizziness TECHNOLOGIST PROVIDED HISTORY: Reason for exam:->hyperglycemia, dizziness Reason for Exam: hyperglycemia, dizziness FINDINGS: Cardiomegaly, diffuse bronchovascular marking prominence worst in the hilar/perihilar regions. Findings are consistent with central and perihilar bronchitis/pneumonitis and/or congestive heart failure. No pulmonary consolidations, peripheral airspace infiltrates, detectable pleural effusion, pneumothorax or mediastinal widening. Findings consistent with central bronchitis and/or congestive heart failure. No detectable pleural effusions or large areas of pulmonary consolidation. Otherwise, radiographically nonacute portable chest.     CBC:  Recent Labs     02/03/23 2213   WBC 7.5   HGB 8.3*   HCT 26.8*         RENAL  Recent Labs     02/03/23 2213   *   K 4.5   CL 93*   CO2 27   BUN 24*   CREATININE 1.9*   GLUCOSE 282*     Hemoglobin a1c:  Lab Results   Component Value Date    LABA1C 14.5 01/07/2023    LABA1C 14.7 11/05/2021    LABA1C 12.7 05/25/2021       LFT'S:  Recent Labs     02/03/23 2213   AST 14*   ALT 7*   BILITOT 0.4   ALKPHOS 107     COAG:  INR ordered. CARDIAC ENZYMES:   Lab Results   Component Value Date    PROBNP 28 03/05/2021     LACTIC ACID:  Recent Labs     02/03/23 2213   LACTSEPSIS 0.9     U/A:  Ordered. PHYSICIAN CERTIFICATION  I certify that Kirsten Trinidad is expected to be hospitalized for 2 midnights based on the following assessment and plan:    ASSESSMENT/PLAN:  Osteomyelitis L foot 2/2 DM foot wound. Hx same and has had multiple amputations to this foot in the past. IV vanco and cefepime, IVF. Podiatry and WC RN c/s.     DM2, poor control, initial . Hold oral Rx, chk A1c, add Mod SSI q4h, continue home Lantus at reduced dose while NPO.  HTN, mildly uncontrolled. Will give a dose of his home Rx tonight. Cont home regimen.     CKD 3b, Cr stable. Neuropathy, cont home gabapentin. DVT Prophylaxis: Lx  Diet: NPO for podiatry c/s. Code Status: Full Code   PT/OT Eval Status: Will order if needed and as patient condition allows  Dispo - Admit to inpatient     Paula Randall MD    Thank you Whitney Pacheco MD for the opportunity to be involved in this patient's care. If you have any questions or concerns please feel free to contact me via the [employer] Answering Service at [phone]. This chart was generated using the [address] [place], [place] [address]  dictation system. I created this record but it may contain dictation errors given the limitations of this technology.

## 2023-02-04 NOTE — PROGRESS NOTES
Family at bedside. Requesting to see Dr. Hortencia Harkins concerning plans for patient and of dry non productive cough patient has had x 1 month. Lungs sounds CTA. Writer did note an infrequent dry cough. Family and patient notified of MRI scheduled for tomorrow and of antibiotics patient is receiving and to receive. Patient tolerating foods and fluids well. Voiding hal urine. Will continue to monitor. Bed in low position, call bell and bedside table within reach.

## 2023-02-04 NOTE — PLAN OF CARE
Problem: Pain  Goal: Verbalizes/displays adequate comfort level or baseline comfort level  Outcome: Progressing  Flowsheets  Taken 2/4/2023 8070  Verbalizes/displays adequate comfort level or baseline comfort level:   Encourage patient to monitor pain and request assistance   Assess pain using appropriate pain scale   Administer analgesics based on type and severity of pain and evaluate response   Implement non-pharmacological measures as appropriate and evaluate response   Consider cultural and social influences on pain and pain management   Notify Licensed Independent Practitioner if interventions unsuccessful or patient reports new pain  Taken 2/4/2023 0300  Verbalizes/displays adequate comfort level or baseline comfort level:   Encourage patient to monitor pain and request assistance   Assess pain using appropriate pain scale   Administer analgesics based on type and severity of pain and evaluate response   Implement non-pharmacological measures as appropriate and evaluate response   Consider cultural and social influences on pain and pain management   Notify Licensed Independent Practitioner if interventions unsuccessful or patient reports new pain

## 2023-02-04 NOTE — CARE COORDINATION
Case Management Assessment  Initial Evaluation    Date/Time of Evaluation: 2/4/2023 12:35 PM  Assessment Completed by: Brooklynn Gomez RN    If patient is discharged prior to next notation, then this note serves as note for discharge by case management. Patient Name: Yassine Jackson                   YOB: 1960  Diagnosis: Anemia, unspecified type [D64.9]  Diabetic ulcer of left midfoot associated with diabetes mellitus due to underlying condition, with necrosis of bone ([address] [R25.090, L97.424]  Other acute osteomyelitis of left foot ([address] [M86.172]  Osteomyelitis of left foot, unspecified type ([address] [M86.9]                   Date / Time: 2/3/2023  9:41 PM    Patient Admission Status: Inpatient   Readmission Risk (Low < 19, Mod (19-27), High > 27): Readmission Risk Score: 16.8    Current PCP: Suzanne Alfaro MD  PCP verified by CM? (P) Yes Venkatesh Rider MD)    Chart Reviewed: Yes      History Provided by: (P) Significant Other  Patient Orientation: (P) Alert and Oriented, Person, Place, Situation    Patient Cognition: (P) Alert    Hospitalization in the last 30 days (Readmission):  Yes    If yes, Readmission Assessment in  Navigator will be completed.     Advance Directives:      Code Status: Full Code   Patient's Primary Decision Maker is: (P) Legal Next of Kin    Primary Decision Maker: [address] [place] [address] - Spouse - [phone]    Supplemental (Other) Decision Maker: Diego Rainey - Child - [phone]    Discharge Planning:    Patient lives with: (P) Spouse/Significant Other Type of Home: (P) House  Primary Care Giver: (P) Spouse  Patient Support Systems include: (P) Spouse/Significant Other   Current Financial resources: (P) Medicare  Current community resources: (P) ECF/Home Care  Current services prior to admission: (P) Durable Medical Equipment            Current DME: (P) Cane, Glucometer            Type of Home Care services:  (P) OT, PT, Nursing Services    ADLS  Prior functional level: (P) Independent in ADLs/IADLs, Mobility  Current functional level: (P) Independent in ADLs/IADLs, Mobility    PT AM-PAC:   /24  OT AM-PAC:   /24    Family can provide assistance at DC: (P) Yes  Would you like Case Management to discuss the discharge plan with any other family members/significant others, and if so, who? (P) Yes (spouse, Liza Koenig)  Plans to Return to Present Housing: (P) Yes  Other Identified Issues/Barriers to RETURNING to current housing: YES  Potential Assistance needed at discharge: (P) [facility], [address]            Potential DME:    Patient expects to discharge to: (P) [facility] for transportation at discharge:      Financial    Payor: Jalyn Montero / Plan: MEDICARE PART A AND B / Product Type: *No Product type* /     Does insurance require precert for SNF: NO    Potential assistance Purchasing Medications: (P) No  Meds-to-Beds request:        Adan Grijalva [address] [facility] [address]  Phone: [phone] Fax: [phone]      Notes:    Factors facilitating achievement of predicted outcomes: Family support    Barriers to discharge: Decreased endurance    Additional Case Management Notes: Chart reviewed. Met with pt's spouse and explained the role of the CM. Plan: goal is to return home. Pt has has increased frequency of falls over the past week per spouse. At baseline he is independent but since the onset of his foot infection his BS is poorly controlled and he is very weak. Dr. Nathaniel De Luna following. CM will follow for new needs. Possible SNF and IVABTX.        The Plan for Transition of Care is related to the following treatment goals of Anemia, unspecified type [D64.9]  Diabetic ulcer of left midfoot associated with diabetes mellitus due to underlying condition, with necrosis of bone ([address] [I95.752, L97.424]  Other acute osteomyelitis of left foot ([address] [T89.896]  Osteomyelitis of left foot, unspecified type ([facility] [F20.4]    IF APPLICABLE: The Patient and/or patient representative Columba Pitts and his family were provided with a choice of provider and agrees with the discharge plan. Freedom of choice list with basic dialogue that supports the patient's individualized plan of care/goals and shares the quality data associated with the providers was provided to:     Patient Representative Name:       The Patient and/or Patient Representative Agree with the Discharge Plan?       Eduardo Brown RN  Case Management Department  Ph: [phone] Fax: [phone]

## 2023-02-04 NOTE — PROGRESS NOTES
FSBS 411. Medicated as ordered and Dr. Darien Mancia notified. Family in with patient updated on MRI scheduled for tomorrow and antibiotics. Patient sitting up in chair without complains of. Chair alarm on. Call bell and bedside table within reach.

## 2023-02-04 NOTE — ED PROVIDER NOTES
I independently examined and evaluated Cicero Dancer. All diagnostic, treatment, and disposition decisions were made by myself in conjunction with the MICAH, Mamadou Holguin. For all further details of the patient's emergency department visit, please see their documentation. 26-year-old male with a history of diabetes presents with a malodorous sore to the plantar aspect of his left foot. He is status post amputations of all of the toes on his left foot. He has noticed this wound for the past 2 weeks but it is worsening. He reports fevers at home and generalized fatigue and malaise. Vitals:    02/04/23 0002   BP: (!) 154/74   Pulse: 81   Resp:    Temp:    SpO2: 95%   Here the patient is chronically ill-appearing. He is missing all toes on his left foot. He has a malodorous ulceration with purulent drainage noted on the plantar aspect of the left foot. Heart is regular rate and rhythm.     Results for orders placed or performed during the hospital encounter of 02/03/23   COVID-19 & Influenza Combo    Specimen: Nasopharyngeal Swab   Result Value Ref Range    SARS-CoV-2 RNA, RT PCR NOT DETECTED NOT DETECTED    INFLUENZA A NOT DETECTED NOT DETECTED    INFLUENZA B NOT DETECTED NOT DETECTED   CBC with Auto Differential   Result Value Ref Range    WBC 7.5 4.0 - 11.0 K/uL    RBC 3.48 (L) 4.20 - 5.90 M/uL    Hemoglobin 8.3 (L) 13.5 - 17.5 g/dL    Hematocrit 26.8 (L) 40.5 - 52.5 %    MCV 77.0 (L) 80.0 - 100.0 fL    MCH 23.9 (L) 26.0 - 34.0 pg    MCHC 31.0 31.0 - 36.0 g/dL    RDW 15.8 (H) 12.4 - 15.4 %    Platelets 204 004 - 677 K/uL    MPV 8.5 5.0 - 10.5 fL    PLATELET SLIDE REVIEW Adequate     SLIDE REVIEW see below     Neutrophils % 77.0 %    Lymphocytes % 6.0 %    Monocytes % 10.0 %    Eosinophils % 1.0 %    Basophils % 0.0 %    Neutrophils Absolute 6.2 1.7 - 7.7 K/uL    Lymphocytes Absolute 0.5 (L) 1.0 - 5.1 K/uL    Monocytes Absolute 0.8 0.0 - 1.3 K/uL    Eosinophils Absolute 0.1 0.0 - 0.6 K/uL    Basophils Absolute 0.0 0.0 - 0.2 K/uL    Bands Relative 6 0 - 7 %    Hypochromia Occasional (A)    Comprehensive Metabolic Panel w/ Reflex to MG   Result Value Ref Range    Sodium 129 (L) 136 - 145 mmol/L    Potassium reflex Magnesium 4.5 3.5 - 5.1 mmol/L    Chloride 93 (L) 99 - 110 mmol/L    CO2 27 21 - 32 mmol/L    Anion Gap 9 3 - 16    Glucose 282 (H) 70 - 99 mg/dL    BUN 24 (H) 7 - 20 mg/dL    Creatinine 1.9 (H) 0.8 - 1.3 mg/dL    Est, Glom Filt Rate 39 (A) >60    Calcium 7.8 (L) 8.3 - 10.6 mg/dL    Total Protein 7.1 6.4 - 8.2 g/dL    Albumin 3.0 (L) 3.4 - 5.0 g/dL    Albumin/Globulin Ratio 0.7 (L) 1.1 - 2.2    Total Bilirubin 0.4 0.0 - 1.0 mg/dL    Alkaline Phosphatase 107 40 - 129 U/L    ALT 7 (L) 10 - 40 U/L    AST 14 (L) 15 - 37 U/L   Lactate, Sepsis   Result Value Ref Range    Lactic Acid, Sepsis 0.9 0.4 - 1.9 mmol/L   Sedimentation Rate   Result Value Ref Range    Sed Rate 102 (H) 0 - 20 mm/Hr   C-Reactive Protein   Result Value Ref Range    .5 (H) 0.0 - 5.1 mg/L       XR FOOT LEFT (MIN 3 VIEWS)   Final Result   Suspected osteomyelitis of the heads of the 2nd and 3rd metatarsals. I personally saw and performed a substantive portion of the visit including all aspects of the medical decision making. MDM:      Here the patient does not meet SIRS criteria but does clearly have an infected foot. This appears to be an infected diabetic ulcer. X-rays concerning for osteomyelitis at the heads of the second and third metatarsals. Blood cultures have been drawn and IV antibiotics have been given. Sed rate and CRP are elevated. Lactic acid is normal and he has no leukocytosis. We will admit him to the hospitalist for further treatment. I personally saw the patient and independently provided 0 minutes of nonconcurrent critical care out of the total shared critical care time provided.         Francia Barone MD  02/04/23 5722

## 2023-02-05 LAB
ANION GAP SERPL CALCULATED.3IONS-SCNC: 7 MMOL/L (ref 3–16)
BASOPHILS ABSOLUTE: 0 K/UL (ref 0–0.2)
BASOPHILS RELATIVE PERCENT: 0.7 %
BUN BLDV-MCNC: 16 MG/DL (ref 7–20)
CALCIUM SERPL-MCNC: 8 MG/DL (ref 8.3–10.6)
CHLORIDE BLD-SCNC: 101 MMOL/L (ref 99–110)
CO2: 26 MMOL/L (ref 21–32)
CREAT SERPL-MCNC: 1.8 MG/DL (ref 0.8–1.3)
EOSINOPHILS ABSOLUTE: 0.2 K/UL (ref 0–0.6)
EOSINOPHILS RELATIVE PERCENT: 4.6 %
ESTIMATED AVERAGE GLUCOSE: 340.8 MG/DL
GFR SERPL CREATININE-BSD FRML MDRD: 41 ML/MIN/{1.73_M2}
GLUCOSE BLD-MCNC: 158 MG/DL (ref 70–99)
GLUCOSE BLD-MCNC: 171 MG/DL (ref 70–99)
GLUCOSE BLD-MCNC: 174 MG/DL (ref 70–99)
GLUCOSE BLD-MCNC: 206 MG/DL (ref 70–99)
GLUCOSE BLD-MCNC: 216 MG/DL (ref 70–99)
GLUCOSE BLD-MCNC: 290 MG/DL (ref 70–99)
HBA1C MFR BLD: 13.5 %
HCT VFR BLD CALC: 25.4 % (ref 40.5–52.5)
HEMOGLOBIN: 8 G/DL (ref 13.5–17.5)
LYMPHOCYTES ABSOLUTE: 0.6 K/UL (ref 1–5.1)
LYMPHOCYTES RELATIVE PERCENT: 11.9 %
MCH RBC QN AUTO: 24 PG (ref 26–34)
MCHC RBC AUTO-ENTMCNC: 31.5 G/DL (ref 31–36)
MCV RBC AUTO: 75.9 FL (ref 80–100)
MONOCYTES ABSOLUTE: 0.8 K/UL (ref 0–1.3)
MONOCYTES RELATIVE PERCENT: 15.3 %
NEUTROPHILS ABSOLUTE: 3.4 K/UL (ref 1.7–7.7)
NEUTROPHILS RELATIVE PERCENT: 67.5 %
PDW BLD-RTO: 15.7 % (ref 12.4–15.4)
PERFORMED ON: ABNORMAL
PLATELET # BLD: 296 K/UL (ref 135–450)
PMV BLD AUTO: 8.8 FL (ref 5–10.5)
POTASSIUM SERPL-SCNC: 4 MMOL/L (ref 3.5–5.1)
RBC # BLD: 3.34 M/UL (ref 4.2–5.9)
SODIUM BLD-SCNC: 134 MMOL/L (ref 136–145)
WBC # BLD: 5 K/UL (ref 4–11)

## 2023-02-05 PROCEDURE — 6370000000 HC RX 637 (ALT 250 FOR IP): Performed by: INTERNAL MEDICINE

## 2023-02-05 PROCEDURE — 85025 COMPLETE CBC W/AUTO DIFF WBC: CPT

## 2023-02-05 PROCEDURE — 6360000002 HC RX W HCPCS: Performed by: INTERNAL MEDICINE

## 2023-02-05 PROCEDURE — 2580000003 HC RX 258: Performed by: INTERNAL MEDICINE

## 2023-02-05 PROCEDURE — 1200000000 HC SEMI PRIVATE

## 2023-02-05 PROCEDURE — 80048 BASIC METABOLIC PNL TOTAL CA: CPT

## 2023-02-05 PROCEDURE — 36415 COLL VENOUS BLD VENIPUNCTURE: CPT

## 2023-02-05 RX ORDER — INSULIN GLARGINE 100 [IU]/ML
32 INJECTION, SOLUTION SUBCUTANEOUS NIGHTLY
Status: DISCONTINUED | OUTPATIENT
Start: 2023-02-05 | End: 2023-02-06

## 2023-02-05 RX ADMIN — VENLAFAXINE HYDROCHLORIDE 75 MG: 75 CAPSULE, EXTENDED RELEASE ORAL at 09:19

## 2023-02-05 RX ADMIN — AMLODIPINE BESYLATE 5 MG: 5 TABLET ORAL at 09:19

## 2023-02-05 RX ADMIN — CEFEPIME 2000 MG: 2 INJECTION, POWDER, FOR SOLUTION INTRAVENOUS at 00:49

## 2023-02-05 RX ADMIN — ENOXAPARIN SODIUM 40 MG: 100 INJECTION SUBCUTANEOUS at 09:19

## 2023-02-05 RX ADMIN — INSULIN LISPRO 8 UNITS: 100 INJECTION, SOLUTION INTRAVENOUS; SUBCUTANEOUS at 18:17

## 2023-02-05 RX ADMIN — CEFEPIME 2000 MG: 2 INJECTION, POWDER, FOR SOLUTION INTRAVENOUS at 22:23

## 2023-02-05 RX ADMIN — INSULIN LISPRO 8 UNITS: 100 INJECTION, SOLUTION INTRAVENOUS; SUBCUTANEOUS at 13:53

## 2023-02-05 RX ADMIN — LISINOPRIL 10 MG: 10 TABLET ORAL at 09:19

## 2023-02-05 RX ADMIN — VANCOMYCIN HYDROCHLORIDE 1000 MG: 1 INJECTION, POWDER, LYOPHILIZED, FOR SOLUTION INTRAVENOUS at 18:16

## 2023-02-05 RX ADMIN — SODIUM CHLORIDE, PRESERVATIVE FREE 10 ML: 5 INJECTION INTRAVENOUS at 09:21

## 2023-02-05 RX ADMIN — CEFEPIME 2000 MG: 2 INJECTION, POWDER, FOR SOLUTION INTRAVENOUS at 10:45

## 2023-02-05 RX ADMIN — INSULIN LISPRO 8 UNITS: 100 INJECTION, SOLUTION INTRAVENOUS; SUBCUTANEOUS at 09:22

## 2023-02-05 RX ADMIN — GABAPENTIN 600 MG: 300 CAPSULE ORAL at 22:18

## 2023-02-05 RX ADMIN — ASPIRIN 81 MG: 81 TABLET, COATED ORAL at 09:19

## 2023-02-05 RX ADMIN — GABAPENTIN 600 MG: 300 CAPSULE ORAL at 09:19

## 2023-02-05 RX ADMIN — TAMSULOSIN HYDROCHLORIDE 0.4 MG: 0.4 CAPSULE ORAL at 09:19

## 2023-02-05 RX ADMIN — INSULIN GLARGINE 32 UNITS: 100 INJECTION, SOLUTION SUBCUTANEOUS at 22:24

## 2023-02-05 RX ADMIN — INSULIN LISPRO 2 UNITS: 100 INJECTION, SOLUTION INTRAVENOUS; SUBCUTANEOUS at 09:22

## 2023-02-05 NOTE — PROGRESS NOTES
Hospitalist Progress Note      PCP: Farhad Tyler MD    Date of Admission: 2/3/2023    Subjective: pain controlled, BS uncontrolled    Medications:  Reviewed    Infusion Medications    dextrose      sodium chloride       Scheduled Medications    insulin glargine  32 Units SubCUTAneous Nightly    cefepime  2,000 mg IntraVENous Q12H    amLODIPine  5 mg Oral Daily    aspirin  81 mg Oral Daily    gabapentin  600 mg Oral BID    lisinopril  10 mg Oral Daily    tamsulosin  0.4 mg Oral Daily    venlafaxine  75 mg Oral Daily    sodium chloride flush  5-40 mL IntraVENous 2 times per day    enoxaparin  40 mg SubCUTAneous Daily    vancomycin  1,000 mg IntraVENous Q24H    insulin lispro  8 Units SubCUTAneous TID WC    insulin lispro  0-8 Units SubCUTAneous TID WC    insulin lispro  0-4 Units SubCUTAneous Nightly     PRN Meds: glucose, dextrose bolus **OR** dextrose bolus, glucagon (rDNA), dextrose, sodium chloride flush, sodium chloride, ondansetron **OR** ondansetron, polyethylene glycol, acetaminophen **OR** acetaminophen, potassium chloride **OR** potassium alternative oral replacement **OR** potassium chloride, magnesium sulfate, labetalol      Intake/Output Summary (Last 24 hours) at 2/5/2023 1532  Last data filed at 2/4/2023 1800  Gross per 24 hour   Intake 406.55 ml   Output --   Net 406.55 ml       Physical Exam Performed:    BP (!) 149/78   Pulse 74   Temp 98.6 °F (37 °C) (Oral)   Resp 16   Ht 5' 10\" (1.778 m)   Wt 161 lb 11.2 oz (73.3 kg)   SpO2 96%   BMI 23.20 kg/m²     GEN:        A&Ox3, NAD. HEENT:   NC/AT,EOMI, MMM, no erythema/exudates or visible masses. CVS:        Normal S1,S2. RRR. Without M/G/R.   LUNG:     CTA-B. No wheezes, rales or rhonchi. ABD:        Soft, ND/NT, BS+ x4. Without G/R.  EXT:        2+ pulses, no c/c/e. Wound noted to distal/medial plantar surface of L foot. All 5 toes are absent from this foot. There is erythema noted to distal foot.   Please see pics below - courtesy [facility]. Decreased sensation to feet. Brisk cap refill. PSY:        Thought process intact, affect appropriate. PUSHPA:        CN III-XII grossly intact. Moves all 4 spontaneously. Sensory grossly intact except for DM neuropathy. SKIN:       No rash or lesions on visible skin except as noted above. Labs:   Recent Labs     02/03/23 2213 02/04/23 0526 02/05/23  1032   WBC 7.5 6.3 5.0   HGB 8.3* 7.4* 8.0*   HCT 26.8* 23.4* 25.4*    290 296     Recent Labs     02/03/23 2213 02/04/23  0526 02/05/23  1032   * 130* 134*   K 4.5 4.4 4.0   CL 93* 96* 101   CO2 27 28 26   BUN 24* 24* 16   CREATININE 1.9* 1.9* 1.8*   CALCIUM 7.8* 7.6* 8.0*     Recent Labs     02/03/23 2213 02/04/23 0526   AST 14* 11*   ALT 7* 6*   BILITOT 0.4 0.3   ALKPHOS 107 89     Recent Labs     02/04/23 0526   INR 1.19*     No results for input(s): gDecide Net in the last 72 hours. Urinalysis:      Lab Results   Component Value Date/Time    NITRU Negative 02/04/2023 03:15 AM    WBCUA 0-2 02/04/2023 03:15 AM    BACTERIA 1+ 01/07/2023 10:38 AM    RBCUA 3-4 02/04/2023 03:15 AM    BLOODU LARGE 02/04/2023 03:15 AM    SPECGRAV 1.020 02/04/2023 03:15 AM    GLUCOSEU >=1000 02/04/2023 03:15 AM       Radiology:  XR FOOT LEFT (MIN 3 VIEWS)   Final Result   Suspected osteomyelitis of the heads of the 2nd and 3rd metatarsals. MRI FOOT LEFT WO CONTRAST    (Results Pending)       PHARMACY TO DOSE VANCOMYCIN  IP CONSULT TO PODIATRY  IP CONSULT TO SPIRITUAL SERVICES    Assessment/Plan:    Active Hospital Problems    Diagnosis     Osteomyelitis of left foot, unspecified type ([facility] [address] [M86.9]      Priority: Medium         Osteomyelitis L foot 2/2 DM foot wound. Hx same and has had multiple amputations to this foot in the past. IV vanco and cefepime, IVF. Podiatry and WC RN c/s.   ordered MRI, poss to be done tomorrow  DM2, poor control, initial .   Hold oral Rx, chk A1c 13.1, add Mod SSI q4h, continue home Lantus, increase dose, add lispro tid  HTN, mildly uncontrolled. Will give a dose of his home Rx tonight. Cont home regimen. CKD 3b, Cr stable. Neuropathy, cont home gabapentin. DVT Prophylaxis: Lx  Diet: ADULT DIET;  Regular; 5 carb choices (75 gm/meal)  Diet NPO Exceptions are: Ice Chips, Sips of Water with Meds  Code Status: Full Code  PT/OT Eval Status: ordered    Sienna Middleton MD

## 2023-02-05 NOTE — PROGRESS NOTES
Pt A&O, awake sitting up in in bed, gave pt boxed sandwich and diet pepsi, pt denies any pain at this time, vitals and shift completed- see low sheet, scheduled medications given, Srx2, call light in reach, will continue to monitor

## 2023-02-05 NOTE — PROGRESS NOTES
Bedside report given and care transferred to [facility]. PT sitting up in chair, denies any needs at this time.

## 2023-02-05 NOTE — PROGRESS NOTES
Vancomycin Day # 2  Current dose = 1000 mg q24h  No new labs. BMP ordered for tomorrow. Vanc level yesterday therapeutic to produce AUC of 516.   Continue same

## 2023-02-05 NOTE — CONSULTS
CONSULT    Admit Date:  2/3/2023    Subjective:  57 y.o. male who is seen for evaluation of cellulitis and ulcers on the left LE. Patient is well known to me. States this wound is scaring him. Denies pain in the foot at this time.  States wife told him wound was from him wearing wrong shoes in recent past.       Past Medical History:        Diagnosis Date    TORI (acute kidney injury) ([facility] [address] 09/12/2017    Bacteremia 05/05/2017    staph aureus    Diabetes mellitus ([facility] [address]     Diabetic neuropathy ([facility] [address]     Gout     Hypertension     MRSA (methicillin resistant staph aureus) culture positive 12/27/18, 9/12/17, 5/5/17,9/5/13, 1/15/14    ulcers bilateral legs    MRSA (methicillin resistant staph aureus) culture positive 01/21/2021    foot wound    Neuropathy     Pneumonia     Pyogenic inflammation of bone ([facility] [address]     Stage 3b chronic kidney disease (CKD) ([facility] [address] 1/8/2023       Past Surgical History:        Procedure Laterality Date    EYE SURGERY      lens implants after removal of cataracts    FOOT DEBRIDEMENT Left 4/28/2021    DEBRIDEMENT INFECTED BONE AND TISSUE LEFT FOOT performed by Ike Nelson DPM at [facility]  12/28/2018    partial right foot amputation with graft application    TOE AMPUTATION Right 12/28/2018    PARTIAL RIGHT FOOT AMPUTATION WITH GRAFT APPLICATION [facility] Left 5/27/2021    PARTIAL LEFT FOOT AMPUTATION performed by Ike Nelson DPM at [address] [place] [address] Left 9/2/2022    PARTIAL LEFT FOOT AMPUTATION, ULCER DEBRIDEMENT LOWER LEG performed by Ike Nelson DPM at [address] [place] [address] N/A 1/21/2021    EGD BIOPSY performed by Jose Macdonald DO at [facility] ENDOSCOPY       Current Medications:     cefepime  2,000 mg IntraVENous Q12H    amLODIPine  5 mg Oral Daily    aspirin  81 mg Oral Daily    gabapentin  600 mg Oral BID    lisinopril  10 mg Oral Daily    tamsulosin  0.4 mg Oral Daily    venlafaxine  75 mg Oral Daily    sodium chloride flush  5-40 mL IntraVENous 2 times per day    enoxaparin  40 mg SubCUTAneous Daily    vancomycin  1,000 mg IntraVENous Q24H    insulin glargine  25 Units SubCUTAneous Nightly    insulin lispro  8 Units SubCUTAneous TID WC    insulin lispro  0-8 Units SubCUTAneous TID WC    insulin lispro  0-4 Units SubCUTAneous Nightly       Allergies:  Hydrocodone-acetaminophen, Percocet [oxycodone-acetaminophen], Pregabalin, and Vicodin [hydrocodone-acetaminophen]    Social History:    Social History     Tobacco Use    Smoking status: Never    Smokeless tobacco: Never   Vaping Use    Vaping Use: Never used   Substance Use Topics    Alcohol use: No     Comment: FORMER DRINKER approx.  quit 2005    Drug use: No       Family History:       Problem Relation Age of Onset    Diabetes Maternal Grandfather     Heart Disease Paternal Uncle     High Blood Pressure Mother        Review of Systems    CONSTITUTIONAL:  negative  EYES:  negative  HEENT:  negative  RESPIRATORY:  negative  CARDIOVASCULAR:  negative  GASTROINTESTINAL:  negative  GENITOURINARY:  negative  INTEGUMENT/BREAST:  positive for ulcer  MUSCULOSKELETAL:  negative  NEUROLOGICAL:  positive for numbness       Objective:   BP (!) 149/78   Pulse 74   Temp 98.6 °F (37 °C) (Oral)   Resp 16   Ht 5' 10\" (1.778 m)   Wt 161 lb 11.2 oz (73.3 kg)   SpO2 96%   BMI 23.20 kg/m²     Data:  CBC:   Recent Labs     02/03/23 2213 02/04/23  0526   WBC 7.5 6.3   HGB 8.3* 7.4*   HCT 26.8* 23.4*   MCV 77.0* 75.9*    290     BMP:   Recent Labs     02/03/23 2213 02/04/23  0526   * 130*   K 4.5 4.4   CL 93* 96*   CO2 27 28   BUN 24* 24*   CREATININE 1.9* 1.9*     LIVER PROFILE:   Recent Labs     02/03/23 2213 02/04/23  0526   AST 14* 11*   ALT 7* 6*   BILITOT 0.4 0.3   ALKPHOS 107 89     PT/INR:   Recent Labs     02/03/23 2213 02/04/23  0526   PROT 7.1 6.3*   INR  --  1.19*     HgBA1c:  Lab Results   Component Value Date    LABA1C 13.5 02/04/2023 CRP - 135.5    ESR - 102      Cultures: Blood x2 - NGSF      Imaging: xray left foot -     New osteopenia and probable erosive change of the 2nd and 3rd metatarsal   heads subjacent to the to the wound. The no acute fracture or dislocation. Moderate degenerative change. Impression   Suspected osteomyelitis of the heads of the 2nd and 3rd metatarsals. Prior amputation digits 1-5. Postop changes noted. Physical Exam:    DP/PT palpable left  Ulcer on the medial aspect left lower leg with red granulation tissue noted. Mild serosanguinous drainage noted. No malodor noted. No periwound erythema or edema noted. Probes to soft tissue only. Ulcer on the plantar aspect left forefoot in region of 2nd/3rd MH with + hyperkeratotic tissue noted. + serosanguinous drainage noted. No malodor noted. + periwound erythema and edema of the forefoot noted. No crepitus noted. No purulence noted. Amputation digits 1-5 noted.              Assessment:  Patient Active Problem List   Diagnosis Code    Diabetic ketoacidosis without coma associated with type 2 diabetes mellitus ([address] E11.10    Diabetic ulcer of left foot associated with type 2 diabetes mellitus ([address] E11.621, L97.529    Nausea & vomiting R11.2    Diabetic neuropathy ([address] E11.40    DM (diabetes mellitus), secondary, uncontrolled, w/neurologic complic [id number]    Gout F57.6    Hx MRSA Z22.322    Hyperkalemia E87.5    Chronic pain on chronic opioids G89.29    Chronic LE diabetic ulcers E11.622, L97.309    Mild protein-calorie malnutrition (HCC) E44.1    Cellulitis L03.90    Acute kidney injury ([address] N17.9    Neuropathy G62.9    Diabetic acidosis without coma (HCC) E11.10    Acute CVA (cerebrovascular accident) ([address] I63.9    HTN (hypertension), benign I10    DKA, type 2, not at goal [facility]) E11.10    Diabetic foot infection ([address] E11.628, L08.9    Sepsis without acute organ dysfunction (HCC) A41.9    Stage 3a chronic kidney disease ([address] N18.31 Hypertension, essential I10    Type 2 diabetes mellitus with hyperglycemia, with long-term current use of insulin ([facility]) E11.65, Z79.4    Acute osteomyelitis of left foot ([facility]) M86.172    Hyperglycemia R73.9    Pyogenic inflammation of bone ([facility]) M86.9    Anemia D64.9    Pseudohyponatremia R79.89    Acute encephalopathy G93.40    Stage 3b chronic kidney disease (CKD) ([facility]) N18.32    Osteomyelitis of left foot, unspecified type ([facility]) M86.9     diabetic foot ulcer left plantar forefoot secondary to peripheral neuropathy   Ulcer medial aspect left lower leg complicated by diabetes mellitus   Cellulitis left LE - improving  Hypertrophy bone left forefoot  Osteomyelitis left foot secondary to diabetes mellitus   diabetes mellitus with peripheral neuropathy uncontrolled    Plan  Patient examined. Reviewed labs, imaging and notes. Continue IV antibiotics. Explained that it is very possible the wounds started from wearing the wrong shoes and causing excess pressure on the area. Especially since he had been doing well with no wounds prior to this episode. Dressing removed. [employer] and dry dressing to the medial lower leg ulcer and foot ulcer. Will await MRI results in order to determine if osteomyelitis is found on MRI. If osteomyelitis is found he will need TMA. Elevation of legs to decrease edema. Needs to control glucose levels to prevent future complications. Will place NPO at midnight for possible foot surgery tomorrow. Depending on timing of OR availability surgery may need to be delayed until Tuesday. Thank you for allowing me to participate in the care of your patient.          Electronically signed by Axel Castro DPM on 2/5/2023 at 10:18 AM.

## 2023-02-05 NOTE — PLAN OF CARE
Problem: Chronic Conditions and Co-morbidities  Goal: Patient's chronic conditions and co-morbidity symptoms are monitored and maintained or improved  2/5/2023 0132 by Franklin Astudillo RN  Outcome: Progressing  2/5/2023 0132 by Franklin Astudillo RN  Outcome: Progressing  4 H Jonathon Siddiqi (Taken 2/4/2023 2022)  Care Plan - Patient's Chronic Conditions and Co-Morbidity Symptoms are Monitored and Maintained or Improved:   Monitor and assess patient's chronic conditions and comorbid symptoms for stability, deterioration, or improvement   Collaborate with multidisciplinary team to address chronic and comorbid conditions and prevent exacerbation or deterioration   Update acute care plan with appropriate goals if chronic or comorbid symptoms are exacerbated and prevent overall improvement and discharge     Problem: Pain  Goal: Verbalizes/displays adequate comfort level or baseline comfort level  2/5/2023 0132 by Franklin Astudillo RN  Outcome: Progressing  2/5/2023 0132 by Franklin Astudillo RN  Outcome: Progressing  Flowsheets  Taken 2/4/2023 2022 by Franklin Astudillo RN  Verbalizes/displays adequate comfort level or baseline comfort level:   Encourage patient to monitor pain and request assistance   Assess pain using appropriate pain scale   Administer analgesics based on type and severity of pain and evaluate response   Implement non-pharmacological measures as appropriate and evaluate response   Consider cultural and social influences on pain and pain management   Notify Licensed Independent Practitioner if interventions unsuccessful or patient reports new pain  Taken 2/4/2023 1515 by Adama Quezada RN  Verbalizes/displays adequate comfort level or baseline comfort level:   Encourage patient to monitor pain and request assistance   Assess pain using appropriate pain scale   Administer analgesics based on type and severity of pain and evaluate response   Implement non-pharmacological measures as appropriate and evaluate response   Consider cultural and social influences on pain and pain management   Notify Licensed Independent Practitioner if interventions unsuccessful or patient reports new pain

## 2023-02-06 ENCOUNTER — APPOINTMENT (OUTPATIENT)
Dept: MRI IMAGING | Age: 63
DRG: 617 | End: 2023-02-06
Payer: COMMERCIAL

## 2023-02-06 LAB
ANION GAP SERPL CALCULATED.3IONS-SCNC: 7 MMOL/L (ref 3–16)
BASOPHILS ABSOLUTE: 0 K/UL (ref 0–0.2)
BASOPHILS RELATIVE PERCENT: 0.8 %
BUN BLDV-MCNC: 16 MG/DL (ref 7–20)
CALCIUM SERPL-MCNC: 7.6 MG/DL (ref 8.3–10.6)
CHLORIDE BLD-SCNC: 103 MMOL/L (ref 99–110)
CO2: 25 MMOL/L (ref 21–32)
CREAT SERPL-MCNC: 1.8 MG/DL (ref 0.8–1.3)
EOSINOPHILS ABSOLUTE: 0.2 K/UL (ref 0–0.6)
EOSINOPHILS RELATIVE PERCENT: 4.6 %
GFR SERPL CREATININE-BSD FRML MDRD: 41 ML/MIN/{1.73_M2}
GLUCOSE BLD-MCNC: 211 MG/DL (ref 70–99)
GLUCOSE BLD-MCNC: 223 MG/DL (ref 70–99)
GLUCOSE BLD-MCNC: 237 MG/DL (ref 70–99)
GLUCOSE BLD-MCNC: 31 MG/DL (ref 70–99)
GLUCOSE BLD-MCNC: 331 MG/DL (ref 70–99)
GLUCOSE BLD-MCNC: 402 MG/DL (ref 70–99)
GLUCOSE BLD-MCNC: 94 MG/DL (ref 70–99)
GLUCOSE BLD-MCNC: 94 MG/DL (ref 70–99)
GLUCOSE BLD-MCNC: 98 MG/DL (ref 70–99)
HCT VFR BLD CALC: 22.7 % (ref 40.5–52.5)
HEMOGLOBIN: 7.3 G/DL (ref 13.5–17.5)
LYMPHOCYTES ABSOLUTE: 0.9 K/UL (ref 1–5.1)
LYMPHOCYTES RELATIVE PERCENT: 16.8 %
MCH RBC QN AUTO: 24.3 PG (ref 26–34)
MCHC RBC AUTO-ENTMCNC: 32.4 G/DL (ref 31–36)
MCV RBC AUTO: 75.2 FL (ref 80–100)
MONOCYTES ABSOLUTE: 0.9 K/UL (ref 0–1.3)
MONOCYTES RELATIVE PERCENT: 16.7 %
NEUTROPHILS ABSOLUTE: 3.1 K/UL (ref 1.7–7.7)
NEUTROPHILS RELATIVE PERCENT: 61.1 %
PDW BLD-RTO: 15.6 % (ref 12.4–15.4)
PERFORMED ON: ABNORMAL
PERFORMED ON: NORMAL
PLATELET # BLD: 323 K/UL (ref 135–450)
PMV BLD AUTO: 8.5 FL (ref 5–10.5)
POTASSIUM SERPL-SCNC: 4.2 MMOL/L (ref 3.5–5.1)
RBC # BLD: 3.02 M/UL (ref 4.2–5.9)
SODIUM BLD-SCNC: 135 MMOL/L (ref 136–145)
WBC # BLD: 5.1 K/UL (ref 4–11)

## 2023-02-06 PROCEDURE — 1200000000 HC SEMI PRIVATE

## 2023-02-06 PROCEDURE — 6370000000 HC RX 637 (ALT 250 FOR IP): Performed by: INTERNAL MEDICINE

## 2023-02-06 PROCEDURE — 6360000002 HC RX W HCPCS: Performed by: INTERNAL MEDICINE

## 2023-02-06 PROCEDURE — 36415 COLL VENOUS BLD VENIPUNCTURE: CPT

## 2023-02-06 PROCEDURE — 2580000003 HC RX 258: Performed by: INTERNAL MEDICINE

## 2023-02-06 PROCEDURE — 85025 COMPLETE CBC W/AUTO DIFF WBC: CPT

## 2023-02-06 PROCEDURE — 99232 SBSQ HOSP IP/OBS MODERATE 35: CPT | Performed by: INTERNAL MEDICINE

## 2023-02-06 PROCEDURE — 73718 MRI LOWER EXTREMITY W/O DYE: CPT

## 2023-02-06 PROCEDURE — 80048 BASIC METABOLIC PNL TOTAL CA: CPT

## 2023-02-06 RX ORDER — DEXTROSE MONOHYDRATE 100 MG/ML
INJECTION, SOLUTION INTRAVENOUS CONTINUOUS PRN
Status: DISCONTINUED | OUTPATIENT
Start: 2023-02-06 | End: 2023-02-09 | Stop reason: HOSPADM

## 2023-02-06 RX ORDER — INSULIN GLARGINE 100 [IU]/ML
40 INJECTION, SOLUTION SUBCUTANEOUS NIGHTLY
Status: DISCONTINUED | OUTPATIENT
Start: 2023-02-06 | End: 2023-02-07

## 2023-02-06 RX ADMIN — LISINOPRIL 10 MG: 10 TABLET ORAL at 08:18

## 2023-02-06 RX ADMIN — INSULIN GLARGINE 40 UNITS: 100 INJECTION, SOLUTION SUBCUTANEOUS at 20:52

## 2023-02-06 RX ADMIN — VANCOMYCIN HYDROCHLORIDE 1000 MG: 1 INJECTION, POWDER, LYOPHILIZED, FOR SOLUTION INTRAVENOUS at 17:54

## 2023-02-06 RX ADMIN — ENOXAPARIN SODIUM 40 MG: 100 INJECTION SUBCUTANEOUS at 08:18

## 2023-02-06 RX ADMIN — GABAPENTIN 600 MG: 300 CAPSULE ORAL at 20:44

## 2023-02-06 RX ADMIN — TAMSULOSIN HYDROCHLORIDE 0.4 MG: 0.4 CAPSULE ORAL at 08:18

## 2023-02-06 RX ADMIN — INSULIN LISPRO 4 UNITS: 100 INJECTION, SOLUTION INTRAVENOUS; SUBCUTANEOUS at 20:52

## 2023-02-06 RX ADMIN — GABAPENTIN 600 MG: 300 CAPSULE ORAL at 08:18

## 2023-02-06 RX ADMIN — VENLAFAXINE HYDROCHLORIDE 75 MG: 75 CAPSULE, EXTENDED RELEASE ORAL at 08:18

## 2023-02-06 RX ADMIN — SODIUM CHLORIDE, PRESERVATIVE FREE 10 ML: 5 INJECTION INTRAVENOUS at 08:20

## 2023-02-06 RX ADMIN — DEXTROSE MONOHYDRATE 250 ML: 100 INJECTION, SOLUTION INTRAVENOUS at 11:30

## 2023-02-06 RX ADMIN — INSULIN LISPRO 8 UNITS: 100 INJECTION, SOLUTION INTRAVENOUS; SUBCUTANEOUS at 08:21

## 2023-02-06 RX ADMIN — ASPIRIN 81 MG: 81 TABLET, COATED ORAL at 08:18

## 2023-02-06 RX ADMIN — INSULIN LISPRO 2 UNITS: 100 INJECTION, SOLUTION INTRAVENOUS; SUBCUTANEOUS at 08:20

## 2023-02-06 RX ADMIN — AMLODIPINE BESYLATE 5 MG: 5 TABLET ORAL at 08:18

## 2023-02-06 NOTE — PLAN OF CARE
Problem: Discharge Planning  Goal: Discharge to home or other facility with appropriate resources  Outcome: Progressing     Problem: Pain  Goal: Verbalizes/displays adequate comfort level or baseline comfort level  Outcome: Progressing     Problem: Chronic Conditions and Co-morbidities  Goal: Patient's chronic conditions and co-morbidity symptoms are monitored and maintained or improved  Outcome: Progressing     Problem: Wound:  Goal: Will show signs of wound healing; wound closure and no evidence of infection  Description: Will show signs of wound healing; wound closure and no evidence of infection  Outcome: Progressing     Problem: Safety - Adult  Goal: Free from fall injury  Outcome: Progressing

## 2023-02-06 NOTE — PROGRESS NOTES
Called to see patient with low FSBS and symptomatice. D10 running on my arrival. Pt lethargic but able to communicate. Recheck on FSBS was 94 and patient more pink and alert . Dr Varinder Feldman at bedside with Liss Lee. Will continue to monitor patient. Detail Level: Detailed Detail Level: Zone

## 2023-02-06 NOTE — CARE COORDINATION
INTERDISCIPLINARY PLAN OF CARE CONFERENCE    Date/Time: 2/6/2023 4:09 PM  Completed by: Mónica Elliott RN, Case Management      Patient Name:  Gee Morales  YOB: 1960  Admitting Diagnosis: Anemia, unspecified type [D64.9]  Diabetic ulcer of left midfoot associated with diabetes mellitus due to underlying condition, with necrosis of bone ([address] [V94.108, L97.424]  Other acute osteomyelitis of left foot ([address] [M86.172]  Osteomyelitis of left foot, unspecified type ([address] [M86.9]     Admit Date/Time:  2/3/2023  9:41 PM    Chart reviewed. Interdisciplinary team contacted or reviewed plan related to patient progress and discharge plans. Disciplines included Case Management, Nursing, and Dietitian. Current Status:Stable  PT/OT recommendation for discharge plan of care: SNF    Expected D/C Disposition:  home vs SNF  Met with:Patient  Discharge Plan Comments: Reviewed chart and met with pt who is sleeping attempted to wake without success. Will follow and attempt in AM to discuss dc plan with pt.     Home O2 in place on admit: No  Pt informed of need to bring portable home O2 tank on day of discharge for nursing to connect prior to leaving:  Not Indicated  Verbalized agreement/Understanding:  Not Indicated

## 2023-02-06 NOTE — PLAN OF CARE
Problem: Discharge Planning  Goal: Discharge to home or other facility with appropriate resources  2/6/2023 0434 by Ilene Tena RN  Outcome: Progressing  2/6/2023 0433 by Ilene Tena RN  Outcome: Progressing     Problem: Pain  Goal: Verbalizes/displays adequate comfort level or baseline comfort level  2/6/2023 0434 by Ilene Tena RN  Outcome: Progressing  2/6/2023 0433 by Ilene Tena RN  Outcome: Progressing     Problem: Chronic Conditions and Co-morbidities  Goal: Patient's chronic conditions and co-morbidity symptoms are monitored and maintained or improved  2/6/2023 0434 by Ilene Tena RN  Outcome: Progressing  2/6/2023 0433 by Ilene Tena RN  Outcome: Progressing     Problem: Wound:  Goal: Will show signs of wound healing; wound closure and no evidence of infection  Description: Will show signs of wound healing; wound closure and no evidence of infection  2/6/2023 0434 by Ilene Tena RN  Outcome: Progressing  2/6/2023 0433 by Ilene Tena RN  Outcome: Progressing     Problem: Safety - Adult  Goal: Free from fall injury  2/6/2023 0434 by Ilene Tena RN  Outcome: Progressing  2/6/2023 0433 by Ilene Tena RN  Outcome: Progressing

## 2023-02-06 NOTE — ACP (ADVANCE CARE PLANNING)
Advance Care Planning     Advance Care Planning Inpatient Note  Spiritual Care Department    Today's Date: 2/6/2023  Unit: [facility]    Received request from IDT Member. Upon review of chart and communication with care team, Spiritual Care will defer advance care planning with patient at this time. . Patient was/were present in the room during visit. Goals of ACP Conversation:  Discuss advance care planning documents    Health Care Decision Makers:       Primary Decision Maker: Dallas Terry - [phone]    Secondary Decision Maker: Wei Jay [facility] - [phone]  Summary:  Verified Healthcare Decision Maker  Updated Healthcare Decision [facility]    Advance Care Planning Documents (Patient Wishes):  None     Assessment:   attempted to have ACP discussion with patient. He was passive and not engaged in decision making conversation. Interventions:  Deferred conversation as patient not interested in completing an advance directive at this time    Care Preferences Communicated:   No    Outcomes/Plan:  ACP Discussion: Postponed as patient is not participating when asked about his wishes. PRN follow-up if patient decides he wants further information or help completing documents. He was willing to take forms, which were left at bedside.     Electronically signed by Sabiha Jacobo, [employer] on 2/6/2023 at 10:53 AM

## 2023-02-06 NOTE — PROGRESS NOTES
Hospitalist Progress Note      PCP: Osman Deleon MD    Date of Admission: 2/3/2023    Subjective: pain controlled, BS uncontrolled    Medications:  Reviewed    Infusion Medications    dextrose      sodium chloride       Scheduled Medications    insulin glargine  32 Units SubCUTAneous Nightly    cefepime  2,000 mg IntraVENous Q12H    amLODIPine  5 mg Oral Daily    aspirin  81 mg Oral Daily    gabapentin  600 mg Oral BID    lisinopril  10 mg Oral Daily    tamsulosin  0.4 mg Oral Daily    venlafaxine  75 mg Oral Daily    sodium chloride flush  5-40 mL IntraVENous 2 times per day    enoxaparin  40 mg SubCUTAneous Daily    vancomycin  1,000 mg IntraVENous Q24H    insulin lispro  8 Units SubCUTAneous TID WC    insulin lispro  0-8 Units SubCUTAneous TID WC    insulin lispro  0-4 Units SubCUTAneous Nightly     PRN Meds: glucose, dextrose bolus **OR** dextrose bolus, glucagon (rDNA), dextrose, sodium chloride flush, sodium chloride, ondansetron **OR** ondansetron, polyethylene glycol, acetaminophen **OR** acetaminophen, potassium chloride **OR** potassium alternative oral replacement **OR** potassium chloride, magnesium sulfate, labetalol    No intake or output data in the 24 hours ending 02/06/23 0801      Physical Exam Performed:    /66   Pulse 64   Temp 99.2 °F (37.3 °C)   Resp 16   Ht 5' 10\" (1.778 m)   Wt 161 lb 8 oz (73.3 kg)   SpO2 98%   BMI 23.17 kg/m²     GEN:        A&Ox3, NAD. HEENT:   NC/AT,EOMI, MMM, no erythema/exudates or visible masses. CVS:        Normal S1,S2. RRR. Without M/G/R.   LUNG:     CTA-B. No wheezes, rales or rhonchi. ABD:        Soft, ND/NT, BS+ x4. Without G/R.  EXT:        2+ pulses, no c/c/e. Wound noted to distal/medial plantar surface of L foot. All 5 toes are absent from this foot. There is erythema noted to distal foot. Please see pics below - courtesy of KOJO Brito. Decreased sensation to feet. Brisk cap refill.   PSY:        Thought process intact, affect appropriate. PUSHPA:        CN III-XII grossly intact. Moves all 4 spontaneously. Sensory grossly intact except for DM neuropathy. SKIN:       No rash or lesions on visible skin except as noted above. Labs:   Recent Labs     02/04/23  0526 02/05/23  1032 02/06/23  0550   WBC 6.3 5.0 5.1   HGB 7.4* 8.0* 7.3*   HCT 23.4* 25.4* 22.7*    296 323       Recent Labs     02/04/23  0526 02/05/23  1032 02/06/23  0550   * 134* 135*   K 4.4 4.0 4.2   CL 96* 101 103   CO2 28 26 25   BUN 24* 16 16   CREATININE 1.9* 1.8* 1.8*   CALCIUM 7.6* 8.0* 7.6*       Recent Labs     02/03/23  2213 02/04/23  0526   AST 14* 11*   ALT 7* 6*   BILITOT 0.4 0.3   ALKPHOS 107 89       Recent Labs     02/04/23  0526   INR 1.19*       No results for input(s): CKTOTAL, TROPONINI in the last 72 hours. Urinalysis:      Lab Results   Component Value Date/Time    NITRU Negative 02/04/2023 03:15 AM    WBCUA 0-2 02/04/2023 03:15 AM    BACTERIA 1+ 01/07/2023 10:38 AM    RBCUA 3-4 02/04/2023 03:15 AM    BLOODU LARGE 02/04/2023 03:15 AM    SPECGRAV 1.020 02/04/2023 03:15 AM    GLUCOSEU >=1000 02/04/2023 03:15 AM       Radiology:  XR FOOT LEFT (MIN 3 VIEWS)   Final Result   Suspected osteomyelitis of the heads of the 2nd and 3rd metatarsals. MRI FOOT LEFT WO CONTRAST    (Results Pending)       PHARMACY TO DOSE VANCOMYCIN  IP CONSULT TO PODIATRY  IP CONSULT TO SPIRITUAL SERVICES    Assessment/Plan:    Active Hospital Problems    Diagnosis     Osteomyelitis of left foot, unspecified type ([facility] [M86.9]      Priority: Medium         Osteomyelitis L foot 2/2 DM foot wound. Hx same and has had multiple amputations to this foot in the past. IV vanco and cefepime, IVF. Podiatry and WC RN c/s. MRI today  DM2, poor control, initial . Hold oral Rx, chk A1c 13.1, add Mod SSI q4h, continue home Lantus, increase dose, add lispro tid  HTN, mildly uncontrolled. Will give a dose of his home Rx tonight. Cont home regimen.     CKD 3b, Cr stable. Neuropathy, cont home gabapentin.        DVT Prophylaxis: Lx  Diet: Diet NPO Exceptions are: Ice Chips, Sips of Water with Meds  Code Status: Full Code  PT/OT Eval Status: ordered    Al Mathias MD

## 2023-02-06 NOTE — PROGRESS NOTES
PROGRESS NOTE    Admit Date:  2/3/2023    Subjective:  57 y.o. male who is seen for evaluation of cellulitis and ulcers on the left LE. Had episode of hypoglycemia earlier while NPO for potential surgery. MRI was not able to be done today thus he was then allowed to eat. States feeling tired at this time.        Past Medical History:        Diagnosis Date    TORI (acute kidney injury) ([facility] [address] 09/12/2017    Bacteremia 05/05/2017    staph aureus    Diabetes mellitus ([facility] [address]     Diabetic neuropathy ([facility] [address]     Gout     Hypertension     MRSA (methicillin resistant staph aureus) culture positive 12/27/18, 9/12/17, 5/5/17,9/5/13, 1/15/14    ulcers bilateral legs    MRSA (methicillin resistant staph aureus) culture positive 01/21/2021    foot wound    Neuropathy     Pneumonia     Pyogenic inflammation of bone ([facility] [address]     Stage 3b chronic kidney disease (CKD) ([facility] 1/8/2023       Past Surgical History:        Procedure Laterality Date    EYE SURGERY      lens implants after removal of cataracts    FOOT DEBRIDEMENT Left 4/28/2021    DEBRIDEMENT INFECTED BONE AND TISSUE LEFT FOOT performed by Ivonne Calixto DPM at [place] [id number]  12/28/2018    partial right foot amputation with graft application    TOE AMPUTATION Right 12/28/2018    PARTIAL RIGHT FOOT AMPUTATION WITH GRAFT APPLICATION performed [facility]von[facility]PM [facility] Left 5/27/2021    PARTIAL LEFT FOOT AMPUTATION performed by [facility]von[facility] DPM at [facility] Left 9/2/2022    PARTIAL LEFT FOOT AMPUTATION, ULCER DEBRIDEMENT LOWER LEG performed by Ivonne Calixto DPM at [address] N/A 1/21/2021    EGD BIOPSY performed by Brooke Correa DO at [facility] ENDOSCOPY       Current Medications:     insulin glargine  40 Units SubCUTAneous Nightly    cefepime  2,000 mg IntraVENous Q12H    amLODIPine  5 mg Oral Daily    aspirin  81 mg Oral Daily    gabapentin  600 mg Oral BID    lisinopril  10 mg Oral Daily tamsulosin  0.4 mg Oral Daily    venlafaxine  75 mg Oral Daily    sodium chloride flush  5-40 mL IntraVENous 2 times per day    enoxaparin  40 mg SubCUTAneous Daily    vancomycin  1,000 mg IntraVENous Q24H    insulin lispro  8 Units SubCUTAneous TID WC    insulin lispro  0-8 Units SubCUTAneous TID WC    insulin lispro  0-4 Units SubCUTAneous Nightly       Allergies:  Hydrocodone-acetaminophen, Percocet [oxycodone-acetaminophen], Pregabalin, and Vicodin [hydrocodone-acetaminophen]    Social History:    Social History     Tobacco Use    Smoking status: Never    Smokeless tobacco: Never   Vaping Use    Vaping Use: Never used   Substance Use Topics    Alcohol use: No     Comment: FORMER DRINKER approx.  quit 2005    Drug use: No       Family History:       Problem Relation Age of Onset    Diabetes Maternal Grandfather     Heart Disease Paternal Uncle     High Blood Pressure Mother        Review of Systems    CONSTITUTIONAL:  negative  EYES:  negative  HEENT:  negative  RESPIRATORY:  negative  CARDIOVASCULAR:  negative  GASTROINTESTINAL:  negative  GENITOURINARY:  negative  INTEGUMENT/BREAST:  positive for ulcer  MUSCULOSKELETAL:  negative  NEUROLOGICAL:  positive for numbness       Objective:   BP (!) 118/56   Pulse 70   Temp 98.8 °F (37.1 °C) (Oral)   Resp 16   Ht 5' 10\" (1.778 m)   Wt 161 lb 8 oz (73.3 kg)   SpO2 96%   BMI 23.17 kg/m²     Data:  CBC:   Recent Labs     02/04/23 0526 02/05/23 1032 02/06/23  0550   WBC 6.3 5.0 5.1   HGB 7.4* 8.0* 7.3*   HCT 23.4* 25.4* 22.7*   MCV 75.9* 75.9* 75.2*    296 323     BMP:   Recent Labs     02/04/23  0526 02/05/23 1032 02/06/23  0550   * 134* 135*   K 4.4 4.0 4.2   CL 96* 101 103   CO2 28 26 25   BUN 24* 16 16   CREATININE 1.9* 1.8* 1.8*     LIVER PROFILE:   Recent Labs     02/03/23 2213 02/04/23  0526   AST 14* 11*   ALT 7* 6*   BILITOT 0.4 0.3   ALKPHOS 107 89     PT/INR:   Recent Labs     02/03/23 2213 02/04/23  0526   PROT 7.1 6.3*   INR  --  1.19* HgBA1c:  Lab Results   Component Value Date    LABA1C 13.5 02/04/2023       CRP - 135.5    ESR - 102      Cultures: Blood x2 - NGSF      Imaging: xray left foot -     New osteopenia and probable erosive change of the 2nd and 3rd metatarsal   heads subjacent to the to the wound. The no acute fracture or dislocation. Moderate degenerative change. Impression   Suspected osteomyelitis of the heads of the 2nd and 3rd metatarsals. Prior amputation digits 1-5. Postop changes noted. Physical Exam:    DP/PT palpable left  Ulcer on the medial aspect left lower leg with red granulation tissue noted. Mild serosanguinous drainage noted. No malodor noted. No periwound erythema or edema noted. Probes to soft tissue only. Ulcer on the plantar aspect left forefoot in region of 2nd/3rd MH with + hyperkeratotic tissue noted. + serosanguinous drainage noted. No malodor noted. + mild periwound erythema and edema of the forefoot noted. No crepitus noted. No purulence noted. Amputation digits 1-5 noted.              Assessment:  Patient Active Problem List   Diagnosis Code    Diabetic ketoacidosis without coma associated with type 2 diabetes mellitus ([facility] [address] E11.10    Diabetic ulcer of left foot associated with type 2 diabetes mellitus ([facility] [address] E11.621, L97.529    Nausea & vomiting R11.2    Diabetic neuropathy ([address] E11.40    DM (diabetes mellitus), secondary, uncontrolled, w/neurologic complic [id number]    Gout K89.5    Hx MRSA Z22.322    Hyperkalemia E87.5    Chronic pain on chronic opioids G89.29    Chronic LE diabetic ulcers E11.622, L97.309    Mild protein-calorie malnutrition (HCC) E44.1    Cellulitis L03.90    Acute kidney injury ([address] N17.9    Neuropathy G62.9    Diabetic acidosis without coma (HCC) E11.10    Acute CVA (cerebrovascular accident) ([facility] [address] I63.9    HTN (hypertension), benign I10    DKA, type 2, not at goal [facility]) E11.10    Diabetic foot infection ([facility] [address] E11.628, L08.9    Sepsis without acute organ dysfunction ([facility]) A41.9    Stage 3a chronic kidney disease (HCC) N18.31    Primary hypertension I10    Diabetes mellitus ([facility] [address] E11.9    Acute osteomyelitis of left foot ([facility]) M86.172    Hyperglycemia R73.9    Pyogenic inflammation of bone (HCC) M86.9    Anemia D64.9    Pseudohyponatremia R79.89    Acute encephalopathy G93.40    Stage 3b chronic kidney disease (CKD) ([facility]) N18.32    Osteomyelitis of left foot, unspecified type ([facility]) M86.9     diabetic foot ulcer left plantar forefoot secondary to peripheral neuropathy   Ulcer medial aspect left lower leg complicated by diabetes mellitus   Cellulitis left LE - improving  Hypertrophy bone left forefoot  Osteomyelitis left foot secondary to diabetes mellitus   diabetes mellitus with peripheral neuropathy uncontrolled      Plan  Patient examined. Reviewed labs, imaging and notes. Continue IV antibiotics. Dressing removed. [employer] and dry dressing to the medial lower leg ulcer and foot ulcer. Will await MRI results in order to determine if osteomyelitis is found on MRI. If osteomyelitis is found he will need TMA. MRI was not able to be done today. Hopefully will get this tomorrow in order to determine timing for next steps. Elevation of legs to decrease edema. Needs to control glucose levels to prevent future complications.            Electronically signed by Axel Castro DPM on 2/6/2023 at 4:31 PM.

## 2023-02-06 NOTE — PROGRESS NOTES
Handoff report and transfer of care given at bedside to  OUR [facility]. Patient in stable condition, denies needs/concerns at this time. Call light within reach.

## 2023-02-06 NOTE — PROGRESS NOTES
Vancomycin Day: 4  Current Regimen: 1000 mg IV every 24 hours    Patient's labs, cultures, vitals, and vancomycin regimen reviewed.    SCr stable  U/O not measured/well documented  [employer] updated    Plan:   No change today

## 2023-02-07 ENCOUNTER — APPOINTMENT (OUTPATIENT)
Dept: GENERAL RADIOLOGY | Age: 63
DRG: 617 | End: 2023-02-07
Payer: COMMERCIAL

## 2023-02-07 ENCOUNTER — ANESTHESIA EVENT (OUTPATIENT)
Dept: OPERATING ROOM | Age: 63
End: 2023-02-07
Payer: MEDICARE

## 2023-02-07 ENCOUNTER — ANESTHESIA (OUTPATIENT)
Dept: OPERATING ROOM | Age: 63
End: 2023-02-07
Payer: MEDICARE

## 2023-02-07 LAB
ANION GAP SERPL CALCULATED.3IONS-SCNC: 7 MMOL/L (ref 3–16)
BASOPHILS ABSOLUTE: 0 K/UL (ref 0–0.2)
BASOPHILS RELATIVE PERCENT: 0.7 %
BLOOD CULTURE, ROUTINE: NORMAL
BUN BLDV-MCNC: 17 MG/DL (ref 7–20)
CALCIUM SERPL-MCNC: 8.1 MG/DL (ref 8.3–10.6)
CHLORIDE BLD-SCNC: 101 MMOL/L (ref 99–110)
CO2: 26 MMOL/L (ref 21–32)
CREAT SERPL-MCNC: 1.9 MG/DL (ref 0.8–1.3)
CULTURE, BLOOD 2: NORMAL
EOSINOPHILS ABSOLUTE: 0.2 K/UL (ref 0–0.6)
EOSINOPHILS RELATIVE PERCENT: 3.1 %
GFR SERPL CREATININE-BSD FRML MDRD: 39 ML/MIN/{1.73_M2}
GLUCOSE BLD-MCNC: 133 MG/DL (ref 70–99)
GLUCOSE BLD-MCNC: 161 MG/DL (ref 70–99)
GLUCOSE BLD-MCNC: 189 MG/DL (ref 70–99)
GLUCOSE BLD-MCNC: 280 MG/DL (ref 70–99)
GLUCOSE BLD-MCNC: 316 MG/DL (ref 70–99)
GLUCOSE BLD-MCNC: 427 MG/DL (ref 70–99)
GLUCOSE BLD-MCNC: 86 MG/DL (ref 70–99)
GLUCOSE BLD-MCNC: 92 MG/DL (ref 70–99)
HCT VFR BLD CALC: 25.9 % (ref 40.5–52.5)
HEMOGLOBIN: 8.1 G/DL (ref 13.5–17.5)
LYMPHOCYTES ABSOLUTE: 0.9 K/UL (ref 1–5.1)
LYMPHOCYTES RELATIVE PERCENT: 14.4 %
MCH RBC QN AUTO: 23.8 PG (ref 26–34)
MCHC RBC AUTO-ENTMCNC: 31.2 G/DL (ref 31–36)
MCV RBC AUTO: 76.4 FL (ref 80–100)
MONOCYTES ABSOLUTE: 0.8 K/UL (ref 0–1.3)
MONOCYTES RELATIVE PERCENT: 12.6 %
NEUTROPHILS ABSOLUTE: 4.4 K/UL (ref 1.7–7.7)
NEUTROPHILS RELATIVE PERCENT: 69.2 %
PDW BLD-RTO: 15.5 % (ref 12.4–15.4)
PERFORMED ON: ABNORMAL
PERFORMED ON: NORMAL
PERFORMED ON: NORMAL
PLATELET # BLD: 407 K/UL (ref 135–450)
PMV BLD AUTO: 8.6 FL (ref 5–10.5)
POTASSIUM SERPL-SCNC: 4.8 MMOL/L (ref 3.5–5.1)
RBC # BLD: 3.4 M/UL (ref 4.2–5.9)
SODIUM BLD-SCNC: 134 MMOL/L (ref 136–145)
WBC # BLD: 6.3 K/UL (ref 4–11)

## 2023-02-07 PROCEDURE — 2500000003 HC RX 250 WO HCPCS: Performed by: ANESTHESIOLOGY

## 2023-02-07 PROCEDURE — 0Y6N0ZC DETACHMENT AT LEFT FOOT, PARTIAL 3RD RAY, OPEN APPROACH: ICD-10-PCS | Performed by: PODIATRIST

## 2023-02-07 PROCEDURE — 80048 BASIC METABOLIC PNL TOTAL CA: CPT

## 2023-02-07 PROCEDURE — 2709999900 HC NON-CHARGEABLE SUPPLY: Performed by: PODIATRIST

## 2023-02-07 PROCEDURE — 6370000000 HC RX 637 (ALT 250 FOR IP): Performed by: PODIATRIST

## 2023-02-07 PROCEDURE — 88305 TISSUE EXAM BY PATHOLOGIST: CPT

## 2023-02-07 PROCEDURE — 73620 X-RAY EXAM OF FOOT: CPT

## 2023-02-07 PROCEDURE — 88311 DECALCIFY TISSUE: CPT

## 2023-02-07 PROCEDURE — 87185 SC STD ENZYME DETCJ PER NZM: CPT

## 2023-02-07 PROCEDURE — 87205 SMEAR GRAM STAIN: CPT

## 2023-02-07 PROCEDURE — 2580000003 HC RX 258: Performed by: NURSE ANESTHETIST, CERTIFIED REGISTERED

## 2023-02-07 PROCEDURE — 85025 COMPLETE CBC W/AUTO DIFF WBC: CPT

## 2023-02-07 PROCEDURE — 0Y6N0Z9 DETACHMENT AT LEFT FOOT, PARTIAL 1ST RAY, OPEN APPROACH: ICD-10-PCS | Performed by: PODIATRIST

## 2023-02-07 PROCEDURE — 3700000001 HC ADD 15 MINUTES (ANESTHESIA): Performed by: PODIATRIST

## 2023-02-07 PROCEDURE — 87077 CULTURE AEROBIC IDENTIFY: CPT

## 2023-02-07 PROCEDURE — 6360000002 HC RX W HCPCS: Performed by: INTERNAL MEDICINE

## 2023-02-07 PROCEDURE — 7100000010 HC PHASE II RECOVERY - FIRST 15 MIN: Performed by: PODIATRIST

## 2023-02-07 PROCEDURE — 87070 CULTURE OTHR SPECIMN AEROBIC: CPT

## 2023-02-07 PROCEDURE — 7100000011 HC PHASE II RECOVERY - ADDTL 15 MIN: Performed by: PODIATRIST

## 2023-02-07 PROCEDURE — 1200000000 HC SEMI PRIVATE

## 2023-02-07 PROCEDURE — 0Y6N0ZD DETACHMENT AT LEFT FOOT, PARTIAL 4TH RAY, OPEN APPROACH: ICD-10-PCS | Performed by: PODIATRIST

## 2023-02-07 PROCEDURE — 2500000003 HC RX 250 WO HCPCS: Performed by: PODIATRIST

## 2023-02-07 PROCEDURE — 87075 CULTR BACTERIA EXCEPT BLOOD: CPT

## 2023-02-07 PROCEDURE — 6360000002 HC RX W HCPCS: Performed by: PODIATRIST

## 2023-02-07 PROCEDURE — 3700000000 HC ANESTHESIA ATTENDED CARE: Performed by: PODIATRIST

## 2023-02-07 PROCEDURE — 3600000013 HC SURGERY LEVEL 3 ADDTL 15MIN: Performed by: PODIATRIST

## 2023-02-07 PROCEDURE — 0Y6N0ZF DETACHMENT AT LEFT FOOT, PARTIAL 5TH RAY, OPEN APPROACH: ICD-10-PCS | Performed by: PODIATRIST

## 2023-02-07 PROCEDURE — 87076 CULTURE ANAEROBE IDENT EACH: CPT

## 2023-02-07 PROCEDURE — 99232 SBSQ HOSP IP/OBS MODERATE 35: CPT | Performed by: INTERNAL MEDICINE

## 2023-02-07 PROCEDURE — 0Y6N0ZB DETACHMENT AT LEFT FOOT, PARTIAL 2ND RAY, OPEN APPROACH: ICD-10-PCS | Performed by: PODIATRIST

## 2023-02-07 PROCEDURE — 2580000003 HC RX 258: Performed by: INTERNAL MEDICINE

## 2023-02-07 PROCEDURE — 87186 SC STD MICRODIL/AGAR DIL: CPT

## 2023-02-07 PROCEDURE — 6360000002 HC RX W HCPCS: Performed by: NURSE ANESTHETIST, CERTIFIED REGISTERED

## 2023-02-07 PROCEDURE — 36415 COLL VENOUS BLD VENIPUNCTURE: CPT

## 2023-02-07 PROCEDURE — 3600000003 HC SURGERY LEVEL 3 BASE: Performed by: PODIATRIST

## 2023-02-07 PROCEDURE — 2580000003 HC RX 258: Performed by: PODIATRIST

## 2023-02-07 DEVICE — PATCH AMNION 2 LAYR PROTCT 4 X 4CM STERISHIELD II: Type: IMPLANTABLE DEVICE | Site: FOOT | Status: FUNCTIONAL

## 2023-02-07 RX ORDER — SODIUM CHLORIDE 0.9 % (FLUSH) 0.9 %
5-40 SYRINGE (ML) INJECTION PRN
Status: DISCONTINUED | OUTPATIENT
Start: 2023-02-07 | End: 2023-02-07 | Stop reason: HOSPADM

## 2023-02-07 RX ORDER — OXYCODONE HYDROCHLORIDE 5 MG/1
10 TABLET ORAL PRN
Status: DISCONTINUED | OUTPATIENT
Start: 2023-02-07 | End: 2023-02-07 | Stop reason: HOSPADM

## 2023-02-07 RX ORDER — SODIUM CHLORIDE 0.9 % (FLUSH) 0.9 %
5-40 SYRINGE (ML) INJECTION EVERY 12 HOURS SCHEDULED
Status: DISCONTINUED | OUTPATIENT
Start: 2023-02-07 | End: 2023-02-07 | Stop reason: HOSPADM

## 2023-02-07 RX ORDER — SODIUM CHLORIDE 9 MG/ML
25 INJECTION, SOLUTION INTRAVENOUS PRN
Status: DISCONTINUED | OUTPATIENT
Start: 2023-02-07 | End: 2023-02-07 | Stop reason: HOSPADM

## 2023-02-07 RX ORDER — SODIUM CHLORIDE, SODIUM LACTATE, POTASSIUM CHLORIDE, CALCIUM CHLORIDE 600; 310; 30; 20 MG/100ML; MG/100ML; MG/100ML; MG/100ML
INJECTION, SOLUTION INTRAVENOUS CONTINUOUS
Status: DISCONTINUED | OUTPATIENT
Start: 2023-02-07 | End: 2023-02-07 | Stop reason: HOSPADM

## 2023-02-07 RX ORDER — OXYCODONE HYDROCHLORIDE 5 MG/1
5 TABLET ORAL PRN
Status: DISCONTINUED | OUTPATIENT
Start: 2023-02-07 | End: 2023-02-07 | Stop reason: HOSPADM

## 2023-02-07 RX ORDER — FAMOTIDINE 10 MG/ML
20 INJECTION, SOLUTION INTRAVENOUS ONCE
Status: COMPLETED | OUTPATIENT
Start: 2023-02-07 | End: 2023-02-07

## 2023-02-07 RX ORDER — ONDANSETRON 2 MG/ML
4 INJECTION INTRAMUSCULAR; INTRAVENOUS
Status: DISCONTINUED | OUTPATIENT
Start: 2023-02-07 | End: 2023-02-07 | Stop reason: HOSPADM

## 2023-02-07 RX ORDER — SODIUM CHLORIDE 9 MG/ML
INJECTION, SOLUTION INTRAVENOUS PRN
Status: DISCONTINUED | OUTPATIENT
Start: 2023-02-07 | End: 2023-02-07 | Stop reason: HOSPADM

## 2023-02-07 RX ORDER — MEPERIDINE HYDROCHLORIDE 25 MG/ML
12.5 INJECTION INTRAMUSCULAR; INTRAVENOUS; SUBCUTANEOUS EVERY 5 MIN PRN
Status: DISCONTINUED | OUTPATIENT
Start: 2023-02-07 | End: 2023-02-07 | Stop reason: HOSPADM

## 2023-02-07 RX ORDER — INSULIN GLARGINE 100 [IU]/ML
50 INJECTION, SOLUTION SUBCUTANEOUS NIGHTLY
Status: DISCONTINUED | OUTPATIENT
Start: 2023-02-07 | End: 2023-02-08

## 2023-02-07 RX ORDER — PROPOFOL 10 MG/ML
INJECTION, EMULSION INTRAVENOUS PRN
Status: DISCONTINUED | OUTPATIENT
Start: 2023-02-07 | End: 2023-02-07 | Stop reason: SDUPTHER

## 2023-02-07 RX ORDER — SODIUM CHLORIDE, SODIUM LACTATE, POTASSIUM CHLORIDE, CALCIUM CHLORIDE 600; 310; 30; 20 MG/100ML; MG/100ML; MG/100ML; MG/100ML
INJECTION, SOLUTION INTRAVENOUS CONTINUOUS PRN
Status: DISCONTINUED | OUTPATIENT
Start: 2023-02-07 | End: 2023-02-07 | Stop reason: SDUPTHER

## 2023-02-07 RX ADMIN — CEFEPIME 2000 MG: 2 INJECTION, POWDER, FOR SOLUTION INTRAVENOUS at 18:53

## 2023-02-07 RX ADMIN — ENOXAPARIN SODIUM 40 MG: 100 INJECTION SUBCUTANEOUS at 17:15

## 2023-02-07 RX ADMIN — CEFEPIME 2000 MG: 2 INJECTION, POWDER, FOR SOLUTION INTRAVENOUS at 01:41

## 2023-02-07 RX ADMIN — INSULIN GLARGINE 50 UNITS: 100 INJECTION, SOLUTION SUBCUTANEOUS at 20:50

## 2023-02-07 RX ADMIN — ASPIRIN 81 MG: 81 TABLET, COATED ORAL at 17:14

## 2023-02-07 RX ADMIN — SODIUM CHLORIDE, POTASSIUM CHLORIDE, SODIUM LACTATE AND CALCIUM CHLORIDE: 600; 310; 30; 20 INJECTION, SOLUTION INTRAVENOUS at 13:11

## 2023-02-07 RX ADMIN — FAMOTIDINE 20 MG: 10 INJECTION, SOLUTION INTRAVENOUS at 12:21

## 2023-02-07 RX ADMIN — VENLAFAXINE HYDROCHLORIDE 75 MG: 75 CAPSULE, EXTENDED RELEASE ORAL at 17:14

## 2023-02-07 RX ADMIN — PROPOFOL 30 MG: 10 INJECTION, EMULSION INTRAVENOUS at 13:43

## 2023-02-07 RX ADMIN — PROPOFOL 30 MG: 10 INJECTION, EMULSION INTRAVENOUS at 13:21

## 2023-02-07 RX ADMIN — PROPOFOL 30 MG: 10 INJECTION, EMULSION INTRAVENOUS at 13:26

## 2023-02-07 RX ADMIN — PROPOFOL 30 MG: 10 INJECTION, EMULSION INTRAVENOUS at 13:31

## 2023-02-07 RX ADMIN — AMLODIPINE BESYLATE 5 MG: 5 TABLET ORAL at 17:14

## 2023-02-07 RX ADMIN — SODIUM CHLORIDE, PRESERVATIVE FREE 10 ML: 5 INJECTION INTRAVENOUS at 08:00

## 2023-02-07 RX ADMIN — GABAPENTIN 600 MG: 300 CAPSULE ORAL at 20:49

## 2023-02-07 RX ADMIN — PROPOFOL 100 MG: 10 INJECTION, EMULSION INTRAVENOUS at 13:14

## 2023-02-07 RX ADMIN — VANCOMYCIN HYDROCHLORIDE 1000 MG: 1 INJECTION, POWDER, LYOPHILIZED, FOR SOLUTION INTRAVENOUS at 17:13

## 2023-02-07 RX ADMIN — TAMSULOSIN HYDROCHLORIDE 0.4 MG: 0.4 CAPSULE ORAL at 17:14

## 2023-02-07 RX ADMIN — PROPOFOL 30 MG: 10 INJECTION, EMULSION INTRAVENOUS at 13:37

## 2023-02-07 RX ADMIN — LISINOPRIL 10 MG: 10 TABLET ORAL at 17:14

## 2023-02-07 RX ADMIN — SODIUM CHLORIDE, PRESERVATIVE FREE 10 ML: 5 INJECTION INTRAVENOUS at 01:42

## 2023-02-07 ASSESSMENT — ENCOUNTER SYMPTOMS: SHORTNESS OF BREATH: 0

## 2023-02-07 ASSESSMENT — LIFESTYLE VARIABLES: SMOKING_STATUS: 0

## 2023-02-07 NOTE — PROGRESS NOTES
Patient transferred via wheelchair in stable condition with all belongings to room 211. Fran Carnes RN

## 2023-02-07 NOTE — BRIEF OP NOTE
Brief Postoperative Note      Patient: Nav Marrero  YOB: 1960  MRN: [id number]    Date of Procedure: 2/7/2023    Pre-Op Diagnosis: Diabetic foot ulcer with osteomyelitis [facility] [E11.621, E11.69, L97.509, M86.9]  Abscess [L02.91]    Post-Op Diagnosis: Same       Procedure(s):  PARTIAL LEFT FOOT AMPUTATION    Surgeon(s):  Khanh Newman DPM    Assistant:  * No surgical staff found *    Anesthesia: Monitor Anesthesia Care    Estimated Blood Loss (mL): less than 672     Complications: None    Specimens:   ID Type Source Tests Collected by Time Destination   1 : culture left foot, anaerobic, aerobic, gram stain Tissue Tissue CULTURE, SURGICAL Khanh Newman DPM 2/7/2023 1329    2 : BONE LEFT FOOT  Tissue Tissue CULTURE, TISSUE Khanh Newman DPM 2/7/2023 1347    A : BONE LEFT FOOT Tissue Tissue SURGICAL PATHOLOGY Khanh Newman DPM 2/7/2023 1349        Implants:  * No implants in log *      Drains: * No LDAs found *    Findings: ulcer left foot, abscess, osteomyelitis    Electronically signed by Khanh Newman DPM on 2/7/2023 at 1:58 PM

## 2023-02-07 NOTE — ANESTHESIA PRE PROCEDURE
Department of Anesthesiology  Preprocedure Note       Name:  Mushtaq Grijalva   Age:  57 y.o.  :  1960                                          MRN:  [id number]         Date:  2023      Surgeon: Nathan Palma):  Emmie Adhikari DPM    Procedure: Procedure(s):  PARTIAL LEFT FOOT AMPUTATION    Medications prior to admission:   Prior to Admission medications    Medication Sig Start Date End Date Taking?  Authorizing Provider   tamsulosin (FLOMAX) 0.4 MG capsule Take 0.4 mg by mouth daily 23  Yes Historical Provider, MD   insulin lispro, 1 Unit Dial, (HUMALOG KWIKPEN) 100 UNIT/ML SOPN Inject 10 Units into the skin 3 times daily (before meals) 23   Herve Spaulding MD   acetaminophen (TYLENOL) 500 MG tablet Take 2 tablets by mouth every 8 hours as needed for Pain 10/2/22   Daryel Situ, APRN - CNP   aspirin 81 MG EC tablet Take 81 mg by mouth daily    Historical Provider, MD   lisinopril (PRINIVIL;ZESTRIL) 10 MG tablet Take 10 mg by mouth daily    Historical Provider, MD   Continuous Blood Gluc  (FREESTYLE ISAI 2 READER) BRITTANEY  21   Historical Provider, MD   Continuous Blood Gluc Sensor (FREESTYLE ISAI 2 SENSOR) [facility]  21   Historical Provider, MD   BD PEN NEEDLE MARY 2ND GEN 32G X 4 MM MISC  21   Historical Provider, MD   Insulin Pen Needle (ULTRA-THIN II PEN NEEDLES) 29G X 12.7MM MISC Use for insulin injections QID as directed 21   Historical Provider, MD   BD ULTRA-FINE PEN NEEDLES 29G X 12.7MM MISC  21   Historical Provider, MD   insulin glargine (LANTUS) 100 UNIT/ML injection vial Inject 40 Units into the skin nightly  Patient taking differently: Inject 34 Units into the skin nightly 21   POLO Flor   amLODIPine (NORVASC) 5 MG tablet Take 1 tablet by mouth daily 21   POLO Flor   sildenafil (VIAGRA) 100 MG tablet Take 100 mg by mouth daily as needed 3/3/21   Historical Provider, MD   gabapentin (NEURONTIN) 600 MG tablet Take 600 mg by mouth 2 times daily.    Historical Provider, MD   venlafaxine (EFFEXOR XR) 75 MG extended release capsule Take 75 mg by mouth daily Needs to be re prescribed    Historical Provider, MD       Current medications:    Current Facility-Administered Medications   Medication Dose Route Frequency Provider Last Rate Last Admin    insulin glargine (LANTUS) injection vial 50 Units  50 Units SubCUTAneous Nightly David Washington MD        glucose chewable tablet 16 g  4 tablet Oral PRN David Washington MD        dextrose bolus 10% 125 mL  125 mL IntraVENous PRN David Washington MD        Or    dextrose bolus 10% 250 mL  250 mL IntraVENous PRN David Washington MD        glucagon (rDNA) injection 1 mg  1 mg SubCUTAneous PRN David Washington MD        dextrose 10 % infusion   IntraVENous Continuous PRN David Washington MD        cefepime (MAXIPIME) 2,000 mg in sodium chloride 0.9 % 50 mL IVPB (mini-bag)  2,000 mg IntraVENous Q12H Paige Breen MD   Stopped at 02/07/23 0620    amLODIPine (NORVASC) tablet 5 mg  5 mg Oral Daily Paige Breen MD   5 mg at 02/06/23 0818    aspirin EC tablet 81 mg  81 mg Oral Daily Paige Breen MD   81 mg at 02/06/23 0818    gabapentin (NEURONTIN) capsule 600 mg  600 mg Oral BID Paige Breen MD   600 mg at 02/06/23 2044    lisinopril (PRINIVIL;ZESTRIL) tablet 10 mg  10 mg Oral Daily Paige Breen MD   10 mg at 02/06/23 0818    tamsulosin (FLOMAX) capsule 0.4 mg  0.4 mg Oral Daily Paige Breen MD   0.4 mg at 02/06/23 0818    venlafaxine (EFFEXOR XR) extended release capsule 75 mg  75 mg Oral Daily Paige Breen MD   75 mg at 02/06/23 0818    glucose chewable tablet 16 g  4 tablet Oral PRN Paige Breen MD        dextrose bolus 10% 125 mL  125 mL IntraVENous PRN Paige Breen MD        Or    dextrose bolus 10% 250 mL  250 mL IntraVENous PRN Paige Breen MD   Stopped at 02/06/23 1140    glucagon (rDNA) injection 1 mg  1 mg SubCUTAneous PRN Naty Tapia MD        dextrose 10 % infusion   IntraVENous Continuous PRN Naty Tapia MD        sodium chloride flush 0.9 % injection 5-40 mL  5-40 mL IntraVENous 2 times per day Naty Tapia MD   10 mL at 02/07/23 0142    sodium chloride flush 0.9 % injection 5-40 mL  5-40 mL IntraVENous PRN Naty Tapia MD        0.9 % sodium chloride infusion   IntraVENous PRN Naty Tapia MD        enoxaparin (LOVENOX) injection 40 mg  40 mg SubCUTAneous Daily Naty Tapia MD   40 mg at 02/06/23 0818    ondansetron (ZOFRAN-ODT) disintegrating tablet 4 mg  4 mg Oral Q8H PRN Naty Tapia MD        Or    ondansetron [facility]) injection 4 mg  4 mg IntraVENous Q6H PRN Naty Tapia MD        polyethylene glycol [facility]) packet 17 g  17 g Oral Daily PRN Naty Tapia MD        acetaminophen (TYLENOL) tablet 650 mg  650 mg Oral Q6H PRN Naty Tapia MD        Or    acetaminophen (TYLENOL) suppository 650 mg  650 mg Rectal Q6H PRN Naty Tapia MD        potassium chloride (KLOR-CON M) extended release tablet 40 mEq  40 mEq Oral PRN Naty Tapia MD        Or    potassium bicarb-citric acid (EFFER-K) effervescent tablet 40 mEq  40 mEq Oral PRN Naty Tapia MD        Or    potassium chloride 10 mEq/100 mL IVPB (Peripheral Line)  10 mEq IntraVENous PRN Naty Tapia MD        magnesium sulfate 1000 mg in dextrose 5% 100 mL IVPB  1,000 mg IntraVENous PRN Naty Tapia MD        labetalol (NORMODYNE) tablet 50 mg  50 mg Oral Q6H PRN Naty Tapia MD        vancomycin 1000 mg IVPB in 250 mL NS addavial  1,000 mg IntraVENous Q24H Naty Tapia MD   Stopped at 02/06/23 1947    insulin lispro (HUMALOG) injection vial 8 Units  8 Units SubCUTAneous TID  Sedrick Erickson MD   8 Units at 02/06/23 0821    insulin lispro (HUMALOG) injection vial 0-8 Units  0-8 Units SubCUTAneous TID MARGARITA Arita MD   2 Units at 02/06/23 0820    insulin lispro (HUMALOG) injection vial 0-4 Units  0-4 Units SubCUTAneous Nightly Joseph Horton MD   4 Units at 02/06/23 2052       Allergies: Allergies   Allergen Reactions    Hydrocodone-Acetaminophen Itching    Percocet [Oxycodone-Acetaminophen]      States \"hallucinations,disoriented, & freaked out\" per wife    Pregabalin Itching    Vicodin [Hydrocodone-Acetaminophen]      Spaces out. Pt. States 1/15/14 has been taking some vicodin without problems.        Problem List:    Patient Active Problem List   Diagnosis Code    Diabetic ketoacidosis without coma associated with type 2 diabetes mellitus ([facility] E11.10    Diabetic ulcer of left foot associated with type 2 diabetes mellitus ([facility] E11.621, L97.529    Nausea & vomiting R11.2    Diabetic neuropathy ([facility] E11.40    DM (diabetes mellitus), secondary, uncontrolled, w/neurologic complic [id number]    Gout M10.9    Hx MRSA Z22.322    Hyperkalemia E87.5    Chronic pain on chronic opioids G89.29    Chronic LE diabetic ulcers E11.622, L97.309    Mild protein-calorie malnutrition (HCC) E44.1    Cellulitis L03.90    Acute kidney injury ([facility] N17.9    Neuropathy G62.9    Diabetic acidosis without coma (HCC) E11.10    Acute CVA (cerebrovascular accident) ([facility] I63.9    HTN (hypertension), benign I10    DKA, type 2, not at goal [facility]) E11.10    Diabetic foot infection ([facility] E11.628, L08.9    Sepsis without acute organ dysfunction (HCC) A41.9    Stage 3a chronic kidney disease (HCC) N18.31    Primary hypertension I10    Diabetes mellitus ([facility] E11.9    Acute osteomyelitis of left foot ([facility] M86.172    Hyperglycemia R73.9    Pyogenic inflammation of bone (HCC) M86.9    Anemia D64.9    Pseudohyponatremia R79.89    Acute encephalopathy G93.40    Stage 3b chronic kidney disease (CKD) (HCC) N18.32    Osteomyelitis of left foot, unspecified type (HCC) M86.9       Past Medical History:        Diagnosis Date  [facility] 09/12/2017    Bacteremia 05/05/2017    staph aureus    Diabetes mellitus ([facility]     Diabetic neuropathy (HCC)     Gout     Hypertension     MRSA (methicillin resistant staph aureus) culture positive 12/27/18, 9/12/17, 5/5/17,9/5/13, 1/15/14    ulcers bilateral legs    MRSA (methicillin resistant staph aureus) culture positive 01/21/2021    foot wound    Neuropathy     Pneumonia     Pyogenic inflammation of bone ([facility]     Stage 3b chronic kidney disease (CKD) ([facility] 1/8/2023       Past Surgical History:        Procedure Laterality Date    EYE SURGERY      lens implants after removal of cataracts    FOOT DEBRIDEMENT Left 4/28/2021    DEBRIDEMENT INFECTED BONE AND TISSUE LEFT FOOT [facility] [address] [facility]    partial right foot amputation with graft application    TOE AMPUTATION Right 12/28/2018    PARTIAL RIGHT FOOT AMPUTATION WITH GRAFT APPLICATION performed by Cindi Mccabe DPM at [address] Left 5/27/2021    PARTIAL LEFT FOOT AMPUTATION performed by Cindi Mccabe DPM at [address] Left 9/2/2022    PARTIAL LEFT FOOT AMPUTATION, ULCER DEBRIDEMENT LOWER LEG performed by Cindi Mccabe DPM at [address] N/A 1/21/2021    EGD BIOPSY performed by Fatou Barboza DO at [facility] History:    Social History     Tobacco Use    Smoking status: Never    Smokeless tobacco: Never   Substance Use Topics    Alcohol use: No     Comment: FORMER DRINKER approx.  quit 2005                                Counseling given: Not Answered      Vital Signs (Current):   Vitals:    02/06/23 0813 02/06/23 2030 02/07/23 0140 02/07/23 1130   BP: (!) 118/56 (!) 148/73 (!) 158/86 (!) 144/83   Pulse: 70 68 74 69   Resp: 16 16 16 16   Temp: 98.8 °F (37.1 °C) 97.7 °F (36.5 °C) 97.9 °F (36.6 °C) 98.2 °F (36.8 °C)   TempSrc: Oral Oral  Oral   SpO2: 96% 98% 95% 97% Weight:       Height:                                                  BP Readings from Last 3 Encounters:   02/07/23 (!) 144/83   01/08/23 (!) 141/86   10/02/22 104/73       NPO Status: Time of last liquid consumption: 2300                        Time of last solid consumption: 1800                        Date of last liquid consumption: 02/06/23                        Date of last solid food consumption: 02/06/23    BMI:   Wt Readings from Last 3 Encounters:   02/06/23 161 lb 8 oz (73.3 kg)   02/05/23 164 lb 8 oz (74.6 kg)   01/08/23 162 lb 5 oz (73.6 kg)     Body mass index is 23.17 kg/m².     CBC:   Lab Results   Component Value Date/Time    WBC 6.3 02/07/2023 05:10 AM    RBC 3.40 02/07/2023 05:10 AM    HGB 8.1 02/07/2023 05:10 AM    HCT 25.9 02/07/2023 05:10 AM    MCV 76.4 02/07/2023 05:10 AM    RDW 15.5 02/07/2023 05:10 AM     02/07/2023 05:10 AM       CMP:   Lab Results   Component Value Date/Time     02/07/2023 05:10 AM    K 4.8 02/07/2023 05:10 AM    K 4.4 02/04/2023 05:26 AM     02/07/2023 05:10 AM    CO2 26 02/07/2023 05:10 AM    BUN 17 02/07/2023 05:10 AM    CREATININE 1.9 02/07/2023 05:10 AM    GFRAA 41 08/13/2022 10:24 AM    GFRAA >60 08/20/2012 05:40 PM    AGRATIO 0.9 02/04/2023 05:26 AM    LABGLOM 39 02/07/2023 05:10 AM    GLUCOSE 316 02/07/2023 05:10 AM    PROT 6.3 02/04/2023 05:26 AM    PROT 7.3 08/20/2012 05:40 PM    CALCIUM 8.1 02/07/2023 05:10 AM    BILITOT 0.3 02/04/2023 05:26 AM    ALKPHOS 89 02/04/2023 05:26 AM    AST 11 02/04/2023 05:26 AM    ALT 6 02/04/2023 05:26 AM       POC Tests:   Recent Labs     02/07/23  1115   POCGLU 161*       Coags:   Lab Results   Component Value Date/Time    PROTIME 14.9 02/04/2023 05:26 AM    INR 1.19 02/04/2023 05:26 AM    APTT 27.2 01/18/2021 05:55 PM       HCG (If Applicable): No results found for: PREGTESTUR, PREGSERUM, HCG, HCGQUANT     ABGs:   Lab Results   Component Value Date/Time    PHART 7.138 12/28/2017 08:45 PM    PO2ART 94.6 12/28/2017 08:45 PM    SAS8OQD 32.6 12/28/2017 08:45 PM    PXL1OCZ 10.8 12/28/2017 08:45 PM    BEART -17.1 12/28/2017 08:45 PM    A0XHNYZF 95.0 12/28/2017 08:45 PM        Type & Screen (If Applicable):  No results found for: LABABO, LABRH    Drug/Infectious Status (If Applicable):  No results found for: HIV, HEPCAB    COVID-19 Screening (If Applicable):   Lab Results   Component Value Date/Time    COVID19 NOT DETECTED 02/03/2023 11:06 PM           Anesthesia Evaluation  Patient summary reviewed and Nursing notes reviewed no history of anesthetic complications:   Airway: Mallampati: II  TM distance: >3 FB   Neck ROM: full  Mouth opening: > = 3 FB   Dental:          Pulmonary:Negative Pulmonary ROS breath sounds clear to auscultation      (-) COPD, asthma, shortness of breath, recent URI, sleep apnea and not a current smoker          Patient did not smoke on day of surgery. Cardiovascular:    (+) hypertension:,     (-) pacemaker, past MI, CAD, CABG/stent, dysrhythmias,  angina and  CHF    ECG reviewed  Rhythm: regular  Rate: normal           Beta Blocker:  Not on Beta Blocker         Neuro/Psych:   (+) neuromuscular disease (NEUROPATHY, FEET AND HANDS):,    (-) seizures, TIA, CVA and psychiatric history           GI/Hepatic/Renal:   (+) GERD (OTC MED PRN): well controlled, renal disease (CKD3):,      (-) liver disease, bowel prep and no morbid obesity       Endo/Other:    (+) DiabetesType II DM, using insulin, blood dyscrasia (ANEMIA): arthritis (AND GOUT):., no malignancy/cancer. (-) hypothyroidism, hyperthyroidism, no malignancy/cancer               Abdominal:       Abdomen: soft. Vascular: negative vascular ROS. Other Findings:           Anesthesia Plan      TIVA and general     ASA 3       Induction: intravenous. MIPS: Postoperative opioids intended and Prophylactic antiemetics administered. Anesthetic plan and risks discussed with patient and spouse.       Plan discussed with CRNA.                This pre-anesthesia assessment may be used as a history and physical.    DOS STAFF ADDENDUM:    Pt seen and examined, chart reviewed (including anesthesia, drug and allergy history). No interval changes to history and physical examination. Anesthetic plan, risks, benefits, alternatives, and personnel involved discussed with patient. Patient verbalized an understanding and agrees to proceed.       Taty Diaz MD  February 7, 2023  12:13 PM      Taty Diaz MD   2/7/2023

## 2023-02-07 NOTE — ANESTHESIA POSTPROCEDURE EVALUATION
Department of Anesthesiology  Postprocedure Note    Patient: Nilda Parker  MRN: [id number]  YOB: 1960  Date of evaluation: 2/7/2023      Procedure Summary     Date: 02/07/23 Room / Location: [place] [address] / [facility]    Anesthesia Start: 7459 Anesthesia Stop: 9250    Procedure: PARTIAL LEFT FOOT AMPUTATION (Left: Foot) Diagnosis:       Diabetic foot ulcer with osteomyelitis ([address]      Abscess      (Diabetic foot ulcer with osteomyelitis ([address] [L38.208, E11.69, L97.509, M86.9])      (Abscess [L02.91])    Surgeons: Darlene Morataya DPM Responsible Provider: Neil Meehan MD    Anesthesia Type: TIVA, general ASA Status: 3          Anesthesia Type: No value filed.     Alfredo Phase I: Alfredo Score: 10    Alfredo Phase II: Alfredo Score: 9      Anesthesia Post Evaluation    Comments: Medications & allergies reviewed  All available lab & EKG data reviewed

## 2023-02-07 NOTE — PROGRESS NOTES
Vancomycin Day: 4  Current Regimen: 1000 mg IV every 24 hours    Patient's labs, cultures, vitals, and vancomycin regimen reviewed.    SCr stable  U/O not measured/well documented  [employer] updated    Plan:   No change today  Amanda Radford 1/9/201772:16 PM  .

## 2023-02-07 NOTE — PROGRESS NOTES
Patient admitted from OR to PACU. Bedside report received. Patient immediately hooked up to vitals monitor. Veronika Spann RN

## 2023-02-07 NOTE — PROGRESS NOTES
Hospitalist Progress Note      PCP: Tiara Carcamo MD    Date of Admission: 2/3/2023    Subjective: Hypoglycemic yesterday morning with sugars dropping to the 40s. Given D10. Medications:  Reviewed    Infusion Medications    dextrose      dextrose      sodium chloride       Scheduled Medications    insulin glargine  40 Units SubCUTAneous Nightly    cefepime  2,000 mg IntraVENous Q12H    amLODIPine  5 mg Oral Daily    aspirin  81 mg Oral Daily    gabapentin  600 mg Oral BID    lisinopril  10 mg Oral Daily    tamsulosin  0.4 mg Oral Daily    venlafaxine  75 mg Oral Daily    sodium chloride flush  5-40 mL IntraVENous 2 times per day    enoxaparin  40 mg SubCUTAneous Daily    vancomycin  1,000 mg IntraVENous Q24H    insulin lispro  8 Units SubCUTAneous TID WC    insulin lispro  0-8 Units SubCUTAneous TID WC    insulin lispro  0-4 Units SubCUTAneous Nightly     PRN Meds: glucose, dextrose bolus **OR** dextrose bolus, glucagon (rDNA), dextrose, glucose, dextrose bolus **OR** dextrose bolus, glucagon (rDNA), dextrose, sodium chloride flush, sodium chloride, ondansetron **OR** ondansetron, polyethylene glycol, acetaminophen **OR** acetaminophen, potassium chloride **OR** potassium alternative oral replacement **OR** potassium chloride, magnesium sulfate, labetalol    No intake or output data in the 24 hours ending 02/07/23 0816      Physical Exam Performed:    BP (!) 158/86   Pulse 74   Temp 97.9 °F (36.6 °C)   Resp 16   Ht 5' 10\" (1.778 m)   Wt 161 lb 8 oz (73.3 kg)   SpO2 95%   BMI 23.17 kg/m²     GEN:        A&Ox3, NAD. HEENT:   NC/AT,EOMI, MMM, no erythema/exudates or visible masses. CVS:        Normal S1,S2. RRR. Without M/G/R.   LUNG:     CTA-B. No wheezes, rales or rhonchi. ABD:        Soft, ND/NT, BS+ x4. Without G/R.  EXT:        2+ pulses, no c/c/e. Wound noted to distal/medial plantar surface of L foot. All 5 toes are absent from this foot. There is erythema noted to distal foot.   Please see pics below - courtesy of KOJO Brito. Decreased sensation to feet. Brisk cap refill. PSY:        Thought process intact, affect appropriate. PUSHPA:        CN III-XII grossly intact. Moves all 4 spontaneously. Sensory grossly intact except for DM neuropathy. SKIN:       No rash or lesions on visible skin except as noted above. Labs:   Recent Labs     02/05/23  1032 02/06/23  0550 02/07/23  0510   WBC 5.0 5.1 6.3   HGB 8.0* 7.3* 8.1*   HCT 25.4* 22.7* 25.9*    323 407       Recent Labs     02/05/23  1032 02/06/23  0550 02/07/23  0510   * 135* 134*   K 4.0 4.2 4.8    103 101   CO2 26 25 26   BUN 16 16 17   CREATININE 1.8* 1.8* 1.9*   CALCIUM 8.0* 7.6* 8.1*       No results for input(s): AST, ALT, BILIDIR, BILITOT, ALKPHOS in the last 72 hours. No results for input(s): INR in the last 72 hours. No results for input(s): Hazeline Balk in the last 72 hours. Urinalysis:      Lab Results   Component Value Date/Time    NITRU Negative 02/04/2023 03:15 AM    WBCUA 0-2 02/04/2023 03:15 AM    BACTERIA 1+ 01/07/2023 10:38 AM    RBCUA 3-4 02/04/2023 03:15 AM    BLOODU LARGE 02/04/2023 03:15 AM    SPECGRAV 1.020 02/04/2023 03:15 AM    GLUCOSEU >=1000 02/04/2023 03:15 AM       Radiology:  MRI FOOT LEFT WO CONTRAST   Final Result   1. Large heterogeneous irregular fluid collection encasing the 3rd metatarsal   head and extending dorsal and plantar measuring 4.6 x 1.2 x 1.7 cm compatible   with an abscess. 2. Smaller fluid collection partially encasing the 2nd metatarsal head. 3. Osteomyelitis of the 3rd metatarsal head with bone marrow edema extending   into the base. 4. Very mild marrow edema in the 2nd metatarsal head compatible with   noninfectious reactive osteitis or early osteomyelitis. 5. Chronic erosive changes of the 4th and 5th metatarsal heads and mild   marrow edema.   Given the lack of an adjacent abscess or deep soft tissue   ulceration this likely represents chronic osteomyelitis. Early acute on   chronic osteomyelitis is not entirely excluded. 6. Small midfoot effusion with severe degenerative changes of the 2nd   tarsometatarsal joint. These may represent neuropathic changes. Given the   lack of an adjacent abscess or ulceration septic arthritis/osteomyelitis is   less likely. XR FOOT LEFT (MIN 3 VIEWS)   Final Result   Suspected osteomyelitis of the heads of the 2nd and 3rd metatarsals. PHARMACY TO DOSE VANCOMYCIN  IP CONSULT TO PODIATRY  IP CONSULT TO SPIRITUAL SERVICES    Assessment/Plan:    Active Hospital Problems    Diagnosis     Osteomyelitis of left foot, unspecified type ([facility] [M86.9]      Priority: Medium    Diabetes mellitus ([facility] [E11.9]     Primary hypertension [I10]          Osteomyelitis L foot 2/2 DM foot wound. Hx same and has had multiple amputations to this foot in the past. IV vanco and cefepime, IVF. Podiatry and WC RN c/s. MRI -osteomyelitis with abscess and edema. Being taken to the OR today. DM2, poor control, initial . Hold oral Rx, chk A1c 13.1, add Mod SSI q4h, continue home Lantus, increase dose, add lispro tid  HTN, mildly uncontrolled. Will give a dose of his home Rx tonight. Cont home regimen. CKD 3b, Cr stable. Neuropathy, cont home gabapentin.        DVT Prophylaxis: Lx  Diet: Diet NPO Exceptions are: Ice Chips, Sips of Water with Meds  Code Status: Full Code  PT/OT Eval Status: ordered    Carolyne Jay MD

## 2023-02-07 NOTE — PROGRESS NOTES
Handoff report and transfer of care given at bedside to [facility]. Patient in stable condition, denies needs/concerns at this time. Call light within reach.

## 2023-02-08 LAB
GLUCOSE BLD-MCNC: 159 MG/DL (ref 70–99)
GLUCOSE BLD-MCNC: 414 MG/DL (ref 70–99)
GLUCOSE BLD-MCNC: 53 MG/DL (ref 70–99)
GLUCOSE BLD-MCNC: 67 MG/DL (ref 70–99)
GLUCOSE BLD-MCNC: 86 MG/DL (ref 70–99)
GLUCOSE BLD-MCNC: 94 MG/DL (ref 70–99)
GLUCOSE BLD-MCNC: 96 MG/DL (ref 70–99)
PERFORMED ON: ABNORMAL
PERFORMED ON: NORMAL

## 2023-02-08 PROCEDURE — 6370000000 HC RX 637 (ALT 250 FOR IP): Performed by: INTERNAL MEDICINE

## 2023-02-08 PROCEDURE — 6370000000 HC RX 637 (ALT 250 FOR IP): Performed by: PODIATRIST

## 2023-02-08 PROCEDURE — 99232 SBSQ HOSP IP/OBS MODERATE 35: CPT | Performed by: INTERNAL MEDICINE

## 2023-02-08 PROCEDURE — 6360000002 HC RX W HCPCS: Performed by: PODIATRIST

## 2023-02-08 PROCEDURE — 2580000003 HC RX 258: Performed by: PODIATRIST

## 2023-02-08 PROCEDURE — 1200000000 HC SEMI PRIVATE

## 2023-02-08 RX ORDER — INSULIN GLARGINE 100 [IU]/ML
30 INJECTION, SOLUTION SUBCUTANEOUS NIGHTLY
Status: DISCONTINUED | OUTPATIENT
Start: 2023-02-08 | End: 2023-02-09 | Stop reason: HOSPADM

## 2023-02-08 RX ADMIN — TAMSULOSIN HYDROCHLORIDE 0.4 MG: 0.4 CAPSULE ORAL at 08:43

## 2023-02-08 RX ADMIN — LISINOPRIL 10 MG: 10 TABLET ORAL at 08:43

## 2023-02-08 RX ADMIN — ASPIRIN 81 MG: 81 TABLET, COATED ORAL at 08:43

## 2023-02-08 RX ADMIN — CEFEPIME 2000 MG: 2 INJECTION, POWDER, FOR SOLUTION INTRAVENOUS at 18:06

## 2023-02-08 RX ADMIN — ENOXAPARIN SODIUM 40 MG: 100 INJECTION SUBCUTANEOUS at 08:44

## 2023-02-08 RX ADMIN — VENLAFAXINE HYDROCHLORIDE 75 MG: 75 CAPSULE, EXTENDED RELEASE ORAL at 08:43

## 2023-02-08 RX ADMIN — INSULIN LISPRO 4 UNITS: 100 INJECTION, SOLUTION INTRAVENOUS; SUBCUTANEOUS at 20:41

## 2023-02-08 RX ADMIN — INSULIN GLARGINE 30 UNITS: 100 INJECTION, SOLUTION SUBCUTANEOUS at 20:40

## 2023-02-08 RX ADMIN — GABAPENTIN 600 MG: 300 CAPSULE ORAL at 20:37

## 2023-02-08 RX ADMIN — SODIUM CHLORIDE, PRESERVATIVE FREE 10 ML: 5 INJECTION INTRAVENOUS at 08:43

## 2023-02-08 RX ADMIN — AMLODIPINE BESYLATE 5 MG: 5 TABLET ORAL at 08:43

## 2023-02-08 RX ADMIN — VANCOMYCIN HYDROCHLORIDE 1000 MG: 1 INJECTION, POWDER, LYOPHILIZED, FOR SOLUTION INTRAVENOUS at 15:43

## 2023-02-08 RX ADMIN — CEFEPIME 2000 MG: 2 INJECTION, POWDER, FOR SOLUTION INTRAVENOUS at 06:08

## 2023-02-08 RX ADMIN — GABAPENTIN 600 MG: 300 CAPSULE ORAL at 08:43

## 2023-02-08 NOTE — PROGRESS NOTES
Called into pt room at this time by PCA, pt glucose 67, Gave crackers and apple juice. Will reassess. Pt denies any symptoms. Call light within reach.

## 2023-02-08 NOTE — PLAN OF CARE
Problem: Discharge Planning  Goal: Discharge to home or other facility with appropriate resources  2/8/2023 1004 by Drew Rosado RN  Outcome: Progressing     Problem: Pain  Goal: Verbalizes/displays adequate comfort level or baseline comfort level  2/8/2023 1004 by Drew Rosado RN  Outcome: Progressing     Problem: Chronic Conditions and Co-morbidities  Goal: Patient's chronic conditions and co-morbidity symptoms are monitored and maintained or improved  2/8/2023 1004 by Drew Rosado RN  Outcome: Progressing     Problem: Wound:  Goal: Will show signs of wound healing; wound closure and no evidence of infection  Description: Will show signs of wound healing; wound closure and no evidence of infection  2/8/2023 1004 by Drew Rosado RN  Outcome: Progressing     Problem: Safety - Adult  Goal: Free from fall injury  2/8/2023 1004 by Drew Rosado RN  Outcome: Progressing

## 2023-02-08 NOTE — PROGRESS NOTES
Hospitalist Progress Note      PCP: Cameron Cronin MD    Date of Admission: 2/3/2023    Subjective: Underwent partial left foot amputation 2/7    Medications:  Reviewed    Infusion Medications    dextrose      dextrose      sodium chloride       Scheduled Medications    insulin glargine  30 Units SubCUTAneous Nightly    cefepime  2,000 mg IntraVENous Q12H    amLODIPine  5 mg Oral Daily    aspirin  81 mg Oral Daily    gabapentin  600 mg Oral BID    lisinopril  10 mg Oral Daily    tamsulosin  0.4 mg Oral Daily    venlafaxine  75 mg Oral Daily    sodium chloride flush  5-40 mL IntraVENous 2 times per day    enoxaparin  40 mg SubCUTAneous Daily    vancomycin  1,000 mg IntraVENous Q24H    insulin lispro  0-8 Units SubCUTAneous TID WC    insulin lispro  0-4 Units SubCUTAneous Nightly     PRN Meds: glucose, dextrose bolus **OR** dextrose bolus, glucagon (rDNA), dextrose, glucose, dextrose bolus **OR** dextrose bolus, glucagon (rDNA), dextrose, sodium chloride flush, sodium chloride, ondansetron **OR** ondansetron, polyethylene glycol, acetaminophen **OR** acetaminophen, potassium chloride **OR** potassium alternative oral replacement **OR** potassium chloride, magnesium sulfate, labetalol      Intake/Output Summary (Last 24 hours) at 2/8/2023 5498  Last data filed at 2/7/2023 1358  Gross per 24 hour   Intake 300 ml   Output --   Net 300 ml         Physical Exam Performed:    /79   Pulse 74   Temp 98.2 °F (36.8 °C) (Oral)   Resp 16   Ht 5' 10\" (1.778 m)   Wt 161 lb 8 oz (73.3 kg)   SpO2 97%   BMI 23.17 kg/m²     GEN:        A&Ox3, NAD. HEENT:   NC/AT,EOMI, MMM, no erythema/exudates or visible masses. CVS:        Normal S1,S2. RRR. Without M/G/R.   LUNG:     CTA-B. No wheezes, rales or rhonchi. ABD:        Soft, ND/NT, BS+ x4. Without G/R.  EXT:        Left foot dressing  PSY:        Thought process intact, affect appropriate. PUSHPA:        CN III-XII grossly intact. Moves all 4 spontaneously. Sensory grossly intact except for DM neuropathy. Labs:   Recent Labs     02/05/23  1032 02/06/23  0550 02/07/23  0510   WBC 5.0 5.1 6.3   HGB 8.0* 7.3* 8.1*   HCT 25.4* 22.7* 25.9*    323 407       Recent Labs     02/05/23  1032 02/06/23  0550 02/07/23  0510   * 135* 134*   K 4.0 4.2 4.8    103 101   CO2 26 25 26   BUN 16 16 17   CREATININE 1.8* 1.8* 1.9*   CALCIUM 8.0* 7.6* 8.1*       No results for input(s): AST, ALT, BILIDIR, BILITOT, ALKPHOS in the last 72 hours. No results for input(s): INR in the last 72 hours. No results for input(s): Parthenia Seal in the last 72 hours. Urinalysis:      Lab Results   Component Value Date/Time    NITRU Negative 02/04/2023 03:15 AM    WBCUA 0-2 02/04/2023 03:15 AM    BACTERIA 1+ 01/07/2023 10:38 AM    RBCUA 3-4 02/04/2023 03:15 AM    BLOODU LARGE 02/04/2023 03:15 AM    SPECGRAV 1.020 02/04/2023 03:15 AM    GLUCOSEU >=1000 02/04/2023 03:15 AM       Radiology:  XR FOOT LEFT (2 VIEWS)   Final Result   Resection of the distal metatarsals of the 2nd through 5th digits         MRI FOOT LEFT WO CONTRAST   Final Result   1. Large heterogeneous irregular fluid collection encasing the 3rd metatarsal   head and extending dorsal and plantar measuring 4.6 x 1.2 x 1.7 cm compatible   with an abscess. 2. Smaller fluid collection partially encasing the 2nd metatarsal head. 3. Osteomyelitis of the 3rd metatarsal head with bone marrow edema extending   into the base. 4. Very mild marrow edema in the 2nd metatarsal head compatible with   noninfectious reactive osteitis or early osteomyelitis. 5. Chronic erosive changes of the 4th and 5th metatarsal heads and mild   marrow edema. Given the lack of an adjacent abscess or deep soft tissue   ulceration this likely represents chronic osteomyelitis. Early acute on   chronic osteomyelitis is not entirely excluded.    6. Small midfoot effusion with severe degenerative changes of the 2nd   tarsometatarsal joint.  These may represent neuropathic changes. Given the   lack of an adjacent abscess or ulceration septic arthritis/osteomyelitis is   less likely. XR FOOT LEFT (MIN 3 VIEWS)   Final Result   Suspected osteomyelitis of the heads of the 2nd and 3rd metatarsals. PHARMACY TO DOSE VANCOMYCIN  IP CONSULT TO PODIATRY  IP CONSULT TO SPIRITUAL SERVICES    Assessment/Plan:    Active Hospital Problems    Diagnosis     Osteomyelitis of left foot, unspecified type ([facility] [M86.9]      Priority: Medium    Diabetes mellitus ([facility] [E11.9]     Primary hypertension [I10]          Osteomyelitis L foot 2/2 DM foot wound. Hx same and has had multiple amputations to this foot in the past. IV vanco and cefepime, IVF. Podiatry and WC RN c/s. MRI -osteomyelitis with abscess and edema. Status post partial left foot amputation 2/7  DM2, poor control, initial . Hold oral Rx, chk A1c 13.1, continue home Lantus, sliding scale insulin  HTN, mildly uncontrolled. Will give a dose of his home Rx tonight. Cont home regimen. CKD 3b, Cr stable. Neuropathy, cont home gabapentin. DVT Prophylaxis: Lx  Diet: ADULT DIET;  Regular; 5 carb choices (75 gm/meal)  Code Status: Full Code  PT/OT Eval Status: ordered    Shawn Staley MD

## 2023-02-08 NOTE — PROGRESS NOTES
PROGRESS NOTE    Admit Date:  2/3/2023    Subjective:  57 y.o. male who is seen for evaluation of cellulitis and ulcers on the left LE. S/P TMA 2/7/23. States feeling OK.        Past Medical History:        Diagnosis Date    TORI (acute kidney injury) ([facility] 09/12/2017    Bacteremia 05/05/2017    staph aureus    Diabetes mellitus ([facility]     Diabetic neuropathy ([facility]     Gout     Hypertension     MRSA (methicillin resistant staph aureus) culture positive 12/27/18, 9/12/17, 5/5/17,9/5/13, 1/15/14    ulcers bilateral legs    MRSA (methicillin resistant staph aureus) culture positive 01/21/2021    foot wound    Neuropathy     Pneumonia     Pyogenic inflammation of bone ([facility]     Stage 3b chronic kidney disease (CKD) ([facility] 1/8/2023       Past Surgical History:        Procedure Laterality Date    EYE SURGERY      lens implants after removal of cataracts    FOOT DEBRIDEMENT Left 4/28/2021    DEBRIDEMENT INFECTED BONE AND TISSUE LEFT FOOT performed by Shameka Nunez DPM at [address] [facility]  12/28/2018    partial right foot amputation with graft application    TOE AMPUTATION Right 12/28/2018    PARTIAL RIGHT FOOT AMPUTATION WITH GRAFT APPLICATION [address] DPM [address] [facility] Left 5/27/2021    PARTIAL LEFT FOOT AMPUTATION performed by Shameka Nunez DPM [address] [facility] Left 9/2/2022    PARTIAL LEFT FOOT AMPUTATION, ULCER DEBRIDEMENT LOWER LEG performed by Shameka Nunez DPM at [address] [facility] Left 2/7/2023    PARTIAL LEFT FOOT AMPUTATION performed by Shameka Nunez DPM at [address] N/A 1/21/2021    EGD BIOPSY performed by Mag Rothman DO at [facility] ENDOSCOPY       Current Medications:     insulin glargine  50 Units SubCUTAneous Nightly    cefepime  2,000 mg IntraVENous Q12H    amLODIPine  5 mg Oral Daily    aspirin  81 mg Oral Daily    gabapentin  600 mg Oral BID    lisinopril  10 mg Oral Daily    tamsulosin  0.4 mg Oral Daily venlafaxine  75 mg Oral Daily    sodium chloride flush  5-40 mL IntraVENous 2 times per day    enoxaparin  40 mg SubCUTAneous Daily    vancomycin  1,000 mg IntraVENous Q24H    insulin lispro  8 Units SubCUTAneous TID WC    insulin lispro  0-8 Units SubCUTAneous TID WC    insulin lispro  0-4 Units SubCUTAneous Nightly       Allergies:  Hydrocodone-acetaminophen, Percocet [oxycodone-acetaminophen], Pregabalin, and Vicodin [hydrocodone-acetaminophen]    Social History:    Social History     Tobacco Use    Smoking status: Never    Smokeless tobacco: Never   Vaping Use    Vaping Use: Never used   Substance Use Topics    Alcohol use: No     Comment: FORMER DRINKER approx. quit 2005    Drug use: No       Family History:       Problem Relation Age of Onset    Diabetes Maternal Grandfather     Heart Disease Paternal Uncle     High Blood Pressure Mother        Review of Systems    CONSTITUTIONAL:  negative  EYES:  negative  HEENT:  negative  RESPIRATORY:  negative  CARDIOVASCULAR:  negative  GASTROINTESTINAL:  negative  GENITOURINARY:  negative  INTEGUMENT/BREAST:  positive for ulcer  MUSCULOSKELETAL:  negative  NEUROLOGICAL:  positive for numbness       Objective:   /79   Pulse 75   Temp 98.3 °F (36.8 °C) (Oral)   Resp 16   Ht 5' 10\" (1.778 m)   Wt 161 lb 8 oz (73.3 kg)   SpO2 97%   BMI 23.17 kg/m²     Data:  CBC:   Recent Labs     02/05/23  1032 02/06/23  0550 02/07/23  0510   WBC 5.0 5.1 6.3   HGB 8.0* 7.3* 8.1*   HCT 25.4* 22.7* 25.9*   MCV 75.9* 75.2* 76.4*    323 407     BMP:   Recent Labs     02/05/23  1032 02/06/23  0550 02/07/23  0510   * 135* 134*   K 4.0 4.2 4.8    103 101   CO2 26 25 26   BUN 16 16 17   CREATININE 1.8* 1.8* 1.9*     LIVER PROFILE:   No results for input(s): AST, ALT, LIPASE, BILIDIR, BILITOT, ALKPHOS in the last 72 hours. Invalid input(s): AMYLASE,  ALB    PT/INR:   No results for input(s): PROT, INR in the last 72 hours.     HgBA1c:  Lab Results   Component Value Date    LABA1C 13.5 02/04/2023       CRP - 135.5    ESR - 102      Cultures: Blood x2 - NGSF    Wound abscess - Gram Stain Result1+ WBC's (Polymorphonuclear)   No organisms seen       Wound bone -     Imaging: xray left foot -   Postop TMA    Pathology - in progress      Physical Exam:    Dressing left foot - clean, dry and intact. No strikethrough noted. No proximal erythema noted. Ulcer on the medial aspect left lower leg with red granulation tissue noted.            Assessment:  Patient Active Problem List   Diagnosis Code    Diabetic ketoacidosis without coma associated with type 2 diabetes mellitus ([facility] E11.10    Diabetic ulcer of left foot associated with type 2 diabetes mellitus ([facility] E11.621, L97.529    Nausea & vomiting R11.2    Diabetic neuropathy ([facility] E11.40    DM (diabetes mellitus), secondary, uncontrolled, w/neurologic complic [id number]    Gout U51.6    Hx MRSA Z22.322    Hyperkalemia E87.5    Chronic pain on chronic opioids G89.29    Chronic LE diabetic ulcers E11.622, L97.309    Mild protein-calorie malnutrition (HCC) E44.1    Cellulitis L03.90    Acute kidney injury ([facility] N17.9    Neuropathy G62.9    Diabetic acidosis without coma (HCC) E11.10    Acute CVA (cerebrovascular accident) ([facility] I63.9    HTN (hypertension), benign I10    DKA, type 2, not at goal [facility]) E11.10    Diabetic foot infection ([facility] E11.628, L08.9    Sepsis without acute organ dysfunction (HCC) A41.9    Stage 3a chronic kidney disease ([facility] N18.31    Primary hypertension I10    Diabetes mellitus ([facility] E11.9    Acute osteomyelitis of left foot ([facility]) M86.172    Hyperglycemia R73.9    Pyogenic inflammation of bone ([facility]) M86.9    Anemia D64.9    Pseudohyponatremia R79.89    Acute encephalopathy G93.40    Stage 3b chronic kidney disease (CKD) ([facility]) N18.32    Osteomyelitis of left foot, unspecified type ([facility]) M86.9     diabetic foot ulcer left plantar forefoot secondary to peripheral neuropathy   Ulcer medial aspect left lower leg complicated by diabetes mellitus   Cellulitis left LE - improving  Osteomyelitis left foot secondary to diabetes mellitus - S/P partial left foot amputation 2/7/23  diabetes mellitus with peripheral neuropathy uncontrolled      Plan  Patient examined. Reviewed labs, imaging and notes. Continue IV antibiotics. Elevation of legs to decrease edema. Needs to control glucose levels to prevent future complications. Anticipate D/C tomorrow on oral antibiotics. Needs to stay off the foot.          Electronically signed by Aniceto Schroeder DPM on 2/8/2023 at 7:40 AM.

## 2023-02-08 NOTE — PROGRESS NOTES
Pt glucose 53 at this time. Gave 2 more apple juice at this time. Pt still denies symptoms of low glucose. Call light within reach.

## 2023-02-09 VITALS
HEIGHT: 70 IN | SYSTOLIC BLOOD PRESSURE: 138 MMHG | BODY MASS INDEX: 23.12 KG/M2 | RESPIRATION RATE: 16 BRPM | WEIGHT: 161.5 LBS | DIASTOLIC BLOOD PRESSURE: 78 MMHG | TEMPERATURE: 98 F | HEART RATE: 74 BPM | OXYGEN SATURATION: 99 %

## 2023-02-09 LAB
GLUCOSE BLD-MCNC: 175 MG/DL (ref 70–99)
GLUCOSE BLD-MCNC: 294 MG/DL (ref 70–99)
GLUCOSE BLD-MCNC: 340 MG/DL (ref 70–99)
PERFORMED ON: ABNORMAL

## 2023-02-09 PROCEDURE — 97166 OT EVAL MOD COMPLEX 45 MIN: CPT

## 2023-02-09 PROCEDURE — 6360000002 HC RX W HCPCS: Performed by: PODIATRIST

## 2023-02-09 PROCEDURE — 97161 PT EVAL LOW COMPLEX 20 MIN: CPT

## 2023-02-09 PROCEDURE — 2580000003 HC RX 258: Performed by: PODIATRIST

## 2023-02-09 PROCEDURE — 99239 HOSP IP/OBS DSCHRG MGMT >30: CPT | Performed by: INTERNAL MEDICINE

## 2023-02-09 PROCEDURE — 97116 GAIT TRAINING THERAPY: CPT

## 2023-02-09 PROCEDURE — 97530 THERAPEUTIC ACTIVITIES: CPT

## 2023-02-09 PROCEDURE — 6370000000 HC RX 637 (ALT 250 FOR IP): Performed by: PODIATRIST

## 2023-02-09 RX ORDER — AMOXICILLIN AND CLAVULANATE POTASSIUM 875; 125 MG/1; MG/1
1 TABLET, FILM COATED ORAL 2 TIMES DAILY
Qty: 14 TABLET | Refills: 0 | Status: SHIPPED | OUTPATIENT
Start: 2023-02-09 | End: 2023-02-16

## 2023-02-09 RX ORDER — INSULIN GLARGINE 100 [IU]/ML
30 INJECTION, SOLUTION SUBCUTANEOUS NIGHTLY
Qty: 1 EACH | Refills: 0
Start: 2023-02-09

## 2023-02-09 RX ADMIN — CEFEPIME 2000 MG: 2 INJECTION, POWDER, FOR SOLUTION INTRAVENOUS at 06:37

## 2023-02-09 RX ADMIN — GABAPENTIN 600 MG: 300 CAPSULE ORAL at 09:19

## 2023-02-09 RX ADMIN — VENLAFAXINE HYDROCHLORIDE 75 MG: 75 CAPSULE, EXTENDED RELEASE ORAL at 09:19

## 2023-02-09 RX ADMIN — TAMSULOSIN HYDROCHLORIDE 0.4 MG: 0.4 CAPSULE ORAL at 09:19

## 2023-02-09 RX ADMIN — ENOXAPARIN SODIUM 40 MG: 100 INJECTION SUBCUTANEOUS at 09:19

## 2023-02-09 RX ADMIN — AMLODIPINE BESYLATE 5 MG: 5 TABLET ORAL at 09:19

## 2023-02-09 RX ADMIN — ASPIRIN 81 MG: 81 TABLET, COATED ORAL at 09:19

## 2023-02-09 RX ADMIN — INSULIN LISPRO 6 UNITS: 100 INJECTION, SOLUTION INTRAVENOUS; SUBCUTANEOUS at 11:43

## 2023-02-09 RX ADMIN — LISINOPRIL 10 MG: 10 TABLET ORAL at 09:19

## 2023-02-09 NOTE — CARE COORDINATION
DISCHARGE ORDER  Date/Time 2023 11:45 AM  Completed by: Carroll Bailey RN, Case Management    Patient Name: Rebekah Kocher      : 1960  Admitting Diagnosis: Anemia, unspecified type [D64.9]  Diabetic ulcer of left midfoot associated with diabetes mellitus due to underlying condition, with necrosis of bone ([address] [J12.006, L97.424]  Other acute osteomyelitis of left foot ([address] [M86.172]  Osteomyelitis of left foot, unspecified type ([address] [M86.9]      Admit order Date and Status:23 inpt  (verify MD's last order for status of admission)      Noted discharge order. If applicable PT/OT recommendation at Discharge: : Home with 24hr supervision (initially)  DME recommendation by PT/OT:shower chair  Confirmed discharge plan  : Yes  with whom patient  If pt confirmed DC plan does family need to be contacted by CM Yes  Order for dc noted. Discharge Plan: Spoke with pt who cont plan for home. Discussed rehab and pt declines. Therapy in to see and rec's home with 24 hr supervision. Pt states has this at dc as YENY will be there. Discussed HHC and pt declines. Chart reviewed and no other dc needs identified. Date of Last IMM Given: 23    Reviewed chart. Role of discharge planner explained and patient verbalized understanding. Discharge order is noted. Has Home O2 in place on admit:  No  Informed of need to bring portable home O2 tank on day of discharge for nursing to connect prior to leaving:   Not Indicated  Verbalized agreement/Understanding:   Not Indicated  Pt is being d/c'd to home today. Pt's O2 sats are 96% on RA. Discharge timeout done with nsg, CM and pt. All discharge needs and concerns addressed.

## 2023-02-09 NOTE — PROGRESS NOTES
PROGRESS NOTE    Admit Date:  2/3/2023    Subjective:  57 y.o. male who is seen for evaluation of cellulitis and ulcers on the left LE. S/P TMA 2/7/23. States feeling OK. Ready to go home.          Past Medical History:        Diagnosis Date    TORI (acute kidney injury) ([facility] 09/12/2017    Bacteremia 05/05/2017    staph aureus    Di[facility] mellitus ([facility]     Diabetic neuropathy ([facility]     Gout     Hypertension     MRSA (methicillin resistant staph aureus) culture positive 12/27/18, 9/12/17, 5/5/17,9/5/13, 1/15/14    ulcers bilateral legs    MRSA (methicillin resistant staph aureus) culture positive 01/21/2021    foot wound    Neuropathy     Pneumonia     Pyogenic inflammation of bone ([facility]     Stage 3b chronic kidney disease (CKD) ([facility] 1/8/2023       Past Surgical History:        Procedure Laterality Date    EYE SURGERY      lens implants after removal of cataracts    FOOT DEBRIDEMENT Left 4/28/2021    DEBRIDEMENT INFECTED BONE AND TISSUE LEFT FOOT performed by Khanh Newman DPM at [address]  12/28/2018    partial right foot amputation with graft application    TOE AMPUTATION Right 12/28/2018    PARTIAL RIGHT FOOT AMPUTATION WITH GRAFT APPLICATION performed by Khanh Newman DPM [address] [facility] Left 5/27/2021    PARTIAL LEFT FOOT AMPUTATION performed by Khanh Newman DPM at [address] [facility] Left 9/2/2022    PARTIAL LEFT FOOT AMPUTATION, ULCER DEBRIDEMENT LOWER LEG performed by Khanh Newman DPM at [address] [facility] Left 2/7/2023    PARTIAL LEFT FOOT AMPUTATION performed by Khanh Newman DPM at [address] 1/21/2021    EGD BIOPSY performed by Lisha Danielson DO at [facility] ENDOSCOPY       Current Medications:     insulin glargine  30 Units SubCUTAneous Nightly    cefepime  2,000 mg IntraVENous Q12H    amLODIPine  5 mg Oral Daily    aspirin  81 mg Oral Daily    gabapentin  600 mg Oral BID    lisinopril  10 mg Oral Daily tamsulosin  0.4 mg Oral Daily    venlafaxine  75 mg Oral Daily    sodium chloride flush  5-40 mL IntraVENous 2 times per day    enoxaparin  40 mg SubCUTAneous Daily    vancomycin  1,000 mg IntraVENous Q24H    insulin lispro  0-8 Units SubCUTAneous TID WC    insulin lispro  0-4 Units SubCUTAneous Nightly       Allergies:  Hydrocodone-acetaminophen, Percocet [oxycodone-acetaminophen], Pregabalin, and Vicodin [hydrocodone-acetaminophen]    Social History:    Social History     Tobacco Use    Smoking status: Never    Smokeless tobacco: Never   Vaping Use    Vaping Use: Never used   Substance Use Topics    Alcohol use: No     Comment: FORMER DRINKER approx. quit 2005    Drug use: No       Family History:       Problem Relation Age of Onset    Diabetes Maternal Grandfather     Heart Disease Paternal Uncle     High Blood Pressure Mother        Review of Systems    CONSTITUTIONAL:  negative  EYES:  negative  HEENT:  negative  RESPIRATORY:  negative  CARDIOVASCULAR:  negative  GASTROINTESTINAL:  negative  GENITOURINARY:  negative  INTEGUMENT/BREAST:  positive for ulcer  MUSCULOSKELETAL:  negative  NEUROLOGICAL:  positive for numbness       Objective:   /67   Pulse 69   Temp 98.1 °F (36.7 °C) (Oral)   Resp 15   Ht 5' 10\" (1.778 m)   Wt 161 lb 8 oz (73.3 kg)   SpO2 96%   BMI 23.17 kg/m²     Data:  CBC:   Recent Labs     02/07/23  0510   WBC 6.3   HGB 8.1*   HCT 25.9*   MCV 76.4*        BMP:   Recent Labs     02/07/23  0510   *   K 4.8      CO2 26   BUN 17   CREATININE 1.9*     LIVER PROFILE:   No results for input(s): AST, ALT, LIPASE, BILIDIR, BILITOT, ALKPHOS in the last 72 hours. Invalid input(s): AMYLASE,  ALB    PT/INR:   No results for input(s): PROT, INR in the last 72 hours.     HgBA1c:  Lab Results   Component Value Date    LABA1C 13.5 02/04/2023       CRP - 135.5    ESR - 102      Cultures: Blood x2 - NGSF    Wound abscess - Gram Stain Result1+ WBC's (Polymorphonuclear)   No organisms seen       Wound bone - Gram Stain Result1+ WBC's (Polymorphonuclear)   No organisms seen   Culture SurgicalNo growth to date Anaerobic CultureAnaerobic culture further report to follow       Imaging: xray left foot -   Postop TMA    Pathology - Left foot bone, partial foot amputation:   - Acute osteomyelitis. Physical Exam:    Dressing left foot - clean, dry and intact. No strikethrough noted. No proximal erythema noted. Sutures intact dorsal aspect TMA with no gapping noted. Minimal serosanguinous drainage noted. No malodor noted. Minimal erythema and edema noted. Ulcer on the medial aspect left lower leg with red granulation tissue noted.            Assessment:  Patient Active Problem List   Diagnosis Code    Diabetic ketoacidosis without coma associated with type 2 diabetes mellitus ([facility] E11.10    Diabetic ulcer of left foot associated with type 2 diabetes mellitus ([facility] E11.621, L97.529    Nausea & vomiting R11.2    Diabetic neuropathy ([facility] E11.40    DM (diabetes mellitus), secondary, uncontrolled, w/neurologic complic [id number]    Gout I25.5    Hx MRSA Z22.322    Hyperkalemia E87.5    Chronic pain on chronic opioids G89.29    Chronic LE diabetic ulcers E11.622, L97.309    Mild protein-calorie malnutrition (HCC) E44.1    Cellulitis L03.90    Acute kidney injury ([address] N17.9    Neuropathy G62.9    Diabetic acidosis without coma (HCC) E11.10    Acute CVA (cerebrovascular accident) ([address] I63.9    HTN (hypertension), benign I10    DKA, type 2, not at goal [facility]) E11.10    Diabetic foot infection ([facility] E11.628, L08.9    Sepsis without acute organ dysfunction (HCC) A41.9    Stage 3a chronic kidney disease ([address] N18.31    Primary hypertension I10    Diabetes mellitus ([facility] E11.9    Acute osteomyelitis of left foot (HCC) M86.172    Hyperglycemia R73.9    Pyogenic inflammation of bone (HCC) M86.9    Anemia D64.9    Pseudohyponatremia R79.89    Acute encephalopathy G93.40    Stage 3b chronic kidney disease (CKD) ([facility] N18.32    Osteomyelitis of left foot, unspecified type ([facility] M86.9     diabetic foot ulcer left plantar forefoot secondary to peripheral neuropathy   Ulcer medial aspect left lower leg complicated by diabetes mellitus   Cellulitis left LE - improving  Osteomyelitis left foot secondary to diabetes mellitus - S/P partial left foot amputation 2/7/23  diabetes mellitus with peripheral neuropathy uncontrolled      Plan  Patient examined. Reviewed labs, imaging and notes. Elevation of legs to decrease bleeding, pain and swelling. Needs to control glucose levels to prevent future complications. Discussed with Dr. Mariaa Lee to D/C home from Podiatry standpoint. Dressing removed. Applied xeroform and dry dressing to remain intact until seen in the office next week. Recommend oral antibiotics.            Electronically signed by Mata Carmona DPM on 2/9/2023 at 8:24 AM.

## 2023-02-09 NOTE — DISCHARGE SUMMARY
Name:  Jose Pacheco  Room:  0211/0211-02  MRN:    [id number]    Discharge Summary      This discharge summary is in conjunction with a complete physical exam done on the day of discharge. Discharging Physician: Winsome Francois MD      Admit: 2/3/2023  Discharge:  2/9/2023    Diagnoses this Admission    Principal Problem:    Osteomyelitis of left foot, unspecified type [facility])  Active Problems:    Primary hypertension    Diabetes mellitus ([facility] [address]  Resolved Problems:    * No resolved hospital problems. *          Procedures (Please Review Full Report for Details)      Consults    PHARMACY TO DOSE VANCOMYCIN  IP CONSULT TO PODIATRY  IP CONSULT TO SPIRITUAL SERVICES      HPI:  The patient is a 57 y.o. male with PMH below, presents with fever, L foot wound. Pt reports he has not felt well for the last 2 weeks. He has been having fevers to an unknown degree. 99.6 at arrival here. He has felt increasing fatigue. He says he has a wound to the bottom of his L foot that is not healing. He has noted increasing drainage from the wound. He has Hx of DM foot wounds and has had amputations. He was found to have a L DM foot infection w/ OM. Pt is a relatively poor historian. Physical Exam at Discharge:  /67   Pulse 69   Temp 98.1 °F (36.7 °C) (Oral)   Resp 15   Ht 5' 10\" (1.778 m)   Wt 161 lb 8 oz (73.3 kg)   SpO2 96%   BMI 23.17 kg/m²     GEN:        A&Ox3, NAD. HEENT:   NC/AT,EOMI, MMM, no erythema/exudates or visible masses. CVS:        Normal S1,S2. RRR. Without M/G/R.   LUNG:     CTA-B. No wheezes, rales or rhonchi. ABD:        Soft, ND/NT, BS+ x4. Without G/R.  EXT:        Left foot dressing  PSY:        Thought process intact, affect appropriate. PUSHPA:        CN III-XII grossly intact. Moves all 4 spontaneously. Sensory grossly intact except for DM neuropathy. Hospital Course    Osteomyelitis L foot 2/2 DM foot wound.   Hx same and has had multiple amputations to this foot in the past. IV vanco and cefepime, IVF. Podiatry and WC RN c/s. MRI -osteomyelitis with abscess and edema. Status post partial left foot amputation 2/7  DM2, poor control, initial . Hold oral Rx, chk A1c 13.1, continue home Lantus, sliding scale insulin  HTN, mildly uncontrolled. Will give a dose of his home Rx tonight. Cont home regimen. CKD 3b, Cr stable. Neuropathy, cont home gabapentin. XR FOOT LEFT (2 VIEWS)   Final Result   Resection of the distal metatarsals of the 2nd through 5th digits         MRI FOOT LEFT WO CONTRAST   Final Result   1. Large heterogeneous irregular fluid collection encasing the 3rd metatarsal   head and extending dorsal and plantar measuring 4.6 x 1.2 x 1.7 cm compatible   with an abscess. 2. Smaller fluid collection partially encasing the 2nd metatarsal head. 3. Osteomyelitis of the 3rd metatarsal head with bone marrow edema extending   into the base. 4. Very mild marrow edema in the 2nd metatarsal head compatible with   noninfectious reactive osteitis or early osteomyelitis. 5. Chronic erosive changes of the 4th and 5th metatarsal heads and mild   marrow edema. Given the lack of an adjacent abscess or deep soft tissue   ulceration this likely represents chronic osteomyelitis. Early acute on   chronic osteomyelitis is not entirely excluded. 6. Small midfoot effusion with severe degenerative changes of the 2nd   tarsometatarsal joint. These may represent neuropathic changes. Given the   lack of an adjacent abscess or ulceration septic arthritis/osteomyelitis is   less likely. XR FOOT LEFT (MIN 3 VIEWS)   Final Result   Suspected osteomyelitis of the heads of the 2nd and 3rd metatarsals.                 Discharge Medications     Medication List        START taking these medications      amoxicillin-clavulanate 875-125 MG per tablet  Commonly known as: AUGMENTIN  Take 1 tablet by mouth 2 times daily for 7 days            CHANGE how you take these medications      insulin glargine 100 UNIT/ML injection vial  Commonly known as: LANTUS  Inject 30 Units into the skin nightly  What changed: how much to take            CONTINUE taking these medications      acetaminophen 500 MG tablet  Commonly known as: TYLENOL  Take 2 tablets by mouth every 8 hours as needed for Pain     amLODIPine 5 MG tablet  Commonly known as: NORVASC  Take 1 tablet by mouth daily     aspirin 81 MG EC tablet     * Ultra-Thin II Pen Needles 29G X 12.7MM Misc  Generic drug: Insulin Pen Needle     * BD ULTRA-FINE PEN NEEDLES 29G X 12.7MM Misc  Generic drug: Insulin Pen Needle     * BD Pen Needle Светлана 2nd Gen 32G X 4 MM Misc  Generic drug: Insulin Pen Needle     FreeStyle Isidoro 2 [place] 2 Sensor Misc     gabapentin 600 MG tablet  Commonly known as: NEURONTIN     insulin lispro (1 Unit Dial) 100 UNIT/ML Sopn  Commonly known as: HumaLOG KwikPen  Inject 10 Units into the skin 3 times daily (before meals)     lisinopril 10 MG tablet  Commonly known as: PRINIVIL;ZESTRIL     sildenafil 100 MG tablet  Commonly known as: VIAGRA     tamsulosin 0.4 MG capsule  Commonly known as: FLOMAX     venlafaxine 75 MG extended release capsule  Commonly known as: EFFEXOR XR           * This list has 3 medication(s) that are the same as other medications prescribed for you. Read the directions carefully, and ask your doctor or other care provider to review them with you.                    Where to Get Your Medications        These medications were sent to [facility], [address] [facility] [address], [address] [address]      Phone: [phone]   amoxicillin-clavulanate 875-125 MG per tablet       Information about where to get these medications is not yet available    Ask your nurse or doctor about these medications  insulin glargine 100 UNIT/ML injection vial           Discharge Condition/Location: Stable    Follow Up: Follow up with PCP.     Total time spent on discharge is 35 minutes        Winsome Francois MD 2/9/2023 8:22 AM

## 2023-02-09 NOTE — PROGRESS NOTES
Shift assessment complete, scheduled meds given. Call light and bedside table within easy reach. Pt does not appear to be in any distress, vss, pt denies further needs at this time.

## 2023-02-09 NOTE — PLAN OF CARE
Problem: Discharge Planning  Goal: Discharge to home or other facility with appropriate resources  2/9/2023 1030 by Baljinder Espinoza RN  Outcome: Completed     Problem: Pain  Goal: Verbalizes/displays adequate comfort level or baseline comfort level  2/9/2023 1030 by Baljinder Espinoza RN  Outcome: Completed     Problem: Chronic Conditions and Co-morbidities  Goal: Patient's chronic conditions and co-morbidity symptoms are monitored and maintained or improved  Outcome: Completed     Problem: Wound:  Goal: Will show signs of wound healing; wound closure and no evidence of infection  Description: Will show signs of wound healing; wound closure and no evidence of infection  Outcome: Completed     Problem: Safety - Adult  Goal: Free from fall injury  Outcome: Completed

## 2023-02-09 NOTE — PROGRESS NOTES
Vancomycin Day: 6  Current Regimen: 1000 mg IV every 24 hours    Patient's labs, cultures, vitals, and vancomycin regimen reviewed.    SCr stable  U/O not measured/well documented  [employer] updated    Plan:   Order level for 2/10 at 11am and BMP  [place] D 2/9/202310:49 AM  .

## 2023-02-09 NOTE — PROGRESS NOTES
Inpatient Occupational Therapy Evaluation and Treatment    Unit: [facility]  Date:  2/9/2023  Patient Name:    Armaan Nelson  Admitting diagnosis:  Anemia, unspecified type [D64.9]  Diabetic ulcer of left midfoot associated with diabetes mellitus due to underlying condition, with necrosis of bone ([facility] [address] [H57.739, L97.424]  Other acute osteomyelitis of left foot ([facility] [address] [M86.172]  Osteomyelitis of left foot, unspecified type ([facility] [address] [M86.9]  Admit Date:  2/3/2023  Precautions/Restrictions/WB Status/ Lines/ Wounds/ Oxygen: : Fall risk, Bed/chair alarm, Lines IV, and WB Restrictions (WBAT LLE per perfect serve with Dr. Thalia Staley)    Treatment Time:  1010--1040  Treatment Number:  1  Timed Code Treatment Minutes: 20 minutes  Total Treatment Minutes:  30   minutes    Patient Goals for Therapy: \" go home  \"          Discharge Recommendations: Home with PRN assist  DME needs for discharge: Shower Chair         Therapy recommendations for staff: Independent with use of no device for all ambulation within room    History of Present Illness: per H&P   57 y.o. male with PMH below, presents with fever, L foot wound. Pt reports he has not felt well for the last 2 weeks. He has been having fevers to an unknown degree. 99.6 at arrival here. He has felt increasing fatigue. He says he has a wound to the bottom of his L foot that is not healing. He has noted increasing drainage from the wound. He has Hx of DM foot wounds and has had amputations. He was found to have a L DM foot infection w/ OM. Pt is a relatively poor historian  Home Health S4 Level Recommendation:  NA    AM-PAC Score: 23  Subjective  Patient sitting up in chair with no family present. Pt agreeable to this OT session.      Cognition:    A&O x4   Able to follow 2 step commands    Pain:   No  Location: NA   Rating: NA /10  Pain Medicine Status: No request made    Preadmission Environment:   Pt. Lives     Spouse- works and is having sx and her sister is coming into town to assist   Home environment:    two story home  Steps to enter first floor:   3 steps to enter  Steps to second floor/basement: N/A  Laundry:     1st floor  Bathroom:     tub/shower unit and standard height toilet  Pt sleeps in a:    Flat bed  Equipment owned:        Preadmission Status:  Pt. Able to drive: Yes  Pt. Fully independent with ADLs: Yes  Pt. Required assistance from family for: Cooking and share laundry and cleaning   Pt. independent for functional transfers and utilized No Device for mobility in home and No Device out in community  History of falls: Yes  Home Health Services:None    Objective:    Upper Extremity ROM:    WFL,  pt able to perform all bed mobility, transfers, and gait without ROM limitation. Upper Extremity Strength: Through observation [facility]     Upper Extremity Sensation:    Diminished    Upper Extremity Proprioception:  WFL    Coordination and Tone  Diminished    Balance:  Sitting:    Independent  Standing:    Independent      Bed Mobility: PT up in the chair   Supine to Sit:    Not Tested  Sit to Supine:   Not Tested  Rolling:   Not Tested  Scooting in sitting: Independent  Scooting in supine:  Not Tested    Transfers:    Sit to stand:    Independent  Stand to sit: Independent  Bed to chair:     Not Tested  Bed/ chair to standard toilet:  Not Tested  chair to standard commode in bathroom : IND      See PT note for gait analysis. ADLs:  Dressing:      UE:   Independent  LE:    Independent donning shoe     Bathing:    UE:  Not Tested  LE:  Not Tested    Eating:   Independent    Toileting:  Independent    Grooming/hygiene: Not Tested    Activity Tolerance   Pt completed therapy session with min light headiness when ambulating down the conrad     Positioning Needs   Pt up in chair, no alarm needed, call light provided and all needs within reach .      Ther Ex / Activities Initiated:   N/A    Patient/Family Education   Pt educated on role of inpatient OT, plan of care, importance of continued activity, DC recommendations. - shower seat     Assessment:    Pt seen for Occupational therapy evaluation in acute care setting.     Goal(s) :   No goals needed due pt does not need OT services       Plan: D/C OT services     Signature: Rj Ortega OTR/L [id number]       If patient discharges from this facility prior to next visit, this note will serve as the Discharge Summary

## 2023-02-09 NOTE — DISCHARGE INSTRUCTIONS
Your information:  Name: Bola Palafox  : 1960    Your instructions: Follow up with family doctor in 7-10 days. Follow up with Dr Adan Metz in 1 week. What to do after you leave the hospital:    Recommended diet: diabetic diet    Recommended activity: activity as tolerated        The following personal items were collected during your admission and were returned to you:    Belongings  Dental Appliances: None  Vision - Corrective Lenses: None  Hearing Aid: None  Clothing: Shirt  Jewelry: Ring  Body Piercings Removed: N/A  Electronic Devices: None  Weapons (Notify Protective Services/Security): None  Other Valuables: Sent home  Home Medications: None  Valuables Given To: Family (Comment)  Provide Name(s) of Who Valuable(s) Were Given To: Steph Dominguez  Responsible person(s) in the waiting room: [phone]  Patient approves for provider to speak to responsible person post operatively: Yes    Information obtained by:  By signing below, I understand that if any problems occur once I leave the hospital I am to contact family doctor. I understand and acknowledge receipt of the instructions indicated above.

## 2023-02-09 NOTE — PROGRESS NOTES
Inpatient Physical Therapy Evaluation    Unit: [address]  Date:  2/9/2023  Patient Name:    Jose Mccarty  Admitting diagnosis:  Anemia, unspecified type [D64.9]  Diabetic ulcer of left midfoot associated with diabetes mellitus due to underlying condition, with necrosis of bone ([facility] [address] [Y73.928, L97.424]  Other acute osteomyelitis of left foot ([facility] [address] [M86.172]  Osteomyelitis of left foot, unspecified type ([facility] [address] [M86.9]  Admit Date:  2/3/2023  Precautions/Restrictions/WB Status/ Lines/ Wounds/ Oxygen: Fall risk, Bed/chair alarm, Lines IV, and WB Restrictions (WBAT LLE per perfect serve with Dr. Sharmaine Ponce)    Treatment Time:  10:10 - 10:40  Treatment Number:  1  Timed Code Treatment Minutes: 20 minutes  Total Treatment Minutes:  30  minutes    Patient Stated Goals for Therapy: \" go home \"          Discharge Recommendations: Home with 24hr supervision (initially)  DME needs for discharge: Shower Chair       Therapy recommendation for EMS Transport: can transport by wheelchair    Therapy recommendations for staff:   Stand by assist with use of No AD and gait belt for all transfers and ambulation to/from chair  to/from bathroom  within room  within halls    History of Present Illness: The patient is a 57 y.o. male with PMH below, presents with fever, L foot wound. Pt reports he has not felt well for the last 2 weeks. He has been having fevers to an unknown degree. 99.6 at arrival here. He has felt increasing fatigue. He says he has a wound to the bottom of his L foot that is not healing. He has noted increasing drainage from the wound. He has Hx of DM foot wounds and has had amputations. He was found to have a L DM foot infection w/ OM. Pt is a relatively poor historian. S/P: PARTIAL LEFT FOOT AMPUTATION on 2/7    Home Health S4 Level Recommendation:  NA    AM-PAC Mobility Score    AM-PAC Inpatient Mobility Raw Score : 22       Subjective  Patient sitting up in chair with no family present.    Pt agreeable to this PT session. Cognition    A&O x4   Able to follow 2 step commands    Pain   No  Location:   Rating: NA /10  Pain Medicine Status: Denies need    Preadmission Environment    Pt. Lives Spouse and 24/7 Assist not Available - pt's YENY is in town and will stay as long as needed to provide 24hr care for pt and his spouse. Pt's spouse will be having a surgery tomorrow morning. Home environment:  two story home and pt able to stay main level  Steps to enter first floor:  3 steps to enter and hand rail bilateral  Steps to second floor/basement: Full flight of 12-13 and hand rail: unilateral  Bathroom: tub/shower unit and standard height toilet  Equipment owned:     Preadmission Status:  Pt. able to drive: Yes  Pt fully independent with ADLs: Yes  Pt. required assistance from family for: Independent PTA - shares responsibilities with spouse  Pt. independent for functional transfers and utilized No Device for mobility in home and No Device out in community  History of falls Yes - fell in bathtub one day before coming to [address] Floor: None    Objective  CHF pt: No    Upper Extremity ROM/Strength  Please see OT evaluation. Lower Extremity ROM / Strength   AROM WFL: Yes  ROM limitations:     BLE strength WFL, but not formally assessed with MMT. Lower Extremity Sensation    Impaired - neuropathy B feet and toes    Coordination and Tone  WFL    Balance  Static Sitting:  Normal; Independent  Dynamic Sitting:  Normal; Independent  Comments:     Static Standing: Good ; Supervision  Dynamic Standing: Good - ; SBA  Comments:     Posture  Seated: WNL  Standing:  WNL    Bed Mobility   Supine to Sit:    Not Tested  Sit to Supine:   Not Tested  Rolling:   Not Tested  Scooting in sitting: Independent  Scooting in supine:  Not Tested    Transfer Training     Sit to stand:   Supervision  Stand to sit:   Supervision  Bed to Chair:   Not Tested with use of N/A    Gait gait completed as indicated below  Distance: 100 ft + 100 ft  Deviations (firm surface/linoleum):  decreased nathaniel, step through pattern, decreased foot clearance on left, and decreased stance time on left  Assistive Device Used:    No AD and gait belt  Level of Assist:    SBA  Comment: steady, no LOB    Stair Training deferred, pt unsafe/ not appropriate to complete stairs at this time  # of Steps:   4  Level of Assist:  CGA  UE Support:  bilateral  Assistive Device:  No AD  Pattern:   reciprocal pattern  Comments: 6\" steps, VC's for safety. Therapeutic Exercises Initiated    Exercises in BOLD performed in unit today. Items not bolded are carried forward from prior visits for continuity of the record. *For CHF pt, see ADDENDUM below for therex details. Exercise/Equipment Resistance/Repetitions Other comments                                        Activity Tolerance   During therapy session noted pt with no adverse symptoms to activity    Positioning Needs   Pt up in chair, no alarm needed, positioned in proper neutral alignment and pressure relief provided. Call light provided and all needs within reach    Other  None. Patient/Family Education   Pt educated on role of inpatient PT, POC, importance of continued activity, DC recommendations, safety awareness, transfer techniques, pacing activity, and calling for assist with mobility. Assessment  Pt demonstrated decreased Activity tolerance, Balance, and Strength as well as decreased independence with Ambulation. Recommending Home 24 hr supervision upon discharge as patient functioning close to baseline level    Goals : To be met in 3 visits:  1). Independent with LE Ex x 10 reps  2). CHF goal: N/A    To be met in 6 visits:  1). Supine to/from sit: Independent  2). Sit to/from stand: Independent  3). Bed to chair: Independent  4). Gait: Ambulate 150 ft.  with  Independent and use of No AD  5). Tolerate B LE exercises 3 sets of 10-15 reps  6).   Ascend/descend 3 steps with Supervision with use of hand rail bilateral and No AD    Rehabilitation Potential: Good  Strengths for achieving goals include:   Pt motivated, PLOF, Family Support, and Pt cooperative   Barriers to achieving goals include:    No Barriers    Plan    To be seen for 1-2 more visits  while in acute care setting for therapeutic exercises, bed mobility, transfers, progressive gait training, balance training, and family/patient education. Signature: Susi Blanton, PT, DPT, OMT-C  #[id number]      If patient discharges from this facility prior to next visit, this note will serve as the Discharge Summary.

## 2023-02-09 NOTE — PROGRESS NOTES
Bedside report given to [facility] RN  pt in stable condition no needs at this time.  Call light within reach

## 2023-02-11 LAB
ANAEROBIC CULTURE: ABNORMAL
GRAM STAIN RESULT: ABNORMAL
ORGANISM: ABNORMAL
WOUND/ABSCESS: ABNORMAL
WOUND/ABSCESS: ABNORMAL

## 2023-02-12 LAB
ANAEROBIC CULTURE: ABNORMAL
CULTURE SURGICAL: ABNORMAL
GRAM STAIN RESULT: ABNORMAL
ORGANISM: ABNORMAL

## 2023-02-12 NOTE — OP NOTE
[address]                 [address] [address]                                OPERATIVE REPORT    PATIENT NAME: Luis Perez                     :        1960  MED REC NO:   [id number]                          ROOM:       0211  ACCOUNT NO:   [de-identified]                           ADMIT DATE: 2023  PROVIDER:     Kemal Suggs DPM    DATE OF PROCEDURE:  2023    PREOPERATIVE DIAGNOSES:  1. Diabetic foot ulceration, left foot. 2.  Osteomyelitis, left foot. 3.  Hypertrophy of bone, left foot. 4.  Abscess, left foot. 5.  Diabetes mellitus, uncontrolled. POSTOPERATIVE DIAGNOSES:  1. Diabetic foot ulceration, left foot. 2.  Osteomyelitis, left foot. 3.  Hypertrophy of bone, left foot. 4.  Abscess, left foot. 5.  Diabetes mellitus, uncontrolled. OPERATION PERFORMED:  Partial left foot amputation. SURGEON:  Kemal Suggs DPM    ANESTHESIA:  Local with MAC. HEMOSTASIS:  Ankle tourniquet at 250 mmHg. ESTIMATED BLOOD LOSS:  Less than 100 mL. OPERATIVE PROCEDURE:  The patient was brought into the operating room,  placed on the operating table in supine position. Under mild IV  sedation, the left ankle was anesthetized with 20 mL of 1:1 mixture of  1% lidocaine plain and 0.5% Marcaine plain. The foot was then scrubbed,  prepped and draped in the usual sterile manner. Esmarch was utilized to  exsanguinate the left foot. Ankle tourniquet was then inflated to 250  mmHg. Attention was then directed to the left foot. There was an ulceration  noted in the region of the subsecond and third metatarsal head region. This did extend up to the level of the metatarsal head 2 and 3. There  was purulence expressed from this area. At this time, linear  longitudinal incision was then made from the region of the dorsal aspect  of the second metatarsal dorsally across the foot to the level of the  fifth metatarsal head. Dissection was carried down in order to expose  the distal portion of the metatarsal heads 2, 3, 4 and 5. There was a  soft tissue abscess in the region of the second and third metatarsal  head. There was purulence expressed in this area. So, culture was  obtained from this area. The necrotic tissue in this area was then  sharply excised as well. This abscess did encompass a fairly large area  and had destroyed some of the underlying soft tissues. Given the  destruction of the soft tissues in the region of the second and third  metatarsals, it was determined that we would ellipse out the necrotic  skin in this region. Therefore, an elliptical incision was then made  just distal to the prior incision excising the necrotic tissue as well  as the remaining tissue laterally. At this time, soft tissues were  dissected back in order to expose the second, third, fourth, and fifth  metatarsal shafts. Sagittal saw was utilized to transect the second,  third, fourth, and fifth metatarsals in the distal midshaft region. A  portion of the bone was sent for culture and sensitivity and the  remaining was sent for pathological evaluation. There was no further  signs of rough edges or bony prominences noted. Surgical site was then  copiously irrigated with sterile saline via the pulse lavage. All  bleeders were then cauterized as necessary. At this time, we inserted  two Steri-Strips, 4x4 gauze in order to stimulate wound healing in a  high-risk diabetic patient. Skin edges were then reapproximated with  3-0 nylon in simple interrupted suture technique. Surgical site was  then covered with Xeroform gauze, fluffs, Kerlix, ABD, and Coban. Tourniquet was then deflated. The patient tolerated the procedure and anesthesia well, transferred to  recovery room with vital signs stable and vascular status intact.    Following a brief period of postoperative monitoring, the patient will  be transferred back to the floor under the care of the Medical Service.         ROBIN ROSS DPM    D: 02/11/2023 17:35:57       T: 02/12/2023 6:51:25     [id number]  Job#: [id number]     Doc#: [id number]    CC:

## 2023-03-14 ENCOUNTER — HOSPITAL ENCOUNTER (INPATIENT)
Age: 63
LOS: 2 days | Discharge: HOME | DRG: 312 | End: 2023-03-16
Attending: STUDENT IN AN ORGANIZED HEALTH CARE EDUCATION/TRAINING PROGRAM | Admitting: INTERNAL MEDICINE
Payer: MEDICARE

## 2023-03-14 ENCOUNTER — APPOINTMENT (OUTPATIENT)
Dept: GENERAL RADIOLOGY | Age: 63
DRG: 312 | End: 2023-03-14
Payer: MEDICARE

## 2023-03-14 DIAGNOSIS — R77.8 ELEVATED TROPONIN: ICD-10-CM

## 2023-03-14 DIAGNOSIS — R55 PRE-SYNCOPE: Primary | ICD-10-CM

## 2023-03-14 LAB
ALBUMIN SERPL-MCNC: 4.1 G/DL (ref 3.4–5)
ALBUMIN/GLOB SERPL: 1.2 {RATIO} (ref 1.1–2.2)
ALP SERPL-CCNC: 179 U/L (ref 40–129)
ALT SERPL-CCNC: 9 U/L (ref 10–40)
ANION GAP SERPL CALCULATED.3IONS-SCNC: 8 MMOL/L (ref 3–16)
AST SERPL-CCNC: 12 U/L (ref 15–37)
BASOPHILS # BLD: 0 K/UL (ref 0–0.2)
BASOPHILS NFR BLD: 0.7 %
BILIRUB SERPL-MCNC: 0.4 MG/DL (ref 0–1)
BILIRUB UR QL STRIP.AUTO: NEGATIVE
BUN SERPL-MCNC: 23 MG/DL (ref 7–20)
CALCIUM SERPL-MCNC: 9.2 MG/DL (ref 8.3–10.6)
CHLORIDE SERPL-SCNC: 99 MMOL/L (ref 99–110)
CLARITY UR: CLEAR
CO2 SERPL-SCNC: 28 MMOL/L (ref 21–32)
COLOR UR: YELLOW
CREAT SERPL-MCNC: 1.7 MG/DL (ref 0.8–1.3)
DEPRECATED RDW RBC AUTO: 16.6 % (ref 12.4–15.4)
EKG ATRIAL RATE: 73 BPM
EKG DIAGNOSIS: NORMAL
EKG P AXIS: 7 DEGREES
EKG P-R INTERVAL: 212 MS
EKG Q-T INTERVAL: 414 MS
EKG QRS DURATION: 98 MS
EKG QTC CALCULATION (BAZETT): 456 MS
EKG R AXIS: -56 DEGREES
EKG T AXIS: -2 DEGREES
EKG VENTRICULAR RATE: 73 BPM
EOSINOPHIL # BLD: 0.2 K/UL (ref 0–0.6)
EOSINOPHIL NFR BLD: 3.1 %
GFR SERPLBLD CREATININE-BSD FMLA CKD-EPI: 44 ML/MIN/{1.73_M2}
GLUCOSE BLD-MCNC: 361 MG/DL (ref 70–99)
GLUCOSE SERPL-MCNC: 117 MG/DL (ref 70–99)
GLUCOSE UR STRIP.AUTO-MCNC: 100 MG/DL
HCT VFR BLD AUTO: 27.2 % (ref 40.5–52.5)
HGB BLD-MCNC: 8.6 G/DL (ref 13.5–17.5)
HGB UR QL STRIP.AUTO: ABNORMAL
KETONES UR STRIP.AUTO-MCNC: NEGATIVE MG/DL
LEUKOCYTE ESTERASE UR QL STRIP.AUTO: NEGATIVE
LYMPHOCYTES # BLD: 1.5 K/UL (ref 1–5.1)
LYMPHOCYTES NFR BLD: 24.7 %
MCH RBC QN AUTO: 24.2 PG (ref 26–34)
MCHC RBC AUTO-ENTMCNC: 31.8 G/DL (ref 31–36)
MCV RBC AUTO: 76.1 FL (ref 80–100)
MONOCYTES # BLD: 0.5 K/UL (ref 0–1.3)
MONOCYTES NFR BLD: 8.5 %
NEUTROPHILS # BLD: 3.9 K/UL (ref 1.7–7.7)
NEUTROPHILS NFR BLD: 63 %
NITRITE UR QL STRIP.AUTO: NEGATIVE
PERFORMED ON: ABNORMAL
PH UR STRIP.AUTO: 5.5 [PH] (ref 5–8)
PLATELET # BLD AUTO: 245 K/UL (ref 135–450)
PMV BLD AUTO: 8.6 FL (ref 5–10.5)
POTASSIUM SERPL-SCNC: 3.9 MMOL/L (ref 3.5–5.1)
PROT SERPL-MCNC: 7.4 G/DL (ref 6.4–8.2)
PROT UR STRIP.AUTO-MCNC: 30 MG/DL
RBC # BLD AUTO: 3.58 M/UL (ref 4.2–5.9)
RBC #/AREA URNS HPF: NORMAL /HPF (ref 0–4)
SODIUM SERPL-SCNC: 135 MMOL/L (ref 136–145)
SP GR UR STRIP.AUTO: <=1.005 (ref 1–1.03)
TROPONIN T SERPL-MCNC: 0.02 NG/ML
TROPONIN T SERPL-MCNC: <0.01 NG/ML
TROPONIN T SERPL-MCNC: <0.01 NG/ML
UA DIPSTICK W REFLEX MICRO PNL UR: YES
URN SPEC COLLECT METH UR: ABNORMAL
UROBILINOGEN UR STRIP-ACNC: 0.2 E.U./DL
WBC # BLD AUTO: 6.2 K/UL (ref 4–11)
WBC #/AREA URNS HPF: NORMAL /HPF (ref 0–5)

## 2023-03-14 PROCEDURE — 2580000003 HC RX 258: Performed by: PHYSICIAN ASSISTANT

## 2023-03-14 PROCEDURE — 93010 ELECTROCARDIOGRAM REPORT: CPT | Performed by: INTERNAL MEDICINE

## 2023-03-14 PROCEDURE — 93005 ELECTROCARDIOGRAM TRACING: CPT | Performed by: STUDENT IN AN ORGANIZED HEALTH CARE EDUCATION/TRAINING PROGRAM

## 2023-03-14 PROCEDURE — 80053 COMPREHEN METABOLIC PANEL: CPT

## 2023-03-14 PROCEDURE — 36415 COLL VENOUS BLD VENIPUNCTURE: CPT

## 2023-03-14 PROCEDURE — 85025 COMPLETE CBC W/AUTO DIFF WBC: CPT

## 2023-03-14 PROCEDURE — 96361 HYDRATE IV INFUSION ADD-ON: CPT

## 2023-03-14 PROCEDURE — 84484 ASSAY OF TROPONIN QUANT: CPT

## 2023-03-14 PROCEDURE — 2580000003 HC RX 258: Performed by: STUDENT IN AN ORGANIZED HEALTH CARE EDUCATION/TRAINING PROGRAM

## 2023-03-14 PROCEDURE — 96374 THER/PROPH/DIAG INJ IV PUSH: CPT

## 2023-03-14 PROCEDURE — 6370000000 HC RX 637 (ALT 250 FOR IP): Performed by: PHYSICIAN ASSISTANT

## 2023-03-14 PROCEDURE — 6360000002 HC RX W HCPCS: Performed by: STUDENT IN AN ORGANIZED HEALTH CARE EDUCATION/TRAINING PROGRAM

## 2023-03-14 PROCEDURE — 1200000000 HC SEMI PRIVATE

## 2023-03-14 PROCEDURE — 6360000002 HC RX W HCPCS: Performed by: PHYSICIAN ASSISTANT

## 2023-03-14 PROCEDURE — 99285 EMERGENCY DEPT VISIT HI MDM: CPT

## 2023-03-14 PROCEDURE — 71045 X-RAY EXAM CHEST 1 VIEW: CPT

## 2023-03-14 PROCEDURE — 81001 URINALYSIS AUTO W/SCOPE: CPT

## 2023-03-14 RX ORDER — ASPIRIN 81 MG/1
81 TABLET ORAL DAILY
Status: DISCONTINUED | OUTPATIENT
Start: 2023-03-14 | End: 2023-03-14 | Stop reason: SDUPTHER

## 2023-03-14 RX ORDER — ONDANSETRON 4 MG/1
4 TABLET, ORALLY DISINTEGRATING ORAL EVERY 8 HOURS PRN
Status: DISCONTINUED | OUTPATIENT
Start: 2023-03-14 | End: 2023-03-16 | Stop reason: HOSPADM

## 2023-03-14 RX ORDER — SODIUM CHLORIDE 9 MG/ML
INJECTION, SOLUTION INTRAVENOUS PRN
Status: DISCONTINUED | OUTPATIENT
Start: 2023-03-14 | End: 2023-03-16 | Stop reason: HOSPADM

## 2023-03-14 RX ORDER — INSULIN GLARGINE 100 [IU]/ML
20 INJECTION, SOLUTION SUBCUTANEOUS NIGHTLY
Status: DISCONTINUED | OUTPATIENT
Start: 2023-03-14 | End: 2023-03-15

## 2023-03-14 RX ORDER — ONDANSETRON 2 MG/ML
4 INJECTION INTRAMUSCULAR; INTRAVENOUS ONCE
Status: COMPLETED | OUTPATIENT
Start: 2023-03-14 | End: 2023-03-14

## 2023-03-14 RX ORDER — AMLODIPINE BESYLATE 5 MG/1
5 TABLET ORAL DAILY
Status: DISCONTINUED | OUTPATIENT
Start: 2023-03-14 | End: 2023-03-16 | Stop reason: HOSPADM

## 2023-03-14 RX ORDER — SODIUM CHLORIDE 0.9 % (FLUSH) 0.9 %
5-40 SYRINGE (ML) INJECTION EVERY 12 HOURS SCHEDULED
Status: DISCONTINUED | OUTPATIENT
Start: 2023-03-14 | End: 2023-03-16 | Stop reason: HOSPADM

## 2023-03-14 RX ORDER — VENLAFAXINE HYDROCHLORIDE 37.5 MG/1
75 CAPSULE, EXTENDED RELEASE ORAL DAILY
Status: DISCONTINUED | OUTPATIENT
Start: 2023-03-14 | End: 2023-03-16 | Stop reason: HOSPADM

## 2023-03-14 RX ORDER — DEXTROSE MONOHYDRATE 100 MG/ML
INJECTION, SOLUTION INTRAVENOUS CONTINUOUS PRN
Status: DISCONTINUED | OUTPATIENT
Start: 2023-03-14 | End: 2023-03-16 | Stop reason: HOSPADM

## 2023-03-14 RX ORDER — ASPIRIN 81 MG/1
81 TABLET, CHEWABLE ORAL DAILY
Status: DISCONTINUED | OUTPATIENT
Start: 2023-03-15 | End: 2023-03-16 | Stop reason: HOSPADM

## 2023-03-14 RX ORDER — ONDANSETRON 2 MG/ML
4 INJECTION INTRAMUSCULAR; INTRAVENOUS EVERY 6 HOURS PRN
Status: DISCONTINUED | OUTPATIENT
Start: 2023-03-14 | End: 2023-03-16 | Stop reason: HOSPADM

## 2023-03-14 RX ORDER — TAMSULOSIN HYDROCHLORIDE 0.4 MG/1
0.4 CAPSULE ORAL DAILY
Status: DISCONTINUED | OUTPATIENT
Start: 2023-03-14 | End: 2023-03-16 | Stop reason: HOSPADM

## 2023-03-14 RX ORDER — ATORVASTATIN CALCIUM 40 MG/1
40 TABLET, FILM COATED ORAL NIGHTLY
Status: DISCONTINUED | OUTPATIENT
Start: 2023-03-14 | End: 2023-03-16 | Stop reason: HOSPADM

## 2023-03-14 RX ORDER — INSULIN LISPRO 100 [IU]/ML
0-16 INJECTION, SOLUTION INTRAVENOUS; SUBCUTANEOUS
Status: DISCONTINUED | OUTPATIENT
Start: 2023-03-14 | End: 2023-03-16 | Stop reason: HOSPADM

## 2023-03-14 RX ORDER — SODIUM CHLORIDE 0.9 % (FLUSH) 0.9 %
5-40 SYRINGE (ML) INJECTION PRN
Status: DISCONTINUED | OUTPATIENT
Start: 2023-03-14 | End: 2023-03-16 | Stop reason: HOSPADM

## 2023-03-14 RX ORDER — POLYETHYLENE GLYCOL 3350 17 G/17G
17 POWDER, FOR SOLUTION ORAL DAILY PRN
Status: DISCONTINUED | OUTPATIENT
Start: 2023-03-14 | End: 2023-03-16 | Stop reason: HOSPADM

## 2023-03-14 RX ORDER — ENOXAPARIN SODIUM 100 MG/ML
40 INJECTION SUBCUTANEOUS DAILY
Status: DISCONTINUED | OUTPATIENT
Start: 2023-03-14 | End: 2023-03-16 | Stop reason: HOSPADM

## 2023-03-14 RX ORDER — LISINOPRIL 10 MG/1
10 TABLET ORAL DAILY
Status: DISCONTINUED | OUTPATIENT
Start: 2023-03-15 | End: 2023-03-16 | Stop reason: HOSPADM

## 2023-03-14 RX ORDER — ACETAMINOPHEN 650 MG/1
650 SUPPOSITORY RECTAL EVERY 6 HOURS PRN
Status: DISCONTINUED | OUTPATIENT
Start: 2023-03-14 | End: 2023-03-16 | Stop reason: HOSPADM

## 2023-03-14 RX ORDER — GABAPENTIN 300 MG/1
600 CAPSULE ORAL 2 TIMES DAILY
Status: DISCONTINUED | OUTPATIENT
Start: 2023-03-14 | End: 2023-03-15

## 2023-03-14 RX ORDER — ACETAMINOPHEN 325 MG/1
650 TABLET ORAL EVERY 6 HOURS PRN
Status: DISCONTINUED | OUTPATIENT
Start: 2023-03-14 | End: 2023-03-16 | Stop reason: HOSPADM

## 2023-03-14 RX ORDER — INSULIN LISPRO 100 [IU]/ML
0-4 INJECTION, SOLUTION INTRAVENOUS; SUBCUTANEOUS NIGHTLY
Status: DISCONTINUED | OUTPATIENT
Start: 2023-03-14 | End: 2023-03-16 | Stop reason: HOSPADM

## 2023-03-14 RX ORDER — 0.9 % SODIUM CHLORIDE 0.9 %
1000 INTRAVENOUS SOLUTION INTRAVENOUS ONCE
Status: COMPLETED | OUTPATIENT
Start: 2023-03-14 | End: 2023-03-14

## 2023-03-14 RX ADMIN — SODIUM CHLORIDE 1000 ML: 9 INJECTION, SOLUTION INTRAVENOUS at 11:51

## 2023-03-14 RX ADMIN — INSULIN LISPRO 4 UNITS: 100 INJECTION, SOLUTION INTRAVENOUS; SUBCUTANEOUS at 21:19

## 2023-03-14 RX ADMIN — Medication 10 ML: at 21:22

## 2023-03-14 RX ADMIN — TAMSULOSIN HYDROCHLORIDE 0.4 MG: 0.4 CAPSULE ORAL at 18:13

## 2023-03-14 RX ADMIN — ENOXAPARIN SODIUM 40 MG: 100 INJECTION SUBCUTANEOUS at 18:13

## 2023-03-14 RX ADMIN — ONDANSETRON 4 MG: 2 INJECTION INTRAMUSCULAR; INTRAVENOUS at 11:51

## 2023-03-14 RX ADMIN — INSULIN GLARGINE 20 UNITS: 100 INJECTION, SOLUTION SUBCUTANEOUS at 21:18

## 2023-03-14 RX ADMIN — VENLAFAXINE HYDROCHLORIDE 75 MG: 37.5 CAPSULE, EXTENDED RELEASE ORAL at 18:13

## 2023-03-14 RX ADMIN — GABAPENTIN 600 MG: 300 CAPSULE ORAL at 21:18

## 2023-03-14 RX ADMIN — ATORVASTATIN CALCIUM 40 MG: 40 TABLET, FILM COATED ORAL at 21:18

## 2023-03-14 ASSESSMENT — PAIN SCALES - GENERAL: PAINLEVEL_OUTOF10: 0

## 2023-03-14 ASSESSMENT — PAIN - FUNCTIONAL ASSESSMENT: PAIN_FUNCTIONAL_ASSESSMENT: NONE - DENIES PAIN

## 2023-03-14 NOTE — ED PROVIDER NOTES
[address] [facility]  ED     EMERGENCY DEPARTMENT ENCOUNTER            Pt Name: Franci Monreal   MRN: [id number]   Paulgfmaryann 1960   Date of evaluation: 3/14/2023   Provider: Emma Brock MD   PCP: Daron Osbonr MD   Note Started: 11:24 AM EDT 3/14/23          CHIEF COMPLAINT     Chief Complaint   Patient presents with    Emesis    Dizziness     Pt to ED per EMS after pt felt like he was going to pass out while at [facility] and then vomited. Pt states, \"I think I ate some bad strawberries\". Pt is diabetic and BG was in 300's this morning. Per EMS, BG was 192. Pt does complain of dizziness and lightheadedness. Denies any abdomen pain. HISTORY OF PRESENT ILLNESS:   History from : Patient   Limitations to history : None     Franci Monreal is a 57 y.o. male who presents with concerns for lightheadedness, presyncopal episode, dizziness. Patient states that this is happened to him multiple times in the past.  He states that he was at [facility] and walking and felt like he was going to faint to somebody helped him to the ground and he sat down and the episode passed without him losing consciousness. He states that he has a history of diabetes. States that this morning for breakfast, his wife cut up some strawberries and put them on glazed donuts and he believes that the strawberries were bad which is what caused the symptoms. He denies any associated chest pain or shortness of breath. He states that he was nauseated and threw up 1 time after this happened. He is still feeling somewhat lightheaded. Denies numbness or tingling. Denies other complaints or concerns. Nursing Notes were all reviewed and agreed with, or any disagreements were addressed in the HPI. REVIEW OF SYSTEMS :    Positives and Pertinent negatives as per HPI.       MEDICAL HISTORY   has a past medical history of TORI (acute kidney injury) ([facility] (09/12/2017), Bacteremia (05/05/2017), Diabetes mellitus ([facility] Diabetic neuropathy ([facility] Gout, Hypertension, MRSA (methicillin resistant staph aureus) culture positive (12/27/18, 9/12/17, 5/5/17,9/5/13, 1/15/14), MRSA (methicillin resistant staph aureus) culture positive (01/21/2021), Neuropathy, Pneumonia, Pyogenic inflammation [facility] bone [facility] and Stage 3b chronic kidney disease (CKD) ([facility] (1/8/2023). Past Surgical History:   Procedure Laterality Date    EYE SURGERY      lens implants after removal of cataracts    FOOT DEBRIDEMENT Left 4/28/2021    DEBRIDEMENT INFECTED BONE AND TISSUE LEFT FOOT performed by Delia Atkins DPM at [place] [address]  12/28/2018    partial right foot amputation with graft application    TOE AMPUTATION Right 12/28/2018    PARTIAL RIGHT FOOT AMPUTATION WITH GRAFT APPLICATION performed by Delia Atkins DPM at [address] [facility] Left 5/27/2021    PARTIAL LEFT FO[facility] 9/2/2022    PARTIAL LEFT FOOT AMPUTATION, ULCER DEBRIDEMENT LOWER LEG performed by Delia Atkins DPM at [address] [facility] Left 2/7/2023    PARTIAL LEFT FOOT AMPUTATION performed by Delia Atkins DPM at [place] [address]. 1/21/2021    EGD BIOPSY performed by Anna Marie Nguyen DO at [address] [place] [address]       Previous Medications    ACETAMINOPHEN (TYLENOL) 500 MG TABLET    Take 2 tablets by mouth every 8 hours as needed for Pain    AMLODIPINE (NORVASC) 5 MG TABLET    Take 1 tablet by mouth daily    ASPIRIN 81 MG EC TABLET    Take 81 mg by mouth daily    BD PEN NEEDLE MARY 2ND GEN 32G X 4 MM MISC        BD ULTRA-FINE PEN NEEDLES 29G X 12.7MM MISC        CONTINUOUS BLOOD GLUC  (FREESTYLE ISAI 2 READER) BRITTANEY        CONTINUOUS BLOOD GLUC SENS[facility] ([facility]        GABAPENTIN (NEURONTIN) 600 MG TABLET    Take 600 mg by mouth 2 times daily.     INSULIN GLARGINE (LANTUS) 100 UNIT/ML INJECTION VIAL    Inject 30 Units into the skin nightly    INSULIN LISPRO, 1 UNIT DIAL, (HUMALOG KWIKPEN) 100 UNIT/ML SOPN    Inject 10 Units into the skin 3 times daily (before meals)    INSULIN PEN NEEDLE (ULTRA-THIN II PEN NEEDLES) 29G X 12.7MM MISC    Use for insulin injections QID as directed    LISINOPRIL (PRINIVIL;ZESTRIL) 10 MG TABLET    Take 10 mg by mouth daily    SILDENAFIL (VIAGRA) 100 MG TABLET    Take 100 mg by mouth daily as needed    TAMSULOSIN (FLOMAX) 0.4 MG CAPSULE    Take 0.4 mg by mouth daily    VENLAFAXINE (EFFEXOR XR) 75 MG EXTENDED RELEASE CAPSULE    Take 75 mg by mouth daily Needs to be re prescribed      SCREENINGS          Lacey Coma Scale  Eye Opening: Spontaneous  Best Verbal Response: Oriented  Best Motor Response: Obeys commands  Lacey Coma Scale Score: 15                CIWA Assessment  BP: 124/80  Heart Rate: 76                  PHYSICAL EXAM :  ED Triage Vitals [03/14/23 1120]   BP Temp Temp Source Heart Rate Resp SpO2 Height Weight   124/80 97.9 °F (36.6 °C) Oral 76 12 99 % 5' 10\" (1.778 m) 160 lb (72.6 kg)      GENERAL APPEARANCE: Awake and alert. Cooperative. No acute distress. HEAD: Normocephalic. Atraumatic. EYES: PERRL. EOM's grossly intact. ENT: Mucous membranes are moist.   NECK: Supple, trachea midline. HEART: RRR. Normal S1, S2. No murmurs, rubs or gallops. LUNGS: Respirations unlabored. CTAB. Good air exchange. No wheezes, rales, or rhonchi. Speaking comfortably in full sentences. ABDOMEN: Soft. Non-distended. Non-tender. No guarding or rebound. Normal Bowel sounds. EXTREMITIES: No peripheral edema. MAEE. No acute deformities. SKIN: Warm and dry. No acute rashes. NEUROLOGICAL: Alert and oriented X 3. CN II-XII intact. No gross facial drooping. Strength 5/5 in all extremities. Sensation intact. No pronator drift. Normal coordination. PSYCHIATRIC: Normal mood and affect.     DIAGNOSTIC RESULTS     LABS:   Labs Reviewed   CBC WITH AUTO DIFFERENTIAL - Abnormal; Notable for the following components:       Result Value    RBC 3.58 (*)     Hemoglobin 8.6 (*)     Hematocrit 27.2 (*)     MCV 76.1 (*)     MCH 24.2 (*)     RDW 16.6 (*)     All other components within normal limits   COMPREHENSIVE METABOLIC PANEL W/ REFLEX TO MG FOR LOW K - Abnormal; Notable for the following components:    Sodium 135 (*)     Glucose 117 (*)     BUN 23 (*)     Creatinine 1.7 (*)     Est, Glom Filt Rate 44 (*)     Alkaline Phosphatase 179 (*)     ALT 9 (*)     AST 12 (*)     All other components within normal limits   TROPONIN - Abnormal; Notable for the following components:    Troponin 0.02 (*)     All other components within normal limits   URINALYSIS      When ordered only abnormal lab results are displayed. All other labs were within normal range or not returned as of this dictation. RADIOLOGY:      Non-plain film images such as CT, Ultrasound and MRI are read by the radiologist. Plain radiographic images are visualized and preliminarily interpreted by the ED Provider with the below findings:   Interpretation per the Radiologist below, if available at the time of this note:     XR CHEST PORTABLE   Final Result   No acute abnormality            No results found. EKG: The Ekg interpreted by me shows  normal sinus rhythm with a rate of 73, first-degree AV block  Hunt is   Left axis deviation  QTc is  normal  Intervals and Durations are unremarkable. ST Segments: nonspecific changes  No significant change from prior EKG dated 1/7/2023    PROCEDURES   Unless otherwise noted below, none     CRITICAL CARE TIME   The total Critical Care time is 15 minutes which excludes separately billable procedures. Vitals:    Vitals:    03/14/23 1120   BP: 124/80   Pulse: 76   Resp: 12   Temp: 97.9 °F (36.6 °C)   TempSrc: Oral   SpO2: 99%   Weight: 160 lb (72.6 kg)   Height: 5' 10\" (1.778 m)             Is this patient to be included in the SEP-1 Core Measure due to severe sepsis or septic shock?    No   Exclusion criteria - the patient is NOT to be included for SEP-1 Core Measure due to: Infection is not suspected       CC/HPI Summary, DDx, ED Course, and Reassessment: Patient is a 55-year-old male, with history of diabetes, presenting with concerns for presyncopal episode that occurred at [facility] prior to arrival and 1 episode of emesis. Patient states that he was feeling dizzy and lightheaded and thought that he might faint and sat on the ground and was able to avoid fainting and then threw up. Upon arrival in the ED, patient still states that he is feeling lightheaded. He denies any associated chest pain or shortness of breath. EKG shows some nonspecific T wave changes however similar compared to previous. Labs were performed and are overall reassuring. He was treated with IV fluids and Zofran with improvement of his symptoms. Troponin however did return elevated at 0.02. It appears that when he was discharged in January he had a normal troponin at that time. He has no new acute kidney injury to account for his troponin elevation and given his presyncope and comorbidities feel that he would benefit from hospitalization for further work-up and treatment of his presyncopal episode. Patient is comfortable and in agreement with plan of care. Patient was given the following medications:   Medications   0.9 % sodium chloride bolus (1,000 mLs IntraVENous New Bag 3/14/23 1151)   ondansetron (ZOFRAN) injection 4 mg (4 mg IntraVENous Given 3/14/23 1151)        CONSULTS:   Hospitalist  Discussion with Other Professionals: None   {Social Determinants: None   Chronic Conditions:  Diabetes   Records Reviewed: Hospitalization from 1/7/2023      Disposition Considerations: Admitted   I am the Primary Clinician of Record. FINAL IMPRESSION    1. Pre-syncope    2. Elevated troponin           DISPOSITION/PLAN     PATIENT REFERRED TO:   No follow-up provider specified.      DISCHARGE MEDICATIONS:   New Prescriptions No medications on file        DISCONTINUED MEDICATIONS:   Discontinued Medications    No medications on file              (Please note that portions of this note were completed with a voice recognition program.  Efforts were made to edit the dictations but occasionally words are mis-transcribed.)       Romana Camarillo MD (electronically signed)             Romana Camarillo MD  03/14/23 [id number]

## 2023-03-14 NOTE — PROGRESS NOTES
4 Eyes Skin Assessment     NAME:  Carmina Patton  YOB: 1960  MEDICAL RECORD NUMBER:  [id number]    The patient is being assessed for  Admission    I agree that One RN has performed a thorough Head to Toe Skin Assessment on the patient. ALL assessment sites listed below have been assessed. Areas assessed by both nurses:    Head, Face, Ears, Shoulders, Back, Chest, Arms, Elbows, Hands, Sacrum. Buttock, Coccyx, Ischium, and Legs. Feet and Heels        Does the Patient have a Wound?  No noted wound(s)       Slade Prevention initiated by RN: No   Wound Care Orders initiated by RN: No    Pressure Injury (Stage 3,4, Unstageable, DTI, NWPT, and Complex wounds) if present, place referral order by RN under : No    New and Established Ostomies, if present place, referral order under : No      Nurse 1 eSignature: Electronically signed by Chucky Lin RN on 3/14/23 at 6:39 PM EDT    Nurse 2 eSignature: Electronically signed by Marilu Garner RN on 3/14/23 at 6:51 PM EDT

## 2023-03-14 NOTE — H&P
Hospital Medicine History & Physical      PCP: Kevin Bryant MD    Date of Admission: 3/14/2023    Date of Service: Pt seen/examined on 3/14/2023 and Admitted to Inpatient with expected LOS greater than two midnights due to medical therapy. Chief Complaint:    Dizziness      History Of Present Illness:   57 y.o. male who presented to [facility] with complaints of lightheadedness/dizziness. Patient states that he was shopping at [facility] this morning when suddenly he felt like he was \"going to pass out\". Endorses lightheadedness and had to be helped to the ground. He denies passing out or losing consciousness. He states he has had multiple episodes of this in the past.  In the ED initial troponin was elevated at 0.02 however patient denies any chest pain or discomfort at this time. He does endorse some nausea and believes he had an episode of emesis in the ambulance. Denies fevers, chills, abdominal pain, urinary symptoms, or focal neurologic deficit. Will be admitted for further work-up and evaluation.     Past Medical History:          Diagnosis Date    TORI (acute kidney injury) ([facility] 09/12/2017    Bacteremia 05/05/2017    staph aureus    Diabetes mellitus ([address]     Diabetic neuropathy ([address]     Gout     Hypertension     MRSA (methicillin resistant staph aureus) culture positive 12/27/18, 9/12/17, 5/5/17,9/5/13, 1/15/14    ulcers bilateral legs    MRSA (methicillin resistant staph aureus) culture positive 01/21/2021    foot wound    Neuropathy     Pneumonia     Pyogenic inflammation of bone ([address]     Stage 3b chronic kidney disease (CKD) ([facility] 1/8/2023       Past Surgical History:          Procedure Laterality Date    EYE SURGERY      lens implants after removal of cataracts    FOOT DEBRIDEMENT Left 4/28/2021    DEBRIDEMENT INFECTED BONE AND TISSUE LEFT FOOT performed by Floridalma Fox DPM at [address]  12/28/2018    partial right foot amputation with graft application TOE AMPUTATION Right 12/28/2018    PARTIAL RIGHT FOOT AMPUTATION WITH GRAFT APPLICATION performed by Daria Day DPM at [address] Left 5/27/2021    PARTIAL LEFT FOOT AMPUTATION performed by Daria Day DPM at [address] Left 9/2/2022    PARTIAL LEFT FOOT AMPUTATION, ULCER DEBRIDEMENT LOWER LEG performed by Daria Day DPM at [address] Left 2/7/2023    PARTIAL LEFT FOOT AMPUTATION performed by Daria Day DPM at [place] 1/21/2021    EGD BIOPSY performed by Amee Mejia DO at [address] [facility]       Medications Prior to Admission:      Prior to Admission medications    Medication Sig Start Date End Date Taking?  Authorizing Provider   insulin glargine (LANTUS) 100 UNIT/ML injection vial Inject 30 Units into the skin nightly 2/9/23   Tory Sams MD   tamsulosin (FLOMAX) 0.4 MG capsule Take 0.4 mg by mouth daily 1/16/23   Historical Provider, MD   insulin lispro, 1 Unit Dial, (HUMALOG KWIKPEN) 100 UNIT/ML SOPN Inject 10 Units into the skin 3 times daily (before meals) 1/8/23   Clemencia Cash MD   acetaminophen (TYLENOL) 500 MG tablet Take 2 tablets by mouth every 8 hours as needed for Pain 10/2/22   NAYELY Seals - CNP   aspirin 81 MG EC tablet Take 81 mg by mouth daily    Historical Provider, MD   lisinopril (PRINIVIL;ZESTRIL) 10 MG tablet Take 10 mg by mouth daily    Historical Provider, MD   Continuous Blood Gluc  (FREESTYLE ISAI 2 READER) BRITTANEY  5/5/21   Historical Provider, MD   Continuous Blood Gluc Sensor (FREESTYLE ISAI 2 SENSOR) [facility]  5/5/21   Historical Provider, MD   BD PEN NEEDLE MARY 2ND GEN 32G X 4 MM MISC  5/5/21   Historical Provider, MD   Insulin Pen Needle (ULTRA-THIN II PEN NEEDLES) 29G X 12.7MM MISC Use for insulin injections QID as directed 4/29/21   Historical Provider, MD   BD ULTRA-FINE PEN NEEDLES 29G X 12.7MM [facility]  4/29/21   Historical Provider, MD   amLODIPine (NORVASC) 5 MG tablet Take 1 tablet by mouth daily 4/30/21   POLO Bolton   sildenafil (VIAGRA) 100 MG tablet Take 100 mg by mouth daily as needed 3/3/21   Historical Provider, MD   gabapentin (NEURONTIN) 600 MG tablet Take 600 mg by mouth 2 times daily. Historical Provider, MD   venlafaxine (EFFEXOR XR) 75 MG extended release capsule Take 75 mg by mouth daily Needs to be re prescribed    Historical Provider, MD       Allergies:  Hydrocodone-acetaminophen, Percocet [oxycodone-acetaminophen], Pregabalin, and Vicodin [hydrocodone-acetaminophen]    Social History:      The patient currently lives home with wife    TOBACCO:   reports that he has never smoked. He has never used smokeless tobacco.  ETOH:   reports no history of alcohol use. E-cigarette/Vaping       Questions Responses    E-cigarette/Vaping Use Never User    Start Date     Passive Exposure     Quit Date     Counseling Given     Comments               Family History:      Reviewed and negative in regards to presenting illness/complaint. Problem Relation Age of Onset    Diabetes Maternal Grandfather     Heart Disease Paternal Uncle     High Blood Pressure Mother        REVIEW OF SYSTEMS COMPLETED:   Pertinent positives as noted in the HPI. All other systems reviewed and negative. PHYSICAL EXAM PERFORMED:    /86   Pulse 69   Temp 97.9 °F (36.6 °C) (Oral)   Resp 16   Ht 5' 10\" (1.778 m)   Wt 160 lb (72.6 kg)   SpO2 98%   BMI 22.96 kg/m²     General appearance:  No apparent distress, appears stated age and cooperative. HEENT:  Normal cephalic, atraumatic without obvious deformity. Pupils equal, round, and reactive to light. Extra ocular muscles intact. Conjunctivae/corneas clear. Neck: Supple, with full range of motion. No jugular venous distention. Trachea midline. Respiratory:  Normal respiratory effort. Clear to auscultation, bilaterally without Rales/Wheezes/Rhonchi.   Cardiovascular:  Regular rate and rhythm with normal S1/S2 without murmurs, rubs or gallops. Abdomen: Soft, non-tender, non-distended with normal bowel sounds. Musculoskeletal: Left foot status post partial amputation no clubbing, cyanosis or edema bilaterally. Full range of motion without deformity. Skin: Wound noted to back of left leg with surrounding erythema and open wound to right shin. Otherwise skin color, texture, turgor normal.  No rashes or lesions. Neurologic:  Neurovascularly intact without any focal sensory/motor deficits. Cranial nerves: II-XII intact, grossly non-focal.  Psychiatric:  Alert and oriented, thought content appropriate, normal insight  Capillary Refill: Brisk,3 seconds, normal  Peripheral Pulses: +2 palpable, equal bilaterally       Labs:     Recent Labs     03/14/23  1154   WBC 6.2   HGB 8.6*   HCT 27.2*        Recent Labs     03/14/23  1154   *   K 3.9   CL 99   CO2 28   BUN 23*   CREATININE 1.7*   CALCIUM 9.2     Recent Labs     03/14/23  1154   AST 12*   ALT 9*   BILITOT 0.4   ALKPHOS 179*     No results for input(s): INR in the last 72 hours.   Recent Labs     03/14/23  1154   TROPONINI 0.02*       Urinalysis:      Lab Results   Component Value Date/Time    NITRU Negative 02/04/2023 03:15 AM    WBCUA 0-2 02/04/2023 03:15 AM    BACTERIA 1+ 01/07/2023 10:38 AM    RBCUA 3-4 02/04/2023 03:15 AM    BLOODU LARGE 02/04/2023 03:15 AM    SPECGRAV 1.020 02/04/2023 03:15 AM    GLUCOSEU >=1000 02/04/2023 03:15 AM       Radiology:     CXR: I have reviewed the CXR with the following interpretation: Nonacute  EKG:  I have reviewed the EKG with the following interpretation: Normal sinus rhythm    XR CHEST PORTABLE   Final Result   No acute abnormality             Consults:    IP CONSULT TO CARDIOLOGY    ASSESSMENT:    Active Hospital Problems    Diagnosis Date Noted    Pre-syncope [R55] 03/14/2023     Priority: Medium         PLAN:    Dizziness  Elevated troponin  -Received 1 L NS in ED  -EKG with no ischemic changes however nonspecific T wave changes  -Check orthostats  -Continue to trend troponin  -Telemetry  -Echocardiogram ordered and pending  -Cardiology consult, defer ischemic work-up if deemed necessary    Diabetes mellitus type 2  -A1c 13.5% (2/04/2023)  -SSI with glucose checks  -Continue basal insulin  -Continue home gabapentin  -Recent osteomyelitis of left foot secondary to diabetic foot wound s/p partial left foot amputation 2/7/2023    Essential hypertension  -Continue home amlodipine and lisinopril    CKD  -Creatinine appears baseline  -Avoid nephrotoxins when able    Right lower leg wound  -Wound care consult      DVT Prophylaxis: Lovenox  Diet: ADULT DIET; Regular; 5 carb choices (75 gm/meal); Low Fat/Low Chol/High Fiber/2 gm Na; No Caffeine  Code Status: Prior    PT/OT Eval Status: Pending    Dispo -2 to 3 days       POLO Dove    Thank you Rebel De La Torre MD for the opportunity to be involved in this patient's care. If you have any questions or concerns please feel free to contact me at [phone].

## 2023-03-14 NOTE — CONSULTS
Consult placed    [facility]: [address] Cardiology  Date:3/14/2023,  Time:5:45 PM        Electronically signed by Joshua Lovell RN on 3/14/2023 at 5:45 PM

## 2023-03-14 NOTE — CARE COORDINATION
CASE MANAGEMENT INITIAL ASSESSMENT      Reviewed chart and completed assessment with patient:Yes  Family present: No  Explained Case Management role/services. Yes    Primary contact information:Sachi, wife     Health Care Decision Maker :   Primary Decision Maker: Jossy Cespedes - Spouse - [phone]    Secondary Decision Maker: Sallie Del Real - Child - [phone]          Can this person be reached and be able to respond quickly, such as within a few minutes or hours? Yes    Admit date/status:3/14/2023  Diagnosis:Pre-syncope    Is this a Readmission?:  No    Readmission Screening completed?: No     Insurance:[employer]    Precert required for SNF: Yes       3 night stay required: Yes    Living arrangements, Adls, care needs, prior to admission:Lives in 2 story home w/wife, IPTA    Durable Medical Equipment at home:  Walker__Cane_X_RTS__ BSC__Shower Chair__  02__ HHN__ CPAP__  BiPap__  Hospital Bed__ W/C___ Other_____    Services in the home and/or outpatient, prior to admission:none    Current PCP:Angel Matta                                Medications:Yes Prescription coverage? Yes Will pt require financial assistance with medications No     Transportation needs: Family     PT/OT recs:    Hospital Exemption Notification (HEN):needed for snf    Barriers to discharge:medical complications    Plan/comments:Referred to patient for discharge planning. Patient is a 57year old male presenting to [facility] ED with pre-syncope. Patient lives in a 2 story home w/wife. Patient has 3 stairs to get to second floor and has no issues with getting around home. Patient is independent in ADLs. Patient uses a cane as needed and denies the need for home care. Patient reports he feels as if he is handling his diabetes \"poorly,\" when asked to further explain he states that he is not compliant with a diabetic diet. Pt is agreeable to education on healthy eating habits for maintaining diabetes.  Pt reports he is compliant with taking medications. UPDATE:  Dietary notified of need for education.    Electronically signed by RYAN Felix Student on 3/14/2023 at 3:27 PM    Electronically signed by RYAN Angulo on 3/15/2023 at 10:41 AM     ECOC on chart for MD signature

## 2023-03-15 ENCOUNTER — APPOINTMENT (OUTPATIENT)
Dept: NUCLEAR MEDICINE | Age: 63
DRG: 312 | End: 2023-03-15
Payer: MEDICARE

## 2023-03-15 ENCOUNTER — APPOINTMENT (OUTPATIENT)
Dept: CT IMAGING | Age: 63
DRG: 312 | End: 2023-03-15
Payer: MEDICARE

## 2023-03-15 LAB
ALBUMIN SERPL-MCNC: 3.4 G/DL (ref 3.4–5)
ALBUMIN/GLOB SERPL: 0.9 {RATIO} (ref 1.1–2.2)
ALP SERPL-CCNC: 161 U/L (ref 40–129)
ALT SERPL-CCNC: 7 U/L (ref 10–40)
ANION GAP SERPL CALCULATED.3IONS-SCNC: 6 MMOL/L (ref 3–16)
AST SERPL-CCNC: 11 U/L (ref 15–37)
BILIRUB SERPL-MCNC: 0.5 MG/DL (ref 0–1)
BUN SERPL-MCNC: 16 MG/DL (ref 7–20)
CALCIUM SERPL-MCNC: 8.8 MG/DL (ref 8.3–10.6)
CHLORIDE SERPL-SCNC: 103 MMOL/L (ref 99–110)
CHOLEST SERPL-MCNC: 111 MG/DL (ref 0–199)
CO2 SERPL-SCNC: 27 MMOL/L (ref 21–32)
CREAT SERPL-MCNC: 1.5 MG/DL (ref 0.8–1.3)
DEPRECATED RDW RBC AUTO: 16.8 % (ref 12.4–15.4)
EKG ATRIAL RATE: 71 BPM
EKG DIAGNOSIS: NORMAL
EKG P AXIS: 14 DEGREES
EKG P-R INTERVAL: 212 MS
EKG Q-T INTERVAL: 406 MS
EKG QRS DURATION: 110 MS
EKG QTC CALCULATION (BAZETT): 441 MS
EKG R AXIS: -53 DEGREES
EKG T AXIS: 8 DEGREES
EKG VENTRICULAR RATE: 71 BPM
GFR SERPLBLD CREATININE-BSD FMLA CKD-EPI: 52 ML/MIN/{1.73_M2}
GLUCOSE BLD-MCNC: 138 MG/DL (ref 70–99)
GLUCOSE BLD-MCNC: 299 MG/DL (ref 70–99)
GLUCOSE BLD-MCNC: 319 MG/DL (ref 70–99)
GLUCOSE BLD-MCNC: 63 MG/DL (ref 70–99)
GLUCOSE BLD-MCNC: 69 MG/DL (ref 70–99)
GLUCOSE BLD-MCNC: 90 MG/DL (ref 70–99)
GLUCOSE SERPL-MCNC: 90 MG/DL (ref 70–99)
HCT VFR BLD AUTO: 28 % (ref 40.5–52.5)
HDLC SERPL-MCNC: 62 MG/DL (ref 40–60)
HGB BLD-MCNC: 8.9 G/DL (ref 13.5–17.5)
LDLC SERPL CALC-MCNC: 39 MG/DL
LV EF: 58 %
LVEF MODALITY: NORMAL
MCH RBC QN AUTO: 24.2 PG (ref 26–34)
MCHC RBC AUTO-ENTMCNC: 31.9 G/DL (ref 31–36)
MCV RBC AUTO: 75.8 FL (ref 80–100)
PERFORMED ON: ABNORMAL
PERFORMED ON: NORMAL
PLATELET # BLD AUTO: 265 K/UL (ref 135–450)
PMV BLD AUTO: 8.2 FL (ref 5–10.5)
POTASSIUM SERPL-SCNC: 4.5 MMOL/L (ref 3.5–5.1)
PROT SERPL-MCNC: 7.1 G/DL (ref 6.4–8.2)
RBC # BLD AUTO: 3.7 M/UL (ref 4.2–5.9)
SODIUM SERPL-SCNC: 136 MMOL/L (ref 136–145)
TRIGL SERPL-MCNC: 52 MG/DL (ref 0–150)
VLDLC SERPL CALC-MCNC: 10 MG/DL
WBC # BLD AUTO: 5.3 K/UL (ref 4–11)

## 2023-03-15 PROCEDURE — 80061 LIPID PANEL: CPT

## 2023-03-15 PROCEDURE — 6370000000 HC RX 637 (ALT 250 FOR IP): Performed by: PHYSICIAN ASSISTANT

## 2023-03-15 PROCEDURE — 97530 THERAPEUTIC ACTIVITIES: CPT

## 2023-03-15 PROCEDURE — A9502 TC99M TETROFOSMIN: HCPCS | Performed by: INTERNAL MEDICINE

## 2023-03-15 PROCEDURE — 3430000000 HC RX DIAGNOSTIC RADIOPHARMACEUTICAL: Performed by: INTERNAL MEDICINE

## 2023-03-15 PROCEDURE — 93005 ELECTROCARDIOGRAM TRACING: CPT | Performed by: PHYSICIAN ASSISTANT

## 2023-03-15 PROCEDURE — 78452 HT MUSCLE IMAGE SPECT MULT: CPT

## 2023-03-15 PROCEDURE — 87324 CLOSTRIDIUM AG IA: CPT

## 2023-03-15 PROCEDURE — 97161 PT EVAL LOW COMPLEX 20 MIN: CPT

## 2023-03-15 PROCEDURE — 93010 ELECTROCARDIOGRAM REPORT: CPT | Performed by: INTERNAL MEDICINE

## 2023-03-15 PROCEDURE — 97165 OT EVAL LOW COMPLEX 30 MIN: CPT

## 2023-03-15 PROCEDURE — 1200000000 HC SEMI PRIVATE

## 2023-03-15 PROCEDURE — 97535 SELF CARE MNGMENT TRAINING: CPT

## 2023-03-15 PROCEDURE — 2580000003 HC RX 258: Performed by: PHYSICIAN ASSISTANT

## 2023-03-15 PROCEDURE — 80053 COMPREHEN METABOLIC PANEL: CPT

## 2023-03-15 PROCEDURE — 97116 GAIT TRAINING THERAPY: CPT

## 2023-03-15 PROCEDURE — 75571 CT HRT W/O DYE W/CA TEST: CPT

## 2023-03-15 PROCEDURE — 85027 COMPLETE CBC AUTOMATED: CPT

## 2023-03-15 PROCEDURE — 99222 1ST HOSP IP/OBS MODERATE 55: CPT | Performed by: INTERNAL MEDICINE

## 2023-03-15 PROCEDURE — 87449 NOS EACH ORGANISM AG IA: CPT

## 2023-03-15 PROCEDURE — 36415 COLL VENOUS BLD VENIPUNCTURE: CPT

## 2023-03-15 PROCEDURE — 93306 TTE W/DOPPLER COMPLETE: CPT

## 2023-03-15 RX ORDER — LABETALOL HYDROCHLORIDE 5 MG/ML
10 INJECTION, SOLUTION INTRAVENOUS EVERY 6 HOURS PRN
Status: DISCONTINUED | OUTPATIENT
Start: 2023-03-15 | End: 2023-03-16 | Stop reason: HOSPADM

## 2023-03-15 RX ORDER — INSULIN GLARGINE 100 [IU]/ML
15 INJECTION, SOLUTION SUBCUTANEOUS NIGHTLY
Status: DISCONTINUED | OUTPATIENT
Start: 2023-03-15 | End: 2023-03-16 | Stop reason: HOSPADM

## 2023-03-15 RX ORDER — GABAPENTIN 400 MG/1
400 CAPSULE ORAL 2 TIMES DAILY
Status: DISCONTINUED | OUTPATIENT
Start: 2023-03-15 | End: 2023-03-16 | Stop reason: HOSPADM

## 2023-03-15 RX ADMIN — Medication 10 ML: at 20:08

## 2023-03-15 RX ADMIN — TETROFOSMIN 30.2 MILLICURIE: 1.38 INJECTION, POWDER, LYOPHILIZED, FOR SOLUTION INTRAVENOUS at 11:50

## 2023-03-15 RX ADMIN — LISINOPRIL 10 MG: 10 TABLET ORAL at 09:46

## 2023-03-15 RX ADMIN — ATORVASTATIN CALCIUM 40 MG: 40 TABLET, FILM COATED ORAL at 20:07

## 2023-03-15 RX ADMIN — AMLODIPINE BESYLATE 5 MG: 5 TABLET ORAL at 09:46

## 2023-03-15 RX ADMIN — ASPIRIN 81 MG 81 MG: 81 TABLET ORAL at 09:46

## 2023-03-15 RX ADMIN — TAMSULOSIN HYDROCHLORIDE 0.4 MG: 0.4 CAPSULE ORAL at 09:46

## 2023-03-15 RX ADMIN — GABAPENTIN 400 MG: 400 CAPSULE ORAL at 20:07

## 2023-03-15 RX ADMIN — GABAPENTIN 400 MG: 400 CAPSULE ORAL at 09:42

## 2023-03-15 RX ADMIN — VENLAFAXINE HYDROCHLORIDE 75 MG: 37.5 CAPSULE, EXTENDED RELEASE ORAL at 09:47

## 2023-03-15 RX ADMIN — Medication 10 ML: at 09:46

## 2023-03-15 RX ADMIN — INSULIN LISPRO 8 UNITS: 100 INJECTION, SOLUTION INTRAVENOUS; SUBCUTANEOUS at 13:44

## 2023-03-15 RX ADMIN — INSULIN LISPRO 12 UNITS: 100 INJECTION, SOLUTION INTRAVENOUS; SUBCUTANEOUS at 17:15

## 2023-03-15 NOTE — PLAN OF CARE
Problem: Discharge Planning  Goal: Discharge to home or other facility with appropriate resources  Outcome: Progressing  Flowsheets (Taken 3/14/2023 1735 by Ivy Shrestha, RN)  Discharge to home or other facility with appropriate resources: Identify barriers to discharge with patient and caregiver     Problem: Safety - Adult  Goal: Free from fall injury  Outcome: Progressing     Problem: Pain  Goal: Verbalizes/displays adequate comfort level or baseline comfort level  Outcome: Progressing

## 2023-03-15 NOTE — ACP (ADVANCE CARE PLANNING)
Advance Care Planning     General Advance Care Planning (ACP) Conversation    Date of Conversation: 3/14/2023  Conducted with: Patient with Decision Making Capacity    Healthcare Decision Maker:    Primary Decision Maker: [facility] [place] - [phone]    Secondary Decision Maker: Ted Harp - [phone]  Click here to complete Healthcare Decision Makers including selection of the Healthcare Decision Maker Relationship (ie \"Primary\"). Today we documented Decision Maker(s) consistent with Legal Next of Kin hierarchy.     Content/Action Overview:  Has NO ACP documents/care preferences - requested patient complete ACP documents  Reviewed DNR/DNI and patient elects Full Code (Attempt Resuscitation)        Length of Voluntary ACP Conversation in minutes:  <16 minutes (Non-Billable)    Ct Leggett RN

## 2023-03-15 NOTE — CONSULTS
[facility]  [phone]      Attending Physician: Diana Phelan MD  Reason for Consultation/Chief Complaint: Dizziness    Subjective   History of Present Illness:  Dinorah Gallo is a 57 y.o. patient who presented to the hospital with complaints of dizziness, felt like he was about to pass out, he was shopping at [facility] when this occurred. He had no surrounding chest pain, palpitations or shortness of breath. He says he had similar episodes in the past and he had nausea as well. He presented to the emergency room and was admitted to the hospital overnight, initial troponin level was 0.02 however subsequent troponin levels were negative. Patient denies any prior cardiac history. However it was reviewed that in 2013, he did follow-up with Dr. Jose Vásquez from our practice and had stress testing echocardiogram at that time which were negative. He has not been seen in the office since then and has been lost to follow-up. Chronically, he does have diabetes, hypertension as well as renal insufficiency, and it was noted he had elevated creatinine which was similar to baseline values. His A1c was noted in favor 2023 to be 13.5. In the course of his hospitalization, he received 1 L normal saline emergency room and his symptoms have abated and he had no further symptoms. Past Medical History:   has a past medical history of TORI (acute kidney injury) ([address] Bacteremia, Diabetes mellitus ([address] Diabetic neuropathy ([address] Gout, Hypertension, MRSA (methicillin resistant staph aureus) culture positive, MRSA (methicillin resistant staph aureus) culture positive, Neuropathy, Pneumonia, Pyogenic inflammation of bone ([address] and Stage 3b chronic kidney disease (CKD) ([address] Surgical History:   has a past surgical history that includes eye surgery; other surgical history (12/28/2018); Toe amputation (Right, 12/28/2018); Upper gastrointestinal endoscopy (N/A, 1/21/2021);  Foot Debridement (Left, 4/28/2021); Toe amputation (Left, 5/27/2021); Toe amputation (Left, 9/2/2022); and Toe amputation (Left, 2/7/2023). Social History:   reports that he has never smoked. He has never used smokeless tobacco. He reports that he does not drink alcohol and does not use drugs. Family History:  family history includes Diabetes in his maternal grandfather; Heart Disease in his paternal uncle; High Blood Pressure in his mother. Home Medications:  Were reviewed and are listed in nursing record and/or below  Prior to Admission medications    Medication Sig Start Date End Date Taking?  Authorizing Provider   insulin glargine (LANTUS) 100 UNIT/ML injection vial Inject 30 Units into the skin nightly 2/9/23   Phil Novak MD   tamsulosin (FLOMAX) 0.4 MG capsule Take 0.4 mg by mouth daily 1/16/23   Historical Provider, MD   insulin lispro, 1 Unit Dial, (HUMALOG KWIKPEN) 100 UNIT/ML SOPN Inject 10 Units into the skin 3 times daily (before meals) 1/8/23   Delfina Prais MD   acetaminophen (TYLENOL) 500 MG tablet Take 2 tablets by mouth every 8 hours as needed for Pain 10/2/22   NAYELY Grant - CNP   aspirin 81 MG EC tablet Take 81 mg by mouth daily    Historical Provider, MD   lisinopril (PRINIVIL;ZESTRIL) 10 MG tablet Take 10 mg by mouth daily    Historical Provider, MD   Continuous Blood Gluc  (FREESTYLE ISAI 2 READER) BRITTANEY  5/5/21   Historical Provider, MD   Continuous Blood Gluc Sensor (FREESTYLE ISAI 2 SENSOR) [facility]  5/5/21   Historical Provider, MD   BD PEN NEEDLE MARY 2ND GEN 32G X 4 MM MISC  5/5/21   Historical Provider, MD   Insulin Pen Needle (ULTRA-THIN II PEN NEEDLES) 29G X 12.7MM MISC Use for insulin injections QID as directed 4/29/21   Historical Provider, MD   BD ULTRA-FINE PEN NEEDLES 29G X 12.7MM MISC  4/29/21   Historical Provider, MD   amLODIPine (NORVASC) 5 MG tablet Take 1 tablet by mouth daily 4/30/21   POLO Loomis   sildenafil (VIAGRA) 100 MG tablet Take 100 mg by mouth daily as needed 3/3/21   Historical Provider, MD   gabapentin (NEURONTIN) 600 MG tablet Take 600 mg by mouth 2 times daily. Historical Provider, MD   venlafaxine (EFFEXOR XR) 75 MG extended release capsule Take 75 mg by mouth daily Needs to be re prescribed    Historical Provider, MD        CURRENT Medications:  gabapentin (NEURONTIN) capsule 400 mg, BID  regadenoson (LEXISCAN) injection 0.4 mg, ONCE PRN  labetalol (NORMODYNE;TRANDATE) injection 10 mg, Q6H PRN  insulin glargine (LANTUS) injection vial 15 Units, Nightly  amLODIPine (NORVASC) tablet 5 mg, Daily  lisinopril (PRINIVIL;ZESTRIL) tablet 10 mg, Daily  tamsulosin (FLOMAX) capsule 0.4 mg, Daily  venlafaxine (EFFEXOR XR) extended release capsule 75 mg, Daily  sodium chloride flush 0.9 % injection 5-40 mL, 2 times per day  sodium chloride flush 0.9 % injection 5-40 mL, PRN  0.9 % sodium chloride infusion, PRN  ondansetron (ZOFRAN-ODT) disintegrating tablet 4 mg, Q8H PRN   Or  ondansetron (ZOFRAN) injection 4 mg, Q6H PRN  acetaminophen (TYLENOL) tablet 650 mg, Q6H PRN   Or  acetaminophen (TYLENOL) suppository 650 mg, Q6H PRN  polyethylene glycol (GLYCOLAX) packet 17 g, Daily PRN  aspirin chewable tablet 81 mg, Daily  atorvastatin (LIPITOR) tablet 40 mg, Nightly  perflutren lipid microspheres (DEFINITY) injection 1.5 mL, ONCE PRN  enoxaparin (LOVENOX) injection 40 mg, Daily  glucose chewable tablet 16 g, PRN  dextrose bolus 10% 125 mL, PRN   Or  dextrose bolus 10% 250 mL, PRN  glucagon (rDNA) injection 1 mg, PRN  dextrose 10 % infusion, Continuous PRN  insulin lispro (HUMALOG) injection vial 0-16 Units, TID WC  insulin lispro (HUMALOG) injection vial 0-4 Units, Nightly        Allergies:  Hydrocodone-acetaminophen, Percocet [oxycodone-acetaminophen], Pregabalin, and Vicodin [hydrocodone-acetaminophen]     Review of Systems:   A 14 point review of symptoms completed.  Pertinent positives identified in the HPI, all other review of symptoms negative as below.       Objective   PHYSICAL EXAM:    Vitals:    03/15/23 1500   BP: 123/60   Pulse: 72   Resp: 16   Temp: 97.7 °F (36.5 °C)   SpO2: 98%    Weight: 163 lb 12.8 oz (74.3 kg)         General Appearance:  Alert, cooperative, no distress, appears stated age   Head:  Normocephalic, without obvious abnormality, atraumatic   Eyes:  PERRL, conjunctiva/corneas clear   Nose: Nares normal, no drainage or sinus tenderness   Throat: Lips, mucosa, and tongue normal   Neck: Supple, no jvp    Lungs:   Clear to auscultation bilaterally, respirations unlabored   Chest Wall:  No deformity or tenderness   Heart:  Regular rate and rhythm, S1, S2 normal, 1/6 sm    Abdomen:   Soft, non-tender, bowel sounds active all four quadrants,  no masses, no organomegaly   Extremities: Extremities tc le edema   Pulses: 2+ and symmetric   Skin: Skin color, texture, turgor normal, no rashes or lesions   Pysch: Normal mood and affect   Neurologic: Normal gross motor and sensory exam.         Labs   CBC:   Lab Results   Component Value Date/Time    WBC 5.3 03/15/2023 06:11 AM    RBC 3.70 03/15/2023 06:11 AM    HGB 8.9 03/15/2023 06:11 AM    HCT 28.0 03/15/2023 06:11 AM    MCV 75.8 03/15/2023 06:11 AM    RDW 16.8 03/15/2023 06:11 AM     03/15/2023 06:11 AM     CMP:  Lab Results   Component Value Date/Time     03/15/2023 06:10 AM    K 4.5 03/15/2023 06:10 AM     03/15/2023 06:10 AM    CO2 27 03/15/2023 06:10 AM    BUN 16 03/15/2023 06:10 AM    CREATININE 1.5 03/15/2023 06:10 AM    GFRAA 41 08/13/2022 10:24 AM    GFRAA >60 08/20/2012 05:40 PM    AGRATIO 0.9 03/15/2023 06:10 AM    LABGLOM 52 03/15/2023 06:10 AM    GLUCOSE 90 03/15/2023 06:10 AM    PROT 7.1 03/15/2023 06:10 AM    PROT 7.3 08/20/2012 05:40 PM    CALCIUM 8.8 03/15/2023 06:10 AM    BILITOT 0.5 03/15/2023 06:10 AM    ALKPHOS 161 03/15/2023 06:10 AM    AST 11 03/15/2023 06:10 AM    ALT 7 03/15/2023 06:10 AM     PT/INR:  No results found for: PTINR  HgBA1c:  Lab Results   Component Value Date    LABA1C 13.5 02/04/2023     Lab Results   Component Value Date    TROPONINI <0.01 03/14/2023         Cardiac Data     Last EKG: Normal sinus rhythm, left axis, borderline poor R wave progression, similar to EKG from January 2023    Echo:    3/2023        Summary   Normal left ventricular systolic function with ejection fraction of 55-60%. No regional wall motion abnormalites are seen. Grade I diastolic dysfunction with normal filling pressure. Compared to previous study left ventricle systolic function appears same as   echo on 3-8-2021. Mild aortic regurgitation. Systolic pulmonic artery pressure (SPAP) is normal estimated at 36 mmHg   (Right atrial pressure of 8 mmHg). Stress Test:    Cath:    Studies:     Cxr       Impression   No acute abnormality         Ct ca score:       Impression   Total calcium score of 1 is in the 10th percentile for the patient's age of   56 y/o . No incidental clinically important extracardiac CT findings. I have reviewed labs and imaging/xray/diagnostic testing in this note.     Assessment and Plan          Patient Active Problem List   Diagnosis    Diabetic ketoacidosis without coma associated with type 2 diabetes mellitus ([address]    Diabetic ulcer of left foot associated with type 2 diabetes mellitus (HCC)    Nausea & vomiting    Diabetic neuropathy ([address]    DM (diabetes mellitus), secondary, uncontrolled, w/neurologic complic    Gout    Hx MRSA    Hyperkalemia    Chronic pain on chronic opioids    Chronic LE diabetic ulcers    Mild protein-calorie malnutrition (HCC)    Cellulitis    Acute kidney injury ([address]    Neuropathy    Diabetic acidosis without coma ([address]    Acute CVA (cerebrovascular accident) ([address]    HTN (hypertension), benign    DKA, type 2, not at goal [facility])    Diabetic foot infection ([address]    Sepsis without acute organ dysfunction ([address]    Stage 3a chronic kidney disease ([address]    Primary hypertension    Diabetes mellitus (HCC)    Acute osteomyelitis of left foot (HCC)    Hyperglycemia    Pyogenic inflammation of bone (HCC)    Anemia    Pseudohyponatremia    Acute encephalopathy    Stage 3b chronic kidney disease (CKD) (HCC)    Osteomyelitis of left foot, unspecified type (HCC)    Pre-syncope       Dizziness, not likely cardiac in etiology    Initial troponin elevated, subsequent values negative, can perform follow-up work-up and echocardiogram shows normal LV function.  CT calcium score is 1 and this is reassuring, stress test is pending, this can be performed either as inpatient or outpatient can be reviewed further as outpatient.  Would plan on medical management unless there are very high risk features.  Continue aspirin.    Hypercholesterolemia, continue statin    Hypertension, continue lisinopril and amlodipine.    Diabetes as per primary service    No further inpatient cardiac work-up or treatment is planned, will sign off, please call with questions.  We will plan cardiac event monitor at discharge.    Thank you for allowing us to participate in the care of Burt Rainey. Please call me with any questions [phone].    Reji Damian MD, [facility]   Interventional Cardiologist  [facility]  [phone] [place] Office  [phone] [place] Office  3/15/2023 6:10 PM

## 2023-03-15 NOTE — PLAN OF CARE
PT eval and tx completed, see note for details. Increase function to baseline. Valtrex Counseling: I discussed with the patient the risks of valacyclovir including but not limited to kidney damage, nausea, vomiting and severe allergy.  The patient understands that if the infection seems to be worsening or is not improving, they are to call.

## 2023-03-15 NOTE — PROGRESS NOTES
Hospitalist Progress Note      PCP: Eva Castrejon MD    Date of Admission: 3/14/2023    Chief Complaint:   Dizziness, near syncope    Hospital Course:   57 y.o. male who presented to [facility] with complaints of lightheadedness/dizziness. Patient states that he was shopping at [facility] this morning when suddenly he felt like he was \"going to pass out\". Endorses lightheadedness and had to be helped to the ground. He denies passing out or losing consciousness. He states he has had multiple episodes of this in the past.  In the ED initial troponin was elevated at 0.02 however patient denies any chest pain or discomfort at this time. He does endorse some nausea and believes he had an episode of emesis in the ambulance. Denies fevers, chills, abdominal pain, urinary symptoms, or focal neurologic deficit. Will be admitted for further work-up and evaluation. Subjective:   Seen resting in bed, states he is doing much better today. No complaints of chest pain, shortness of breath, or dizziness. Updated on plan of care.       Medications:  Reviewed    Infusion Medications    sodium chloride      dextrose       Scheduled Medications    gabapentin  400 mg Oral BID    insulin glargine  15 Units SubCUTAneous Nightly    amLODIPine  5 mg Oral Daily    lisinopril  10 mg Oral Daily    tamsulosin  0.4 mg Oral Daily    venlafaxine  75 mg Oral Daily    sodium chloride flush  5-40 mL IntraVENous 2 times per day    aspirin  81 mg Oral Daily    atorvastatin  40 mg Oral Nightly    enoxaparin  40 mg SubCUTAneous Daily    insulin lispro  0-16 Units SubCUTAneous TID WC    insulin lispro  0-4 Units SubCUTAneous Nightly     PRN Meds: regadenoson, labetalol, sodium chloride flush, sodium chloride, ondansetron **OR** ondansetron, acetaminophen **OR** acetaminophen, polyethylene glycol, perflutren lipid microspheres, glucose, dextrose bolus **OR** dextrose bolus, glucagon (rDNA), dextrose      Intake/Output Summary (Last 24 hours) at 3/15/2023 1237  Last data filed at 3/15/2023 0906  Gross per 24 hour   Intake 1460 ml   Output --   Net 1460 ml       Physical Exam Performed:    BP (!) 173/76   Pulse 68   Temp 97.9 °F (36.6 °C) (Oral)   Resp 16   Ht 5' 10\" (1.778 m)   Wt 163 lb 12.8 oz (74.3 kg)   SpO2 96%   BMI 23.50 kg/m²     General appearance: No apparent distress, appears stated age and cooperative. HEENT: Pupils equal, round, and reactive to light. Conjunctivae/corneas clear. Neck: Supple, with full range of motion. No jugular venous distention. Trachea midline. Respiratory:  Normal respiratory effort. Clear to auscultation, bilaterally without Rales/Wheezes/Rhonchi. Cardiovascular: Regular rate and rhythm with normal S1/S2 without murmurs, rubs or gallops. Abdomen: Soft, non-tender, non-distended with normal bowel sounds. Musculoskeletal: Left foot status post partial amputation. No clubbing, cyanosis or edema bilaterally. Full range of motion without deformity. Skin: Bilateral leg wounds. Skin color, texture, turgor normal.  No rashes or lesions. Neurologic:  Neurovascularly intact without any focal sensory/motor deficits. Cranial nerves: II-XII intact, grossly non-focal.  Psychiatric: Alert and oriented, thought content appropriate, normal insight  Capillary Refill: Brisk, 3 seconds, normal   Peripheral Pulses: +2 palpable, equal bilaterally       Labs:   Recent Labs     03/14/23  1154 03/15/23  0611   WBC 6.2 5.3   HGB 8.6* 8.9*   HCT 27.2* 28.0*    265     Recent Labs     03/14/23  1154 03/15/23  0610   * 136   K 3.9 4.5   CL 99 103   CO2 28 27   BUN 23* 16   CREATININE 1.7* 1.5*   CALCIUM 9.2 8.8     Recent Labs     03/14/23  1154 03/15/23  0610   AST 12* 11*   ALT 9* 7*   BILITOT 0.4 0.5   ALKPHOS 179* 161*     No results for input(s): INR in the last 72 hours.   Recent Labs     03/14/23  1154 03/14/23  1738 03/14/23 2034   TROPONINI 0.02* <0.01 <0.01       Urinalysis:      Lab Results Component Value Date/Time    NITRU Negative 03/14/2023 01:30 PM    WBCUA 0-2 03/14/2023 01:30 PM    BACTERIA 1+ 01/07/2023 10:38 AM    RBCUA 0-2 03/14/2023 01:30 PM    BLOODU TRACE-LYSED 03/14/2023 01:30 PM    SPECGRAV <=1.005 03/14/2023 01:30 PM    GLUCOSEU 100 03/14/2023 01:30 PM       Radiology:  XR CHEST PORTABLE   Final Result   No acute abnormality         NM Cardiac Stress Test Nuclear Imaging    (Results Pending)   CT CARDIAC CALCIUM SCORING    (Results Pending)       IP CONSULT TO CARDIOLOGY    Assessment/Plan:    Active Hospital Problems    Diagnosis     Pre-syncope [R55]      Priority: Medium     Dizziness  Elevated troponin  -Received 1 L NS in ED  -EKG with no ischemic changes however nonspecific T wave changes  -Check orthostats  -Continue to trend troponin  -Telemetry  -Echocardiogram 3/15/2023 showed LVEF of 55 to 60%, no regional wall motion abnormality, grade 1 diastolic dysfunction  -Cardiology consult, CT calcium scoring, stress test     Diabetes mellitus type 2  -A1c 13.5% (2/04/2023)  -SSI with glucose checks  -Continue basal insulin  -Continue home gabapentin  -Recent osteomyelitis of left foot secondary to diabetic foot wound s/p partial left foot amputation 2/7/2023     Essential hypertension  -Continue home amlodipine and lisinopril  -IV labetalol for SBP greater than 160     CKD  -Creatinine appears baseline  -Avoid nephrotoxins when able     Right lower leg wound  -Wound care consult    DVT Prophylaxis: Lovenox  Diet: ADULT DIET; Regular; 5 carb choices (75 gm/meal); Low Fat/Low Chol/High Fiber/2 gm Na;  No Caffeine  Diet NPO  Code Status: Full Code  PT/OT Eval Status: Home with assist as needed    Dispo -1 to 2 days pending further cardiac testing    Appropriate for A1 Discharge Unit: No      POLO Valdez

## 2023-03-15 NOTE — PROGRESS NOTES
Physical Therapy  Facility/Department: [place] [address] - [facility]  Physical Therapy Initial Assessment/Treatment    Name: Dinorah Gallo  : 1960  MRN: [id number]  Date of Service: 3/15/2023    Discharge Recommendations:  Home with assist PRN   PT Equipment Recommendations  Equipment Needed: No      Patient Diagnosis(es): The primary encounter diagnosis was Pre-syncope. A diagnosis of Elevated troponin was also pertinent to this visit. Past Medical History:  has a past medical history of TORI (acute kidney injury) ([facility] [address] Bacteremia, Diabetes mellitus ([facility] [address] Diabetic neuropathy ([facility] [address] Gout, Hypertension, MRSA (methicillin resistant staph aureus) culture positive, MRSA (methicillin resistant staph aureus) culture positive, Neuropathy, Pneumonia, Pyogenic inflammation of bone ([facility] [address] and Stage 3b chronic kidney disease (CKD) ([facility] [address] Past Surgical History:  has a past surgical history that includes eye surgery; other surgical history (2018); Toe amputation (Right, 2018); Upper gastrointestinal endoscopy (N/A, 2021); Foot Debridement (Left, 2021); Toe amputation (Left, 2021); Toe amputation (Left, 2022); and Toe amputation (Left, 2023). Assessment   Body Structures, Functions, Activity Limitations Requiring Skilled Therapeutic Intervention: Decreased functional mobility ; Decreased endurance;Decreased strength;Decreased balance;Decreased ADL status; Decreased body mechanics  Assessment: Pt seen for PT eval following episode of dizziness/lightheadedness that required a controlled lowering to the ground. Prior to admission, pt was independent with no AD for mobility, with occasional use of SPC, and lived with spouse in two level house. Pt currently mod-ind for bed mobility, supv for transfers, and SBA for gait up to 100 ft with no AD, but limited due to dizziness and minor unsteadiness. Pt also has toe amputations on B feet.  Pt would benefit from one additional session during LOS to progress functional mobility and activity tolerance as well as trial full flight of stairs prior to returning home. Recommending home with PRN A at d/c. Treatment Diagnosis: decreased activity tolerance  Specific Instructions for Next Treatment: stair trial (full flight), progress ambulation distance and therex as tolerated  Therapy Prognosis: Good  Decision Making: Low Complexity  Requires PT Follow-Up: Yes (1 additional visit during LOS)  Activity Tolerance  Activity Tolerance: Patient tolerated evaluation without incident; Other (comment)  Activity Tolerance Comments: pt limited in participation due to dizziness/lightheadedness, BP after ambulation 151/78     Plan   Physcial Therapy Plan  General Plan: Other (See Comment) (1 additional visit during LOS)  Specific Instructions for Next Treatment: stair trial (full flight), progress ambulation distance and therex as tolerated  Current Treatment Recommendations: Strengthening, Balance training, Functional mobility training, Endurance training, Transfer training, Gait training, Home exercise program, Therapeutic activities, Stair training  Safety Devices  Type of Devices: Left in chair, Call light within reach, Nurse notified, All fall risk precautions in place, Gait belt  Restraints  Restraints Initially in Place: No     Restrictions  Restrictions/Precautions  Restrictions/Precautions: General Precautions, Up as Tolerated, Isolation  Required Braces or Orthoses?: No  Position Activity Restriction  Other position/activity restrictions: c-diff contact precautions, tele     Subjective   Pain: pt denies pain  General  Chart Reviewed: Yes  Patient assessed for rehabilitation services?: Yes  Response To Previous Treatment: Not applicable  Family / Caregiver Present: No  Referring Practitioner: POLO Mcarthur  Referral Date : 03/14/23  Follows Commands: Within Functional Limits  General Comment  Comments: RN cleared for therapy         Social/Functional History  Social/Functional History  Lives With: Spouse  Type of Home: House  Home Layout: Two level  Home Access: Stairs to enter with rails  Entrance Stairs - Number of Steps: 3  Bathroom Shower/Tub: Tub/Shower unit  Bathroom Toilet: Standard  Home Equipment: Gayl Nice, standard  Has the patient had two or more falls in the past year or any fall with injury in the past year?: No (reports 3-4 minor falls, no major injuries)  ADL Assistance: Independent  Homemaking Assistance: Needs assistance (wife assists)  Homemaking Responsibilities: No (wife assists)  Ambulation Assistance: Independent (no AD, intermittent use of SPC)  Transfer Assistance: Independent  Active : Yes  Occupation: Retired  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[place] [address] Street: Impaired  Vision Exceptions: Wears glasses for reading  Hearing  Hearing: Within functional limits    Cognition   Orientation  Overall Orientation Status: Within Functional Limits  Orientation Level: Oriented X4     Objective   Heart Rate: 89  Heart Rate Source: Monitor  BP: (!) 151/78  BP Location: Left upper arm  BP Method: Automatic  Patient Position: Sitting  MAP (Calculated): 102  Resp: 16  SpO2: 98 %  O2 Device: None (Room air)  Comment: post ambulation     Observation/Palpation  Posture: Good  Gross Assessment  AROM: Within functional limits  Strength: Within functional limits  Sensation: Impaired (neuropathy in BLE and BUE, chronic)                 Bed Mobility Training  Bed Mobility Training: Yes  Overall Level of Assistance: Modified independent  Interventions: Verbal cues  Supine to Sit: Modified independent (HOB slightly elevated)  Sit to Supine: Other (comment) (not assessed, pt left in chair at end of session)  Balance  Sitting: Intact  Standing: Intact  Standing - Static: Good  Standing - Dynamic: Good  Transfer Training  Transfer Training: Yes  Overall Level of Assistance: Supervision  Interventions: Verbal cues; Safety awareness training  Sit to Stand: Supervision  Stand to Sit: Supervision  Toilet Transfer: Supervision  Gait Training  Gait Training: Yes  Gait  Overall Level of Assistance: Stand-by assistance; Additional time  Interventions: Visual cues; Verbal cues  Base of Support: Narrowed  Speed/Sri: Slow  Step Length: Right shortened;Left shortened  Gait Abnormalities: Trunk sway increased; Path deviations;Early heel rise;Decreased step clearance (minor unsteadiness, no overt LOB)  Distance (ft): 100 Feet  Assistive Device: Gait belt (no AD)  Rail Use: Both  Stairs - Level of Assistance: Stand-by assistance  Number of Stairs Trained: 5                        AM-PAC Score  AM-PAC Inpatient Mobility Raw Score : 22 (03/15/23 1024)  AM-PAC Inpatient T-Scale Score : 53.28 (03/15/23 1024)  Mobility Inpatient CMS 0-100% Score: 20.91 (03/15/23 1024)  Mobility Inpatient CMS G-Code Modifier : CJ (03/15/23 1024)        Goals  Short Term Goals  Time Frame for Short Term Goals: 3/22/23  Short Term Goal 1: pt will perform bed mobility independently with bed flat  Short Term Goal 2: pt will perform functional transfers independently  Short Term Goal 3: pt will ambulate 150 ft with no AD and supv  Short Term Goal 4: pt will perform 10-12 steps with single hand rail and SBA  Short Term Goal 5: pt will participate in 10-12 reps of BLE exercises to improve LE funtional strength by 3/19/23  Patient Goals   Patient Goals : \"to go home\"       Education  Patient Education  Education Given To: Patient  Education Provided: Role of Therapy;Plan of Care; Fall Prevention Strategies;Transfer Training  Education Provided Comments: role of PT, importance of mobility, safety with transfers and ambulation  Education Method: Demonstration;Verbal  Barriers to Learning: None  Education Outcome: Verbalized understanding;Demonstrated understanding      Therapy Time   Individual Concurrent Group Co-treatment   Time In 0826         Time Out 0859         Minutes 33 Timed Code Treatment Minutes: 23 Minutes (10 min eval)     If pt is unable to be seen after this session, please let this note serve as discharge summary. Please see case management note for discharge disposition. Thank you.     Jenni Brasher, PT, DPT

## 2023-03-16 ENCOUNTER — TELEPHONE (OUTPATIENT)
Dept: CARDIOLOGY | Age: 63
End: 2023-03-16

## 2023-03-16 ENCOUNTER — APPOINTMENT (OUTPATIENT)
Dept: NUCLEAR MEDICINE | Age: 63
DRG: 312 | End: 2023-03-16
Payer: MEDICARE

## 2023-03-16 VITALS
WEIGHT: 161.7 LBS | SYSTOLIC BLOOD PRESSURE: 151 MMHG | OXYGEN SATURATION: 98 % | HEIGHT: 70 IN | RESPIRATION RATE: 16 BRPM | HEART RATE: 78 BPM | BODY MASS INDEX: 23.15 KG/M2 | TEMPERATURE: 97.5 F | DIASTOLIC BLOOD PRESSURE: 71 MMHG

## 2023-03-16 LAB
ANION GAP SERPL CALCULATED.3IONS-SCNC: 6 MMOL/L (ref 3–16)
BASOPHILS # BLD: 0 K/UL (ref 0–0.2)
BASOPHILS NFR BLD: 0.9 %
BUN SERPL-MCNC: 18 MG/DL (ref 7–20)
C DIFF TOX A+B STL QL IA: NORMAL
CALCIUM SERPL-MCNC: 8.9 MG/DL (ref 8.3–10.6)
CHLORIDE SERPL-SCNC: 100 MMOL/L (ref 99–110)
CO2 SERPL-SCNC: 27 MMOL/L (ref 21–32)
CREAT SERPL-MCNC: 1.5 MG/DL (ref 0.8–1.3)
DEPRECATED RDW RBC AUTO: 16.3 % (ref 12.4–15.4)
EOSINOPHIL # BLD: 0.2 K/UL (ref 0–0.6)
EOSINOPHIL NFR BLD: 3.3 %
GFR SERPLBLD CREATININE-BSD FMLA CKD-EPI: 52 ML/MIN/{1.73_M2}
GLUCOSE BLD-MCNC: 203 MG/DL (ref 70–99)
GLUCOSE BLD-MCNC: 245 MG/DL (ref 70–99)
GLUCOSE BLD-MCNC: 319 MG/DL (ref 70–99)
GLUCOSE SERPL-MCNC: 383 MG/DL (ref 70–99)
HCT VFR BLD AUTO: 26.9 % (ref 40.5–52.5)
HGB BLD-MCNC: 8.7 G/DL (ref 13.5–17.5)
LV EF: 54 %
LVEF MODALITY: NORMAL
LYMPHOCYTES # BLD: 1.3 K/UL (ref 1–5.1)
LYMPHOCYTES NFR BLD: 25.8 %
MCH RBC QN AUTO: 24.5 PG (ref 26–34)
MCHC RBC AUTO-ENTMCNC: 32.4 G/DL (ref 31–36)
MCV RBC AUTO: 75.6 FL (ref 80–100)
MONOCYTES # BLD: 0.4 K/UL (ref 0–1.3)
MONOCYTES NFR BLD: 7.5 %
NEUTROPHILS # BLD: 3.3 K/UL (ref 1.7–7.7)
NEUTROPHILS NFR BLD: 62.5 %
PERFORMED ON: ABNORMAL
PLATELET # BLD AUTO: 261 K/UL (ref 135–450)
PMV BLD AUTO: 8.3 FL (ref 5–10.5)
POTASSIUM SERPL-SCNC: 5.4 MMOL/L (ref 3.5–5.1)
RBC # BLD AUTO: 3.56 M/UL (ref 4.2–5.9)
SODIUM SERPL-SCNC: 133 MMOL/L (ref 136–145)
WBC # BLD AUTO: 5.2 K/UL (ref 4–11)

## 2023-03-16 PROCEDURE — 80048 BASIC METABOLIC PNL TOTAL CA: CPT

## 2023-03-16 PROCEDURE — 2580000003 HC RX 258: Performed by: PHYSICIAN ASSISTANT

## 2023-03-16 PROCEDURE — A9502 TC99M TETROFOSMIN: HCPCS | Performed by: INTERNAL MEDICINE

## 2023-03-16 PROCEDURE — 85025 COMPLETE CBC W/AUTO DIFF WBC: CPT

## 2023-03-16 PROCEDURE — 6370000000 HC RX 637 (ALT 250 FOR IP): Performed by: PHYSICIAN ASSISTANT

## 2023-03-16 PROCEDURE — 36415 COLL VENOUS BLD VENIPUNCTURE: CPT

## 2023-03-16 PROCEDURE — 6360000002 HC RX W HCPCS: Performed by: INTERNAL MEDICINE

## 2023-03-16 PROCEDURE — 99232 SBSQ HOSP IP/OBS MODERATE 35: CPT | Performed by: NURSE PRACTITIONER

## 2023-03-16 PROCEDURE — 93017 CV STRESS TEST TRACING ONLY: CPT

## 2023-03-16 PROCEDURE — 3430000000 HC RX DIAGNOSTIC RADIOPHARMACEUTICAL: Performed by: INTERNAL MEDICINE

## 2023-03-16 RX ORDER — ATORVASTATIN CALCIUM 40 MG/1
40 TABLET, FILM COATED ORAL NIGHTLY
Qty: 30 TABLET | Refills: 3 | Status: SHIPPED | OUTPATIENT
Start: 2023-03-16

## 2023-03-16 RX ADMIN — REGADENOSON 0.4 MG: 0.08 INJECTION, SOLUTION INTRAVENOUS at 08:52

## 2023-03-16 RX ADMIN — INSULIN LISPRO 12 UNITS: 100 INJECTION, SOLUTION INTRAVENOUS; SUBCUTANEOUS at 12:46

## 2023-03-16 RX ADMIN — GABAPENTIN 400 MG: 400 CAPSULE ORAL at 10:25

## 2023-03-16 RX ADMIN — LISINOPRIL 10 MG: 10 TABLET ORAL at 10:25

## 2023-03-16 RX ADMIN — TAMSULOSIN HYDROCHLORIDE 0.4 MG: 0.4 CAPSULE ORAL at 10:25

## 2023-03-16 RX ADMIN — ASPIRIN 81 MG 81 MG: 81 TABLET ORAL at 10:25

## 2023-03-16 RX ADMIN — TETROFOSMIN 28 MILLICURIE: 1.38 INJECTION, POWDER, LYOPHILIZED, FOR SOLUTION INTRAVENOUS at 08:52

## 2023-03-16 RX ADMIN — VENLAFAXINE HYDROCHLORIDE 75 MG: 37.5 CAPSULE, EXTENDED RELEASE ORAL at 10:25

## 2023-03-16 RX ADMIN — Medication 10 ML: at 10:26

## 2023-03-16 RX ADMIN — AMLODIPINE BESYLATE 5 MG: 5 TABLET ORAL at 10:25

## 2023-03-16 NOTE — PROGRESS NOTES
Physical Therapy    Attempted to see pt, RN reports pt at stress test.  Will follow up as able later today.   [employer], VFP#[id number]

## 2023-03-16 NOTE — DISCHARGE INSTRUCTIONS
FOLLOW-UP APPOINTMENTS    [facility] - Appointment on April 20 at 930am with Alison Menon NP, [address] [facility],  [facility], [address] [place] [address] [address], [address], [place], [facility]. Office #: [phone]. If you are unable to make this appointment, please call to reschedule. Directions to [place] [address] towards [place]. [address] [facility] exit. Right off exit. Cross over [employer]. Right on [address]. Left into hospital. Follow the signs to the emergency room ( turn left toward the Emergency room). Go right at the first stop sign. Just past the Emergency room at the second stop sign turn right and go up the ramp and park on the top level if possible. Go in the glass doors of the [facility] we on the top level of the garage [address]. As soon as you get in the door turn left and our office is the one with the glass doors.

## 2023-03-16 NOTE — PROGRESS NOTES
Patient discharged home with mom. Tele removed and CMU notified. IV removed without complication. Heart Monitor in place and education provided. Discharge instructions provided with no questions/concerns noted. Medications picked up at outpatient pharmacy.

## 2023-03-16 NOTE — CARE COORDINATION
Discharge order noted. Pt from home w/wife. IPTA, denies dcp needs. CM will sign off. Remains available should needs arise.  Ct Joy, RN

## 2023-03-16 NOTE — TELEPHONE ENCOUNTER
Monitor company [employer]  Length of monitor 14 days  Monitor ordered by Dr. Katarina Morales ID [id number]  Device number [id number]  Monitor given to Bethany Mo RN . Nurse to apply at the time of discharge.

## 2023-03-16 NOTE — PROGRESS NOTES
[address]   Daily Progress Note      Admit Date:  3/14/2023    Reason for follow up visit: Dizziness    CC: \"I feel well. I haven't had any more dizziness. \"    58 y/o male with PMH significant for diabetes mellitus, HTN and renal insufficiency who was admitted with c/o dizziness. He has had similar episodes int he past associated with nausea. He presented to ED and troponin level was 0.02 and subsequent levels were negative. He had prior stress testing and echo in 2013 which were negative. He received 1L IV fluids and symptoms improved. Interval History:  Pt. seen and examined; records reviewed  BP stable. Denies chest pain or SOB  Anxious to go home    Subjective:  Pt with no acute overnight cardiac events. Denies chest pain, cough, SOB, palpitations or further dizziness    Objective:   BP (!) 148/85   Pulse 91   Temp 98.2 °F (36.8 °C) (Oral)   Resp 17   Ht 5' 10\" (1.778 m)   Wt 161 lb 11.2 oz (73.3 kg)   SpO2 97%   BMI 23.20 kg/m²     Intake/Output Summary (Last 24 hours) at 3/16/2023 0847  Last data filed at 3/15/2023 2000  Gross per 24 hour   Intake 924 ml   Output --   Net 924 ml     Wt Readings from Last 3 Encounters:   03/16/23 161 lb 11.2 oz (73.3 kg)   02/06/23 161 lb 8 oz (73.3 kg)   02/05/23 164 lb 8 oz (74.6 kg)       Physical Exam:  General: In no acute distress. Awake, alert, and oriented X4. Resting in bed  HEENT: Sclerae anicteric. Normocephalic  Skin:  Warm and dry. Neck:  Supple. No JVD appreciated. Chest: Lungs clear to auscultation. No wheezes/rhonchi/rales  Cardiovascular:  RRR. Normal S1 and S2. I/VI systolic murmur   Abdomen:  soft,  nontender, nondistended, +bowel sounds. No hepatomegaly  Extremities:  No LE edema. No clubbing or cyanosis. 2+ bilateral radial and 1+ bilateral DP/PT pulses.  Cap refill brisk    Medications:    gabapentin  400 mg Oral BID    insulin glargine  15 Units SubCUTAneous Nightly    amLODIPine  5 mg Oral Daily    lisinopril  10 mg Oral Daily tamsulosin  0.4 mg Oral Daily    venlafaxine  75 mg Oral Daily    sodium chloride flush  5-40 mL IntraVENous 2 times per day    aspirin  81 mg Oral Daily    atorvastatin  40 mg Oral Nightly    enoxaparin  40 mg SubCUTAneous Daily    insulin lispro  0-16 Units SubCUTAneous TID WC    insulin lispro  0-4 Units SubCUTAneous Nightly      sodium chloride      dextrose       regadenoson, labetalol, sodium chloride flush, sodium chloride, ondansetron **OR** ondansetron, acetaminophen **OR** acetaminophen, polyethylene glycol, perflutren lipid microspheres, glucose, dextrose bolus **OR** dextrose bolus, glucagon (rDNA), dextrose    Lab Data:  CBC:   Recent Labs     23  1154 03/15/23  0611   WBC 6.2 5.3   HGB 8.6* 8.9*    265     BMP:    Recent Labs     23  1154 03/15/23  0610   * 136   K 3.9 4.5   CO2 28 27   BUN 23* 16   CREATININE 1.7* 1.5*     LIVR:   Recent Labs     23  1154 03/15/23  0610   AST 12* 11*   ALT 9* 7*      Latest Reference Range & Units 3/15/23 06:10   CHOLESTEROL, TOTAL, [id number] 0 - 199 mg/dL 111   HDL Cholesterol 40 - 60 mg/dL 62 (H)   LDL Calculated <100 mg/dL 39   Triglycerides 0 - 150 mg/dL 52   VLDL Cholesterol Calculated Not Established mg/dL 10   (H): Data is abnormally high     Latest Reference Range & Units Most Recent 3/14/23 11:54 3/14/23 17:38   Troponin <0.01 ng/mL <0.01  3/14/23 20:34 0.02 (H) <0.01   (H): Data is abnormally high    ECG 3/15/23:  Sinus rhythm with 1st degree A-V block  Left anterior fascicular block  Slow R wave progression in precordial leads  Left axis deviation  When compared with ECG of 14-MAR-2023 11:46,  No significant change was found    3/15/23 CT calcium scoring:  FINDINGS:   LEFT MAIN: Zero (0). LEFT ANTERIOR DESCENDIN       CIRCUMFLEX: Zero (0). RIGHT CORONARY ARTERY: Zero (0). TOTAL AGATSTON CALCIUM SCORE: 1       EXTRACARDIAC STRUCTURES: No evidence of mediastinal or hilar lymphadenopathy.    No pericardial effusion. No cardiomegaly. No evidence of acute process in   the lungs. No noncalcified nodules are noted in the lungs. Limited images   of the upper abdomen are grossly unremarkable. Visualized osseous structures   demonstrate age related degenerative changes without acute abnormality. Mild gynecomastia is identified greater on the right     3/15/23 Echo:  Normal left ventricular systolic function with ejection fraction of 55-60%. No regional wall motion abnormalites are seen. Grade I diastolic dysfunction with normal filling pressure. Compared to previous study left ventricle systolic function appears same as echo on 3-8-2021. Mild aortic regurgitation. Systolic pulmonic artery pressure (SPAP) is normal estimated at 36 mmHg (Right atrial pressure of 8 mmHg). 3/16/23 Lexiscan-Myoview:    Abnormal low risk myocardial perfusion study. There is a mild inferior defect consistent with attenuation artifact vs    prior infarction. There is no clear evidence for ischemia. The left ventricular size is mildly dilated. The estimated left ventricular function is 54%. TTE 3/2021:   No IV access for use of Definity. Normal left ventricular systolic function with ejection fraction of 55-60%. No regional wall motion abnormalites are seen. Grade I diastolic dysfunction with normal filling pressure. 12/22/2013 Stress Myoview:  Impression:   1. No evidence of stress induced myocardial ischemia.    2. LVEF 59%       Telemetry: Sinus rhythm  (Personally reviewed)    Assessment/Plan:    1) Abnormal troponin  -initial Tpn with slight elevation and normalized; no evidence of ischemia  -CT calcium score: 1  -Lexiscan-Myoview: no evidence of ischemia  -Echo with LVEF  -no further cardiac work up planned    2) Dizziness  -without recurrence  -will place event monitor prior to discharge    3) Essential hypertension  -fairly well controlled  -continue medical management with amlodipine and lisinopril    4) Hyperlipidemia  -continue statin    5) Diabetes Mellitus  -Rx per Primary service    No further work up per cardiology    Event monitor at discharge: can arrange follow up after completed    Continue Amlodipine 5 mg daily, ASA 81 mg daily, atorvastatin 40 mg daily, Lisinopril 10 mg daily    Thank you for allowing us to participate in Mr. Ronny hassan.     Electronically signed by NAYELY Khalil CNP on 3/16/2023 at 8:47 AM

## 2023-03-16 NOTE — PLAN OF CARE
Problem: Safety - Adult  Goal: Free from fall injury  3/16/2023 0342 by Alex Martines, RN  Outcome: Progressing  3/15/2023 1438 by Yessenia Brown RN  Outcome: Progressing   Safety measures are applied to prevent falls    Problem: Cardiovascular - Adult  Goal: Maintains optimal cardiac output and hemodynamic stability  Outcome: Progressing  Pt.  Has normal HR and in sinus rhythm     Problem: Skin/Tissue Integrity - Adult  Goal: Skin integrity remains intact  Outcome: Progressing     Problem: Skin/Tissue Integrity - Adult  Goal: Incisions, wounds, or drain sites healing without S/S of infection  Outcome: Progressing   No signs and symptoms of infection    Problem: Gastrointestinal - Adult  Goal: Maintains or returns to baseline bowel function  Outcome: Progressing  No longer has diarrhea     Problem: Metabolic/Fluid and Electrolytes - Adult  Goal: Glucose maintained within prescribed range  Outcome: Progressing   Blood glucose is maintained within normal range

## 2023-03-16 NOTE — DISCHARGE INSTR - COC
Continuity of Care Form    Patient Name: Franci Monreal   :  1960  MRN:  [id number]    Admit date:  3/14/2023  Discharge date:  23    Code Status Order: Full Code   Advance Directives:     Admitting Physician:  Yoselyn Sutherland MD  PCP: Daron Osborn MD    Discharging Nurse: [facility] Unit/Room#: 0116/0116-01  Discharging Unit Phone Number: ***    Emergency Contact:   Extended Emergency Contact Information  Primary Emergency Contact: Christ Mcgarry  Address: [address] [facility] [address] [place] [address] Phone: [phone]  Mobile Phone: [phone]  Relation: Spouse   needed? No  Secondary Emergency Contact: [address] [facility] Phone: [phone]  Mobile Phone: [phone]  Relation: Parent   needed?  No    Past Surgical History:  Past Surgical History:   Procedure Laterality Date    EYE SURGERY      lens implants after removal of cataracts    FOOT DEBRIDEMENT Left 2021    DEBRIDEMENT INFECTED BONE AND TISSUE LEFT FOOT performed by Suleiman Valdes DPM at [place] [address]  2018    partial right foot amputation with graft application    TOE AMPUTATION Right 2018    PARTIAL RIGHT FOOT AMPUTATION WITH GRAFT APPLICATION performed by Suleiman Valdes DPM at [address] [facility] Left 2021    PARTIAL LEFT FO[facility] [address] [facility]    PARTIAL LEFT FOOT AMPUTATION, ULCER DEBRIDEMENT LOWER LEG [facility] [address] [facility] 20[facility]    PARTIAL LEFT FOOT AMPUTATION performed by Suleiman Valdes DPM at [place] [address]. 2021    EGD BIOPSY performed by Nilda Reyes DO at [address] [facility] ENDOSCOPY       Immunization History:   Immunization History   Administered Date(s) Administered    COVID-19, PFIZER PURPLE top, DILUTE for use, (age 15 y+), 30mcg/0.3mL 2021, 2021, 12/29/2021    Influenza Virus Vaccine 02/05/2001    Tdap (Boostrix, Adacel) 09/05/2008       Active Problems:  Patient Active Problem List   Diagnosis Code    Diabetic ketoacidosis without coma associated with type 2 diabetes mellitus ([facility] E11.10    Diabetic ulcer of left foot associated with type 2 diabetes mellitus ([facility] E11.621, L97.529    Nausea & vomiting R11.2    Diabetic neuropathy ([facility] E11.40    DM (diabetes mellitus), secondary, uncontrolled, w/neurologic complic [id number]    Gout Y50.7    Hx MRSA Z22.322    Hyperkalemia E87.5    Chronic pain on chronic opioids G89.29    Chronic LE diabetic ulcers E11.622, L97.309    Mild protein-calorie malnutrition (HCC) E44.1    Cellulitis L03.90    Acute kidney injury ([facility] N17.9    Neuropathy G62.9    Diabetic acidosis without coma ([facility]) E11.10    Acute CVA (cerebrovascular accident) ([facility] I63.9    HTN (hypertension), benign I10    DKA, type 2, not at goal [facility]) E11.10    Diabetic foot infection ([facility] E11.628, L08.9    Sepsis without acute organ dysfunction ([facility]) A41.9    Stage 3a chronic kidney disease ([facility] N18.31    Primary hypertension I10    Diabetes mellitus ([facility] E11.9    Acute osteomyelitis of left foot ([facility]) M86.172    Hyperglycemia R73.9    Pyogenic inflammation of bone ([facility]) M86.9    Anemia D64.9    Pseudohyponatremia R79.89    Acute encephalopathy G93.40    Stage 3b chronic kidney disease (CKD) ([facility]) N18.32    Osteomyelitis of left foot, unspecified type ([facility]) M86.9    Pre-syncope R55       Isolation/Infection:   Isolation            C Diff Contact          Patient Infection Status       Infection Onset Added Last Indicated Last Indicated By Review Planned Expiration Resolved Resolved By    MRSA 02/07/23 02/11/23 02/07/23 Culture, Tissue        Culture, Tissue [[id number]] (Abnormal)  Collected: 02/07/23 1347  Lab Status: Final result Specimen: Tissue Updated: 02/11/23 1442   Gram Stain Result CANCELED     Comment: Result canceled by the ancillary.     Organism Staph aureus MRSA Abnormal    WOUND/ABSCESS -- Abnormal    Rare growth   CONTACT PRECAUTIONS INDICATED    Abnormal    Organism Diphtheroids Abnormal    WOUND/ABSCESS --   Rare growth   No further workup    Organism Prevotella denticola Abnormal         Resolved    C-diff Rule Out 03/14/23 03/14/23 03/15/23 Clostridium Difficile Toxin/Antigen (Ordered)   23 Rule-Out Test Resulted    COVID-19 (Rule Out) 23 COVID-19 & Influenza Combo (Ordered)   23 Rule-Out Test Resulted    COVID-19 (Rule Out) 23 COVID-19 & Influenza Combo (Ordered)   23 Rule-Out Test Resulted    COVID-19 22 COVID-19 & Influenza Combo   22     COVID-19 (Rule Out) 22 COVID-19 & Influenza Combo (Ordered)   22 Rule-Out Test Resulted    COVID-19 (Rule Out) 21 COVID-19 & Influenza Combo (Ordered)   21 Rule-Out Test Resulted    COVID-19 (Rule Out) 21 COVID-19 & Influenza Combo (Ordered)   21 Rule-Out Test Resulted    COVID-19 (Rule Out) 21 COVID-19, Rapid (Ordered)   21 Rule-Out Test Resulted    MRSA 21 Culture, Wound   21 Denia Honeycutt RN    COVID-19 (Rule Out) 21 COVID-19 (Ordered)   21 Rule-Out Test Resulted    COVID-19 (Rule Out) 21 COVID-19 (Ordered)   21 Rule-Out Test Resulted    MRSA  17 Wound Culture   21 Devora Kaur RN    MRSA  13 Venice Suárez RN   11/19/15 Venice Suárez, RN            Nurse Assessment:  Last Vital Signs: BP (!) 151/71   Pulse 78   Temp 97.5 °F (36.4 °C) (Oral)   Resp 16   Ht 5' 10\" (1.778 m)   Wt 161 lb 11.2 oz (73.3 kg)   SpO2 98%   BMI 23.20 kg/m²     Last documented pain score (0-10 scale): Pain Level: 0  Last Weight:   Wt Readings from Last 1 Encounters:  23 161 lb 11.2 oz (73.3 kg)     Mental Status:  {IP PT MENTAL STATUS:20285}    IV Access:  [address] ANTONIO IV ACCESS:795698698}    Nursing Mobility/ADLs:  Walking   {CHP DME VVYV:544308488}  Transfer  {CHP DME HAHR:434282349}  Bathing  {CHP DME RMOU:127748053}  Dressing  {CHP DME GRRN:393266187}  Toileting  {CHP DME DDN}  Feeding  {CHP DME KTQE:493832066}  Med Admin  {CHP DME GDZM:294924398}  Med Delivery   { ANTONIO MED Delivery:837658267}    Wound Care Documentation and Therapy:  Wound 19 Leg Inner; Lower Venous Ulcer, healing over 4 years  (Active)   Number of days: 1505       Wound 19 Foot Left;Plantar healing veneous wound (Active)   Number of days: 1267       Wound 19 Tibial Distal;Left;Dorsal;Inner healing wound on inner lower leg (Active)   Number of days: 1267       Wound 21  Left;Plantar Round, no drainage, 1.5 x 1.5 x 0.5 (Active)   Number of days: 689       Wound 23 Left;Plantar (Active)   Number of days: 40       Wound 23 Tibial Left; Inner (Active)   Number of days: 40       Wound 03/15/23 Leg Lower; Inner;Left stage 2 6x2 (Active)   Wound Etiology Diabetic Mclaughlin 2 23 1213   Dressing Status New dressing applied 23 1213   Wound Cleansed Cleansed with saline 23 1213   Dressing/Treatment Foam 23 1213   Wound Length (cm) 6 cm 03/15/23 1051   Wound Width (cm) 2 cm 03/15/23 1051   Wound Depth (cm) 0 cm 03/15/23 1051   Wound Surface Area (cm^2) 12 cm^2 03/15/23 1051   Wound Volume (cm^3) 0 cm^3 03/15/23 1051   Wound Assessment Erythema;Pink/red 03/15/23 2000   Drainage Amount Scant 03/15/23 2000   Number of days: 1       Wound 03/15/23 Leg Lower;Right Shin healing stage 2 (Active)   Wound Etiology Diabetic Mclaughlin 2 23 1213   Dressing Status Other (Comment) 23 1213   Wound Cleansed Cleansed with saline 23 1213   Dressing/Treatment Open to air 23 1213   Wound Length (cm) 3 cm 03/15/23 1051   Wound Width (cm) 1.5 cm 03/15/23 1051   Wound Depth (cm) 0 cm 03/15/23 1051   Wound Surface Area (cm^2) 4.5 cm^2 03/15/23 1051   Wound Volume (cm^3) 0 cm^3 03/15/23 1051   Wound Assessment Dry;Eschar less than 20% 03/15/23 2000   Drainage Amount None 03/15/23 2000   Number of days: 1       Incision 22 Toe (Comment  which one) Anterior; Left (Active)   Number of days: 195       Incision 23 Foot Anterior; Left (Active)   Number of days: 36        Elimination:  Continence: Bowel: {YES / AL:78244}  Bladder: {YES / TI:85023}  Urinary Catheter: {Urinary Catheter:163562168}   Colostomy/Ileostomy/Ileal Conduit: {YES / YA:25333}       Date of Last BM: ***    Intake/Output Summary (Last 24 hours) at 3/16/2023 1429  Last data filed at 3/16/2023 1059  Gross per 24 hour   Intake 1128 ml   Output --   Net 1128 ml     I/O last 3 completed shifts:   In: 9998 [P.O.:1144]  Out: -     Safety Concerns:     [address] [employer] Safety Concerns:263083216}    Impairments/Disabilities:      [address] [employer] Impairments/Disabilities:788350257}    Nutrition Therapy:  Current Nutrition Therapy:   [address] [employer] Diet List:701179291}    Routes of Feeding: {CHP DME Other Feedings:103964779}  Liquids: {Slp liquid thickness:13009}  Daily Fluid Restriction: {CHP DME Yes amt example:757050330}  Last Modified Barium Swallow with Video (Video Swallowing Test): {Done Not Done RXBF:014899923}    Treatments at the Time of Hospital Discharge:   Respiratory Treatments: ***  Oxygen Therapy:  {Therapy; copd oxygen:75888}  Ventilator:    { FAB Vent LPOH:383290956}    Rehab Therapies: {THERAPEUTIC INTERVENTION:8414955358}  Weight Bearing Status/Restrictions: [address] [employer] Weight Bearin}  Other Medical Equipment (for information only, NOT a DME order):  {EQUIPMENT:075092097}  Other Treatments: ***    Patient's personal belongings (please select all that are sent with patient):  {CHP DME Belongings:301650639}    RN SIGNATURE:  {Esignature:744002522}    CASE MANAGEMENT/SOCIAL WORK SECTION    Inpatient Status Date: 03/14/23    Readmission Risk Assessment Score:  Readmission Risk              Risk of Unplanned Readmission:  22       / signature: Electronically signed by Madeleine Lund RN on 3/16/23 at 2:29 PM EDT    PHYSICIAN SECTION    Prognosis: {Prognosis:7772646390}    Condition at Discharge: Cecille[address] Valerie Butler Patient Condition:551543782}    Rehab Potential (if transferring to Rehab): {Prognosis:5442997093}    Recommended Labs or Other Treatments After Discharge: ***    Physician Certification: I certify the above information and transfer of Gilford Henri  is necessary for the continuing treatment of the diagnosis listed and that he requires {Admit to Appropriate Level of Care:49145} for {GREATER/LESS:318021547} 30 days.      Update Admission H&P: {CHP DME Changes in EHOIC:659838503}    PHYSICIAN SIGNATURE:  {Esignature:585691925}

## 2023-03-16 NOTE — CARE COORDINATION
CASE MANAGEMENT DISCHARGE SUMMARY      Discharge to: Home with spouse, denies needs    Precertification completed: N/A  Hospital Exemption Notification (HENS) completed: N/A    IMM given:03/16/23    New Durable Medical Equipment ordered/agency: n/a    Transportation:    Family/car: Private       Confirmed discharge plan with:     Patient: yes     Family:  patient to notify           RN, name: Rosie Richardson LPN    Note: Discharging nurse to complete ANTONIO, reconcile AVS, and place final copy with patient's discharge packet. RN to ensure that written prescriptions for  Level II medications are sent with patient to the facility as per protocol.

## 2023-03-16 NOTE — PROGRESS NOTES
Nutrition Note    Consult received for DM diet education. Provided pt with written and verbal instruction on DM nutrition therapy. Discussed sources of carbohydrate, carb counting, label reading, meal planning, and importance of BG control. Pt voiced understanding.      Electronically signed by Duyen Lucas MS, RD, LD on 3/16/23 at 1:46 PM EDT    Contact: [phone]

## 2023-03-19 NOTE — DISCHARGE SUMMARY
Hospital Medicine Discharge Summary    Patient ID: Franci Monreal      Patient's PCP: Daron Osborn MD    Admit Date: 3/14/2023     Discharge Date: 3/16/2023     Admitting Provider: Yoselyn Sutherland MD     Discharge Provider: POLO Moreira     Discharge Diagnoses: Active Hospital Problems    Diagnosis     Pre-syncope [R55]      Priority: Medium       The patient was seen and examined on day of discharge and this discharge summary is in conjunction with any daily progress note from day of discharge. Hospital Course:   57 y.o. male who presented to [facility] with complaints of lightheadedness/dizziness. Patient states that he was shopping at [facility] this morning when suddenly he felt like he was \"going to pass out\". Endorses lightheadedness and had to be helped to the ground. He denies passing out or losing consciousness. He states he has had multiple episodes of this in the past.  In the ED initial troponin was elevated at 0.02 however patient denies any chest pain or discomfort at this time. He does endorse some nausea and believes he had an episode of emesis in the ambulance. Denies fevers, chills, abdominal pain, urinary symptoms, or focal neurologic deficit. Will be admitted for further work-up and evaluation.      Dizziness  Elevated troponin  -Received 1 L NS in ED  -EKG with no ischemic changes however nonspecific T wave changes  -Orthostats negative  -Troponin elevated once at 0.02, repeat troponins negative  -Echocardiogram 3/15/2023 showed LVEF of 55 to 60%, no regional wall motion abnormality, grade 1 diastolic dysfunction  -Cardiology consulted during admission CT calcium scoring test with a score of 1 and stress test low risk with mild inferior defect which may be artifact, continue medical management     Diabetes mellitus type 2  -A1c 13.5% (2/04/2023) very poorly controlled  -Continue basal insulin  -Continue home gabapentin  -Recent osteomyelitis of left foot secondary to diabetic foot wound s/p partial left foot amputation 2/7/2023  -Resume home insulin regimen with close follow-up PCP, educated patient's on importance of consistent therapy and diet     Essential hypertension  -Continue home amlodipine and lisinopril     CKD  -Creatinine appears baseline  -Avoid nephrotoxins when able     Right lower leg wound  -Wound care consult      Physical Exam Performed:     BP (!) 151/71   Pulse 78   Temp 97.5 °F (36.4 °C) (Oral)   Resp 16   Ht 5' 10\" (1.778 m)   Wt 161 lb 11.2 oz (73.3 kg)   SpO2 98%   BMI 23.20 kg/m²       General appearance: No apparent distress, appears stated age and cooperative. HEENT: Pupils equal, round, and reactive to light. Conjunctivae/corneas clear. Neck: Supple, with full range of motion. No jugular venous distention. Trachea midline. Respiratory:  Normal respiratory effort. Clear to auscultation, bilaterally without Rales/Wheezes/Rhonchi. Cardiovascular: Regular rate and rhythm with normal S1/S2 without murmurs, rubs or gallops. Abdomen: Soft, non-tender, non-distended with normal bowel sounds. Musculoskeletal: Left foot status post partial amputation. No clubbing, cyanosis or edema bilaterally. Full range of motion without deformity. Skin: Bilateral leg wounds. Skin color, texture, turgor normal.  No rashes or lesions. Neurologic:  Neurovascularly intact without any focal sensory/motor deficits. Cranial nerves: II-XII intact, grossly non-focal.  Psychiatric: Alert and oriented, thought content appropriate, normal insight  Capillary Refill: Brisk, 3 seconds, normal   Peripheral Pulses: +2 palpable, equal bilaterally       Labs:  For convenience and continuity at follow-up the following most recent labs are provided:      CBC:    Lab Results   Component Value Date/Time    WBC 5.2 03/16/2023 11:42 AM    HGB 8.7 03/16/2023 11:42 AM    HCT 26.9 03/16/2023 11:42 AM     03/16/2023 11:42 AM       Renal:    Lab Results   Component Value Date/Time     03/16/2023 11:42 AM    K 5.4 03/16/2023 11:42 AM     03/16/2023 11:42 AM    CO2 27 03/16/2023 11:42 AM    BUN 18 03/16/2023 11:42 AM    CREATININE 1.5 03/16/2023 11:42 AM    CALCIUM 8.9 03/16/2023 11:42 AM    PHOS 2.7 11/06/2021 11:55 AM         Significant Diagnostic Studies    Radiology:   NM Cardiac Stress Test Nuclear Imaging   Final Result      CT CARDIAC CALCIUM SCORING   Final Result   Total calcium score of 1 is in the 10th percentile for the patient's age of   56 y/o . No incidental clinically important extracardiac CT findings. Mild gynecomastia greater on the right         Calcium Score Interpretation      0  No identifiable atherosclerotic plaque. Very low cardiovascular disease   risk. Less than 5% chance of presence of coronary artery disease. A   negative examination. 1-10 Minimal plaque burden. Significant coronary artery disease very   unlikely.  Mild plaque burden. Likely mild or minimal coronary stenosis. 101-400 Moderate plaque burden. Moderate non-obstructive coronary artery   disease highly likely. Over 400 Extensive plaque burden. High likelihood of at least one   significant coronary artery stenosis (>50% diameter). CALCIUM SCORING OVERVIEW:      Coronary calcium is a marker for plaque in a blood vessel or atherosclerosis   (hardening of the arteries). The presence and amount of calcium detected in   the coronary artery by the CT scan estimates the presence and amount of   atherosclerotic plaque. These calcium deposits can appear years before the   development of heart disease symptoms such as chest pain and shortness of   breath. A calcium score is computed for each of the coronary arteries based upon the   volume and density of the calcium deposits. This can be referred to as your   calcified plaque burden.   It does not correspond directly to the percentage   of narrowing in the artery, but does correlate with the severity of the   overall coronary atherosclerotic burden. This score is then used to determine the calcium percentile which compares   your calcified plaque burden to that of other asymptomatic men and women of   the same age. The calcium score, in combination with the percentile, enables   your physician to determine your risk of developing symptomatic coronary   artery disease, and to measure the progression of disease as well as the   effectiveness of treatment. A score of zero indicates that there is no calcified plaque burden. This   implies that there is no significant coronary artery narrowing and very low   likelihood of a cardiac event over at least the next 3 years. It does not   absolutely rule out the presence of soft, noncalcified plaque or totally   eliminate the possibility of a cardiac event. A score greater than zero indicates at least some coronary artery disease. As the score increases, so does the likelihood of a significant coronary   narrowing and the likelihood of a coronary event over the next 3 years. Similarly, the likelihood of a coronary event increases with increasing   calcium percentiles.          XR CHEST PORTABLE   Final Result   No acute abnormality                Consults:     IP CONSULT TO CARDIOLOGY    Disposition: Home    Condition at Discharge: Stable    Discharge Instructions/Follow-up:    Follow-up with PCP in 1 week    Code Status:  Prior full    Activity: activity as tolerated    Diet: regular diet and diabetic diet      Discharge Medications:     Discharge Medication List as of 3/16/2023  2:10 PM             Details   atorvastatin (LIPITOR) 40 MG tablet Take 1 tablet by mouth nightly, Disp-30 tablet, R-3Normal                Details   insulin glargine (LANTUS) 100 UNIT/ML injection vial Inject 30 Units into the skin nightly, Disp-1 each, R-0NO PRINT      tamsulosin (FLOMAX) 0.4 MG capsule Take 0.4 mg by mouth dailyHistorical Med      insulin lispro, 1 Unit Dial, (HUMALOG KWIKPEN) 100 UNIT/ML SOPN Inject 10 Units into the skin 3 times daily (before meals), Disp-5 Adjustable Dose Pre-filled Pen Syringe, R-2Normal      acetaminophen (TYLENOL) 500 MG tablet Take 2 tablets by mouth every 8 hours as needed for Pain, Disp-120 tablet, R-0Normal      aspirin 81 MG EC tablet Take 81 mg by mouth dailyHistorical Med      lisinopril (PRINIVIL;ZESTRIL) 10 MG tablet Take 10 mg by mouth dailyHistorical Med      Continuous Blood Gluc  (FREESTYLE ISAI 2 READER) BRITTANEY Historical Med      Continuous Blood Gluc Sensor (FREESTYLE ISAI 2 SENSOR) MISC Historical Med      !! BD PEN NEEDLE MARY 2ND GEN 32G X 4 MM MISC DAWHistorical Med      !! Insulin Pen Needle (ULTRA-THIN II PEN NEEDLES) 29G X 12.7MM MISC Historical Med      !! BD ULTRA-FINE PEN NEEDLES 29G X 12.7MM MISC Starting Thu 4/29/2021, SAMANTHA, Historical Med      amLODIPine (NORVASC) 5 MG tablet Take 1 tablet by mouth daily, Disp-30 tablet, R-3Normal      sildenafil (VIAGRA) 100 MG tablet Take 100 mg by mouth daily as neededHistorical Med      gabapentin (NEURONTIN) 600 MG tablet Take 600 mg by mouth 2 times daily. Historical Med      venlafaxine (EFFEXOR XR) 75 MG extended release capsule Take 75 mg by mouth daily Needs to be re prescribedHistorical Med       !! - Potential duplicate medications found. Please discuss with provider. Time Spent on discharge: 39 minutes in the examination, evaluation, counseling and review of medications and discharge plan. Signed:    POLO Jerez   3/19/2023      Thank you Kevin Bryant MD for the opportunity to be involved in this patient's care. If you have any questions or concerns, please feel free to contact me at [phone].

## 2023-04-24 PROCEDURE — 93228 REMOTE 30 DAY ECG REV/REPORT: CPT | Performed by: INTERNAL MEDICINE

## 2023-05-02 DIAGNOSIS — R55 PRE-SYNCOPE: ICD-10-CM

## 2023-05-03 ENCOUNTER — TELEPHONE (OUTPATIENT)
Dept: CARDIOLOGY CLINIC | Age: 63
End: 2023-05-03

## 2023-05-03 NOTE — TELEPHONE ENCOUNTER
----- Message from Gladis Lancaster MD sent at 5/2/2023  4:01 PM EDT -----  Let patient know their monitor test shows psvt, pvcs, rec: f/u w/ dr Kari Umaña, lets make sure pt has appt w/ him. Thanks.

## 2023-05-03 NOTE — TELEPHONE ENCOUNTER
Called. Pt's wife answered. No HIPPA on file so asked that she have pt call back to office to relay monitor results and recommendation made.

## 2023-06-22 ENCOUNTER — HOSPITAL ENCOUNTER (EMERGENCY)
Age: 63
Discharge: HOME | End: 2023-06-22
Payer: MEDICARE

## 2023-06-22 VITALS
RESPIRATION RATE: 12 BRPM | BODY MASS INDEX: 21.52 KG/M2 | DIASTOLIC BLOOD PRESSURE: 68 MMHG | HEART RATE: 67 BPM | OXYGEN SATURATION: 96 % | WEIGHT: 150 LBS | SYSTOLIC BLOOD PRESSURE: 133 MMHG | TEMPERATURE: 98.9 F

## 2023-06-22 DIAGNOSIS — E86.0 DEHYDRATION: ICD-10-CM

## 2023-06-22 DIAGNOSIS — R73.9 HYPERGLYCEMIA: Primary | ICD-10-CM

## 2023-06-22 LAB
ALBUMIN SERPL-MCNC: 3.8 G/DL (ref 3.4–5)
ALBUMIN/GLOB SERPL: 1 {RATIO} (ref 1.1–2.2)
ALP SERPL-CCNC: 256 U/L (ref 40–129)
ALT SERPL-CCNC: 13 U/L (ref 10–40)
AMORPH SED URNS QL MICRO: NORMAL /HPF
ANION GAP SERPL CALCULATED.3IONS-SCNC: 12 MMOL/L (ref 3–16)
AST SERPL-CCNC: 11 U/L (ref 15–37)
BASE EXCESS BLDV CALC-SCNC: -2.5 MMOL/L (ref -3–3)
BASOPHILS # BLD: 0 K/UL (ref 0–0.2)
BASOPHILS NFR BLD: 0.6 %
BILIRUB SERPL-MCNC: 0.7 MG/DL (ref 0–1)
BILIRUB UR QL STRIP.AUTO: NEGATIVE
BUN SERPL-MCNC: 33 MG/DL (ref 7–20)
CALCIUM SERPL-MCNC: 9 MG/DL (ref 8.3–10.6)
CHLORIDE SERPL-SCNC: 91 MMOL/L (ref 99–110)
CLARITY UR: CLEAR
CO2 BLDV-SCNC: 24 MMOL/L
CO2 SERPL-SCNC: 24 MMOL/L (ref 21–32)
COHGB MFR BLDV: 2.6 % (ref 0–1.5)
COLOR UR: YELLOW
CREAT SERPL-MCNC: 2.1 MG/DL (ref 0.8–1.3)
DEPRECATED RDW RBC AUTO: 14.5 % (ref 12.4–15.4)
EOSINOPHIL # BLD: 0 K/UL (ref 0–0.6)
EOSINOPHIL NFR BLD: 0.3 %
EPI CELLS #/AREA URNS HPF: NORMAL /HPF (ref 0–5)
GFR SERPLBLD CREATININE-BSD FMLA CKD-EPI: 34 ML/MIN/{1.73_M2}
GLUCOSE BLD-MCNC: 362 MG/DL (ref 70–99)
GLUCOSE BLD-MCNC: 433 MG/DL (ref 70–99)
GLUCOSE BLD-MCNC: 532 MG/DL (ref 70–99)
GLUCOSE SERPL-MCNC: 503 MG/DL (ref 70–99)
GLUCOSE UR STRIP.AUTO-MCNC: >=1000 MG/DL
HCO3 BLDV-SCNC: 22.6 MMOL/L (ref 23–29)
HCT VFR BLD AUTO: 27.5 % (ref 40.5–52.5)
HGB BLD-MCNC: 9 G/DL (ref 13.5–17.5)
HGB UR QL STRIP.AUTO: ABNORMAL
KETONES UR STRIP.AUTO-MCNC: 15 MG/DL
LEUKOCYTE ESTERASE UR QL STRIP.AUTO: NEGATIVE
LYMPHOCYTES # BLD: 1.1 K/UL (ref 1–5.1)
LYMPHOCYTES NFR BLD: 13.7 %
MCH RBC QN AUTO: 24.3 PG (ref 26–34)
MCHC RBC AUTO-ENTMCNC: 32.6 G/DL (ref 31–36)
MCV RBC AUTO: 74.7 FL (ref 80–100)
METHGB MFR BLDV: 0.3 %
MONOCYTES # BLD: 0.6 K/UL (ref 0–1.3)
MONOCYTES NFR BLD: 7.3 %
NEUTROPHILS # BLD: 6.2 K/UL (ref 1.7–7.7)
NEUTROPHILS NFR BLD: 78.1 %
NITRITE UR QL STRIP.AUTO: NEGATIVE
O2 THERAPY: ABNORMAL
PCO2 BLDV: 40.1 MMHG (ref 40–50)
PERFORMED ON: ABNORMAL
PH BLDV: 7.37 [PH] (ref 7.35–7.45)
PH UR STRIP.AUTO: 5.5 [PH] (ref 5–8)
PLATELET # BLD AUTO: 353 K/UL (ref 135–450)
PMV BLD AUTO: 8.2 FL (ref 5–10.5)
PO2 BLDV: 68.6 MMHG (ref 25–40)
POTASSIUM SERPL-SCNC: 4.4 MMOL/L (ref 3.5–5.1)
PROT SERPL-MCNC: 7.5 G/DL (ref 6.4–8.2)
PROT UR STRIP.AUTO-MCNC: 30 MG/DL
RBC # BLD AUTO: 3.68 M/UL (ref 4.2–5.9)
RBC #/AREA URNS HPF: NORMAL /HPF (ref 0–4)
SAO2 % BLDV: 93 %
SODIUM SERPL-SCNC: 127 MMOL/L (ref 136–145)
SP GR UR STRIP.AUTO: 1.01 (ref 1–1.03)
UA COMPLETE W REFLEX CULTURE PNL UR: ABNORMAL
UA DIPSTICK W REFLEX MICRO PNL UR: YES
URN SPEC COLLECT METH UR: ABNORMAL
UROBILINOGEN UR STRIP-ACNC: 0.2 E.U./DL
WBC # BLD AUTO: 8 K/UL (ref 4–11)
WBC #/AREA URNS HPF: NORMAL /HPF (ref 0–5)

## 2023-06-22 PROCEDURE — 81001 URINALYSIS AUTO W/SCOPE: CPT

## 2023-06-22 PROCEDURE — 6370000000 HC RX 637 (ALT 250 FOR IP): Performed by: NURSE PRACTITIONER

## 2023-06-22 PROCEDURE — 96374 THER/PROPH/DIAG INJ IV PUSH: CPT

## 2023-06-22 PROCEDURE — 99284 EMERGENCY DEPT VISIT MOD MDM: CPT

## 2023-06-22 PROCEDURE — 85025 COMPLETE CBC W/AUTO DIFF WBC: CPT

## 2023-06-22 PROCEDURE — 2580000003 HC RX 258: Performed by: NURSE PRACTITIONER

## 2023-06-22 PROCEDURE — 80053 COMPREHEN METABOLIC PANEL: CPT

## 2023-06-22 PROCEDURE — 82803 BLOOD GASES ANY COMBINATION: CPT

## 2023-06-22 RX ORDER — 0.9 % SODIUM CHLORIDE 0.9 %
1000 INTRAVENOUS SOLUTION INTRAVENOUS ONCE
Status: COMPLETED | OUTPATIENT
Start: 2023-06-22 | End: 2023-06-22

## 2023-06-22 RX ADMIN — SODIUM CHLORIDE 1000 ML: 9 INJECTION, SOLUTION INTRAVENOUS at 14:31

## 2023-06-22 RX ADMIN — INSULIN HUMAN 8 UNITS: 100 INJECTION, SOLUTION PARENTERAL at 16:03

## 2023-06-22 RX ADMIN — SODIUM CHLORIDE 1000 ML: 9 INJECTION, SOLUTION INTRAVENOUS at 16:02

## 2023-06-22 ASSESSMENT — PAIN - FUNCTIONAL ASSESSMENT
PAIN_FUNCTIONAL_ASSESSMENT: 0-10
PAIN_FUNCTIONAL_ASSESSMENT: NONE - DENIES PAIN

## 2023-06-22 ASSESSMENT — PAIN SCALES - GENERAL: PAINLEVEL_OUTOF10: 0

## 2023-06-22 NOTE — ED PROVIDER NOTES
treatment of his glucose. Otherwise patient will be discharged home. Encouraged follow-up with his family doctor for further management of his hyperglycemia. Does not appear to be in DKA. Disposition Considerations (tests considered but not done, Admit vs D/C, Shared Decision Making, Pt Expectation of Test or Tx.):       I am the Primary Clinician of Record. FINAL IMPRESSION      1. Hyperglycemia    2.  Dehydration          DISPOSITION/PLAN     DISPOSITION Decision To Discharge 06/22/2023 05:18:33 PM      PATIENT REFERRED TO:  Fabio Epley, MD  [address]  [phone]    Schedule an appointment as soon as possible for a visit       [facility]  ED  [address]  Go to   If symptoms worsen      DISCHARGE MEDICATIONS:  Discharge Medication List as of 6/22/2023  5:22 PM          DISCONTINUED MEDICATIONS:  Discharge Medication List as of 6/22/2023  5:22 PM                 (Please note that portions of this note were completed with a voice recognition program.  Efforts were made to edit the dictations but occasionally words are mis-transcribed.)    NAYELY Pierce CNP (electronically signed)       NAYELY Pierce CNP  06/24/23 1048

## 2023-06-22 NOTE — ED NOTES
Patient discharged from ED with all personal belongings and ED paperwork. All instructions provided by ED provider. Patient deemed clinically stable to go home. No additional needs/ questions during the time of discharge. Patient mother at bedside to transport patient back to private residence.       Letha Reveles RN  06/22/23 8894

## 2023-06-24 ASSESSMENT — ENCOUNTER SYMPTOMS
ABDOMINAL PAIN: 0
COUGH: 0
VOMITING: 0
NAUSEA: 0
BACK PAIN: 0
EYE PAIN: 0
SHORTNESS OF BREATH: 0

## 2024-12-24 NOTE — ED NOTES
2137 Patient Bed Assignment           Bed# 2116           Report# [id number]           [facility] [id number]       [employer]  01/18/21 2138       Francine Yip  01/18/21 2139 11

## (undated) DEVICE — HANDPIECE SET WITH HIGH FLOW TIP AND SUCTION TUBE: Brand: INTERPULSE

## (undated) DEVICE — ELECTRODE PT RET AD L9FT HI MOIST COND ADH HYDRGEL CORDED

## (undated) DEVICE — NEEDLE HYPO 25GA L1.5IN BLU POLYPR HUB S STL REG BVL STR

## (undated) DEVICE — TIP SUCT DIA12FR W STYL CTRL VENT DISPOSABLE FRAZ

## (undated) DEVICE — BANDAGE,GAUZE,BULKEE II,4.5"X4.1YD,STRL: Brand: MEDLINE

## (undated) DEVICE — PAD,ABDOMINAL,8"X10",ST,LF: Brand: MEDLINE

## (undated) DEVICE — SOLUTION IV IRRIG 500ML 0.9% SODIUM CHL 2F7123

## (undated) DEVICE — MAJOR SET UP PK

## (undated) DEVICE — DRESSING,GAUZE,XEROFORM,CURAD,1"X8",ST: Brand: CURAD

## (undated) DEVICE — GLOVE ORANGE PI 7 1/2   MSG9075

## (undated) DEVICE — BANDAGE COMPR W4INXL12FT E DISP ESMARCH EVEN

## (undated) DEVICE — X-RAY CASSETTE COVER: Brand: CONVERTORS

## (undated) DEVICE — SYRINGE MED 10ML LUERLOCK TIP W/O SFTY DISP

## (undated) DEVICE — BLADE SAW SAG OSCIL LNG MED 9X31MM

## (undated) DEVICE — SUTURE ETHLN SZ 3-0 L30IN NONABSORBABLE BLK FSL L30MM 3/8 1671H

## (undated) DEVICE — FORCEPS BX 240CM 2.4MM L NDL RAD JAW 4 M00513334

## (undated) DEVICE — COTTON UNDERCAST PADDING,CRIMPED FINISH: Brand: WEBRIL

## (undated) DEVICE — GOWN SIRUS NONREIN XL W/TWL: Brand: MEDLINE INDUSTRIES, INC.

## (undated) DEVICE — PACK ORTH LO EXT VI

## (undated) DEVICE — 3M™ COBAN™ NL STERILE NON-LATEX SELF-ADHERENT WRAP, 2084S, 4 IN X 5 YD (10 CM X 4,5 M), 18 ROLLS/CASE: Brand: 3M™ COBAN™

## (undated) DEVICE — APPLICATOR PREP 26ML 0.7% IOD POVACRYLEX 74% ISO ALC ST

## (undated) DEVICE — APPLICATOR MEDICATED 26 CC SOLUTION HI LT ORNG CHLORAPREP

## (undated) DEVICE — GAUZE,SPONGE,4"X4",8PLY,STRL,LF,10/TRAY: Brand: MEDLINE

## (undated) DEVICE — INTENDED FOR TISSUE SEPARATION, AND OTHER PROCEDURES THAT REQUIRE A SHARP SURGICAL BLADE TO PUNCTURE OR CUT.: Brand: BARD-PARKER ® STAINLESS STEEL BLADES

## (undated) DEVICE — ESMARK: Brand: DEROYAL

## (undated) DEVICE — Z CONVERTED USE 2276073 ROLL BNDG L W4.5INXL4 1/8YD WHT COT 6 PLY CNFRM KERLIX

## (undated) DEVICE — SHOE, POST-OPERATIVE: Brand: DEROYAL

## (undated) DEVICE — AGENT HEMSTAT W2XL4IN OXIDIZED REGENERATED CELOS ABSRB

## (undated) DEVICE — CURITY ABDOMINAL PAD: Brand: CURITY

## (undated) DEVICE — BITE BLOCK ENDOSCP AD 60 FR W/ ADJ STRP PLAS GRN BLOX

## (undated) DEVICE — OCCLUSIVE GAUZE STRIP,3% BISMUTH TRIBROMOPHENATE IN PETROLATUM BLEND: Brand: XEROFORM

## (undated) DEVICE — BANDAGE ROLL,100% COTTON, 8 PLY, LARGE: Brand: KERLIX

## (undated) DEVICE — CONTAINER SPEC 480ML CLR POLYSTYR 10% NEUT BUFF FRMLN ZN

## (undated) DEVICE — ABDOMINAL PAD: Brand: DERMACEA

## (undated) DEVICE — ENDOSCOPY KIT: Brand: MEDLINE INDUSTRIES, INC.

## (undated) DEVICE — Z INACTIVE USE 2660664 SOLUTION IRRIG 3000ML 0.9% SOD CHL USP UROMATIC PLAS CONT

## (undated) DEVICE — BANDAGE,GAUZE,4.5"X4.1YD,STERILE,LF: Brand: MEDLINE

## (undated) DEVICE — PADDING CAST W4INXL4YD NONSTERILE COT RAYON MICROPLEATED

## (undated) DEVICE — Z INACTIVE USE 2855096 SPONGE GZ W4XL4IN 8 PLY 100% COT

## (undated) DEVICE — CANNULA NSL 13FT TUBE AD ETCO2 DIV SAMP M